# Patient Record
Sex: MALE | Race: WHITE | NOT HISPANIC OR LATINO | Employment: OTHER | URBAN - METROPOLITAN AREA
[De-identification: names, ages, dates, MRNs, and addresses within clinical notes are randomized per-mention and may not be internally consistent; named-entity substitution may affect disease eponyms.]

---

## 2017-01-04 ENCOUNTER — TRANSCRIBE ORDERS (OUTPATIENT)
Dept: URGENT CARE | Age: 72
End: 2017-01-04

## 2017-01-04 ENCOUNTER — APPOINTMENT (OUTPATIENT)
Dept: LAB | Age: 72
End: 2017-01-04
Payer: MEDICARE

## 2017-01-04 DIAGNOSIS — N40.0 BENIGN PROSTATIC HYPERPLASIA, PRESENCE OF LOWER URINARY TRACT SYMPTOMS UNSPECIFIED, UNSPECIFIED MORPHOLOGY: Primary | ICD-10-CM

## 2017-01-04 DIAGNOSIS — N40.0 BENIGN PROSTATIC HYPERPLASIA, PRESENCE OF LOWER URINARY TRACT SYMPTOMS UNSPECIFIED, UNSPECIFIED MORPHOLOGY: ICD-10-CM

## 2017-01-04 LAB — PSA SERPL-MCNC: 0.4 NG/ML (ref 0–4)

## 2017-01-04 PROCEDURE — 84153 ASSAY OF PSA TOTAL: CPT

## 2017-01-05 ENCOUNTER — LAB CONVERSION - ENCOUNTER (OUTPATIENT)
Dept: OTHER | Facility: OTHER | Age: 72
End: 2017-01-05

## 2017-01-05 ENCOUNTER — LAB REQUISITION (OUTPATIENT)
Dept: LAB | Facility: HOSPITAL | Age: 72
End: 2017-01-05
Payer: MEDICARE

## 2017-01-05 DIAGNOSIS — Z12.11 ENCOUNTER FOR SCREENING FOR MALIGNANT NEOPLASM OF COLON: ICD-10-CM

## 2017-01-05 PROCEDURE — 88305 TISSUE EXAM BY PATHOLOGIST: CPT | Performed by: INTERNAL MEDICINE

## 2017-01-23 ENCOUNTER — GENERIC CONVERSION - ENCOUNTER (OUTPATIENT)
Dept: OTHER | Facility: OTHER | Age: 72
End: 2017-01-23

## 2017-02-20 ENCOUNTER — ALLSCRIPTS OFFICE VISIT (OUTPATIENT)
Dept: OTHER | Facility: OTHER | Age: 72
End: 2017-02-20

## 2017-02-20 DIAGNOSIS — I10 ESSENTIAL (PRIMARY) HYPERTENSION: ICD-10-CM

## 2017-02-20 DIAGNOSIS — K21.9 GASTRO-ESOPHAGEAL REFLUX DISEASE WITHOUT ESOPHAGITIS: ICD-10-CM

## 2017-02-20 DIAGNOSIS — R00.2 PALPITATIONS: ICD-10-CM

## 2017-02-20 DIAGNOSIS — G47.30 SLEEP APNEA: ICD-10-CM

## 2017-02-20 DIAGNOSIS — Z00.00 ENCOUNTER FOR GENERAL ADULT MEDICAL EXAMINATION WITHOUT ABNORMAL FINDINGS: ICD-10-CM

## 2017-02-20 DIAGNOSIS — E66.9 OBESITY: ICD-10-CM

## 2017-02-20 DIAGNOSIS — I49.8 OTHER SPECIFIED CARDIAC ARRHYTHMIAS: ICD-10-CM

## 2017-02-20 DIAGNOSIS — E78.5 HYPERLIPIDEMIA: ICD-10-CM

## 2017-02-20 DIAGNOSIS — E78.00 PURE HYPERCHOLESTEROLEMIA: ICD-10-CM

## 2017-02-20 DIAGNOSIS — E11.9 TYPE 2 DIABETES MELLITUS WITHOUT COMPLICATIONS (HCC): ICD-10-CM

## 2017-04-06 ENCOUNTER — APPOINTMENT (OUTPATIENT)
Dept: LAB | Age: 72
End: 2017-04-06
Payer: MEDICARE

## 2017-04-06 ENCOUNTER — TRANSCRIBE ORDERS (OUTPATIENT)
Dept: ADMINISTRATIVE | Age: 72
End: 2017-04-06

## 2017-04-06 DIAGNOSIS — E66.9 OBESITY: ICD-10-CM

## 2017-04-06 DIAGNOSIS — R00.2 PALPITATIONS: ICD-10-CM

## 2017-04-06 DIAGNOSIS — Z00.00 ENCOUNTER FOR GENERAL ADULT MEDICAL EXAMINATION WITHOUT ABNORMAL FINDINGS: ICD-10-CM

## 2017-04-06 DIAGNOSIS — I49.8 OTHER SPECIFIED CARDIAC DYSRHYTHMIAS(427.89): ICD-10-CM

## 2017-04-06 DIAGNOSIS — E78.00 PURE HYPERCHOLESTEROLEMIA: ICD-10-CM

## 2017-04-06 DIAGNOSIS — E11.9 TYPE 2 DIABETES MELLITUS WITHOUT COMPLICATIONS (HCC): ICD-10-CM

## 2017-04-06 DIAGNOSIS — E78.5 HYPERLIPIDEMIA: ICD-10-CM

## 2017-04-06 DIAGNOSIS — G47.30 SLEEP APNEA: ICD-10-CM

## 2017-04-06 DIAGNOSIS — I10 ESSENTIAL (PRIMARY) HYPERTENSION: ICD-10-CM

## 2017-04-06 DIAGNOSIS — K21.9 GASTRO-ESOPHAGEAL REFLUX DISEASE WITHOUT ESOPHAGITIS: ICD-10-CM

## 2017-04-06 LAB
ALBUMIN SERPL BCP-MCNC: 3.2 G/DL (ref 3.5–5)
ALP SERPL-CCNC: 113 U/L (ref 46–116)
ALT SERPL W P-5'-P-CCNC: 42 U/L (ref 12–78)
ANION GAP SERPL CALCULATED.3IONS-SCNC: 7 MMOL/L (ref 4–13)
AST SERPL W P-5'-P-CCNC: 18 U/L (ref 5–45)
BACTERIA UR QL AUTO: ABNORMAL /HPF
BASOPHILS # BLD AUTO: 0.02 THOUSANDS/ΜL (ref 0–0.1)
BASOPHILS NFR BLD AUTO: 0 % (ref 0–1)
BILIRUB SERPL-MCNC: 0.59 MG/DL (ref 0.2–1)
BILIRUB UR QL STRIP: NEGATIVE
BUN SERPL-MCNC: 15 MG/DL (ref 5–25)
CALCIUM SERPL-MCNC: 8.2 MG/DL (ref 8.3–10.1)
CHLORIDE SERPL-SCNC: 98 MMOL/L (ref 100–108)
CHOLEST SERPL-MCNC: 168 MG/DL (ref 50–200)
CLARITY UR: CLEAR
CO2 SERPL-SCNC: 31 MMOL/L (ref 21–32)
COLOR UR: YELLOW
CREAT SERPL-MCNC: 1.13 MG/DL (ref 0.6–1.3)
EOSINOPHIL # BLD AUTO: 0.12 THOUSAND/ΜL (ref 0–0.61)
EOSINOPHIL NFR BLD AUTO: 2 % (ref 0–6)
ERYTHROCYTE [DISTWIDTH] IN BLOOD BY AUTOMATED COUNT: 13.6 % (ref 11.6–15.1)
GFR SERPL CREATININE-BSD FRML MDRD: >60 ML/MIN/1.73SQ M
GLUCOSE P FAST SERPL-MCNC: 140 MG/DL (ref 65–99)
GLUCOSE UR STRIP-MCNC: NEGATIVE MG/DL
HCT VFR BLD AUTO: 37.2 % (ref 36.5–49.3)
HDLC SERPL-MCNC: 43 MG/DL (ref 40–60)
HGB BLD-MCNC: 12.9 G/DL (ref 12–17)
HGB UR QL STRIP.AUTO: ABNORMAL
HYALINE CASTS #/AREA URNS LPF: ABNORMAL /LPF
KETONES UR STRIP-MCNC: NEGATIVE MG/DL
LDLC SERPL CALC-MCNC: 98 MG/DL (ref 0–100)
LEUKOCYTE ESTERASE UR QL STRIP: NEGATIVE
LYMPHOCYTES # BLD AUTO: 1.49 THOUSANDS/ΜL (ref 0.6–4.47)
LYMPHOCYTES NFR BLD AUTO: 22 % (ref 14–44)
MCH RBC QN AUTO: 33.8 PG (ref 26.8–34.3)
MCHC RBC AUTO-ENTMCNC: 34.7 G/DL (ref 31.4–37.4)
MCV RBC AUTO: 97 FL (ref 82–98)
MONOCYTES # BLD AUTO: 0.6 THOUSAND/ΜL (ref 0.17–1.22)
MONOCYTES NFR BLD AUTO: 9 % (ref 4–12)
NEUTROPHILS # BLD AUTO: 4.62 THOUSANDS/ΜL (ref 1.85–7.62)
NEUTS SEG NFR BLD AUTO: 67 % (ref 43–75)
NITRITE UR QL STRIP: NEGATIVE
NON-SQ EPI CELLS URNS QL MICRO: ABNORMAL /HPF
NRBC BLD AUTO-RTO: 0 /100 WBCS
PH UR STRIP.AUTO: 6.5 [PH] (ref 4.5–8)
PLATELET # BLD AUTO: 257 THOUSANDS/UL (ref 149–390)
PMV BLD AUTO: 10.2 FL (ref 8.9–12.7)
POTASSIUM SERPL-SCNC: 3.2 MMOL/L (ref 3.5–5.3)
PROT SERPL-MCNC: 7.5 G/DL (ref 6.4–8.2)
PROT UR STRIP-MCNC: ABNORMAL MG/DL
RBC # BLD AUTO: 3.82 MILLION/UL (ref 3.88–5.62)
RBC #/AREA URNS AUTO: ABNORMAL /HPF
SODIUM SERPL-SCNC: 136 MMOL/L (ref 136–145)
SP GR UR STRIP.AUTO: 1.02 (ref 1–1.03)
TRIGL SERPL-MCNC: 133 MG/DL
UROBILINOGEN UR QL STRIP.AUTO: 0.2 E.U./DL
WBC # BLD AUTO: 6.88 THOUSAND/UL (ref 4.31–10.16)
WBC #/AREA URNS AUTO: ABNORMAL /HPF

## 2017-04-06 PROCEDURE — 81001 URINALYSIS AUTO W/SCOPE: CPT

## 2017-04-06 PROCEDURE — 80053 COMPREHEN METABOLIC PANEL: CPT

## 2017-04-06 PROCEDURE — 80061 LIPID PANEL: CPT

## 2017-04-06 PROCEDURE — 36415 COLL VENOUS BLD VENIPUNCTURE: CPT

## 2017-04-06 PROCEDURE — 85025 COMPLETE CBC W/AUTO DIFF WBC: CPT

## 2017-04-13 ENCOUNTER — ALLSCRIPTS OFFICE VISIT (OUTPATIENT)
Dept: OTHER | Facility: OTHER | Age: 72
End: 2017-04-13

## 2017-04-14 ENCOUNTER — GENERIC CONVERSION - ENCOUNTER (OUTPATIENT)
Dept: OTHER | Facility: OTHER | Age: 72
End: 2017-04-14

## 2017-05-19 ENCOUNTER — HOSPITAL ENCOUNTER (OUTPATIENT)
Dept: SLEEP CENTER | Facility: CLINIC | Age: 72
Discharge: HOME | End: 2017-05-19
Payer: MEDICARE

## 2017-05-19 ENCOUNTER — TRANSCRIBE ORDERS (OUTPATIENT)
Dept: SLEEP CENTER | Facility: CLINIC | Age: 72
End: 2017-05-19

## 2017-05-19 DIAGNOSIS — G47.33 OSA (OBSTRUCTIVE SLEEP APNEA): ICD-10-CM

## 2017-05-19 DIAGNOSIS — G47.33 OSA (OBSTRUCTIVE SLEEP APNEA): Primary | ICD-10-CM

## 2017-06-06 ENCOUNTER — GENERIC CONVERSION - ENCOUNTER (OUTPATIENT)
Dept: OTHER | Facility: OTHER | Age: 72
End: 2017-06-06

## 2017-06-07 ENCOUNTER — GENERIC CONVERSION - ENCOUNTER (OUTPATIENT)
Dept: OTHER | Facility: OTHER | Age: 72
End: 2017-06-07

## 2017-06-08 ENCOUNTER — GENERIC CONVERSION - ENCOUNTER (OUTPATIENT)
Dept: OTHER | Facility: OTHER | Age: 72
End: 2017-06-08

## 2017-06-15 DIAGNOSIS — E11.9 TYPE 2 DIABETES MELLITUS WITHOUT COMPLICATIONS (HCC): ICD-10-CM

## 2017-06-21 ENCOUNTER — APPOINTMENT (OUTPATIENT)
Dept: LAB | Age: 72
End: 2017-06-21
Payer: MEDICARE

## 2017-06-21 ENCOUNTER — TRANSCRIBE ORDERS (OUTPATIENT)
Dept: ADMINISTRATIVE | Age: 72
End: 2017-06-21

## 2017-06-21 DIAGNOSIS — E08.8 DIABETES MELLITUS DUE TO UNDERLYING CONDITION WITH COMPLICATION, UNSPECIFIED LONG TERM INSULIN USE STATUS: ICD-10-CM

## 2017-06-21 DIAGNOSIS — E08.8 DIABETES MELLITUS DUE TO UNDERLYING CONDITION WITH COMPLICATION, UNSPECIFIED LONG TERM INSULIN USE STATUS: Primary | ICD-10-CM

## 2017-06-21 LAB
ALBUMIN SERPL BCP-MCNC: 3.4 G/DL (ref 3.5–5)
ALP SERPL-CCNC: 127 U/L (ref 46–116)
ALT SERPL W P-5'-P-CCNC: 33 U/L (ref 12–78)
ANION GAP SERPL CALCULATED.3IONS-SCNC: 11 MMOL/L (ref 4–13)
AST SERPL W P-5'-P-CCNC: 19 U/L (ref 5–45)
BILIRUB SERPL-MCNC: 0.34 MG/DL (ref 0.2–1)
BUN SERPL-MCNC: 22 MG/DL (ref 5–25)
CALCIUM SERPL-MCNC: 9.4 MG/DL (ref 8.3–10.1)
CHLORIDE SERPL-SCNC: 102 MMOL/L (ref 100–108)
CO2 SERPL-SCNC: 29 MMOL/L (ref 21–32)
CREAT SERPL-MCNC: 1.02 MG/DL (ref 0.6–1.3)
EST. AVERAGE GLUCOSE BLD GHB EST-MCNC: 137 MG/DL
GFR SERPL CREATININE-BSD FRML MDRD: >60 ML/MIN/1.73SQ M
GLUCOSE P FAST SERPL-MCNC: 139 MG/DL (ref 65–99)
HBA1C MFR BLD: 6.4 % (ref 4.2–6.3)
POTASSIUM SERPL-SCNC: 3.4 MMOL/L (ref 3.5–5.3)
PROT SERPL-MCNC: 7.9 G/DL (ref 6.4–8.2)
SODIUM SERPL-SCNC: 142 MMOL/L (ref 136–145)

## 2017-06-21 PROCEDURE — 83036 HEMOGLOBIN GLYCOSYLATED A1C: CPT

## 2017-06-21 PROCEDURE — 80053 COMPREHEN METABOLIC PANEL: CPT

## 2017-06-21 PROCEDURE — 36415 COLL VENOUS BLD VENIPUNCTURE: CPT

## 2017-06-28 ENCOUNTER — ALLSCRIPTS OFFICE VISIT (OUTPATIENT)
Dept: OTHER | Facility: OTHER | Age: 72
End: 2017-06-28

## 2017-07-05 ENCOUNTER — ALLSCRIPTS OFFICE VISIT (OUTPATIENT)
Dept: OTHER | Facility: OTHER | Age: 72
End: 2017-07-05

## 2017-07-14 ENCOUNTER — ALLSCRIPTS OFFICE VISIT (OUTPATIENT)
Dept: OTHER | Facility: OTHER | Age: 72
End: 2017-07-14

## 2017-10-26 ENCOUNTER — APPOINTMENT (OUTPATIENT)
Dept: LAB | Age: 72
End: 2017-10-26
Payer: MEDICARE

## 2017-10-26 ENCOUNTER — TRANSCRIBE ORDERS (OUTPATIENT)
Dept: ADMINISTRATIVE | Age: 72
End: 2017-10-26

## 2017-10-26 DIAGNOSIS — E11.9 TYPE 2 DIABETES MELLITUS WITHOUT COMPLICATIONS (HCC): ICD-10-CM

## 2017-10-26 LAB
ALBUMIN SERPL BCP-MCNC: 3.2 G/DL (ref 3.5–5)
ALP SERPL-CCNC: 151 U/L (ref 46–116)
ALT SERPL W P-5'-P-CCNC: 48 U/L (ref 12–78)
ANION GAP SERPL CALCULATED.3IONS-SCNC: 8 MMOL/L (ref 4–13)
AST SERPL W P-5'-P-CCNC: 23 U/L (ref 5–45)
BILIRUB SERPL-MCNC: 0.49 MG/DL (ref 0.2–1)
BUN SERPL-MCNC: 11 MG/DL (ref 5–25)
CALCIUM SERPL-MCNC: 8.9 MG/DL (ref 8.3–10.1)
CHLORIDE SERPL-SCNC: 101 MMOL/L (ref 100–108)
CO2 SERPL-SCNC: 28 MMOL/L (ref 21–32)
CREAT SERPL-MCNC: 1.1 MG/DL (ref 0.6–1.3)
EST. AVERAGE GLUCOSE BLD GHB EST-MCNC: 186 MG/DL
GFR SERPL CREATININE-BSD FRML MDRD: 66 ML/MIN/1.73SQ M
GLUCOSE P FAST SERPL-MCNC: 190 MG/DL (ref 65–99)
HBA1C MFR BLD: 8.1 % (ref 4.2–6.3)
POTASSIUM SERPL-SCNC: 3.4 MMOL/L (ref 3.5–5.3)
PROT SERPL-MCNC: 7.9 G/DL (ref 6.4–8.2)
SODIUM SERPL-SCNC: 137 MMOL/L (ref 136–145)

## 2017-10-26 PROCEDURE — 83036 HEMOGLOBIN GLYCOSYLATED A1C: CPT

## 2017-10-26 PROCEDURE — 36415 COLL VENOUS BLD VENIPUNCTURE: CPT

## 2017-10-26 PROCEDURE — 80053 COMPREHEN METABOLIC PANEL: CPT

## 2017-10-28 DIAGNOSIS — E11.9 TYPE 2 DIABETES MELLITUS WITHOUT COMPLICATIONS (HCC): ICD-10-CM

## 2017-11-02 ENCOUNTER — ALLSCRIPTS OFFICE VISIT (OUTPATIENT)
Dept: OTHER | Facility: OTHER | Age: 72
End: 2017-11-02

## 2017-11-03 NOTE — PROGRESS NOTES
Assessment  1  Acute pain of right knee (959 46) (M25 561)   2  DMII (diabetes mellitus, type 2) (250 00) (E11 9)   3  Morbid obesity with BMI of 40 0-44 9, adult (278 01,V85 41) (E66 01,Z68 41)   4  Hypertension (401 9) (I10)    Plan  DMII (diabetes mellitus, type 2)    · MetFORMIN HCl - 500 MG Oral Tablet; Take 1 tablet daily   · (1) BASIC METABOLIC PROFILE; Status:Active; Requested for:27Nov2017;     1  Type 2 diabetes uncontrolled recommend the patient start metformin at 500 mg daily follow-up fasting glucose in 3-4 weeks with office visit  Patient will not do home glucometer checking at this time  2   Morbid obesity with a 7 lb weight gain from July to now reviewed with the patient the importance of losing weight through walking exercise and calorie consumption reduction  He admits to poor diet control over the past several months  Dietary consultation offered and declined  3   Hypertension adequate control continue present medications for hypertension  4   Persistent pain in the right knee status post total knee replacement patient's car orthopedic surgeon believes that it could be related to his statin medication will discontinue the medication for period of 4 weeks and re-evaluate  Discussion/Summary  Discussion Summary:   In summary this pleasant 76-year-old gentleman presents today for routine follow-up visit  His blood testing indicates a significant jump in his fasting glucose which is now 190  He did admits to increased frequency of urination secondary to his high blood sugars  We also tested his hemoglobin A1c with a reading of 8 1% today  We discussed the pathophysiology of diabetes and the need to start medication at this time  The patient is agreeable start medication but does not want to start testing his glucose at home   I will start him on metformin 500 mg daily with follow-up laboratory fasting glucose in 4 weeks with an office visit shortly after that   said persistent pain status post total knee replacement  His orthopedic surgeon thinks it may be something related to his statin medication will discontinue his Crestor for 4 weeks and re-evaluate the pain  hypertension continues under good control  does have morbid obesity unfortunately has gained 7 lb since July  We had a discussion today about the need to seriously lose weight hopefully he will be able to decrease his calorie consumption as his activity level is somewhat limited  Counseling Documentation With Imm: total time of encounter was 25 minutes-- and-- 20 minutes was spent counseling  Chief Complaint  Chief Complaint Free Text Note Form: patient is here for a follow up  History of Present Illness  HPI: This 65-year-old gentleman returns today for routine follow-up visit  We performed a comprehensive metabolic profile and hemoglobin A1c test on him for this visit  His blood sugar is now 190 with a hemoglobin A1c value of 8 1%  Had a long discussion with the patient indicating that I think it is no longer safe to continue lifestyle management of his diabetes  I will start him on metformin 500 mg daily to be taken at breakfast time  I have encouraged him to work on losing weight and control his calorie intake  Unfortunately he has gained 7 lb since July  Part of this was from inactivity following his orthopedic surgery  He is reluctant to start home glucose glucose monitoring and prefers to go to the lab to have his blood sugar checked  I will see him in 3-4 weeks for follow-up visit at that time will have a fasting glucose to check on the performance of his metformin medication  has persistent pain in the area of his recent knee surgery  I his orthopedic surgeon is recommending that we discontinue his statin medication for a trial of one-month to determine if the medication is a factor in his persistent pain  I agree with this and will have him stop the Crestor for 4 weeks           Review of Systems  Complete-Male:   Constitutional: recent 7 lb weight gain, but-- No fever or chills, feels well, no tiredness, no recent weight gain or weight loss  Eyes: No complaints of eye pain, no red eyes, no discharge from eyes, no itchy eyes  ENT: no complaints of earache, no hearing loss, no nosebleeds, no nasal discharge, no sore throat, no hoarseness  Cardiovascular: No complaints of slow heart rate, no fast heart rate, no chest pain, no palpitations, no leg claudication, no lower extremity  Respiratory: No complaints of shortness of breath, no wheezing, no cough, no SOB on exertion, no orthopnea or PND  Gastrointestinal: No complaints of abdominal pain, no constipation, no nausea or vomiting, no diarrhea or bloody stools  Genitourinary: No complaints of dysuria, no incontinence, no hesitancy, no nocturia, no genital lesion, no testicular pain  Musculoskeletal: arthralgias-- and-- myalgias  Integumentary: No complaints of skin rash or skin lesions, no itching, no skin wound, no dry skin  Neurological: No compliants of headache, no confusion, no convulsions, no numbness or tingling, no dizziness or fainting, no limb weakness, no difficulty walking  Psychiatric: Is not suicidal, no sleep disturbances, no anxiety or depression, no change in personality, no emotional problems  Endocrine: No complaints of proptosis, no hot flashes, no muscle weakness, no erectile dysfunction, no deepening of the voice, no feelings of weakness  Hematologic/Lymphatic: No complaints of swollen glands, no swollen glands in the neck, does not bleed easily, no easy bruising  Active Problems  1  Acute pain of right knee (719 46) (M25 561)   2  Acute pharyngitis, unspecified etiology (462) (J02 9)   3  Antibiotic prophylaxis for dental procedure indicated due to prior joint replacement (V58 62) (Z79 2)   4  Anxiety (300 00) (F41 9)   5  Arthritis (716 90) (M19 90)   6   DMII (diabetes mellitus, type 2) (250 00) (E11 9)   7  Fluttering heart (427 42) (I49 8)   8  GERD (gastroesophageal reflux disease) (530 81) (K21 9)   9  High cholesterol (272 0) (E78 00)   10  History of total knee replacement, right (V43 65) (Z96 651)   11  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)   12  Hyperlipidemia (272 4) (E78 5)   13  Hypertension (401 9) (I10)   14  Liver disease (573 9) (K76 9)   15  Lumbar radiculopathy (724 4) (M54 16)   16  Mild left ventricular hypertrophy (429 3) (I51 7)   17  Morbid obesity with BMI of 40 0-44 9, adult (278 01,V85 41) (E66 01,Z68 41)   18  Need for immunization against influenza (V04 81) (Z23)   19  Obesity (278 00) (E66 9)   20  Palpitations (785 1) (R00 2)   21  Sinusitis (473 9) (J32 9)   22  Sleep apnea (780 57) (G47 30)    Past Medical History  1  History of Chest discomfort (786 59) (R07 89)   2  History of Diaphoresis (780 8) (R61)   3  History of acute sinusitis (V12 69) (Z87 09)   4  History of paroxysmal supraventricular tachycardia (V12 59) (Z86 79)   5  History of shortness of breath (V13 89) (Z87 898)   6  History of tinnitus (V12 49) (Z86 69)   7  History of urinary frequency (V13 09) (Z87 898)   8  History of Numbness and tingling in left arm (782 0) (R20 0,R20 2)   9  History of Numbness on left side (782 0) (R20 0)  Active Problems And Past Medical History Reviewed: The active problems and past medical history were reviewed and updated today  Surgical History  1  History of Anoscopy For Polyp Removal   2  History of Arthroscopy Knee Right   3  History of Back Surgery   4  History of Biopsy Rectal   5  History of Knee Surgery Left   6  History of Oral Surgery Tooth Extraction   7  History of Total Knee Replacement Left  Surgical History Reviewed: The surgical history was reviewed and updated today  Family History  Mother    1  Family history of diabetes mellitus (V18 0) (Z83 3)  Father    2  Family history of hypertension (V17 49) (Z82 49)  Family History Reviewed:    The family history was reviewed and updated today  Social History   · Consumes alcohol (V49 89) (Z78 9)   · Former smoker (F27 63) (U86 854)   ·    · Non-smoker (V49 89) (Z78 9)   · Retired  Social History Reviewed: The social history was reviewed and updated today  The social history was reviewed and is unchanged  Current Meds   1  AmLODIPine Besylate 5 MG Oral Tablet; TAKE 1 AND 1/2 TABLETS     DAILY; Therapy: 39UJI8027 to 450 5770)  Requested for: 36VHN2865; Last Rx:23Jan2017   Ordered   2  Amoxicillin 500 MG Oral Capsule; TAKE 4 CAPSULES 1 HOUR PRIOR TO DENTAL APPOINTMENT; Therapy: 01Aff0343 to (Evaluate:15Oct2017)  Requested for: 99VJI2824; Last Rx:10Oct2017   Ordered   3  Aspirin Low Dose 81 MG TABS; TAKE 1 TABLET DAILY; Therapy: (Recorded:17Mar2015) to Recorded   4  Benicar HCT 40-25 MG Oral Tablet; TAKE 1 TABLET DAILY; Therapy: 75GPZ2604 to (Evaluate:23Nov2017)  Requested for: 15ITQ1749; Last Rx:28Nov2016   Ordered   5  Cephalexin 500 MG Oral Capsule; TAKE 4 CAPSULES 1 HR PRIOR TO DENTAL PROCEDURE; Therapy: 18XQC1033 to (Evaluate:30Mzv4889)  Requested for: 88KPM7486; Last Rx:72Fmz4901 Ordered   6  Citalopram Hydrobromide 10 MG Oral Tablet; Take 1 tablet daily; Therapy: 86PYN0220 to (Ruffus Willie)  Requested for: 88Jzo5633; Last Rx:89Veu9020   Ordered   7  Combigan 0 2-0 5 % Ophthalmic Solution; take as directed; Therapy: (Recorded:17Mar2015) to Recorded   8  Fluticasone Propionate 50 MCG/ACT Nasal Suspension; USE 1 SPRAY IN EACH NOSTRIL TWICE   DAILY for one week then one spray once daily thereafter; Therapy: 14ROH3893 to (Evaluate:01Eue1061)  Requested for: 71CKK1687; Last Rx:77Qfv2992   Ordered   9  Latanoprost 0 005 % Ophthalmic Solution; Therapy: 59GWP3863 to Recorded   10   MethylPREDNISolone 4 MG Oral Tablet Therapy Pack; take 6 tabs the first day, 5 tabs the next day, 4    tabs the next day, 3 tabs the next day, 2 tabs the next day and 1 tab the next day and sto;    Therapy: [id number] to (Evaluate:12Lki0776)  Requested for: 91XHF7930; Last Rx:91Egi6841 Ordered   11  Omeprazole 20 MG Oral Capsule Delayed Release; take 1 capsule daily; Therapy: 19RQC0091 to (Evaluate:18Jan2018)  Requested for: 29ESA6548; Last Rx:45Ohq2038    Ordered   12  Rosuvastatin Calcium 5 MG Oral Tablet; TAKE 1 TABLET DAILY; Therapy: 00UWA0570 to (Charly Knows)  Requested for: 53IMR7903; Last Rx:21Tbe9112    Ordered   13  TraMADol HCl - 50 MG Oral Tablet; TAKE 1 TABLET EVERY 4 TO 6 HOURS AS NEEDED FOR PAIN;    Therapy: 41TEM1817 to (Evaluate:39Kgi3541); Last Rx:28Jun2017 Ordered  Medication List Reviewed: The medication list was reviewed and updated today  Allergies  1  Codeine Derivatives   2  Sulfa Drugs    Vitals  Vital Signs    Recorded: 09QJV7494 12:54PM   Temperature 98 8 F   Heart Rate 67   Respiration 16   Systolic 574   Diastolic 84   Height 5 ft 7 5 in   Weight 287 lb 0 2 oz   BMI Calculated 44 29   BSA Calculated 2 37   O2 Saturation 98     Physical Exam    Constitutional   General appearance: No acute distress, well appearing and well nourished  Eyes   Conjunctiva and lids: No swelling, erythema, or discharge  Ears, Nose, Mouth, and Throat   External inspection of ears and nose: Normal     Pulmonary   Respiratory effort: No increased work of breathing or signs of respiratory distress  Auscultation of lungs: Clear to auscultation, equal breath sounds bilaterally, no wheezes, no rales, no rhonci  Cardiovascular   Auscultation of heart: Normal rate and rhythm, normal S1 and S2, without murmurs  Examination of extremities for edema and/or varicosities: Normal          Future Appointments    Date/Time Provider Specialty Site   02/21/2018 11:15 AM SUZY Garcia   Internal Medicine [address]     Signatures   Electronically signed by : SUZY Florez ; Nov 2 2017  1:27PM EST                       (Author)

## 2017-11-27 ENCOUNTER — APPOINTMENT (OUTPATIENT)
Dept: LAB | Age: 72
End: 2017-11-27
Payer: MEDICARE

## 2017-11-27 ENCOUNTER — TRANSCRIBE ORDERS (OUTPATIENT)
Dept: ADMINISTRATIVE | Age: 72
End: 2017-11-27

## 2017-11-27 DIAGNOSIS — E11.9 TYPE 2 DIABETES MELLITUS WITHOUT COMPLICATIONS (HCC): ICD-10-CM

## 2017-11-27 LAB
ANION GAP SERPL CALCULATED.3IONS-SCNC: 6 MMOL/L (ref 4–13)
BUN SERPL-MCNC: 14 MG/DL (ref 5–25)
CALCIUM SERPL-MCNC: 8.8 MG/DL (ref 8.3–10.1)
CHLORIDE SERPL-SCNC: 102 MMOL/L (ref 100–108)
CO2 SERPL-SCNC: 29 MMOL/L (ref 21–32)
CREAT SERPL-MCNC: 1.13 MG/DL (ref 0.6–1.3)
GFR SERPL CREATININE-BSD FRML MDRD: 64 ML/MIN/1.73SQ M
GLUCOSE P FAST SERPL-MCNC: 149 MG/DL (ref 65–99)
POTASSIUM SERPL-SCNC: 3.3 MMOL/L (ref 3.5–5.3)
SODIUM SERPL-SCNC: 137 MMOL/L (ref 136–145)

## 2017-11-27 PROCEDURE — 80048 BASIC METABOLIC PNL TOTAL CA: CPT

## 2017-11-27 PROCEDURE — 36415 COLL VENOUS BLD VENIPUNCTURE: CPT

## 2017-11-30 ENCOUNTER — GENERIC CONVERSION - ENCOUNTER (OUTPATIENT)
Dept: OTHER | Facility: OTHER | Age: 72
End: 2017-11-30

## 2018-01-01 DIAGNOSIS — E11.9 TYPE 2 DIABETES MELLITUS WITHOUT COMPLICATIONS (HCC): ICD-10-CM

## 2018-01-12 VITALS
BODY MASS INDEX: 43.5 KG/M2 | HEIGHT: 68 IN | TEMPERATURE: 98.8 F | HEART RATE: 67 BPM | SYSTOLIC BLOOD PRESSURE: 138 MMHG | RESPIRATION RATE: 16 BRPM | OXYGEN SATURATION: 98 % | DIASTOLIC BLOOD PRESSURE: 84 MMHG | WEIGHT: 287.01 LBS

## 2018-01-13 VITALS
DIASTOLIC BLOOD PRESSURE: 80 MMHG | OXYGEN SATURATION: 98 % | HEART RATE: 80 BPM | SYSTOLIC BLOOD PRESSURE: 138 MMHG | RESPIRATION RATE: 18 BRPM | BODY MASS INDEX: 42.66 KG/M2 | WEIGHT: 281.5 LBS | TEMPERATURE: 98.2 F | HEIGHT: 68 IN

## 2018-01-14 VITALS
DIASTOLIC BLOOD PRESSURE: 78 MMHG | OXYGEN SATURATION: 98 % | SYSTOLIC BLOOD PRESSURE: 154 MMHG | RESPIRATION RATE: 18 BRPM | BODY MASS INDEX: 43.21 KG/M2 | TEMPERATURE: 98.9 F | HEIGHT: 68 IN | WEIGHT: 285.13 LBS | HEART RATE: 77 BPM

## 2018-01-14 VITALS
DIASTOLIC BLOOD PRESSURE: 74 MMHG | SYSTOLIC BLOOD PRESSURE: 132 MMHG | HEART RATE: 70 BPM | OXYGEN SATURATION: 97 % | WEIGHT: 295.38 LBS | TEMPERATURE: 98 F | HEIGHT: 68 IN | RESPIRATION RATE: 22 BRPM | BODY MASS INDEX: 44.77 KG/M2

## 2018-01-14 VITALS
RESPIRATION RATE: 16 BRPM | TEMPERATURE: 98.5 F | HEIGHT: 68 IN | DIASTOLIC BLOOD PRESSURE: 70 MMHG | SYSTOLIC BLOOD PRESSURE: 133 MMHG | OXYGEN SATURATION: 98 % | WEIGHT: 280 LBS | BODY MASS INDEX: 42.44 KG/M2 | HEART RATE: 77 BPM

## 2018-01-16 ENCOUNTER — TRANSCRIBE ORDERS (OUTPATIENT)
Dept: ADMINISTRATIVE | Age: 73
End: 2018-01-16

## 2018-01-16 ENCOUNTER — APPOINTMENT (OUTPATIENT)
Dept: LAB | Age: 73
End: 2018-01-16
Payer: MEDICARE

## 2018-01-16 DIAGNOSIS — R35.0 BENIGN PROSTATIC HYPERPLASIA WITH URINARY FREQUENCY: ICD-10-CM

## 2018-01-16 DIAGNOSIS — R35.0 BENIGN PROSTATIC HYPERPLASIA WITH URINARY FREQUENCY: Primary | ICD-10-CM

## 2018-01-16 DIAGNOSIS — N40.1 BENIGN PROSTATIC HYPERPLASIA WITH URINARY FREQUENCY: ICD-10-CM

## 2018-01-16 DIAGNOSIS — N40.1 BENIGN PROSTATIC HYPERPLASIA WITH URINARY FREQUENCY: Primary | ICD-10-CM

## 2018-01-16 LAB — PSA SERPL-MCNC: 0.4 NG/ML (ref 0–4)

## 2018-01-16 PROCEDURE — 84153 ASSAY OF PSA TOTAL: CPT

## 2018-01-16 NOTE — PROGRESS NOTES
Assessment   1  DMII (diabetes mellitus, type 2) (250 00) (E11 9)  2  Tinnitus (388 30) (H93 19)  3  Arthritis (716 90) (M19 90)  4  Hyperlipidemia (272 4) (E78 5)  5  Hypertension (401 9) (I10)  6  Numbness on left side (782 0) (R20 0)  7  Sleep apnea (780 57) (G47 30)    Plan   DMII (diabetes mellitus, type 2)    · (1) GLUCOSE,  FASTING; Status:Active; Requested for:17Mar2016;     * MRI BRAIN WO CONTRAST; Status:Hold For - Scheduling; Requested [facility]:95RWF0790;   Perform:[facility] Radiology; Order Comments: With Anesthesia; AMR:80GYZ0569;KUPISUL;  For:Numbness on left side; Ordered By:Shasta Michel;   * MRI CERVICAL SPINE WO CONTRAST; Status:Hold For - Scheduling; Requested for:95Nrv3494;   Perform:[facility] Radiology; Order Comments:Needs Anesthesia; SLZ:32NVC3004;LVTJSKS;  For:Numbness on left side; Ordered By:Shasta Michel;   2 Bettie Clifton MD, Shasta Peacock (Otolaryngology) Physician Referral Consult Status: Active Requested for: 22WEL6944  Recorded; For: Numbness on left side; Ordered By: Angie Dominguez Performed:  Due: 09GKC8552; Last Updated By: Katheryn Wang; 2/4/2016 12:37:56 PM     Discussion/Summary  Prostate cancer screening: prostate cancer screening is current  Testicular cancer screening: testicular cancer screening is current  Colorectal cancer screening: the risks and benefits of colorectal cancer screening were discussed  1- Left side change in sensation and patient perceived weakness  Plan for MRI of brain and Cervical spine  2-Tinnitus in both ears will schedule;e audiology testing  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  4-H/O sleep apnea will continue CPAP  type II diabetes rising FBS patient admits to dietary noncompliance will follow up in 6 weeks likely will need to start PO agent  HTN- controlled  Chief Complaint  Annual complete history and physical      History of Present Illness  HPI: 1- 1 year duration of left side of body weakness and sensation of tingling and burning sensation    2- Muscle spasm of the upper back located in the scapular region  3- Tinnitus in both ears,some decrease in hearing acuity  4- Bilateral thumb tenderness and pain with swelling in the joint region  5- intermittent dizziness seems to have an orthostatic component  Review of Systems    Constitutional: No fever or chills, feels well, no tiredness, no recent weight gain or weight loss  Eyes: No complaints of eye pain, no red eyes, no discharge from eyes, no itchy eyes  ENT: tinnitus in both ears  Cardiovascular: No complaints of slow heart rate, no fast heart rate, no chest pain, no palpitations, no leg claudication, no lower extremity  Respiratory: No complaints of shortness of breath, no wheezing, no cough, no SOB on exertion, no orthopnea or PND  Gastrointestinal: No complaints of abdominal pain, no constipation, no nausea or vomiting, no diarrhea or bloody stools  Genitourinary: No complaints of dysuria, no incontinence, no hesitancy, no nocturia, no genital lesion, no testicular pain  Musculoskeletal: No complaints of arthralgia, no myalgias, no joint swelling or stiffness, no limb pain or swelling  Integumentary: No complaints of skin rash or skin lesions, no itching, no skin wound, no dry skin  Neurological: tingling, dizziness and tingling and burning sensation on the l side of his body  Psychiatric: Is not suicidal, no sleep disturbances, no anxiety or depression, no change in personality, no emotional problems  Endocrine: No complaints of proptosis, no hot flashes, no muscle weakness, no erectile dysfunction, no deepening of the voice, no feelings of weakness  Hematologic/Lymphatic: No complaints of swollen glands, no swollen glands in the neck, does not bleed easily, no easy bruising  Active Problems   1  Anxiety (300 00) (F41 9)  2  Arthritis (716 90) (M19 90)  3  Fluttering heart (427 42) (I49 8)  4  GERD (gastroesophageal reflux disease) (530 81) (K21 9)  5   Hyperlipidemia (272 4) (E78 5)  6  Hypertension (401 9) (I10)  7  Liver disease (573 9) (K76 9)  8  Palpitations (785 1) (R00 2)  9  Sleep apnea (780 57) (G47 30)    Past Medical History    · History of Chest discomfort (786 59) (R07 89)   · History of Diaphoresis (780 8) (R61)   · History of paroxysmal supraventricular tachycardia (V12 59) (Z86 79)   · History of shortness of breath (V13 89) (M46 914)   · History of urinary frequency (V13 09) (W04 557)    Surgical History    · History of Anoscopy For Polyp Removal   · History of Arthroscopy Knee Right   · History of Back Surgery   · History of Biopsy Rectal   · History of Knee Surgery Left   · History of Oral Surgery Tooth Extraction   · History of Total Knee Replacement Left    Family History    · Family history of diabetes mellitus (V18 0) (Z83 3)    · Family history of hypertension (V17 49) (Z82 49)    Social History    · Consumes alcohol (V49 89) (F10 99)   ·    · Non-smoker (V49 89) (Z78 9)   · Retired    Current Meds  1  AmLODIPine Besylate 5 MG Oral Tablet; take 1 tablet and 1/2  every day; Therapy: 25DLT7826 to (Evaluate:15Jun2015) Recorded  2  Aspirin Low Dose 81 MG Oral Tablet; TAKE 1 TABLET DAILY; Therapy: (Recorded:17Mar2015) to Recorded  3  Benicar HCT 40-25 MG Oral Tablet; TAKE 1 TABLET DAILY; Therapy: 69FFL7979 to (Evaluate:16Apr2015) Recorded  4  Besivance 0 6 % Ophthalmic Suspension; Therapy: 41KOX9846 to Recorded  5  Citalopram Hydrobromide 10 MG Oral Tablet; TAKE 1 TABLET DAILY; Therapy: 17DTO0410 to (Evaluate:16Apr2015) Recorded  6  Combigan 0 2-0 5 % Ophthalmic Solution; take as directed; Therapy: (Recorded:17Mar2015) to Recorded  7  Crestor 5 MG Oral Tablet; TAKE 1 TABLET DAILY; Therapy: 93HFS4823 to (Evaluate:18Xjc0291) Recorded  8  Durezol 0 05 % Ophthalmic Emulsion; Therapy: 23QXN0349 to Recorded  9  Ilevro 0 3 % Ophthalmic Suspension; Therapy: 08WGA3080 to Recorded  10  Latanoprost 0 005 % Ophthalmic Solution;     Therapy: 61YAO2975 to Recorded  11  Omeprazole 20 MG Oral Capsule Delayed Release; take one tablet daily; Therapy: 95QWA0135 to (Evaluate:15Jun2015) Recorded  12  VESIcare 5 MG Oral Tablet; Therapy: 39KYP2494 to Recorded    Allergies   1  Codeine Derivatives  2  Sulfa Drugs    Vitals   Recorded: 15TIS2015 06:17PM   Heart Rate 68   Respiration 18   Systolic 773   Diastolic 78   BP CUFF SIZE Large   Height 5 ft 7 in   Weight 284 lb    BMI Calculated 44 48   BSA Calculated 2 35     Physical Exam    Constitutional   General appearance: No acute distress, well appearing and well nourished  Head and Face   Head and face: Normal     Palpation of the face and sinuses: No sinus tenderness  Eyes   Conjunctiva and lids: No erythema, swelling or discharge  Pupils and irises: Equal, round, reactive to light  Ophthalmoscopic examination: Normal fundi and optic discs  Ears, Nose, Mouth, and Throat   External inspection of ears and nose: Normal     Otoscopic examination: Tympanic membranes translucent with normal light reflex  Canals patent without erythema  Hearing: Normal     Nasal mucosa, septum, and turbinates: Normal without edema or erythema  Lips, teeth, and gums: Normal, good dentition  Oropharynx: Normal with no erythema, edema, exudate or lesions  Neck   Neck: Supple, symmetric, trachea midline, no masses  Thyroid: Normal, no thyromegaly  Pulmonary   Respiratory effort: No increased work of breathing or signs of respiratory distress  Percussion of chest: Normal     Auscultation of lungs: Clear to auscultation  Cardiovascular   Auscultation of heart: Normal rate and rhythm, normal S1 and S2, no murmurs  Carotid pulses: 2+ bilaterally  Chest   Breasts: Normal, no dimpling or skin changes appreciated  Palpation of breasts and axillae: Normal, no masses palpated  Chest: Normal     Abdomen   Abdomen: Non-tender, no masses  Liver and spleen: No hepatomegaly or splenomegaly      Examination for hernias: No hernias appreciated  Anus, perineum, and rectum: Normal sphincter tone, no masses, no prolapse  Genitourinary   Scrotal contents: Normal testes, no masses  Penis: Normal, no lesions  Digital rectal exam of prostate: Abnormal   enlarged non nodular  Lymphatic   Palpation of lymph nodes in neck: No lymphadenopathy  Palpation of lymph nodes in axillae: No lymphadenopathy  Palpation of lymph nodes in groin: No lymphadenopathy  Musculoskeletal   Gait and station: Normal     Inspection/palpation of joints, bones, and muscles: Abnormal   swelling and tenderness the thumbs bilaterally  Muscle strength/tone: Abnormal   tender at the right scapular edge  Skin   Skin and subcutaneous tissue: Abnormal   recent skin lesion removed from right cheek  Neurologic   Cranial nerves: Cranial nerves 2-12 intact  Cortical function: Normal mental status  Reflexes: Abnormal   decreased reflexes in the patellar and Achillis region bilaterally  Sensation: No sensory loss  Coordination: Normal finger to nose and heel to shin  Diabetic Foot Exam: Right Foot Findings: normal foot  The toes were normal  Left Foot Findings: normal foot  The toes were normal    Monofilament Testing: normal tactile sensation with monofilament testing throughout both feet  Vascular:   Psychiatric   Judgment and insight: Normal     Orientation to person, place and time: Normal     Recent and remote memory: Intact  Mood and affect: Normal        Future Appointments    Date/Time Provider Specialty Site   03/23/2016 10:30 AM SUZY Le  Internal Medicine [facility]   02/15/2017 10:45 AM SUZY Le   Internal Medicine [facility] INTERNAL MED     Signatures   Electronically signed by : SUZY Spicer ; Feb 5 2016  2:31PM EST                       (Author)

## 2018-01-22 VITALS
WEIGHT: 290.38 LBS | HEIGHT: 68 IN | RESPIRATION RATE: 20 BRPM | BODY MASS INDEX: 44.01 KG/M2 | TEMPERATURE: 97.8 F | SYSTOLIC BLOOD PRESSURE: 148 MMHG | HEART RATE: 74 BPM | OXYGEN SATURATION: 95 % | DIASTOLIC BLOOD PRESSURE: 78 MMHG

## 2018-01-22 VITALS
DIASTOLIC BLOOD PRESSURE: 80 MMHG | WEIGHT: 287.02 LBS | RESPIRATION RATE: 14 BRPM | SYSTOLIC BLOOD PRESSURE: 140 MMHG | OXYGEN SATURATION: 97 % | HEART RATE: 74 BPM | HEIGHT: 68 IN | TEMPERATURE: 99.3 F | BODY MASS INDEX: 43.5 KG/M2

## 2018-01-23 NOTE — PROGRESS NOTES
Assessment    1  DMII (diabetes mellitus, type 2) (250 00) (E11 9)   2  High cholesterol (272 0) (E78 00)   3  Hyperlipidemia (272 4) (E78 5)   4  Hypertension (401 9) (I10)   5  Lumbar radiculopathy (724 4) (M54 16)   6  Obesity (278 00) (E66 9)   7  Sleep apnea (780 57) (G47 30)   8  Arthritis (716 90) (M19 90)    Plan  DMII (diabetes mellitus, type 2), Health Maintenance, Fluttering heart, GERD  (gastroesophageal reflux disease), High cholesterol, Hyperlipidemia, Hypertension,  Obesity, Palpitations, Sleep apnea    · (1) CBC/PLT/DIFF; Status:Active; Requested for:07Snr4148;    · (1) COMPREHENSIVE METABOLIC PANEL; Status:Active; Requested for:97Jjb9816;    · (1) LIPID PANEL FASTING W DIRECT LDL REFLEX; Status:Active; Requested  for:28Pwc3563;    · (1) URINALYSIS (will reflex a microscopy if leukocytes, occult blood, protein or nitrites are  not within normal limits); Status:Active; Requested for:16Mhc7691;   Hypertension    · EKG/ECG- POC; Status:Active - Perform Order; Requested for:28Rlv4561;     #1 type 2 diabetes recommend the patient continue to work on weight loss and regular exercise  His bili exercise limited by his advancing arthritis and is weightbearing joints secondary to his obesity  #2 history of hyperlipidemia right updated blood testing to evaluate his present status  #3 hypertension adequately controlled continue present medications  #4 history of lumbar radiculopathy presently was some low back pain that does not seem to radiate below the buttocks region  #5 easily significant risk for future medical complications  Rest of the patient need to lose weight through caloric consumption reduction  #6 history of sleep apnea continue on CPAP  Discussion/Summary  In summary this gentleman continues to have significant obesity problems without a loss of late significant over the past year  The excessive weight increases his risk for multiple medical  comorbidities   I discussed the need for regular walking exercise as reduction of calorie intake  The patient had a colonoscopy within the past year and is up-to-date on his colon cancer screening  A single polyp was removed on that procedure  Recommendation is for a five-year follow-up examination R the patient prefers to stay at 2-3 year intervals given his history of polyps  He continues with regular prostate examinations with his urologist  Yandy Cruz see the patient in 4 months for his next regular visit  Chief Complaint  patient is here for an AWV  History of Present Illness  This 24-year-old gentleman presents today for an annual wellness visit  He has a history of hypertension and obesity left ventricular hypertrophy sleep apnea, arthritis, type 2 diabetes, lumbar radiculopathy, hyperlipidemia  He is up-to-date on his eye examinations having had his most recent diabetic eye examination performed on September 28, 2016  A 1C from October 5, 2016 of 6 3  The patient is followed by Dr Thor Aase the Department of urology who monitors his prostate status he had a examination performed with Dr Thor Aase in January 2017 and his PSA reading was 0 4  The patient is being seen for the initial annual wellness visit  Medicare Screening and Risk Factors   Hospitalizations: no previous hospitalizations  Once per lifetime medicare screening tests: ECG  Medicare Screening Tests Risk Questions   Drug and Alcohol Use: The patient has never smoked cigarettes  The patient reports frequent alcohol use and drinking 2 drinks per day  Alcohol concern:   The patient has no concerns about alcohol abuse  He has never used illicit drugs  Diet and Physical Activity: Current diet includes well balanced meals  The patient does not exercise  Mood Disorder and Cognitive Impairment Screening:   Depression screening was done using  Functional Ability/Level of Safety: Hearing is slightly decreased and a hearing aid is not used  Fall risk factors:   The patient fell 0 times in the past 12 months  Advance Directives: Advance directives: unknown  Concerns with the patient's end of life decisions: unknown  Co-Managers and Medical Equipment/Suppliers: See Patient Care Team      Patient Care Team    Care Team Member Role Specialty Office Number   Tevin Spears [place]  Cardiology [phone]   Cecil Dean MD Attending Internal Medicine [phone]     Review of Systems    Constitutional: negative  Head and Face: negative  Eyes: negative  ENT: nasal congestion and nasal discharge  Cardiovascular: negative  Respiratory: negative  Gastrointestinal: negative  Genitourinary: negative  Musculoskeletal: negative  Integumentary and Breasts: negative  Neurological: negative  Psychiatric: negative  Endocrine: negative  Hematologic and Lymphatic: negative  Active Problems    1  Anxiety (300 00) (F41 9)   2  Arthritis (716 90) (M19 90)   3  DMII (diabetes mellitus, type 2) (250 00) (E11 9)   4  Fluttering heart (427 42) (I49 8)   5  GERD (gastroesophageal reflux disease) (530 81) (K21 9)   6  High cholesterol (272 0) (E78 00)   7  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)   8  Hyperlipidemia (272 4) (E78 5)   9  Hypertension (401 9) (I10)   10  Liver disease (573 9) (K76 9)   11  Lumbar radiculopathy (724 4) (M54 16)   12  Mild left ventricular hypertrophy (429 3) (I51 7)   13  Need for immunization against influenza (V04 81) (Z23)   14  Obesity (278 00) (E66 9)   15  Palpitations (785 1) (R00 2)   16  Sleep apnea (780 57) (G47 30)    Past Medical History    1  History of Chest discomfort (786 59) (R07 89)   2  History of Diaphoresis (780 8) (R61)   3  History of acute sinusitis (V12 69) (Z87 09)   4  History of paroxysmal supraventricular tachycardia (V12 59) (Z86 79)   5  History of shortness of breath (V13 89) (Z87 898)   6  History of tinnitus (V12 49) (Z86 69)   7  History of urinary frequency (V13 09) (Z87 898)   8   History of Numbness and tingling in left arm (782  0) (R20 0,R20 2)   9  History of Numbness on left side (782 0) (R20 0)    The active problems and past medical history were reviewed and updated today  Surgical History    1  History of Anoscopy For Polyp Removal   2  History of Arthroscopy Knee Right   3  History of Back Surgery   4  History of Biopsy Rectal   5  History of Knee Surgery Left   6  History of Oral Surgery Tooth Extraction   7  History of Total Knee Replacement Left    The surgical history was reviewed and updated today  Family History  Mother    1  Family history of diabetes mellitus (V18 0) (Z83 3)  Father    2  Family history of hypertension (V17 49) (Z82 49)    The family history was reviewed and updated today  Social History    · Consumes alcohol (V49 89) (Z78 9)   · Former smoker (I20 79) (X02 541)   ·    · Non-smoker (V49 89) (Z78 9)   · Retired  The social history was reviewed and updated today  The social history was reviewed and is unchanged  Current Meds   1  AmLODIPine Besylate 5 MG Oral Tablet; TAKE 1 AND 1/2 TABLETS     DAILY; Therapy: 64GIX9004 to [phone])  Requested for: 61NJQ6132; Last   Rx:23Jan2017 Ordered   2  Aspirin Low Dose 81 MG TABS; TAKE 1 TABLET DAILY; Therapy: (Recorded:17Mar2015) to Recorded   3  Benicar HCT 40-25 MG Oral Tablet; TAKE 1 TABLET DAILY; Therapy: 00GKI7520 to (Jhonatan Thompson)  Requested for: 17SJN9103; Last   Rx:28Nov2016 Ordered   4  Benzonatate 100 MG Oral Capsule; TAKE 1 CAPSULE 3 TIMES DAILY AS NEEDED; Therapy: 82MPE1930 to (Evaluate:53Fkg8238)  Requested for: 92OZP1860; Last   Rx:29Mar2016 Ordered   5  Citalopram Hydrobromide 10 MG Oral Tablet; TAKE 1 TABLET DAILY; Therapy: 46AAM7704 to (Evaluate:64Dwk9723)  Requested for: 25Jan2017; Last   Rx:25Jan2017 Ordered   6  Clarithromycin 500 MG Oral Tablet; take 1 tablet twice daily until finished; Therapy: 99SCZ2314 to (Michele Dean)  Requested for: 99OGE6904; Last   Rx:29Mar2016 Ordered   7  Combigan 0 2-0 5 % Ophthalmic Solution; take as directed; Therapy: (Recorded:17Mar2015) to Recorded   8  Latanoprost 0 005 % Ophthalmic Solution; Therapy: 72SDW4952 to Recorded   9  Omeprazole 20 MG Oral Capsule Delayed Release; take 1 capsule daily; Therapy: 10XIK0331 to (Evaluate:18Jan2018)  Requested for: 33TEX7882; Last   Rx:23Jan2017 Ordered   10  Rosuvastatin Calcium 5 MG Oral Tablet; TAKE 1 TABLET DAILY; Therapy: 97NPT7256 to ([employer])  Requested for: 70NPG3364; Last    Rx:28Nov2016 Ordered    The medication list was reviewed and updated today  Allergies    1  Codeine Derivatives   2  Sulfa Drugs    Immunizations  Influenza --- Verl Nones: 28-Oct-2016     Vitals  Signs   Recorded: 86VFP7944 13:19TO   Systolic: 727  Diastolic: 78  Recorded: 53XYL0695 02:48PM   Temperature: 97 8 F  Heart Rate: 74  Respiration: 20  Systolic: 610  Diastolic: 84  Height: 5 ft 7 5 in  Weight: 290 lb 6 08 oz  BMI Calculated: 44 81  BSA Calculated: 2 39  O2 Saturation: 95    Procedure    Procedure: Visual Acuity Test    Indication: routine screening  Inforrmation supplied by a Snellen chart  Results: 20/20 in both eyes without corrective device, 20/50 in the right eye without corrective device, 20/40 in the left eye without corrective device   Color vision was screened with the Ishihara Test and the results were normal    The patient tolerated the procedure well and was cooperative  There were no complications  Future Appointments    Date/Time Provider Specialty Site   04/13/2017 01:00 PM SUZY Leblanc  Internal Medicine [facility] INTERNAL MED   07/12/2017 11:30 AM SUZY Leblacn  Internal Medicine [facility] INTERNAL MED   02/21/2018 11:15 AM SUZY Leblanc   Internal Medicine [facility] INTERNAL MED     Signatures   Electronically signed by : SUZY [place] ; Feb 20 2017  6:31PM EST                       (Author)

## 2018-01-24 ENCOUNTER — OFFICE VISIT (OUTPATIENT)
Dept: UROLOGY | Facility: CLINIC | Age: 73
End: 2018-01-24
Payer: MEDICARE

## 2018-01-24 ENCOUNTER — TRANSCRIBE ORDERS (OUTPATIENT)
Dept: ADMINISTRATIVE | Facility: HOSPITAL | Age: 73
End: 2018-01-24

## 2018-01-24 ENCOUNTER — APPOINTMENT (OUTPATIENT)
Dept: LAB | Age: 73
End: 2018-01-24
Payer: MEDICARE

## 2018-01-24 VITALS
BODY MASS INDEX: 45.36 KG/M2 | WEIGHT: 289 LBS | HEIGHT: 67 IN | SYSTOLIC BLOOD PRESSURE: 159 MMHG | HEART RATE: 75 BPM | DIASTOLIC BLOOD PRESSURE: 84 MMHG

## 2018-01-24 DIAGNOSIS — E11.9 TYPE 2 DIABETES MELLITUS WITHOUT COMPLICATIONS (HCC): ICD-10-CM

## 2018-01-24 DIAGNOSIS — R35.1 BPH ASSOCIATED WITH NOCTURIA: ICD-10-CM

## 2018-01-24 DIAGNOSIS — Z00.00 HEALTHCARE MAINTENANCE: Primary | ICD-10-CM

## 2018-01-24 DIAGNOSIS — N40.1 BPH ASSOCIATED WITH NOCTURIA: ICD-10-CM

## 2018-01-24 LAB
ANION GAP SERPL CALCULATED.3IONS-SCNC: 8 MMOL/L (ref 4–13)
BUN SERPL-MCNC: 13 MG/DL (ref 5–25)
CALCIUM SERPL-MCNC: 8.6 MG/DL (ref 8.3–10.1)
CHLORIDE SERPL-SCNC: 102 MMOL/L (ref 100–108)
CO2 SERPL-SCNC: 30 MMOL/L (ref 21–32)
CREAT SERPL-MCNC: 1.13 MG/DL (ref 0.6–1.3)
GFR SERPL CREATININE-BSD FRML MDRD: 64 ML/MIN/1.73SQ M
GLUCOSE P FAST SERPL-MCNC: 140 MG/DL (ref 65–99)
POTASSIUM SERPL-SCNC: 3.7 MMOL/L (ref 3.5–5.3)
SL AMB  POCT GLUCOSE, UA: NORMAL
SL AMB LEUKOCYTE ESTERASE,UA: NORMAL
SL AMB POCT BLOOD,UA: NORMAL
SL AMB POCT CLARITY,UA: CLEAR
SL AMB POCT COLOR,UA: YELLOW
SL AMB POCT KETONES,UA: NORMAL
SL AMB POCT NITRITE,UA: NORMAL
SL AMB POCT PH,UA: 5.5
SODIUM SERPL-SCNC: 140 MMOL/L (ref 136–145)

## 2018-01-24 PROCEDURE — 81002 URINALYSIS NONAUTO W/O SCOPE: CPT | Performed by: PHYSICIAN ASSISTANT

## 2018-01-24 PROCEDURE — 36415 COLL VENOUS BLD VENIPUNCTURE: CPT

## 2018-01-24 PROCEDURE — 80048 BASIC METABOLIC PNL TOTAL CA: CPT

## 2018-01-24 PROCEDURE — 99213 OFFICE O/P EST LOW 20 MIN: CPT | Performed by: PHYSICIAN ASSISTANT

## 2018-01-24 RX ORDER — GABAPENTIN 300 MG/1
100 CAPSULE ORAL DAILY
COMMUNITY
End: 2018-06-13 | Stop reason: ALTCHOICE

## 2018-01-24 NOTE — PROGRESS NOTES
UROLOGY PROGRESS NOTE   Patient Identifiers: Xander Wallace (MRN [id number])  Date of Service: 1/24/2018    Subjective:        Patient has  no complaints  Objective:     VITALS:    There were no vitals filed for this visit  67 year old male hx of BPH and voiding dysfunction  He is doing well on oxybutinin  He is on a diuretic and has morning frequency ad pccasional urgency  PSA 0 4  AUA index score 10  Urine clear  No other complaints  LABS:  Lab Results   Component Value Date    HGB 12 9 04/06/2017    HCT 37 2 04/06/2017    WBC 6 88 04/06/2017     04/06/2017   ]    Lab Results   Component Value Date     01/24/2018    K 3 7 01/24/2018     01/24/2018    CO2 30 01/24/2018    BUN 13 01/24/2018    CREATININE 1 13 01/24/2018    CALCIUM 8 6 01/24/2018    GLUCOSE 121 10/05/2016   ]    INPATIENT MEDS:    Current Outpatient Prescriptions:     AMLODIPINE BESYLATE PO, Take 7 5 mg by mouth daily  , Disp: , Rfl:     aspirin 81 MG tablet, Take 81 mg by mouth daily  , Disp: , Rfl:     brimonidine-timolol (COMBIGAN) 0 2-0 5 %, Administer 1 drop into the left eye daily  , Disp: , Rfl:     citalopram (CeleXA) 10 mg tablet, Take 10 mg by mouth daily  , Disp: , Rfl:     latanoprost (XALATAN) 0 005 % ophthalmic solution, 1 drop daily at bedtime  , Disp: , Rfl:     olmesartan-hydrochlorothiazide (BENICAR HCT) 40-25 MG per tablet, Take 1 tablet by mouth daily  , Disp: , Rfl:     omeprazole (PriLOSEC) 20 mg delayed release capsule, Take 20 mg by mouth daily  , Disp: , Rfl:     oxybutynin (DITROPAN-XL) 10 MG 24 hr tablet, Take 10 mg by mouth daily  , Disp: , Rfl:     rosuvastatin (CRESTOR) 5 mg tablet, Take 5 mg by mouth daily  , Disp: , Rfl:     UNABLE TO FIND, 500 mg 2 (two) times a day  c carthromycin/  Sinus infection, Disp: , Rfl:       Physical Exam:   There were no vitals taken for this visit    GEN: no acute distress    RESP: breathing comfortably with no accessory muscle use    ABD: soft, non-tender, non-distended   INCISION:    EXT: no significant peripheral edema   (Male): Penis circumcised, phallus normal, meatus patent  Testicles descended into scrotum bilaterally without masses nor tenderness  No inguinal hernias bilaterally  TU: Prostate is enlarged at 35 grams  The prostate is not boggy  The prostate is not tender  No nodules noted    RADIOLOGY:   none     Assessment:   1  BPH  2   Voiding dysfunction    Plan:   - continue oxybutynin  - follow up one year psa prior  -  -  -

## 2018-01-29 ENCOUNTER — OFFICE VISIT (OUTPATIENT)
Dept: INTERNAL MEDICINE CLINIC | Facility: CLINIC | Age: 73
End: 2018-01-29
Payer: MEDICARE

## 2018-01-29 VITALS
BODY MASS INDEX: 45.2 KG/M2 | HEART RATE: 77 BPM | WEIGHT: 288 LBS | HEIGHT: 67 IN | TEMPERATURE: 98.6 F | SYSTOLIC BLOOD PRESSURE: 126 MMHG | RESPIRATION RATE: 18 BRPM | DIASTOLIC BLOOD PRESSURE: 70 MMHG | OXYGEN SATURATION: 98 %

## 2018-01-29 DIAGNOSIS — I10 ESSENTIAL HYPERTENSION: Primary | ICD-10-CM

## 2018-01-29 DIAGNOSIS — E66.01 MORBID OBESITY (HCC): ICD-10-CM

## 2018-01-29 DIAGNOSIS — Z12.11 COLON CANCER SCREENING: ICD-10-CM

## 2018-01-29 DIAGNOSIS — E78.5 HYPERLIPIDEMIA, UNSPECIFIED HYPERLIPIDEMIA TYPE: ICD-10-CM

## 2018-01-29 DIAGNOSIS — E11.40 TYPE 2 DIABETES MELLITUS WITH DIABETIC NEUROPATHY, WITHOUT LONG-TERM CURRENT USE OF INSULIN (HCC): ICD-10-CM

## 2018-01-29 DIAGNOSIS — Z12.5 PROSTATE CANCER SCREENING: ICD-10-CM

## 2018-01-29 PROBLEM — G62.9 PERIPHERAL NEUROPATHY: Status: ACTIVE | Noted: 2018-01-29

## 2018-01-29 PROCEDURE — 99214 OFFICE O/P EST MOD 30 MIN: CPT | Performed by: INTERNAL MEDICINE

## 2018-01-29 RX ORDER — AMLODIPINE BESYLATE 10 MG/1
10 TABLET ORAL
COMMUNITY
Start: 2017-11-30 | End: 2019-01-07 | Stop reason: SDUPTHER

## 2018-01-29 RX ORDER — OXYBUTYNIN CHLORIDE 5 MG/1
5 TABLET, EXTENDED RELEASE ORAL
COMMUNITY
Start: 2017-11-20 | End: 2018-06-11 | Stop reason: SDUPTHER

## 2018-01-29 RX ORDER — CEPHALEXIN 500 MG/1
500 CAPSULE ORAL
COMMUNITY
Start: 2017-07-24 | End: 2018-09-17 | Stop reason: ALTCHOICE

## 2018-01-29 RX ORDER — ROSUVASTATIN CALCIUM 5 MG/1
5 TABLET, COATED ORAL DAILY
COMMUNITY
Start: 2015-02-10 | End: 2018-01-29 | Stop reason: SDUPTHER

## 2018-01-29 RX ORDER — LATANOPROST 50 UG/ML
SOLUTION/ DROPS OPHTHALMIC
COMMUNITY
Start: 2015-01-15 | End: 2018-01-29 | Stop reason: SDUPTHER

## 2018-01-29 RX ORDER — CITALOPRAM 10 MG/1
10 TABLET ORAL DAILY
COMMUNITY
Start: 2015-02-10 | End: 2018-05-21 | Stop reason: SDUPTHER

## 2018-01-29 RX ORDER — BRIMONIDINE TARTRATE, TIMOLOL MALEATE 2; 5 MG/ML; MG/ML
SOLUTION/ DROPS OPHTHALMIC
COMMUNITY
End: 2018-01-29 | Stop reason: SDUPTHER

## 2018-01-29 NOTE — ASSESSMENT & PLAN NOTE
Patient continues to be morbidly obese  Connection to hypertension as well as type 2 diabetes was explained  Recommend the patient continue to work on reducing calorie intake which would be beneficial to help him lose weight as well as help control his type 2 diabetes    Follow-up in 2 months

## 2018-01-29 NOTE — ASSESSMENT & PLAN NOTE
Patient has a history of hypertension on his last visit his blood pressure was elevated and we increased his amlodipine from 7 and 0 5 mg a day to 10 mg daily  He reports no side effects of the medication as blood pressure today is much better controlled  Will continue on 10 mg daily follow-up in late February for reassessment of blood pressure control

## 2018-01-29 NOTE — PROGRESS NOTES
Assessment/Plan:    Type 2 diabetes mellitus with diabetic neuropathy, without long-term current use of insulin ([address] )  The patient returns today for assessment of his type 2 diabetes  He is resistant to the idea of testing his glucose at home prefers to go to the laboratory instead  Fortunately he made it through the holiday season with out any increase in his fasting glucose  His fasting glucose is now down to 140  On his next visit will obtain a hemoglobin A1c to see how it compares to his pretreatment hemoglobin A1c value  He denies any symptoms of hypoglycemia at this time  He is working to try to reduce his weight through eating a careful diet  Due to his multiple arthritic issues he is not able to do any significant exercising to assist with the control of his type 2 diabetes  He does have a new onset of peripheral neuropathy symptoms in his lower legs and feet consistent with a possible diabetic neuropathy  He also has a history of lower spine issue so the neuropathy could be multifactorial   His podiatrist recently saw him and started him on gabapentin 300 mg daily he has no apparent side effects of the drug but has not been on an adequate trial to confirm its effectiveness  He is coming back to see me the end of February for complete physical repeat reassess the effectiveness of the gabapentin at that time if he still has significant symptoms I would favor increasing the dose of gabapentin  The meantime will continue on metformin 500 mg twice a day  He does occasionally has some loose stools I do not believe is related to the medication as it is an intermittent episode    Essential hypertension  Patient has a history of hypertension on his last visit his blood pressure was elevated and we increased his amlodipine from 7 and 0 5 mg a day to 10 mg daily  He reports no side effects of the medication as blood pressure today is much better controlled    Will continue on 10 mg daily follow-up in late February for reassessment of blood pressure control  Morbid obesity ([facility] )  Patient continues to be morbidly obese  Connection to hypertension as well as type 2 diabetes was explained  Recommend the patient continue to work on reducing calorie intake which would be beneficial to help him lose weight as well as help control his type 2 diabetes  Follow-up in 2 months       Diagnoses and all orders for this visit:    Essential hypertension  -     CBC and differential; Future  -     Comprehensive metabolic panel; Future  -     Urinalysis with microscopic    Colon cancer screening  -     Fecal Globin By [employer]  (Medicare); Future    Hyperlipidemia, unspecified hyperlipidemia type  -     Lipid panel; Future    Prostate cancer screening  -     PSA; Future    Type 2 diabetes mellitus with diabetic neuropathy, without long-term current use of insulin ([facility])  -     Hemoglobin A1c; Future    Morbid obesity ([facility] )    Other orders  -     amLODIPine (NORVASC) 10 mg tablet; 10 mg  -     aspirin (ASPIRIN LOW DOSE) 81 MG tablet; Take 1 tablet by mouth daily  -     cephalexin (KEFLEX) 500 mg capsule; Take 500 mg by mouth  -     oxybutynin (DITROPAN-XL) 5 mg 24 hr tablet; 5 mg  -     citalopram (CeleXA) 10 mg tablet; Take 10 tablets by mouth daily  -     Discontinue: brimonidine-timolol (COMBIGAN) 0 2-0 5 %; Apply to eye  -     Discontinue: latanoprost (XALATAN) 0 005 % ophthalmic solution; Apply to eye  -     Discontinue: metFORMIN (GLUCOPHAGE) 500 mg tablet; Take 500 mg by mouth Twice daily  -     Discontinue: rosuvastatin (CRESTOR) 5 mg tablet; Take 5 tablets by mouth daily        Subjective:      Patient ID: Gia West is a 67 y o  male  This 59-year-old gentleman presents today for follow-up visit pertaining to his hypertension and type 2 diabetes  He has no new complaints at this time    His blood pressure shows significant improvement and he has had no significant side effects from the increased dose of amlodipine  His blood sugars continue to gradually improve he is now down to 140 and is on metformin 500 mg twice a day with no apparent side effects of the medication  Correlation to weight and exercise and control of the diabetes was reviewed today with the patient  The patient presents with symptoms of peripheral neuropathy with numbness and paresthesia in his feet bilaterally  His podiatrist has started him on gabapentin recently 300 mg daily  Will see how he does with this medication over the next 2 months when he returns to the office will reassess the effectiveness of the medication  I connected the patient has history of back problems as well as diabetes as a cause for this peripheral neuropathy  The following portions of the patient's history were reviewed and updated as appropriate:   He  has a past medical history of Anxiety; Claustrophobia; Depression; Dyslipidemia; GERD (gastroesophageal reflux disease); Glaucoma; Headache; Hypertension; Low back pain; Neck pain; Numbness and tingling in left arm; OAB (overactive bladder); Paroxysmal supraventricular tachycardia ([address] ); and Sleep apnea  He  does not have any pertinent problems on file  He  has a past surgical history that includes Cataract extraction (Bilateral); Knee arthroscopy (Bilateral); Lumbar spine surgery; Excision basal cell carcinoma; Total knee arthroplasty (Left); Replacement total knee (Right, 04/2017); Replacement total knee (Left, 03/2013); Anoscopy; Rectal biopsy; and Mouth surgery  His family history includes Alzheimer's disease in his mother; Diabetes in his mother and paternal grandmother; Esophageal cancer in his paternal grandfather; Burak Madrigal Hypertension in his father  He  reports that he quit smoking about 35 years ago  He has never used smokeless tobacco  He reports that he drinks about 8 4 oz of alcohol per week   He reports that he does not use drugs    Current Outpatient Prescriptions   Medication Sig Dispense Refill    amLODIPine (NORVASC) 10 mg tablet 10 mg      aspirin (ASPIRIN LOW DOSE) 81 MG tablet Take 1 tablet by mouth daily      brimonidine-timolol (COMBIGAN) 0 2-0 5 % Administer 1 drop into the left eye daily   cephalexin (KEFLEX) 500 mg capsule Take 500 mg by mouth      citalopram (CeleXA) 10 mg tablet Take 10 tablets by mouth daily      gabapentin (NEURONTIN) 300 mg capsule Take 100 mg by mouth daily      latanoprost (XALATAN) 0 005 % ophthalmic solution 1 drop daily at bedtime   metFORMIN (GLUCOPHAGE) 500 mg tablet Take 500 mg by mouth 2 (two) times a day      olmesartan-hydrochlorothiazide (BENICAR HCT) 40-25 MG per tablet Take 1 tablet by mouth daily   omeprazole (PriLOSEC) 20 mg delayed release capsule Take 20 mg by mouth daily   oxybutynin (DITROPAN-XL) 5 mg 24 hr tablet 5 mg      rosuvastatin (CRESTOR) 5 mg tablet Take 5 mg by mouth daily   oxybutynin (DITROPAN-XL) 10 MG 24 hr tablet Take 10 mg by mouth daily   UNABLE TO FIND 500 mg 2 (two) times a day  c carthromycin/  Sinus infection       No current facility-administered medications for this visit  Current Outpatient Prescriptions on File Prior to Visit   Medication Sig    brimonidine-timolol (COMBIGAN) 0 2-0 5 % Administer 1 drop into the left eye daily   gabapentin (NEURONTIN) 300 mg capsule Take 100 mg by mouth daily    latanoprost (XALATAN) 0 005 % ophthalmic solution 1 drop daily at bedtime   metFORMIN (GLUCOPHAGE) 500 mg tablet Take 500 mg by mouth 2 (two) times a day    olmesartan-hydrochlorothiazide (BENICAR HCT) 40-25 MG per tablet Take 1 tablet by mouth daily   omeprazole (PriLOSEC) 20 mg delayed release capsule Take 20 mg by mouth daily   rosuvastatin (CRESTOR) 5 mg tablet Take 5 mg by mouth daily   oxybutynin (DITROPAN-XL) 10 MG 24 hr tablet Take 10 mg by mouth daily   UNABLE TO FIND 500 mg 2 (two) times a day   c carthromycin/  Sinus infection    [DISCONTINUED] AMLODIPINE BESYLATE PO Take 10 mg by mouth daily      [DISCONTINUED] aspirin 81 MG tablet Take 81 mg by mouth daily   [DISCONTINUED] citalopram (CeleXA) 10 mg tablet Take 10 mg by mouth daily  No current facility-administered medications on file prior to visit       Review of Systems   Constitutional: Negative  HENT: Negative  Eyes: Negative  Respiratory: Negative  Cardiovascular: Negative  Gastrointestinal: Positive for diarrhea  Occasional diarrhea not on a regular basis   Endocrine: Negative  Genitourinary: Negative  Musculoskeletal: Negative  Skin: Negative  Allergic/Immunologic: Negative  Neurological: Negative  Hematological: Negative  Psychiatric/Behavioral: Negative  Objective:     Physical Exam   Constitutional: He appears well-developed and well-nourished  No distress  HENT:   Head: Normocephalic and atraumatic  Eyes: Conjunctivae and EOM are normal  Pupils are equal, round, and reactive to light  Neck: Normal range of motion  Neck supple  Cardiovascular: Normal rate, regular rhythm and normal heart sounds  Exam reveals no gallop and no friction rub  No murmur heard  Pulmonary/Chest: Effort normal and breath sounds normal    Musculoskeletal: Normal range of motion  Neurological:   Decreased sensation in the feet bilaterally   Skin: Skin is warm and dry  No rash noted  He is not diaphoretic  No erythema  No pallor  Psychiatric: He has a normal mood and affect   His behavior is normal  Judgment and thought content normal

## 2018-01-29 NOTE — ASSESSMENT & PLAN NOTE
The patient returns today for assessment of his type 2 diabetes  He is resistant to the idea of testing his glucose at home prefers to go to the laboratory instead  Fortunately he made it through the holiday season with out any increase in his fasting glucose  His fasting glucose is now down to 140  On his next visit will obtain a hemoglobin A1c to see how it compares to his pretreatment hemoglobin A1c value  He denies any symptoms of hypoglycemia at this time  He is working to try to reduce his weight through eating a careful diet  Due to his multiple arthritic issues he is not able to do any significant exercising to assist with the control of his type 2 diabetes  He does have a new onset of peripheral neuropathy symptoms in his lower legs and feet consistent with a possible diabetic neuropathy  He also has a history of lower spine issue so the neuropathy could be multifactorial   His podiatrist recently saw him and started him on gabapentin 300 mg daily he has no apparent side effects of the drug but has not been on an adequate trial to confirm its effectiveness  He is coming back to see me the end of February for complete physical repeat reassess the effectiveness of the gabapentin at that time if he still has significant symptoms I would favor increasing the dose of gabapentin  The meantime will continue on metformin 500 mg twice a day    He does occasionally has some loose stools I do not believe is related to the medication as it is an intermittent episode

## 2018-01-30 ENCOUNTER — TELEPHONE (OUTPATIENT)
Dept: INTERNAL MEDICINE CLINIC | Facility: CLINIC | Age: 73
End: 2018-01-30

## 2018-01-30 NOTE — TELEPHONE ENCOUNTER
Patient reports he left a VM yesterday to find out whether he still needs to get his PSA level checked before his physical  You ordered it yesterday along with his other bloodwork orders  However, he reports he just got his PSA checked in the past 10 days  Should he get it again, or disregard?

## 2018-02-16 ENCOUNTER — TRANSCRIBE ORDERS (OUTPATIENT)
Dept: ADMINISTRATIVE | Age: 73
End: 2018-02-16

## 2018-02-16 ENCOUNTER — APPOINTMENT (OUTPATIENT)
Dept: LAB | Age: 73
End: 2018-02-16
Payer: MEDICARE

## 2018-02-16 DIAGNOSIS — Z12.11 SPECIAL SCREENING FOR MALIGNANT NEOPLASMS, COLON: Primary | ICD-10-CM

## 2018-02-16 DIAGNOSIS — Z12.11 COLON CANCER SCREENING: ICD-10-CM

## 2018-02-16 LAB
ALBUMIN SERPL BCP-MCNC: 3.3 G/DL (ref 3.5–5)
ALP SERPL-CCNC: 118 U/L (ref 46–116)
ALT SERPL W P-5'-P-CCNC: 42 U/L (ref 12–78)
ANION GAP SERPL CALCULATED.3IONS-SCNC: 9 MMOL/L (ref 4–13)
AST SERPL W P-5'-P-CCNC: 20 U/L (ref 5–45)
BACTERIA UR QL AUTO: ABNORMAL /HPF
BASOPHILS # BLD AUTO: 0.03 THOUSANDS/ΜL (ref 0–0.1)
BASOPHILS NFR BLD AUTO: 0 % (ref 0–1)
BILIRUB SERPL-MCNC: 0.29 MG/DL (ref 0.2–1)
BILIRUB UR QL STRIP: NEGATIVE
BUN SERPL-MCNC: 14 MG/DL (ref 5–25)
CALCIUM SERPL-MCNC: 8.3 MG/DL (ref 8.3–10.1)
CHLORIDE SERPL-SCNC: 104 MMOL/L (ref 100–108)
CHOLEST SERPL-MCNC: 141 MG/DL (ref 50–200)
CLARITY UR: CLEAR
CO2 SERPL-SCNC: 27 MMOL/L (ref 21–32)
COLOR UR: YELLOW
CREAT SERPL-MCNC: 1.12 MG/DL (ref 0.6–1.3)
EOSINOPHIL # BLD AUTO: 0.11 THOUSAND/ΜL (ref 0–0.61)
EOSINOPHIL NFR BLD AUTO: 2 % (ref 0–6)
ERYTHROCYTE [DISTWIDTH] IN BLOOD BY AUTOMATED COUNT: 13.2 % (ref 11.6–15.1)
GFR SERPL CREATININE-BSD FRML MDRD: 65 ML/MIN/1.73SQ M
GLUCOSE P FAST SERPL-MCNC: 136 MG/DL (ref 65–99)
GLUCOSE UR STRIP-MCNC: NEGATIVE MG/DL
HCT VFR BLD AUTO: 35.8 % (ref 36.5–49.3)
HDLC SERPL-MCNC: 37 MG/DL (ref 40–60)
HGB BLD-MCNC: 12.3 G/DL (ref 12–17)
HGB UR QL STRIP.AUTO: NEGATIVE
HYALINE CASTS #/AREA URNS LPF: ABNORMAL /LPF
KETONES UR STRIP-MCNC: NEGATIVE MG/DL
LDLC SERPL CALC-MCNC: 75 MG/DL (ref 0–100)
LEUKOCYTE ESTERASE UR QL STRIP: NEGATIVE
LYMPHOCYTES # BLD AUTO: 1.88 THOUSANDS/ΜL (ref 0.6–4.47)
LYMPHOCYTES NFR BLD AUTO: 25 % (ref 14–44)
MCH RBC QN AUTO: 33.3 PG (ref 26.8–34.3)
MCHC RBC AUTO-ENTMCNC: 34.4 G/DL (ref 31.4–37.4)
MCV RBC AUTO: 97 FL (ref 82–98)
MONOCYTES # BLD AUTO: 0.71 THOUSAND/ΜL (ref 0.17–1.22)
MONOCYTES NFR BLD AUTO: 10 % (ref 4–12)
NEUTROPHILS # BLD AUTO: 4.7 THOUSANDS/ΜL (ref 1.85–7.62)
NEUTS SEG NFR BLD AUTO: 63 % (ref 43–75)
NITRITE UR QL STRIP: NEGATIVE
NON-SQ EPI CELLS URNS QL MICRO: ABNORMAL /HPF
NRBC BLD AUTO-RTO: 0 /100 WBCS
PH UR STRIP.AUTO: 6 [PH] (ref 4.5–8)
PLATELET # BLD AUTO: 302 THOUSANDS/UL (ref 149–390)
PMV BLD AUTO: 10.4 FL (ref 8.9–12.7)
POTASSIUM SERPL-SCNC: 3.6 MMOL/L (ref 3.5–5.3)
PROT SERPL-MCNC: 7.9 G/DL (ref 6.4–8.2)
PROT UR STRIP-MCNC: ABNORMAL MG/DL
RBC # BLD AUTO: 3.69 MILLION/UL (ref 3.88–5.62)
RBC #/AREA URNS AUTO: ABNORMAL /HPF
SODIUM SERPL-SCNC: 140 MMOL/L (ref 136–145)
SP GR UR STRIP.AUTO: 1.02 (ref 1–1.03)
TRIGL SERPL-MCNC: 145 MG/DL
UROBILINOGEN UR QL STRIP.AUTO: 1 E.U./DL
WBC # BLD AUTO: 7.47 THOUSAND/UL (ref 4.31–10.16)
WBC #/AREA URNS AUTO: ABNORMAL /HPF

## 2018-02-16 PROCEDURE — 81001 URINALYSIS AUTO W/SCOPE: CPT | Performed by: INTERNAL MEDICINE

## 2018-02-16 PROCEDURE — 85025 COMPLETE CBC W/AUTO DIFF WBC: CPT

## 2018-02-16 PROCEDURE — 36415 COLL VENOUS BLD VENIPUNCTURE: CPT

## 2018-02-16 PROCEDURE — 80053 COMPREHEN METABOLIC PANEL: CPT

## 2018-02-16 PROCEDURE — 80061 LIPID PANEL: CPT

## 2018-02-21 ENCOUNTER — TELEPHONE (OUTPATIENT)
Dept: INTERNAL MEDICINE CLINIC | Facility: CLINIC | Age: 73
End: 2018-02-21

## 2018-02-22 ENCOUNTER — OFFICE VISIT (OUTPATIENT)
Dept: INTERNAL MEDICINE CLINIC | Facility: CLINIC | Age: 73
End: 2018-02-22
Payer: MEDICARE

## 2018-02-22 ENCOUNTER — APPOINTMENT (OUTPATIENT)
Dept: LAB | Age: 73
End: 2018-02-22
Payer: MEDICARE

## 2018-02-22 VITALS
HEIGHT: 67 IN | OXYGEN SATURATION: 97 % | BODY MASS INDEX: 45.61 KG/M2 | WEIGHT: 290.6 LBS | DIASTOLIC BLOOD PRESSURE: 64 MMHG | HEART RATE: 67 BPM | TEMPERATURE: 98.3 F | SYSTOLIC BLOOD PRESSURE: 128 MMHG | RESPIRATION RATE: 14 BRPM

## 2018-02-22 DIAGNOSIS — Z12.5 SCREENING FOR PROSTATE CANCER: ICD-10-CM

## 2018-02-22 DIAGNOSIS — Z12.11 SPECIAL SCREENING FOR MALIGNANT NEOPLASMS, COLON: ICD-10-CM

## 2018-02-22 DIAGNOSIS — I10 ESSENTIAL HYPERTENSION: ICD-10-CM

## 2018-02-22 DIAGNOSIS — E11.40 TYPE 2 DIABETES MELLITUS WITH DIABETIC NEUROPATHY, WITHOUT LONG-TERM CURRENT USE OF INSULIN (HCC): Primary | ICD-10-CM

## 2018-02-22 DIAGNOSIS — N06.9 ISOLATED PROTEINURIA WITH MORPHOLOGIC LESION: ICD-10-CM

## 2018-02-22 DIAGNOSIS — Z12.11 SCREENING FOR COLON CANCER: ICD-10-CM

## 2018-02-22 DIAGNOSIS — E66.01 MORBID OBESITY (HCC): ICD-10-CM

## 2018-02-22 DIAGNOSIS — Z13.6 SCREENING FOR CARDIOVASCULAR CONDITION: ICD-10-CM

## 2018-02-22 DIAGNOSIS — Z00.00 MEDICARE ANNUAL WELLNESS VISIT, SUBSEQUENT: ICD-10-CM

## 2018-02-22 DIAGNOSIS — G62.9 PERIPHERAL POLYNEUROPATHY: ICD-10-CM

## 2018-02-22 DIAGNOSIS — Z13.6 SCREENING FOR AAA (AORTIC ABDOMINAL ANEURYSM): ICD-10-CM

## 2018-02-22 DIAGNOSIS — Z13.5 SCREENING FOR GLAUCOMA: ICD-10-CM

## 2018-02-22 DIAGNOSIS — Z13.1 SCREENING FOR DIABETES MELLITUS: ICD-10-CM

## 2018-02-22 LAB
HEMOCCULT STL QL IA: NEGATIVE
SL AMB POCT HEMOGLOBIN AIC: 6.6

## 2018-02-22 PROCEDURE — 99214 OFFICE O/P EST MOD 30 MIN: CPT | Performed by: INTERNAL MEDICINE

## 2018-02-22 PROCEDURE — G0328 FECAL BLOOD SCRN IMMUNOASSAY: HCPCS

## 2018-02-22 PROCEDURE — 83036 HEMOGLOBIN GLYCOSYLATED A1C: CPT | Performed by: INTERNAL MEDICINE

## 2018-02-22 NOTE — PROGRESS NOTES
Assessment/Plan:    No problem-specific Assessment & Plan notes found for this encounter  Diagnoses and all orders for this visit:    Type 2 diabetes mellitus with diabetic neuropathy, without long-term current use of insulin ([facility])  -     POCT hemoglobin A1c    Essential hypertension    Morbid obesity ([facility] [address] )    Isolated proteinuria with morphologic lesion        Subjective:      Patient ID: Anthony Brandon is a 67 y o  male  This is a routine annual complete physical for this 60-year-old gentleman  He presents today with no new complaints  He has a past history of hypertension type 2 diabetes treated with oral medication and lifestyle management peripheral neuropathy secondary to his diabetes and morbid obesity  He denies any chest pains palpitations or shortness of breath  He has had some occasional mild hypoglycemic episodes that occur in the late morning hours  I reviewed this with the patient and reviewed his breakfast contents  Appears that he is eating mostly carbohydrates for breakfast and would benefit from the addition of some proteins at breakfast time to minimize his tendency for hypoglycemic episodes  We discussed the things to do in response to his hypoglycemic tendencies  The following portions of the patient's history were reviewed and updated as appropriate:   He  has a past medical history of Anxiety; Claustrophobia; Depression; Dyslipidemia; GERD (gastroesophageal reflux disease); Glaucoma; Headache; Hypertension; Low back pain; Neck pain; Numbness and tingling in left arm; OAB (overactive bladder); Paroxysmal supraventricular tachycardia ([address] ); and Sleep apnea    He   Patient Active Problem List    Diagnosis Date Noted    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    Type 2 diabetes mellitus with diabetic neuropathy, without long-term current use of insulin ([address] ) 01/29/2018    Essential hypertension 01/29/2018    Peripheral neuropathy 01/29/2018    Morbid obesity ([facility] [address] ) 01/29/2018    Claustrophobia     Headache     Neck pain     Low back pain      He  has a past surgical history that includes Cataract extraction (Bilateral); Knee arthroscopy (Bilateral); Lumbar spine surgery; Excision basal cell carcinoma; Total knee arthroplasty (Left); Replacement total knee (Right, 04/2017); Replacement total knee (Left, 03/2013); Anoscopy; Rectal biopsy; and Mouth surgery  His family history includes Alzheimer's disease in his mother; Diabetes in his mother and paternal grandmother; Esophageal cancer in his paternal grandfather; Raynell Pheasant Hypertension in his father  He  reports that he quit smoking about 35 years ago  He has never used smokeless tobacco  He reports that he drinks about 8 4 oz of alcohol per week   He reports that he does not use drugs  Current Outpatient Prescriptions   Medication Sig Dispense Refill    amLODIPine (NORVASC) 10 mg tablet 10 mg      aspirin (ASPIRIN LOW DOSE) 81 MG tablet Take 1 tablet by mouth daily      brimonidine-timolol (COMBIGAN) 0 2-0 5 % Administer 1 drop into the left eye daily   cephalexin (KEFLEX) 500 mg capsule Take 500 mg by mouth      citalopram (CeleXA) 10 mg tablet Take 10 tablets by mouth daily      gabapentin (NEURONTIN) 300 mg capsule Take 100 mg by mouth daily      latanoprost (XALATAN) 0 005 % ophthalmic solution 1 drop daily at bedtime   metFORMIN (GLUCOPHAGE) 500 mg tablet Take 500 mg by mouth 2 (two) times a day      olmesartan-hydrochlorothiazide (BENICAR HCT) 40-25 MG per tablet Take 1 tablet by mouth daily   omeprazole (PriLOSEC) 20 mg delayed release capsule Take 20 mg by mouth daily   oxybutynin (DITROPAN-XL) 10 MG 24 hr tablet Take 10 mg by mouth daily   oxybutynin (DITROPAN-XL) 5 mg 24 hr tablet 5 mg      rosuvastatin (CRESTOR) 5 mg tablet Take 5 mg by mouth daily   UNABLE TO FIND 500 mg 2 (two) times a day   c carthromycin/  Sinus infection       No current facility-administered medications for this visit       Review of Systems   Constitutional: Negative  HENT: Negative  Eyes: Negative  Respiratory: Negative  Cardiovascular: Negative  Gastrointestinal: Negative  Endocrine: Negative  Genitourinary: Negative  Musculoskeletal: Negative  Skin: Negative  Allergic/Immunologic: Negative  Neurological:        Pain and paresthesia of the feet bilaterally   Hematological: Negative  Psychiatric/Behavioral: Negative  All other systems reviewed and are negative  Objective:      /64   Pulse 67   Temp 98 3 °F (36 8 °C)   Resp 14   Ht 5' 7" (1 702 m)   Wt 132 kg (290 lb 9 6 oz)   SpO2 97%   BMI 45 51 kg/m²          Physical Exam   Constitutional: He is oriented to person, place, and time  He appears well-developed and well-nourished  No distress  HENT:   Head: Normocephalic and atraumatic  Right Ear: Tympanic membrane, external ear and ear canal normal    Left Ear: Tympanic membrane, external ear and ear canal normal    Nose: Nose normal    Mouth/Throat: Uvula is midline, oropharynx is clear and moist and mucous membranes are normal    Eyes: Conjunctivae and EOM are normal  Pupils are equal, round, and reactive to light  Right eye exhibits no discharge  Left eye exhibits no discharge  No scleral icterus  Neck: Normal range of motion  Neck supple  No tracheal deviation present  No thyromegaly present  Cardiovascular: Normal rate, regular rhythm and normal heart sounds  Exam reveals no gallop and no friction rub  Pulses are weak pulses  No murmur heard  Pulses:       Dorsalis pedis pulses are 1+ on the right side, and 1+ on the left side  Posterior tibial pulses are 1+ on the right side, and 1+ on the left side  Pulmonary/Chest: Effort normal and breath sounds normal  No respiratory distress  He has no wheezes  He has no rales  Abdominal: Soft  Bowel sounds are normal  He exhibits no distension and no mass   There is no tenderness  There is no rebound and no guarding  Musculoskeletal: Normal range of motion  He exhibits no edema  Feet:   Right Foot:   Skin Integrity: Negative for ulcer, skin breakdown, erythema, warmth, callus or dry skin  Left Foot:   Skin Integrity: Negative for ulcer, skin breakdown, erythema, warmth, callus or dry skin  Lymphadenopathy:     He has no cervical adenopathy  Neurological: He is alert and oriented to person, place, and time  He displays normal reflexes  No cranial nerve deficit  He exhibits normal muscle tone  Coordination normal    Skin: Skin is dry  No rash noted  He is not diaphoretic  No erythema  No pallor  Psychiatric: He has a normal mood and affect  His behavior is normal  Judgment and thought content normal      Right Foot/Ankle   Right Foot Inspection  Skin Exam: skin normal and skin intact no dry skin, no warmth, no callus, no erythema, no maceration, no abnormal color, no pre-ulcer, no ulcer and no callus                          Toe Exam: ROM and strength within normal limits  Sensory   Vibration: intact  Proprioception: intact   Monofilament testing: diminished  Vascular  Capillary refills: < 3 seconds  The right DP pulse is 1+  The right PT pulse is 1+  Left Foot/Ankle  Left Foot Inspection  Skin Exam: skin normal and skin intactno dry skin, no warmth, no erythema, no maceration, normal color, no pre-ulcer, no ulcer and no callus                         Toe Exam: ROM and strength within normal limits                   Sensory   Vibration: intact  Proprioception: intact  Monofilament: diminished  Vascular  Capillary refills: < 3 seconds  The left DP pulse is 1+  The left PT pulse is 1+  Assign Risk Category:  No deformity present;  Loss of protective sensation; Weak pulses       Risk: 1

## 2018-02-22 NOTE — ASSESSMENT & PLAN NOTE
Patient does have decreased sensation in his toes and feet consistent with a peripheral diabetic neuropathy  I explained this to the patient in detail that better control of his diabetes will prevent this from progressing

## 2018-02-22 NOTE — ASSESSMENT & PLAN NOTE
Patient has a trace of protein in his urine and a slightly decreased serum albumin value we correlated this to the patient's diabetic condition explained this to the patient that improvement in his kidney function would be correlated to improve diabetic control

## 2018-02-22 NOTE — ASSESSMENT & PLAN NOTE
Patient has adequate control of his blood sugars have with the hemoglobin A1c less than 7 and a fasting glucose of 136  He declines any home fasting glucose testing  He continues to work on losing weight and trying to exercise through walking as much as he can  We will see him in 4 months with a follow-up fasting glucose and hemoglobin A1c

## 2018-02-22 NOTE — ASSESSMENT & PLAN NOTE
Morbid obesity continue to work with the patient for a weight loss of encouraged him to work on modifying his diet to decrease his total calorie intake this was discussed in detail with the patient

## 2018-02-23 DIAGNOSIS — E11.9 TYPE 2 DIABETES MELLITUS WITHOUT COMPLICATION, WITHOUT LONG-TERM CURRENT USE OF INSULIN (HCC): Primary | ICD-10-CM

## 2018-05-21 DIAGNOSIS — F32.A DEPRESSION, UNSPECIFIED DEPRESSION TYPE: Primary | ICD-10-CM

## 2018-05-21 RX ORDER — CITALOPRAM 10 MG/1
TABLET ORAL
Qty: 90 TABLET | Refills: 2 | Status: SHIPPED | OUTPATIENT
Start: 2018-05-21 | End: 2019-02-19 | Stop reason: SDUPTHER

## 2018-06-04 ENCOUNTER — OFFICE VISIT (OUTPATIENT)
Dept: SLEEP CENTER | Facility: CLINIC | Age: 73
End: 2018-06-04
Payer: MEDICARE

## 2018-06-04 VITALS
BODY MASS INDEX: 44.89 KG/M2 | HEIGHT: 67 IN | WEIGHT: 286 LBS | DIASTOLIC BLOOD PRESSURE: 62 MMHG | HEART RATE: 76 BPM | SYSTOLIC BLOOD PRESSURE: 140 MMHG

## 2018-06-04 DIAGNOSIS — E66.01 MORBID OBESITY WITH BMI OF 40.0-44.9, ADULT (HCC): ICD-10-CM

## 2018-06-04 DIAGNOSIS — R68.2 DRY MOUTH: ICD-10-CM

## 2018-06-04 DIAGNOSIS — K21.9 GASTROESOPHAGEAL REFLUX DISEASE, ESOPHAGITIS PRESENCE NOT SPECIFIED: ICD-10-CM

## 2018-06-04 DIAGNOSIS — R45.86 MOOD DISTURBANCE: ICD-10-CM

## 2018-06-04 DIAGNOSIS — G47.33 OSA (OBSTRUCTIVE SLEEP APNEA): Primary | ICD-10-CM

## 2018-06-04 DIAGNOSIS — G47.53 RECURRENT ISOLATED SLEEP PARALYSIS: ICD-10-CM

## 2018-06-04 DIAGNOSIS — I10 ESSENTIAL HYPERTENSION: ICD-10-CM

## 2018-06-04 PROCEDURE — 99214 OFFICE O/P EST MOD 30 MIN: CPT | Performed by: INTERNAL MEDICINE

## 2018-06-04 NOTE — PROGRESS NOTES
Follow-Up Note - Sleep Center   Hector VIKAS Fishman  75 y o  male  :1945  DAPHNEY:[id number]    CC: I saw this patient for follow-up in clinic today for his Sleep Disordered Breathing, Coexisting Sleep and Medical Problems  PFSH, Problem List, Medications & Allergies were reviewed in EMR  Interval changes: [none reported ]   He  has a past medical history of Anxiety; Claustrophobia; Depression; Dyslipidemia; GERD (gastroesophageal reflux disease); Glaucoma; Headache; Hypertension; Low back pain; Neck pain; Numbness and tingling in left arm; OAB (overactive bladder); Paroxysmal supraventricular tachycardia ([facility] [address] ); and Sleep apnea  He has a current medication list which includes the following prescription(s): amlodipine, aspirin, brimonidine-timolol, cephalexin, citalopram, gabapentin, latanoprost, metformin, olmesartan-hydrochlorothiazide, omeprazole, oxybutynin, oxybutynin, and rosuvastatin  ROS: Reviewed (see attached)  He reports awakening with episodic fluttering for which he had Holter monitoring that did not demonstrate any significant arrhythmia  He he has some dyspnea on effort  He rarely awakens with headache  He feels acid reflux is controlled  HPI:  With respect to sleep disordered breathing, compliance data download shows: [ No data was available, but he reports use for > 4hours regularly ]   Rosy Yun reports  · no difficulty tolerating PAP;   · some adverse effects; dry mouth  · Experiencing significant benefits from use; he is unable to sleep without CPAP  · He has episodic sleep paralysis around 2 times a month  · He states he has medical conditions are stable  · Anxiety is controlled on Celexa  Sleep Routine:  He reports spending up to 12 hr in bed and getting 8-10 hours sleep  He watches TV in bed for several hours ; he  has no difficulty initiating or maintaining sleep   He awakens spontaneously feeling refreshed  He denies excessive drowsiness    [facility] rated himself at Total score: 2 /24 on the [place] sleepiness scale ]  [  ]    EXAM: Vitals are stable: /62   Pulse 76   Ht 5' 7" (1 702 m)   Wt 130 kg (286 lb)   BMI 44 79 kg/m²   Neck Circumference: 51 cm  Patient is alert, orientated, cooperative [and in no distress]  Mental state [appears normal]  There are [no] facial pressure marks or rashes  Physical findings are essentially unchanged as documented in the initial encounter  IMPRESSION:     1  ANALY (obstructive sleep apnea)  Sleep F/U  - established patient    Cpap DME   2  Dry mouth     3  Morbid obesity with BMI of 40 0-44 9, adult ([facility] )     4  Essential hypertension     5  Mood disturbance ([facility] )     6  Gastroesophageal reflux disease, esophagitis presence not specified         PLAN:  1  I reviewed results of the Sleep studies with the patient  2  The patient's current unit has now reached its five-year reasonable useful life and needs to be replaced  3  I advised a retitration study but he declined  4  Treatment with  PAP is medically necessary and Hector is agreable to continue use  5  Pressure setting:  Continue at 14 - 18 cm H2O     6  Instruction on care of equipment and strategies to improve comfort with use of PAP were discussed [:controlling mucosal dryness, nasal symptoms and Mask leaks]  7  Care coordinated with DME provider and prescription to replace supplies as needed was provided  8  Need for compliance with therapy and weight reduction were emphasized  He states his efforts at weight reduction have been unsuccessful  I suggested working with weight management program but he declined  9  I will see him for follow-up in 2 months] [to monitor progress, compliance and to adjust therapy]  Thank you for allowing me to participate in the care of this patient      Sincerely,    Authenticated electronically by Zeinab Parkinson MD on 77/87/23   Board Certified Specialist

## 2018-06-04 NOTE — PROGRESS NOTES
Review of Systems      Genitourinary difficulty with erection   Cardiology palpitations/fluttering feeling in the chest and ankle/leg swelling   Gastrointestinal none   Neurology awaken with headache, forgetfulness, poor concentration or confusion,  and difficulty with memory   Constitutional none   Integumentary itching   Psychiatry anxiety   Musculoskeletal joint pain and back pain   Pulmonary shortness of breath with activity   ENT throat clearing and ringing in ears   Endocrine excessive thirst and frequent urination   Hematological none

## 2018-06-06 ENCOUNTER — APPOINTMENT (OUTPATIENT)
Dept: LAB | Age: 73
End: 2018-06-06
Payer: MEDICARE

## 2018-06-06 DIAGNOSIS — E11.9 TYPE 2 DIABETES MELLITUS WITHOUT COMPLICATION, WITHOUT LONG-TERM CURRENT USE OF INSULIN (HCC): ICD-10-CM

## 2018-06-06 LAB
ALBUMIN SERPL BCP-MCNC: 3.2 G/DL (ref 3.5–5)
ALP SERPL-CCNC: 110 U/L (ref 46–116)
ALT SERPL W P-5'-P-CCNC: 39 U/L (ref 12–78)
ANION GAP SERPL CALCULATED.3IONS-SCNC: 5 MMOL/L (ref 4–13)
AST SERPL W P-5'-P-CCNC: 21 U/L (ref 5–45)
BILIRUB SERPL-MCNC: 0.29 MG/DL (ref 0.2–1)
BUN SERPL-MCNC: 13 MG/DL (ref 5–25)
CALCIUM SERPL-MCNC: 9 MG/DL (ref 8.3–10.1)
CHLORIDE SERPL-SCNC: 99 MMOL/L (ref 100–108)
CO2 SERPL-SCNC: 33 MMOL/L (ref 21–32)
CREAT SERPL-MCNC: 1.17 MG/DL (ref 0.6–1.3)
EST. AVERAGE GLUCOSE BLD GHB EST-MCNC: 157 MG/DL
GFR SERPL CREATININE-BSD FRML MDRD: 61 ML/MIN/1.73SQ M
GLUCOSE P FAST SERPL-MCNC: 146 MG/DL (ref 65–99)
HBA1C MFR BLD: 7.1 % (ref 4.2–6.3)
POTASSIUM SERPL-SCNC: 3.5 MMOL/L (ref 3.5–5.3)
PROT SERPL-MCNC: 7.5 G/DL (ref 6.4–8.2)
SODIUM SERPL-SCNC: 137 MMOL/L (ref 136–145)

## 2018-06-06 PROCEDURE — 36415 COLL VENOUS BLD VENIPUNCTURE: CPT

## 2018-06-06 PROCEDURE — 80053 COMPREHEN METABOLIC PANEL: CPT

## 2018-06-06 PROCEDURE — 83036 HEMOGLOBIN GLYCOSYLATED A1C: CPT

## 2018-06-11 DIAGNOSIS — N39.8 VOIDING DYSFUNCTION: Primary | ICD-10-CM

## 2018-06-12 DIAGNOSIS — N39.8 VOIDING DYSFUNCTION: ICD-10-CM

## 2018-06-12 RX ORDER — OXYBUTYNIN CHLORIDE 5 MG/1
5 TABLET, EXTENDED RELEASE ORAL
Qty: 30 TABLET | Refills: 0 | Status: SHIPPED | OUTPATIENT
Start: 2018-06-12 | End: 2020-01-23 | Stop reason: SDUPTHER

## 2018-06-12 RX ORDER — OXYBUTYNIN CHLORIDE 5 MG/1
5 TABLET, EXTENDED RELEASE ORAL
Qty: 90 TABLET | Refills: 3 | Status: SHIPPED | OUTPATIENT
Start: 2018-06-12 | End: 2018-06-12 | Stop reason: SDUPTHER

## 2018-06-13 ENCOUNTER — OFFICE VISIT (OUTPATIENT)
Dept: INTERNAL MEDICINE CLINIC | Facility: CLINIC | Age: 73
End: 2018-06-13
Payer: MEDICARE

## 2018-06-13 VITALS
HEIGHT: 67 IN | SYSTOLIC BLOOD PRESSURE: 138 MMHG | TEMPERATURE: 98.6 F | WEIGHT: 287 LBS | OXYGEN SATURATION: 98 % | DIASTOLIC BLOOD PRESSURE: 70 MMHG | HEART RATE: 68 BPM | BODY MASS INDEX: 45.04 KG/M2

## 2018-06-13 DIAGNOSIS — K52.1 DIARRHEA DUE TO DRUG: ICD-10-CM

## 2018-06-13 DIAGNOSIS — I10 ESSENTIAL HYPERTENSION: ICD-10-CM

## 2018-06-13 DIAGNOSIS — M62.838 MUSCLE SPASM: Primary | ICD-10-CM

## 2018-06-13 DIAGNOSIS — M54.2 NECK PAIN: ICD-10-CM

## 2018-06-13 DIAGNOSIS — E11.40 TYPE 2 DIABETES MELLITUS WITH DIABETIC NEUROPATHY, WITHOUT LONG-TERM CURRENT USE OF INSULIN (HCC): ICD-10-CM

## 2018-06-13 PROCEDURE — 99214 OFFICE O/P EST MOD 30 MIN: CPT | Performed by: INTERNAL MEDICINE

## 2018-06-13 RX ORDER — CYCLOBENZAPRINE HCL 5 MG
5 TABLET ORAL
Qty: 14 TABLET | Refills: 1 | Status: SHIPPED | OUTPATIENT
Start: 2018-06-13 | End: 2018-09-17 | Stop reason: ALTCHOICE

## 2018-06-13 NOTE — ASSESSMENT & PLAN NOTE
Lab Results   Component Value Date    HGBA1C 7 1 (H) 06/06/2018     Type 2 diabetes with a hemoglobin A1c improving from 8 1 down to 7 1 most recently  His blood sugar was 146 fasting  Will be discontinuing his metformin in view of his diarrhea symptoms and converting him to Januvia at 100 mg once a day prior to breakfast   He will check his blood sugars in the morning and return in about 2 weeks for follow-up assessment  He has a history of diabetic peripheral neuropathy and was tried on gabapentin 300 mg daily at bedtime with no improvement after 3 months  The patient would prefer to discontinue the medication as he sees no particular benefit  I am in agreement with this decision  No results for input(s): POCGLU in the last 72 hours      Blood Sugar Average: Last 72 hrs:

## 2018-06-13 NOTE — ASSESSMENT & PLAN NOTE
Neck pain appears to be muscular in nature he has some muscular tension on the right side of his neck will start him on warm heat to the neck and upper shoulder area and also Flexeril 5 mg at bedtime for 2 weeks  Follow-up assessment in 2 weeks when he returns to review his blood sugars

## 2018-06-13 NOTE — PROGRESS NOTES
Assessment/Plan:    Diarrhea due to drug  New stools most likely due to metformin medication will discontinue the drug and convert the patient over to Januvia 100 mg daily prior to breakfast   He will check his blood sugars daily on the morning we instructed him today in the proper use of a home glucometer  I will see him in 2 weeks to see how his blood sugar control isn't also review the diarrhea symptoms  Type 2 diabetes mellitus with diabetic neuropathy, without long-term current use of insulin ([facility])  Lab Results   Component Value Date    HGBA1C 7 1 (H) 06/06/2018     Type 2 diabetes with a hemoglobin A1c improving from 8 1 down to 7 1 most recently  His blood sugar was 146 fasting  Will be discontinuing his metformin in view of his diarrhea symptoms and converting him to Januvia at 100 mg once a day prior to breakfast   He will check his blood sugars in the morning and return in about 2 weeks for follow-up assessment  He has a history of diabetic peripheral neuropathy and was tried on gabapentin 300 mg daily at bedtime with no improvement after 3 months  The patient would prefer to discontinue the medication as he sees no particular benefit  I am in agreement with this decision  No results for input(s): POCGLU in the last 72 hours  Blood Sugar Average: Last 72 hrs:      Essential hypertension  History of hypertension blood pressure 138/70 continue on present blood pressure medication    Neck pain  Neck pain appears to be muscular in nature he has some muscular tension on the right side of his neck will start him on warm heat to the neck and upper shoulder area and also Flexeril 5 mg at bedtime for 2 weeks  Follow-up assessment in 2 weeks when he returns to review his blood sugars  Diagnoses and all orders for this visit:    Muscle spasm  -     cyclobenzaprine (FLEXERIL) 5 mg tablet;  Take 1 tablet (5 mg total) by mouth daily at bedtime    Type 2 diabetes mellitus with diabetic neuropathy, without long-term current use of insulin ([facility] [address] )    Diarrhea due to drug    Essential hypertension    Neck pain        Subjective:      Patient ID: Chelel Romero is a 75 y o  male  This 76-year-old gentleman presents today to review several symptoms  The 1st symptom is that of intermittent diarrhea  He reports several months of intermittent watery diarrhea  He has some normal bowel movements and then occasionally a loose watery bowel movement  He does have some occasional abdominal discomfort associated with the loose stools  He reports no nausea or vomiting  He has seen no evidence of any undigested food or blood mixed in with the stool  His last colonoscopy was performed in January of 2017 a single polyp was removed there is no mention of any inflammatory bowel changes  We did review with the patient the possibility that his metformin medication which is being used to treat his type 2 diabetes can cause diarrhea  Second concern is regarding a previous trial of gabapentin prescribed by his podiatrist for peripheral neuropathy  He has documented peripheral neuropathy secondary to his diabetes and he was placed on 300 mg of gabapentin at bedtime  He has now been on medication for 3 months and sees no particular benefit to the medication he would like to discontinue the medication and has already run out of pills  As we see no particular benefit to the drug it is okay to discontinue it  He also has symptoms of right-sided neck stiffness which has been present for several days  He has had no history of trauma to the neck he awoke 1 morning with the symptoms  He has discomfort when he rotates his neck side to side forward and backward  The following portions of the patient's history were reviewed and updated as appropriate:   He  has a past medical history of Anxiety; Claustrophobia; Depression; Dyslipidemia; GERD (gastroesophageal reflux disease); Glaucoma; Headache; Hypertension;  Low back pain; Neck pain; Numbness and tingling in left arm; OAB (overactive bladder); Paroxysmal supraventricular tachycardia ([facility] ); and Sleep apnea  He   Patient Active Problem List    Diagnosis Date Noted    Diarrhea due to drug 06/13/2018    ANLAY (obstructive sleep apnea) 06/04/2018    Dry mouth 06/04/2018    Morbid obesity with BMI of 40 0-44 9, adult ([facility] ) 06/04/2018    Mood disturbance ([facility] ) 06/04/2018    Gastroesophageal reflux disease 06/04/2018    Recurrent isolated sleep paralysis 06/04/2018    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    Type 2 diabetes mellitus with diabetic neuropathy, without long-term current use of insulin ([place] [address] ) 01/29/2018    Essential hypertension 01/29/2018    Peripheral neuropathy 01/29/2018    Morbid obesity ([place] [address] ) 01/29/2018    Claustrophobia     Headache     Neck pain     Low back pain      He  has a past surgical history that includes Cataract extraction (Bilateral); Knee arthroscopy (Bilateral); Lumbar spine surgery; Excision basal cell carcinoma; Total knee arthroplasty (Left); Replacement total knee (Right, 04/2017); Replacement total knee (Left, 03/2013); Anoscopy; Rectal biopsy; and Mouth surgery  His family history includes Alzheimer's disease in his mother; Diabetes in his mother and paternal grandmother; Esophageal cancer in his paternal grandfather; Sara Dikes Hypertension in his father  He  reports that he quit smoking about 35 years ago  He has never used smokeless tobacco  He reports that he drinks about 8 4 oz of alcohol per week   He reports that he does not use drugs       Review of Systems   Constitutional: Negative  HENT: Negative  Eyes: Negative  Respiratory: Negative  Cardiovascular: Negative  Gastrointestinal: Positive for abdominal pain and diarrhea  Endocrine: Negative  Genitourinary: Negative  Musculoskeletal: Positive for myalgias and neck pain  Skin: Negative  Allergic/Immunologic: Negative  Neurological: Negative  Hematological: Negative  Psychiatric/Behavioral: Negative  Objective:      /70 (BP Location: Right arm, Patient Position: Sitting, Cuff Size: Adult)   Pulse 68   Temp 98 6 °F (37 °C) (Tympanic)   Ht 5' 7" (1 702 m)   Wt 130 kg (287 lb)   SpO2 98%   BMI 44 95 kg/m²          Physical Exam   Constitutional: He is oriented to person, place, and time  He appears well-developed and well-nourished  No distress  HENT:   Head: Normocephalic and atraumatic  Right Ear: Hearing, tympanic membrane, external ear and ear canal normal    Left Ear: Hearing, tympanic membrane, external ear and ear canal normal    Nose: Nose normal    Mouth/Throat: Oropharynx is clear and moist and mucous membranes are normal    Eyes: Conjunctivae are normal  Pupils are equal, round, and reactive to light  Right eye exhibits no discharge  Left eye exhibits no discharge  Neck: No JVD present  No tracheal deviation present  No thyromegaly present  Cardiovascular: Normal rate, regular rhythm, S1 normal, S2 normal, normal heart sounds and intact distal pulses  No murmur heard  Pulmonary/Chest: Effort normal and breath sounds normal  No respiratory distress  He has no wheezes  He has no rales  He exhibits no tenderness  Abdominal: Soft  Bowel sounds are normal  He exhibits no distension and no mass  There is no tenderness  There is no rebound and no guarding  Musculoskeletal: Normal range of motion  He exhibits no edema  Muscular tension in tightness over the right neck and upper shoulder region   Lymphadenopathy:     He has no cervical adenopathy  Neurological: He is alert and oriented to person, place, and time  He has normal reflexes  Skin: Skin is warm and dry  No rash noted  He is not diaphoretic  No erythema  No pallor  Psychiatric: He has a normal mood and affect   His behavior is normal  Judgment and thought content normal

## 2018-06-13 NOTE — ASSESSMENT & PLAN NOTE
New stools most likely due to metformin medication will discontinue the drug and convert the patient over to Januvia 100 mg daily prior to breakfast   He will check his blood sugars daily on the morning we instructed him today in the proper use of a home glucometer  I will see him in 2 weeks to see how his blood sugar control isn't also review the diarrhea symptoms

## 2018-06-25 ENCOUNTER — APPOINTMENT (OUTPATIENT)
Dept: LAB | Age: 73
End: 2018-06-25
Payer: MEDICARE

## 2018-06-25 ENCOUNTER — TELEPHONE (OUTPATIENT)
Dept: INTERNAL MEDICINE CLINIC | Facility: CLINIC | Age: 73
End: 2018-06-25

## 2018-06-25 DIAGNOSIS — E11.9 TYPE 2 DIABETES MELLITUS WITHOUT COMPLICATION, WITHOUT LONG-TERM CURRENT USE OF INSULIN (HCC): ICD-10-CM

## 2018-06-25 DIAGNOSIS — E11.9 TYPE 2 DIABETES MELLITUS WITHOUT COMPLICATION, WITHOUT LONG-TERM CURRENT USE OF INSULIN (HCC): Primary | ICD-10-CM

## 2018-06-25 LAB — GLUCOSE P FAST SERPL-MCNC: 168 MG/DL (ref 65–99)

## 2018-06-25 PROCEDURE — 36415 COLL VENOUS BLD VENIPUNCTURE: CPT

## 2018-06-25 PROCEDURE — 82947 ASSAY GLUCOSE BLOOD QUANT: CPT

## 2018-06-25 NOTE — TELEPHONE ENCOUNTER
Pt was unable to use the kit given to him last time for tomorrow's appt  He asked that a lab for the glucose test be added in his chart  He is currently fasting, so once added, I will call him at [phone] to let him know he can go to [facility]

## 2018-06-26 ENCOUNTER — OFFICE VISIT (OUTPATIENT)
Dept: INTERNAL MEDICINE CLINIC | Facility: CLINIC | Age: 73
End: 2018-06-26
Payer: MEDICARE

## 2018-06-26 VITALS
RESPIRATION RATE: 14 BRPM | BODY MASS INDEX: 45.74 KG/M2 | WEIGHT: 291.4 LBS | HEIGHT: 67 IN | DIASTOLIC BLOOD PRESSURE: 80 MMHG | TEMPERATURE: 99.1 F | SYSTOLIC BLOOD PRESSURE: 136 MMHG | HEART RATE: 85 BPM | OXYGEN SATURATION: 96 %

## 2018-06-26 DIAGNOSIS — I10 ESSENTIAL HYPERTENSION: ICD-10-CM

## 2018-06-26 DIAGNOSIS — E11.9 TYPE 2 DIABETES MELLITUS WITHOUT COMPLICATION, WITHOUT LONG-TERM CURRENT USE OF INSULIN (HCC): Primary | ICD-10-CM

## 2018-06-26 DIAGNOSIS — M54.2 NECK PAIN: ICD-10-CM

## 2018-06-26 PROBLEM — K52.1 DIARRHEA DUE TO DRUG: Status: RESOLVED | Noted: 2018-06-13 | Resolved: 2018-06-26

## 2018-06-26 PROCEDURE — 99214 OFFICE O/P EST MOD 30 MIN: CPT | Performed by: INTERNAL MEDICINE

## 2018-06-26 NOTE — ASSESSMENT & PLAN NOTE
Lab Results   Component Value Date    HGBA1C 7 1 (H) 06/06/2018    Type 2 diabetes her with hemoglobin A1c of 7 1%  Metformin was previously discontinued due to loose bowel movement condition  As place the patient was started on Januvia 100 mg daily which has not controlled his blood per sugars quite as well with a reading now 168 compared to his previous reading of 146 on metformin  He reports constipation instead of diarrhea since discontinuing the metformin so I suggested that we use a combination of the Januvia and metformin to control his blood sugars and hopefully give him and neutral affect on his bowel movements  He will take 100 mg of Januvia daily and 500 mg of metformin daily both of them to be taken at breakfast time  I requested a follow-up blood sugar to be performed in 1 month along with a office follow-up visit  No results for input(s): POCGLU in the last 72 hours      Blood Sugar Average: Last 72 hrs:

## 2018-06-26 NOTE — PROGRESS NOTES
Assessment/Plan:    Type 2 diabetes mellitus with diabetic neuropathy, without long-term current use of insulin (HCC)  Lab Results   Component Value Date    HGBA1C 7 1 (H) 06/06/2018    Type 2 diabetes her with hemoglobin A1c of 7 1%  Metformin was previously discontinued due to loose bowel movement condition  As place the patient was started on Januvia 100 mg daily which has not controlled his blood per sugars quite as well with a reading now 168 compared to his previous reading of 146 on metformin  He reports constipation instead of diarrhea since discontinuing the metformin so I suggested that we use a combination of the Januvia and metformin to control his blood sugars and hopefully give him and neutral affect on his bowel movements  He will take 100 mg of Januvia daily and 500 mg of metformin daily both of them to be taken at breakfast time  I requested a follow-up blood sugar to be performed in 1 month along with a office follow-up visit  No results for input(s): POCGLU in the last 72 hours  Blood Sugar Average: Last 72 hrs:      Essential hypertension  Hypertension control is adequate blood pressure today 136/80 continue present medications  Neck pain  Muscular neck pain has improved the patient completed his course of Flexeril he still has some mild tenderness in the muscles with significant improvement in range of motion compared to his last visit  Will continue with heat and expect his condition should continue to improve  Diagnoses and all orders for this visit:    Type 2 diabetes mellitus without complication, without long-term current use of insulin (HCC)  -     metFORMIN (GLUCOPHAGE) 500 mg tablet; Take 1 tablet (500 mg total) by mouth daily with breakfast  -     Glucose, fasting; Future        Subjective:      Patient ID: Yanique Steve is a 75 y o  male  This is a follow-up visit for this 69-year-old gentleman    On his last visit he indicated that he been experiencing persistent diarrhea and we suspected that it could be related to his metformin medication  At the time of his last visit he was taking 1000 mg of metformin daily in a 500 mg dose in the morning and 500 mg at suppertime  At our request he discontinued that medication and started on Januvia 100 mg daily  He returns today for follow-up assessment of the effectiveness of this treatment for his type 2 diabetes  The patient indicates that 1st of all his bowel movements have now become rather constipated instead of loose since discontinuing the metformin  His blood sugar has increased from a previous reading of 146 to a present reading of 168 on just the Januvia by itself  Unfortunate the patient was not able to master the use of a home glucometer  His previous stiffness in the musculature of the neck on the right side has improved he still has some tightness in decreased range of motion but overall it is much better than his last visit  He went through the full course of Flexeril 5 mg at bedtime  The following portions of the patient's history were reviewed and updated as appropriate: He  has a past medical history of Anxiety; Claustrophobia; Depression; Dyslipidemia; GERD (gastroesophageal reflux disease); Glaucoma; Headache; Hypertension; Low back pain; Neck pain; Numbness and tingling in left arm; OAB (overactive bladder); Paroxysmal supraventricular tachycardia ([address] ); and Sleep apnea  He  has a past surgical history that includes Cataract extraction (Bilateral); Knee arthroscopy (Bilateral); Lumbar spine surgery; Excision basal cell carcinoma; Total knee arthroplasty (Left); Replacement total knee (Right, 04/2017); Replacement total knee (Left, 03/2013); Anoscopy; Rectal biopsy; and Mouth surgery  His family history includes Alzheimer's disease in his mother; Diabetes in his mother and paternal grandmother; Esophageal cancer in his paternal grandfather; Jg Limb Hypertension in his father    He reports that he quit smoking about 35 years ago  He has never used smokeless tobacco  He reports that he drinks about 8 4 oz of alcohol per week   He reports that he does not use drugs       Review of Systems   Gastrointestinal: Positive for constipation  Musculoskeletal: Positive for neck pain  All other systems reviewed and are negative  Objective:      /80   Pulse 85   Temp 99 1 °F (37 3 °C)   Resp 14   Ht 5' 7" (1 702 m)   Wt 132 kg (291 lb 6 4 oz)   SpO2 96%   BMI 45 64 kg/m²          Physical Exam   Constitutional: He is oriented to person, place, and time  He appears well-developed and well-nourished  HENT:   Right Ear: Hearing, tympanic membrane, external ear and ear canal normal    Left Ear: Hearing, tympanic membrane, external ear and ear canal normal    Nose: Nose normal    Mouth/Throat: Oropharynx is clear and moist and mucous membranes are normal    Eyes: Conjunctivae are normal  Pupils are equal, round, and reactive to light  Neck: No thyromegaly present  Cardiovascular: Normal rate, regular rhythm, S1 normal, S2 normal, normal heart sounds and intact distal pulses  No murmur heard  Pulmonary/Chest: Effort normal and breath sounds normal    Musculoskeletal: Normal range of motion  He exhibits no edema  Slight muscular tension in the right side of the neck radiating down into the scapular region   Lymphadenopathy:     He has no cervical adenopathy  Neurological: He is alert and oriented to person, place, and time  He has normal reflexes  Skin: Skin is warm and dry  Psychiatric: He has a normal mood and affect   His behavior is normal  Judgment and thought content normal

## 2018-06-26 NOTE — ASSESSMENT & PLAN NOTE
Muscular neck pain has improved the patient completed his course of Flexeril he still has some mild tenderness in the muscles with significant improvement in range of motion compared to his last visit  Will continue with heat and expect his condition should continue to improve

## 2018-07-31 ENCOUNTER — APPOINTMENT (OUTPATIENT)
Dept: LAB | Age: 73
End: 2018-07-31
Payer: MEDICARE

## 2018-07-31 DIAGNOSIS — E11.9 TYPE 2 DIABETES MELLITUS WITHOUT COMPLICATION, WITHOUT LONG-TERM CURRENT USE OF INSULIN (HCC): ICD-10-CM

## 2018-07-31 LAB — GLUCOSE P FAST SERPL-MCNC: 144 MG/DL (ref 65–99)

## 2018-07-31 PROCEDURE — 82947 ASSAY GLUCOSE BLOOD QUANT: CPT

## 2018-07-31 PROCEDURE — 36415 COLL VENOUS BLD VENIPUNCTURE: CPT

## 2018-08-01 ENCOUNTER — OFFICE VISIT (OUTPATIENT)
Dept: INTERNAL MEDICINE CLINIC | Facility: CLINIC | Age: 73
End: 2018-08-01
Payer: MEDICARE

## 2018-08-01 VITALS
TEMPERATURE: 98.9 F | HEART RATE: 75 BPM | DIASTOLIC BLOOD PRESSURE: 70 MMHG | WEIGHT: 287.2 LBS | SYSTOLIC BLOOD PRESSURE: 134 MMHG | BODY MASS INDEX: 45.08 KG/M2 | RESPIRATION RATE: 16 BRPM | HEIGHT: 67 IN | OXYGEN SATURATION: 96 %

## 2018-08-01 DIAGNOSIS — I10 ESSENTIAL HYPERTENSION: Primary | ICD-10-CM

## 2018-08-01 DIAGNOSIS — E66.01 MORBID OBESITY WITH BMI OF 40.0-44.9, ADULT (HCC): ICD-10-CM

## 2018-08-01 DIAGNOSIS — E11.40 TYPE 2 DIABETES MELLITUS WITH DIABETIC NEUROPATHY, WITHOUT LONG-TERM CURRENT USE OF INSULIN (HCC): ICD-10-CM

## 2018-08-01 PROBLEM — R68.2 DRY MOUTH: Status: RESOLVED | Noted: 2018-06-04 | Resolved: 2018-08-01

## 2018-08-01 PROCEDURE — 99213 OFFICE O/P EST LOW 20 MIN: CPT | Performed by: INTERNAL MEDICINE

## 2018-08-01 NOTE — ASSESSMENT & PLAN NOTE
Lab Results   Component Value Date    HGBA1C 7 1 (H) 06/06/2018    Type 2 diabetes with a hemoglobin A1c of 7 1%  Follow-up blood sugar for this visit is 144  He will continue on his combination of Januvia 100 mg and metformin 500 mg taken at breakfast daily and continue to try to lose weight  Follow-up is requested for 3 months with glucose and hemoglobin A1c at that time  The benefits of tight blood sugar control reviewed with the patient  We offer him a visit with the dietitian but he declines that consultation  Type 2 diabetesNo results for input(s): POCGLU in the last 72 hours      Blood Sugar Average: Last 72 hrs:

## 2018-08-01 NOTE — PROGRESS NOTES
Assessment/Plan:    Type 2 diabetes mellitus with diabetic neuropathy, without long-term current use of insulin ([facility])  Lab Results   Component Value Date    HGBA1C 7 1 (H) 06/06/2018    Type 2 diabetes with a hemoglobin A1c of 7 1%  Follow-up blood sugar for this visit is 144  He will continue on his combination of Januvia 100 mg and metformin 500 mg taken at breakfast daily and continue to try to lose weight  Follow-up is requested for 3 months with glucose and hemoglobin A1c at that time  The benefits of tight blood sugar control reviewed with the patient  We offer him a visit with the dietitian but he declines that consultation  Type 2 diabetesNo results for input(s): POCGLU in the last 72 hours  Blood Sugar Average: Last 72 hrs:      Essential hypertension  Hypertension remains under good control continue present medications follow-up assessment in 3 months requested  Morbid obesity with BMI of 40 0-44 9, adult ([facility] )  Morbid obesity with a body mass index of 44 98  This certainly compromises the control of his type 2 diabetes  We have encouraged him to continue to work on losing weight  He has made some progress since his last visit but still needs to continue with regular walking exercise as well as a reduction in his calorie consumption  Diagnoses and all orders for this visit:    Essential hypertension    Type 2 diabetes mellitus with diabetic neuropathy, without long-term current use of insulin ([facility])  -     sitaGLIPtin (JANUVIA) 100 mg tablet; Take 1 tablet (100 mg total) by mouth daily  -     Basic metabolic panel; Future  -     Hemoglobin A1C; Future    Morbid obesity with BMI of 40 0-44 9, adult ([facility])        Subjective:      Patient ID: Helder Abdullahi is a 75 y o  male  This 23-year-old gentleman returns today for follow-up assessment of his type 2 diabetes  He is presently on Januvia 100 mg daily and metformin 500 mg daily both her taken in the morning    He has been trying to abide by a good diabetic diet  He declines a consultation with the dietitian's for further education about the proper diabetic diet  He has been able to lose some weight and is 4 lb lighter than he was on his last visit with us  He has had no symptoms of hypoglycemia  Previously he was having some bowel irregularity symptoms on the metformin he was loose on the Januvia he was constipated with the 2 together he seems to be pretty well regulated with his bowel movements  He has a history of hypertension blood pressure today is 134/70  He has no other complaints at this time  The following portions of the patient's history were reviewed and updated as appropriate:   He  has a past medical history of Anxiety; Claustrophobia; Depression; Dyslipidemia; GERD (gastroesophageal reflux disease); Glaucoma; Headache; Hypertension; Low back pain; Neck pain; Numbness and tingling in left arm; OAB (overactive bladder); Paroxysmal supraventricular tachycardia ([facility] ); and Sleep apnea  He  has a past surgical history that includes Cataract extraction (Bilateral); Knee arthroscopy (Bilateral); Lumbar spine surgery; Excision basal cell carcinoma; Total knee arthroplasty (Left); Replacement total knee (Right, 04/2017); Replacement total knee (Left, 03/2013); Anoscopy; Rectal biopsy; and Mouth surgery  His family history includes Alzheimer's disease in his mother; Diabetes in his mother and paternal grandmother; Esophageal cancer in his paternal grandfather; Genella Brothers Hypertension in his father  He  reports that he quit smoking about 35 years ago  He has never used smokeless tobacco  He reports that he drinks about 8 4 oz of alcohol per week   He reports that he does not use drugs    Current Outpatient Prescriptions   Medication Sig Dispense Refill    amLODIPine (NORVASC) 10 mg tablet 10 mg      aspirin (ASPIRIN LOW DOSE) 81 MG tablet Take 1 tablet by mouth daily      brimonidine-timolol (COMBIGAN) 0 2-0 5 % Administer 1 drop into the left eye daily   cephalexin (KEFLEX) 500 mg capsule Take 500 mg by mouth      citalopram (CeleXA) 10 mg tablet TAKE 1 TABLET DAILY 90 tablet 2    cyclobenzaprine (FLEXERIL) 5 mg tablet Take 1 tablet (5 mg total) by mouth daily at bedtime 14 tablet 1    latanoprost (XALATAN) 0 005 % ophthalmic solution 1 drop daily at bedtime   metFORMIN (GLUCOPHAGE) 500 mg tablet Take 1 tablet (500 mg total) by mouth daily with breakfast 90 tablet 3    olmesartan-hydrochlorothiazide (BENICAR HCT) 40-25 MG per tablet Take 1 tablet by mouth daily   omeprazole (PriLOSEC) 20 mg delayed release capsule Take 20 mg by mouth daily   oxybutynin (DITROPAN-XL) 10 MG 24 hr tablet Take 10 mg by mouth daily   oxybutynin (DITROPAN-XL) 5 mg 24 hr tablet Take 1 tablet (5 mg total) by mouth daily at bedtime 30 tablet 0    rosuvastatin (CRESTOR) 5 mg tablet Take 5 mg by mouth daily   sitaGLIPtin (JANUVIA) 100 mg tablet Take 1 tablet (100 mg total) by mouth daily 90 tablet 3     No current facility-administered medications for this visit       Review of Systems   All other systems reviewed and are negative  Objective:      /70   Pulse 75   Temp 98 9 °F (37 2 °C)   Resp 16   Ht 5' 7" (1 702 m)   Wt 130 kg (287 lb 3 2 oz)   SpO2 96%   BMI 44 98 kg/m²          Physical Exam   Constitutional: He is oriented to person, place, and time  He appears well-developed and well-nourished  No distress  HENT:   Head: Normocephalic and atraumatic  Right Ear: Hearing, tympanic membrane, external ear and ear canal normal    Left Ear: Hearing, tympanic membrane, external ear and ear canal normal    Nose: Nose normal    Mouth/Throat: Oropharynx is clear and moist and mucous membranes are normal  No oropharyngeal exudate  Eyes: Conjunctivae are normal  Pupils are equal, round, and reactive to light  Right eye exhibits no discharge  Left eye exhibits no discharge  No scleral icterus     Neck: No JVD present  No tracheal deviation present  No thyromegaly present  Cardiovascular: Normal rate, regular rhythm, S1 normal, S2 normal, normal heart sounds and intact distal pulses  No murmur heard  Pulmonary/Chest: Effort normal and breath sounds normal  No respiratory distress  He has no wheezes  He has no rales  He exhibits no tenderness  Musculoskeletal: Normal range of motion  He exhibits no edema  Lymphadenopathy:     He has no cervical adenopathy  Neurological: He is alert and oriented to person, place, and time  He has normal reflexes  Skin: Skin is warm and dry  No rash noted  He is not diaphoretic  No erythema  No pallor  Psychiatric: He has a normal mood and affect   His behavior is normal  Judgment and thought content normal

## 2018-08-01 NOTE — ASSESSMENT & PLAN NOTE
Hypertension remains under good control continue present medications follow-up assessment in 3 months requested

## 2018-08-01 NOTE — ASSESSMENT & PLAN NOTE
Morbid obesity with a body mass index of 44 98  This certainly compromises the control of his type 2 diabetes  We have encouraged him to continue to work on losing weight  He has made some progress since his last visit but still needs to continue with regular walking exercise as well as a reduction in his calorie consumption

## 2018-09-17 ENCOUNTER — OFFICE VISIT (OUTPATIENT)
Dept: SLEEP CENTER | Facility: CLINIC | Age: 73
End: 2018-09-17
Payer: MEDICARE

## 2018-09-17 VITALS
WEIGHT: 289 LBS | DIASTOLIC BLOOD PRESSURE: 62 MMHG | HEIGHT: 67 IN | BODY MASS INDEX: 45.36 KG/M2 | SYSTOLIC BLOOD PRESSURE: 130 MMHG | RESPIRATION RATE: 14 BRPM | HEART RATE: 60 BPM

## 2018-09-17 DIAGNOSIS — R00.2 PALPITATIONS: ICD-10-CM

## 2018-09-17 DIAGNOSIS — G47.33 OSA (OBSTRUCTIVE SLEEP APNEA): Primary | ICD-10-CM

## 2018-09-17 DIAGNOSIS — R45.86 MOOD DISTURBANCE: ICD-10-CM

## 2018-09-17 DIAGNOSIS — G47.10 HYPERSOMNIA: ICD-10-CM

## 2018-09-17 DIAGNOSIS — I10 ESSENTIAL HYPERTENSION: ICD-10-CM

## 2018-09-17 DIAGNOSIS — R68.2 DRY MOUTH: ICD-10-CM

## 2018-09-17 DIAGNOSIS — F40.240 CLAUSTROPHOBIA: ICD-10-CM

## 2018-09-17 DIAGNOSIS — E66.01 MORBID OBESITY WITH BMI OF 40.0-44.9, ADULT (HCC): ICD-10-CM

## 2018-09-17 DIAGNOSIS — J34.89 DRY NOSE: ICD-10-CM

## 2018-09-17 DIAGNOSIS — E11.40 TYPE 2 DIABETES MELLITUS WITH DIABETIC NEUROPATHY, WITHOUT LONG-TERM CURRENT USE OF INSULIN (HCC): ICD-10-CM

## 2018-09-17 PROCEDURE — 99214 OFFICE O/P EST MOD 30 MIN: CPT | Performed by: INTERNAL MEDICINE

## 2018-09-17 NOTE — PROGRESS NOTES
Review of Systems      Genitourinary need to urinate more than twice a night and difficulty with erection   Cardiology palpitations/fluttering feeling in the chest   Gastrointestinal none   Neurology none   Constitutional excessive sweating at night   Integumentary none   Psychiatry none   Musculoskeletal back pain   Pulmonary shortness of breath with activity   ENT none   Endocrine frequent urination   Hematological none

## 2018-09-17 NOTE — PROGRESS NOTES
Follow-Up Note - Sleep [facility]   Hector Fishman  75 y o  male  :1945  [id number]    CC: I saw this patient for follow-up in clinic today for his Sleep Disordered Breathing, Coexisting Sleep and Medical Problems  He got a replacement CPAP machine a few months ago  PFSH, Problem List, Medications & Allergies were reviewed in EMR  Interval changes: none reported  He  has a past medical history of Anxiety; Claustrophobia; Depression; Dyslipidemia; GERD (gastroesophageal reflux disease); Glaucoma; Headache; Hypertension; Low back pain; Neck pain; Numbness and tingling in left arm; OAB (overactive bladder); Paroxysmal supraventricular tachycardia ([address] ); and Sleep apnea  He has a current medication list which includes the following prescription(s): amlodipine, aspirin, brimonidine-timolol, citalopram, latanoprost, metformin, olmesartan-hydrochlorothiazide, omeprazole, oxybutynin, rosuvastatin, and sitagliptin  ROS: Reviewed (see attached)  Significant for at times he awakens with sensation of his heart fluttering  He had Holter monitoring a few years ago that was unrevealing but does have a history of SVT  HPI:  Reed Morgan reports  · no  difficulty tolerating PAP; he has not felt claustrophobic since using a nasal interface  · no  adverse effects: dry mouth and dry nose  · Benefitting from use: sleeping better   With respect to compliance, data download shows:  using PAP > 4 hours/night 73% of the time  LILO (estimated) 1 1/hour at 90th percentile pressure of 15 2cm H2O  Sleep Routine: He reports getting 9-10 hours sleep; he has no difficulty initiating, but reports diffculty maintaining sleep  episodically  He awakens spontaneously feeling refreshed  He denied excessive drowsiness   He rated himself at Total score: 2 /24 on the [place] sleepiness scale  Habits: reports that he quit smoking about 35 years ago   He has never used smokeless tobacco ,  reports that he drinks about 8 4 oz of alcohol per week   ,  reports that he does not use drugs  , Caffeine use: moderate , Exercise routine: none   EXAM: /62 (BP Location: Left arm, Cuff Size: Large)   Pulse 60   Resp 14   Ht 5' 7" (1 702 m)   Wt 131 kg (289 lb)   BMI 45 26 kg/m²      Patient is alert, orientated, cooperative and in no distress  Mental state appears normal Craniofacial anatomy normal  There are no facial pressure marks or rashes  There are no abnormal neck masses  Nasal airway is patent  Mucous membranes appeared normal  The oral airway is crowded  Apart from truncal obesity, the rest of exam (Heart, Lungs, Abdomen, CNS and Musculoskeletal systems) was unremarkable   IMPRESSION:   1  ANALY (obstructive sleep apnea)  PAP DME Resupply/Reorder   2  Hypersomnia     3  Claustrophobia     4  Dry mouth     5  Dry nose     6  Mood disturbance ([facility] )     7  Morbid obesity with BMI of 40 0-44 9, adult ([facility] )     8  Palpitations     9  Essential hypertension     10  Type 2 diabetes mellitus with diabetic neuropathy, without long-term current use of insulin ([facility] [address] )         PLAN:  1  Treatment with  PAP is medically necessary and Hector is agreable to continue use  2  We discussed methods to improve comfort using PAP, care of equipment, and importance of compliance with therapy  3  Pressure setting:  Continue at 14-18 cm H2O     4  Rx provided to replace supplies and Care coordinated with DME provider  5  Strategies for weight reduction were discussed  6  Cognitive behavioral therapy, Sleep Hygiene and behavioral techniques to manage Insomnia were discussed  7  I advised follow-up with his primary care physician to consider repeat Holter monitoring but he felt he did not want to do so  8  Follow-up is advised in 1 year or sooner if needed to monitor progress, compliance and to adjust therapy  Thank you for allowing me to participate in the care of this patient      Sincerely,    Authenticated electronically by Jaquan Polanco Sean Mayers MD on 91/96/15   Board Certified Specialist

## 2018-11-06 ENCOUNTER — APPOINTMENT (OUTPATIENT)
Dept: LAB | Age: 73
End: 2018-11-06
Payer: MEDICARE

## 2018-11-06 ENCOUNTER — TRANSCRIBE ORDERS (OUTPATIENT)
Dept: ADMINISTRATIVE | Age: 73
End: 2018-11-06

## 2018-11-06 DIAGNOSIS — E11.40 TYPE 2 DIABETES MELLITUS WITH DIABETIC NEUROPATHY, WITHOUT LONG-TERM CURRENT USE OF INSULIN (HCC): ICD-10-CM

## 2018-11-06 LAB
ANION GAP SERPL CALCULATED.3IONS-SCNC: 5 MMOL/L (ref 4–13)
BUN SERPL-MCNC: 16 MG/DL (ref 5–25)
CALCIUM SERPL-MCNC: 9.1 MG/DL (ref 8.3–10.1)
CHLORIDE SERPL-SCNC: 100 MMOL/L (ref 100–108)
CO2 SERPL-SCNC: 29 MMOL/L (ref 21–32)
CREAT SERPL-MCNC: 1.24 MG/DL (ref 0.6–1.3)
EST. AVERAGE GLUCOSE BLD GHB EST-MCNC: 183 MG/DL
GFR SERPL CREATININE-BSD FRML MDRD: 57 ML/MIN/1.73SQ M
GLUCOSE P FAST SERPL-MCNC: 140 MG/DL (ref 65–99)
HBA1C MFR BLD: 8 % (ref 4.2–6.3)
POTASSIUM SERPL-SCNC: 3.8 MMOL/L (ref 3.5–5.3)
SODIUM SERPL-SCNC: 134 MMOL/L (ref 136–145)

## 2018-11-06 PROCEDURE — 36415 COLL VENOUS BLD VENIPUNCTURE: CPT

## 2018-11-06 PROCEDURE — 83036 HEMOGLOBIN GLYCOSYLATED A1C: CPT

## 2018-11-06 PROCEDURE — 80048 BASIC METABOLIC PNL TOTAL CA: CPT

## 2018-11-08 ENCOUNTER — OFFICE VISIT (OUTPATIENT)
Dept: INTERNAL MEDICINE CLINIC | Facility: CLINIC | Age: 73
End: 2018-11-08
Payer: MEDICARE

## 2018-11-08 VITALS
BODY MASS INDEX: 46.62 KG/M2 | WEIGHT: 297 LBS | HEART RATE: 74 BPM | SYSTOLIC BLOOD PRESSURE: 138 MMHG | TEMPERATURE: 98.7 F | DIASTOLIC BLOOD PRESSURE: 70 MMHG | OXYGEN SATURATION: 95 % | HEIGHT: 67 IN | RESPIRATION RATE: 16 BRPM

## 2018-11-08 DIAGNOSIS — I10 ESSENTIAL HYPERTENSION: ICD-10-CM

## 2018-11-08 DIAGNOSIS — E11.40 TYPE 2 DIABETES MELLITUS WITH DIABETIC NEUROPATHY, WITHOUT LONG-TERM CURRENT USE OF INSULIN (HCC): ICD-10-CM

## 2018-11-08 DIAGNOSIS — E66.01 MORBID OBESITY WITH BMI OF 40.0-44.9, ADULT (HCC): ICD-10-CM

## 2018-11-08 DIAGNOSIS — R00.2 PALPITATIONS: ICD-10-CM

## 2018-11-08 DIAGNOSIS — K21.9 GASTROESOPHAGEAL REFLUX DISEASE, ESOPHAGITIS PRESENCE NOT SPECIFIED: ICD-10-CM

## 2018-11-08 DIAGNOSIS — Z13.29 SCREENING FOR THYROID DISORDER: Primary | ICD-10-CM

## 2018-11-08 PROCEDURE — 99214 OFFICE O/P EST MOD 30 MIN: CPT | Performed by: NURSE PRACTITIONER

## 2018-11-08 RX ORDER — INFLUENZA A VIRUSA/MICHIGAN/45/2015 X-275 (H1N1) ANTIGEN (FORMALDEHYDE INACTIVATED), INFLUENZA A VIRUS A/HONG KONG/4801/2014 X-263B (H3N2) ANTIGEN (FORMALDEHYDE INACTIVATED), AND INFLUENZA B VIRUS B/BRISBANE/60/2008 ANTIGEN (FORMALDEHYDE INACTIVATED) 60; 60; 60 UG/.5ML; UG/.5ML; UG/.5ML
INJECTION, SUSPENSION INTRAMUSCULAR
Refills: 0 | COMMUNITY
Start: 2018-10-22 | End: 2020-02-17

## 2018-11-08 NOTE — ASSESSMENT & PLAN NOTE
Stable on omeprazole  Pt does have hx of precancerous cells on endoscopy and he is due for f/u endoscopy with Dr Yue Rai  He will also schedule colonoscopy at the same time

## 2018-11-08 NOTE — ASSESSMENT & PLAN NOTE
Lab Results   Component Value Date    HGBA1C 8 0 (H) 11/06/2018       No results for input(s): POCGLU in the last 72 hours  Blood Sugar Average: Last 72 hrs:  Diabetes is not well controlled on current medications  He openly states that he is not going to become more compliant with diet and exercise  He is resistant to adding on more medication  I did discuss various options and ultimately, we elected to try to increase his metformin back up to twice daily in hopes that his tolerance to medication will be improved now that he has been tolerating once daily dosing for 3 months  In addition, the constipating effects of januvia may help balance out the GI effects of metformin  Pt to begin this change today and he should call the office at the first sign of problem so that we can work on a new treatment plan  Otherwise, f/u in 3 months

## 2018-11-08 NOTE — ASSESSMENT & PLAN NOTE
As noted, pt is not compliant with lifestyle modifications for weight loss and risk reduction  He feels "life is too short" to suffer  He is aware of the risk he is incurring because of this choice  I have ordered a TSH to assess for possible thyroid dysfunction that may be a potentially contributing factor

## 2018-11-08 NOTE — ASSESSMENT & PLAN NOTE
Symptoms have been evaluated in the past and were non-cardiac  No associated chest pain, headache, shortness of breath, dizziness  It appears to be due to stress/anxiety  Pt decline intervention to treat anxiety at this time  Will continue to monitor

## 2018-11-08 NOTE — PROGRESS NOTES
Assessment/Plan:    Gastroesophageal reflux disease  Stable on omeprazole  Pt does have hx of precancerous cells on endoscopy and he is due for f/u endoscopy with Dr Colby Thornton  He will also schedule colonoscopy at the same time  Type 2 diabetes mellitus with diabetic neuropathy, without long-term current use of insulin (HCC)  Lab Results   Component Value Date    HGBA1C 8 0 (H) 11/06/2018       No results for input(s): POCGLU in the last 72 hours  Blood Sugar Average: Last 72 hrs:  Diabetes is not well controlled on current medications  He openly states that he is not going to become more compliant with diet and exercise  He is resistant to adding on more medication  I did discuss various options and ultimately, we elected to try to increase his metformin back up to twice daily in hopes that his tolerance to medication will be improved now that he has been tolerating once daily dosing for 3 months  In addition, the constipating effects of januvia may help balance out the GI effects of metformin  Pt to begin this change today and he should call the office at the first sign of problem so that we can work on a new treatment plan  Otherwise, f/u in 3 months  Essential hypertension  Blood pressure is adequately controlled on amlodipine and benicar  No change indicated  Morbid obesity with BMI of 40 0-44 9, adult ([facility] [address] )  As noted, pt is not compliant with lifestyle modifications for weight loss and risk reduction  He feels "life is too short" to suffer  He is aware of the risk he is incurring because of this choice  I have ordered a TSH to assess for possible thyroid dysfunction that may be a potentially contributing factor  Palpitations  Symptoms have been evaluated in the past and were non-cardiac  No associated chest pain, headache, shortness of breath, dizziness  It appears to be due to stress/anxiety  Pt decline intervention to treat anxiety at this time    Will continue to monitor  Diagnoses and all orders for this visit:    Screening for thyroid disorder  -     TSH, 3rd generation with Free T4 reflex; Future    Gastroesophageal reflux disease, esophagitis presence not specified    Type 2 diabetes mellitus with diabetic neuropathy, without long-term current use of insulin ([facility] [address] )    Essential hypertension    Morbid obesity with BMI of 40 0-44 9, adult (HCC)    Palpitations    Other orders  -     FLUZONE HIGH-DOSE 0 5 ML SHEILA; TO BE GIVEN BY PHARMACIST PER STANDING ORDER          Subjective:      Patient ID: Jose Maria King is a 75 y o  male  Pt is a 75y o  year old male who is seen today for 3 month follow up to management of DM, HTN, HLD  Management of diabetes has been a challenge as patient has not been compliant with diet and he has had difficult tolerating medications  He was initially treated with metformin 500 mg BID which had to be d/c'd due to loose stools  He was switched to [place] and his glycemic control was not as good  He eventually ended up with constipation from the [place] so most recently, he was put back on a 500 mg dose of metformin in addition to [place]  His bowels have since been regular  His A1C is 8 0 which is up from 7 1 at his last check  He feels very discouraged by this and he states that he feels like his quality of life goes down when he needs to adhere to to dietary restrictions and he does not want to be on more medication  He does not exercise due to knee pain and back pain  He denies chest pain, headaches, dizziness  He does occasionally get SOB and palpitations  This has been evaluated previously and was not found to be cardiac  He does say he puts a lot of stress on himself and feels this might be related  No change in bowel/bladder               The following portions of the patient's history were reviewed and updated as appropriate: allergies, current medications, past family history, past medical history, past social history, past surgical history and problem list     Review of Systems   Constitutional: Negative for activity change, appetite change, chills, fatigue, fever and unexpected weight change  HENT: Negative for hearing loss  Eyes: Negative for visual disturbance  Respiratory: Positive for shortness of breath  Negative for cough and chest tightness  Cardiovascular: Positive for palpitations  Negative for chest pain and leg swelling  Gastrointestinal: Negative for abdominal pain, constipation, diarrhea, nausea and vomiting  Genitourinary: Negative for dysuria and frequency  Musculoskeletal: Positive for arthralgias and back pain  Negative for myalgias  Neurological: Negative for dizziness, weakness, numbness and headaches  Psychiatric/Behavioral: Negative for sleep disturbance  The patient is nervous/anxious  Objective:      /70   Pulse 74   Temp 98 7 °F (37 1 °C)   Resp 16   Ht 5' 7" (1 702 m)   Wt 135 kg (297 lb)   SpO2 95%   BMI 46 52 kg/m²          Physical Exam   Constitutional: He is oriented to person, place, and time  Vital signs are normal  He appears well-developed and well-nourished  He is cooperative  HENT:   Right Ear: Hearing and external ear normal    Left Ear: Hearing and external ear normal    Mouth/Throat: Mucous membranes are normal    Eyes: Conjunctivae and lids are normal    Neck: No JVD present  Carotid bruit is not present  Cardiovascular: Normal rate, regular rhythm, S1 normal, S2 normal, normal heart sounds and intact distal pulses  No murmur heard  Pulmonary/Chest: Effort normal and breath sounds normal    Abdominal: Soft  Normal appearance and bowel sounds are normal  There is no tenderness  Musculoskeletal: Normal range of motion  He exhibits no edema  Lymphadenopathy:     He has no cervical adenopathy  Neurological: He is alert and oriented to person, place, and time  Skin: Skin is warm, dry and intact     Psychiatric: He has a normal mood and affect  His speech is normal and behavior is normal  Judgment and thought content normal  Cognition and memory are normal    Vitals reviewed

## 2018-11-09 ENCOUNTER — LAB (OUTPATIENT)
Dept: LAB | Age: 73
End: 2018-11-09
Payer: MEDICARE

## 2018-11-09 DIAGNOSIS — Z13.29 SCREENING FOR THYROID DISORDER: ICD-10-CM

## 2018-11-09 LAB
T4 FREE SERPL-MCNC: 0.84 NG/DL (ref 0.76–1.46)
TSH SERPL DL<=0.05 MIU/L-ACNC: 3.85 UIU/ML (ref 0.36–3.74)

## 2018-11-09 PROCEDURE — 84443 ASSAY THYROID STIM HORMONE: CPT

## 2018-11-09 PROCEDURE — 84439 ASSAY OF FREE THYROXINE: CPT

## 2018-11-09 PROCEDURE — 36415 COLL VENOUS BLD VENIPUNCTURE: CPT

## 2018-11-13 DIAGNOSIS — E03.8 SUBCLINICAL HYPOTHYROIDISM: Primary | ICD-10-CM

## 2018-11-14 DIAGNOSIS — I10 ESSENTIAL HYPERTENSION: Primary | ICD-10-CM

## 2018-11-14 RX ORDER — OLMESARTAN MEDOXOMIL AND HYDROCHLOROTHIAZIDE 40/25 40; 25 MG/1; MG/1
TABLET ORAL
Qty: 90 TABLET | Refills: 3 | Status: SHIPPED | OUTPATIENT
Start: 2018-11-14 | End: 2019-02-14 | Stop reason: SDUPTHER

## 2018-12-10 DIAGNOSIS — E11.9 TYPE 2 DIABETES MELLITUS WITHOUT COMPLICATION, WITHOUT LONG-TERM CURRENT USE OF INSULIN (HCC): ICD-10-CM

## 2018-12-19 DIAGNOSIS — E78.00 PURE HYPERCHOLESTEROLEMIA: Primary | ICD-10-CM

## 2018-12-19 RX ORDER — ROSUVASTATIN CALCIUM 5 MG/1
TABLET, COATED ORAL
Qty: 90 TABLET | Refills: 3 | Status: SHIPPED | OUTPATIENT
Start: 2018-12-19 | End: 2019-12-11 | Stop reason: SDUPTHER

## 2018-12-27 ENCOUNTER — APPOINTMENT (OUTPATIENT)
Dept: LAB | Age: 73
End: 2018-12-27
Payer: MEDICARE

## 2018-12-27 DIAGNOSIS — E03.8 SUBCLINICAL HYPOTHYROIDISM: ICD-10-CM

## 2018-12-27 LAB — TSH SERPL DL<=0.05 MIU/L-ACNC: 3 UIU/ML (ref 0.36–3.74)

## 2018-12-27 PROCEDURE — 86376 MICROSOMAL ANTIBODY EACH: CPT

## 2018-12-27 PROCEDURE — 84443 ASSAY THYROID STIM HORMONE: CPT

## 2018-12-27 PROCEDURE — 86800 THYROGLOBULIN ANTIBODY: CPT

## 2018-12-27 PROCEDURE — 36415 COLL VENOUS BLD VENIPUNCTURE: CPT

## 2018-12-28 LAB
THYROGLOB AB SERPL-ACNC: <1 IU/ML (ref 0–0.9)
THYROPEROXIDASE AB SERPL-ACNC: 12 IU/ML (ref 0–34)

## 2019-01-02 DIAGNOSIS — K21.9 GASTROESOPHAGEAL REFLUX DISEASE WITHOUT ESOPHAGITIS: Primary | ICD-10-CM

## 2019-01-02 RX ORDER — OMEPRAZOLE 20 MG/1
CAPSULE, DELAYED RELEASE ORAL
Qty: 90 CAPSULE | Refills: 3 | Status: SHIPPED | OUTPATIENT
Start: 2019-01-02 | End: 2019-11-20 | Stop reason: SDUPTHER

## 2019-01-07 DIAGNOSIS — I10 ESSENTIAL HYPERTENSION: Primary | ICD-10-CM

## 2019-01-07 RX ORDER — AMLODIPINE BESYLATE 10 MG/1
TABLET ORAL
Qty: 90 TABLET | Refills: 3 | Status: SHIPPED | OUTPATIENT
Start: 2019-01-07 | End: 2019-12-30 | Stop reason: SDUPTHER

## 2019-01-11 ENCOUNTER — TELEPHONE (OUTPATIENT)
Dept: INTERNAL MEDICINE CLINIC | Facility: CLINIC | Age: 74
End: 2019-01-11

## 2019-01-11 NOTE — TELEPHONE ENCOUNTER
The patient called back he would like the results of his TSH  Please result and I will call the patient back

## 2019-01-24 ENCOUNTER — OFFICE VISIT (OUTPATIENT)
Dept: UROLOGY | Facility: CLINIC | Age: 74
End: 2019-01-24
Payer: MEDICARE

## 2019-01-24 VITALS
BODY MASS INDEX: 45.36 KG/M2 | SYSTOLIC BLOOD PRESSURE: 142 MMHG | DIASTOLIC BLOOD PRESSURE: 80 MMHG | HEIGHT: 67 IN | WEIGHT: 289 LBS | HEART RATE: 74 BPM

## 2019-01-24 DIAGNOSIS — N40.1 BENIGN PROSTATIC HYPERPLASIA WITH LOWER URINARY TRACT SYMPTOMS, SYMPTOM DETAILS UNSPECIFIED: Primary | ICD-10-CM

## 2019-01-24 DIAGNOSIS — N39.8 VOIDING DYSFUNCTION: ICD-10-CM

## 2019-01-24 PROCEDURE — 99213 OFFICE O/P EST LOW 20 MIN: CPT | Performed by: PHYSICIAN ASSISTANT

## 2019-01-24 RX ORDER — METRONIDAZOLE 7.5 MG/G
GEL TOPICAL
Refills: 3 | COMMUNITY
Start: 2018-12-22 | End: 2020-05-06

## 2019-01-24 NOTE — PROGRESS NOTES
UROLOGY PROGRESS NOTE   Patient Identifiers: Anna Osullivan (MRN [id number])  Date of Service: 1/24/2019    Subjective:       40-year-old man history of BPH and overactive bladder  He is comfortable on oxybutynin 5 mg  AUA index score is 13  His last PSA 0 4  Current PSA is pending  No other complaints  Patient has  no complaints  Objective:     VITALS:    Vitals:    01/24/19 1319   BP: 142/80   Pulse: 74     AUA SYMPTOM SCORE      Most Recent Value   AUA SYMPTOM SCORE   How often have you had a sensation of not emptying your bladder completely after you finished urinating? 0   How often have you had to urinate again less than two hours after you finished urinating? 4   How often have you found you stopped and started again several times when you urinate? 1   How often have you found it difficult to postpone urination? 4   How often have you had a weak urinary stream?  2   How often have you had to push or strain to begin urination? 0   How many times did you most typically get up to urinate from the time you went to bed at night until the time you got up in the morning?   2   Quality of Life: If you were to spend the rest of your life with your urinary condition just the way it is now, how would you feel about that?  3   AUA SYMPTOM SCORE  13            LABS:  Lab Results   Component Value Date    HGB 12 3 02/16/2018    HCT 35 8 (L) 02/16/2018    WBC 7 47 02/16/2018     02/16/2018   ]    Lab Results   Component Value Date     01/19/2015    K 3 8 11/06/2018     11/06/2018    CO2 29 11/06/2018    BUN 16 11/06/2018    CREATININE 1 24 11/06/2018    CALCIUM 9 1 11/06/2018    GLUCOSE 125 01/19/2015   ]        INPATIENT MEDS:    Current Outpatient Prescriptions:     amLODIPine (NORVASC) 10 mg tablet, TAKE 1 TABLET DAILY, Disp: 90 tablet, Rfl: 3    aspirin (ASPIRIN LOW DOSE) 81 MG tablet, Take 1 tablet by mouth daily, Disp: , Rfl:     brimonidine-timolol (COMBIGAN) 0 2-0 5 %, Administer 1 drop into the left eye daily  , Disp: , Rfl:     citalopram (CeleXA) 10 mg tablet, TAKE 1 TABLET DAILY, Disp: 90 tablet, Rfl: 2    latanoprost (XALATAN) 0 005 % ophthalmic solution, 1 drop daily at bedtime  , Disp: , Rfl:     metFORMIN (GLUCOPHAGE) 500 mg tablet, TAKE 1 TABLET TWICE A DAY  BEFORE BREAKFAST AND SUPPER, Disp: 180 tablet, Rfl: 2    metroNIDAZOLE (METROGEL) 0 75 % gel, APPLY TO AFFECTED AREA(S) EVERY DAY, Disp: , Rfl: 3    olmesartan-hydrochlorothiazide (BENICAR HCT) 40-25 MG per tablet, TAKE 1 TABLET DAILY, Disp: 90 tablet, Rfl: 3    omeprazole (PriLOSEC) 20 mg delayed release capsule, Take one daily, Disp: 90 capsule, Rfl: 3    oxybutynin (DITROPAN-XL) 5 mg 24 hr tablet, Take 1 tablet (5 mg total) by mouth daily at bedtime, Disp: 30 tablet, Rfl: 0    rosuvastatin (CRESTOR) 5 mg tablet, TAKE 1 TABLET DAILY, Disp: 90 tablet, Rfl: 3    sitaGLIPtin (JANUVIA) 100 mg tablet, Take 1 tablet (100 mg total) by mouth daily, Disp: 90 tablet, Rfl: 3    FLUZONE HIGH-DOSE 0 5 ML SHEILA, TO BE GIVEN BY PHARMACIST PER STANDING ORDER, Disp: , Rfl: 0      Physical Exam:   /80 (BP Location: Left arm, Patient Position: Sitting, Cuff Size: Adult)   Pulse 74   Ht 5' 7" (1 702 m)   Wt 131 kg (289 lb)   BMI 45 26 kg/m²   GEN: no acute distress    RESP: breathing comfortably with no accessory muscle use    ABD: soft, non-tender, non-distended   INCISION:    EXT: no significant peripheral edema   (Male): Penis circumcised, phallus normal, meatus patent  Testicles descended into scrotum bilaterally without masses nor tenderness  No inguinal hernias bilaterally  TU: Prostate is enlarged at 35 grams  The prostate is not boggy  The prostate is not tender  No nodules noted      RADIOLOGY:     None     Assessment:    1  BPH    2   Voiding dysfunction  Plan:   - continue same medication  - follow-up in 1 year with PSA prior to visit  -  -

## 2019-02-07 ENCOUNTER — OFFICE VISIT (OUTPATIENT)
Dept: INTERNAL MEDICINE CLINIC | Facility: CLINIC | Age: 74
End: 2019-02-07
Payer: MEDICARE

## 2019-02-07 VITALS
WEIGHT: 292 LBS | RESPIRATION RATE: 14 BRPM | HEART RATE: 78 BPM | HEIGHT: 67 IN | DIASTOLIC BLOOD PRESSURE: 70 MMHG | OXYGEN SATURATION: 96 % | TEMPERATURE: 98.4 F | BODY MASS INDEX: 45.83 KG/M2 | SYSTOLIC BLOOD PRESSURE: 142 MMHG

## 2019-02-07 DIAGNOSIS — N30.00 ACUTE CYSTITIS WITHOUT HEMATURIA: ICD-10-CM

## 2019-02-07 DIAGNOSIS — Z12.5 PROSTATE CANCER SCREENING: ICD-10-CM

## 2019-02-07 DIAGNOSIS — E66.01 MORBID OBESITY WITH BMI OF 40.0-44.9, ADULT (HCC): ICD-10-CM

## 2019-02-07 DIAGNOSIS — R00.2 PALPITATION: ICD-10-CM

## 2019-02-07 DIAGNOSIS — G47.33 OBSTRUCTIVE SLEEP APNEA SYNDROME: ICD-10-CM

## 2019-02-07 DIAGNOSIS — Z23 ENCOUNTER FOR IMMUNIZATION: ICD-10-CM

## 2019-02-07 DIAGNOSIS — R53.83 OTHER FATIGUE: ICD-10-CM

## 2019-02-07 DIAGNOSIS — Z12.11 COLON CANCER SCREENING: ICD-10-CM

## 2019-02-07 DIAGNOSIS — I10 ESSENTIAL HYPERTENSION: ICD-10-CM

## 2019-02-07 DIAGNOSIS — E78.5 HYPERLIPIDEMIA, UNSPECIFIED HYPERLIPIDEMIA TYPE: ICD-10-CM

## 2019-02-07 DIAGNOSIS — E11.8 TYPE 2 DIABETES MELLITUS WITH COMPLICATION, UNSPECIFIED WHETHER LONG TERM INSULIN USE: Primary | ICD-10-CM

## 2019-02-07 PROBLEM — G47.53 RECURRENT ISOLATED SLEEP PARALYSIS: Status: RESOLVED | Noted: 2018-06-04 | Resolved: 2019-02-07

## 2019-02-07 PROBLEM — J34.89 DRY NOSE: Status: RESOLVED | Noted: 2018-09-17 | Resolved: 2019-02-07

## 2019-02-07 PROBLEM — Z13.29 SCREENING FOR THYROID DISORDER: Status: RESOLVED | Noted: 2018-11-08 | Resolved: 2019-02-07

## 2019-02-07 PROCEDURE — 93000 ELECTROCARDIOGRAM COMPLETE: CPT | Performed by: INTERNAL MEDICINE

## 2019-02-07 PROCEDURE — G0009 ADMIN PNEUMOCOCCAL VACCINE: HCPCS | Performed by: INTERNAL MEDICINE

## 2019-02-07 PROCEDURE — 99214 OFFICE O/P EST MOD 30 MIN: CPT | Performed by: INTERNAL MEDICINE

## 2019-02-07 PROCEDURE — 90732 PPSV23 VACC 2 YRS+ SUBQ/IM: CPT | Performed by: INTERNAL MEDICINE

## 2019-02-07 NOTE — PROGRESS NOTES
Assessment/Plan:    Type 2 diabetes mellitus with complication (HCC)  Lab Results   Component Value Date    HGBA1C 8 0 (H) 11/06/2018    This patient is presently on a combination of metformin 500 mg twice a day and Januvia 100 mg daily  He struggles to maintain good blood sugar control  His weight continues to be a significant factor in limitations on control of his diabetes  Physically is not overly active which also compromises control of his diabetes  We requested updated studies of fasting blood sugar and hemoglobin A1c  The patient does not desire to test his blood sugars at home  No results for input(s): POCGLU in the last 72 hours  Blood Sugar Average: Last 72 hrs:      Obstructive sleep apnea syndrome  History of sleep apnea symptoms the patient it is comply and on a daily basis with his CPAP  In view of his increasing body weight I feel it is necessary to do a follow-up sleep study to ensure that his CPAP is providing him with adequate treatment of his sleep apnea  He does have morning palpitations which may be a sign of hypoxia through the night  Essential hypertension  History of hypertension presently under medication  Blood pressure is mildly elevated today at 142/70  I have recommended that he continue his Benicar HCTZ  tablet daily along with his amlodipine 10 mg daily  Will re-evaluate blood pressure control on next visit    Acute cystitis without hematuria  Right flank discomfort suggesting the possibility of urinary tract infection we have requested a urine culture  If the urine cultures normal I suspect that the right flank pain is most likely muscular in nature  Morbid obesity with BMI of 40 0-44 9, adult ([facility] )  Morbid obesity with a body mass index of 45 73  He continues to gain weight which is a serious concern for his overall health  He understands the adverse effects of his obesity on his hypertension as well as diabetes      Palpitation  Morning palpitations feeling in his chest which is intermittent and does not occur every morning  He does not seem to have any palpitations or chest pains on physical exertion  At the timing of the symptoms suggest the possibility of hypoxia at night  While he is compliant with his CPAP is been several years since he has had a sleep study to confirm that the CPAP is still adequate to control his condition  He has gained a fair amount of weight since his last study  We requested a home sleep study to evaluate this further       Diagnoses and all orders for this visit:    Type 2 diabetes mellitus with complication, unspecified whether long term insulin use ([facility] [address] )  -     Cancel: Microalbumin / creatinine urine ratio  -     Cancel: Ambulatory referral to Ophthalmology; Future  -     Comprehensive metabolic panel; Future  -     Hemoglobin A1C; Future  -     Microalbumin / creatinine urine ratio; Future    Encounter for immunization  -     PNEUMOCOCCAL POLYSACCHARIDE VACCINE 23-VALENT =>1YO SQ IM    Palpitation  -     POCT ECG  -     Comprehensive metabolic panel; Future    Other fatigue  -     CBC and differential; Future    Colon cancer screening  -     Occult Blood, Fecal Immunochemical; Future    Prostate cancer screening  -     PSA, Total Screen; Future    Obstructive sleep apnea syndrome  -     Home Study; Future    Hyperlipidemia, unspecified hyperlipidemia type  -     Lipid panel; Future    Acute cystitis without hematuria  -     UA w Reflex to Microscopic w Reflex to Culture -Lab Collect    Morbid obesity with BMI of 40 0-44 9, adult ([facility])        Subjective:      Patient ID: Candy Tejeda is a 75 y o  male  This is a routine follow-up visit for this 76-year-old gentleman with a history of type 2 diabetes obesity, hypertension, morbid obesity  He has several issues to discuss today  His 1st concern is regarding of intermittent symptoms of morning palpitations    He indicates that frequently when he awakens in the morning he has a limited episode of palpitations of his heart  These never current during the day when he is up and around inactive  He does not have any heaviness on his chest nor any discomfort in his neck or arm associated with these palpitations  Second concern is that of a right flank area pain which he has had for approximately 1 month  He denies any changes to his urination he has no dysuria hematuria increased frequency  His 3rd concern is that of bilateral tinnitus and decreased hearing acuity  The following portions of the patient's history were reviewed and updated as appropriate:   He  has a past medical history of Anxiety; Claustrophobia; Depression; Dyslipidemia; GERD (gastroesophageal reflux disease); Glaucoma; Headache; Hypertension; Low back pain; Neck pain; Numbness and tingling in left arm; OAB (overactive bladder); Paroxysmal supraventricular tachycardia ([facility] [address] ); and Sleep apnea  He   Patient Active Problem List    Diagnosis Date Noted    Palpitation 02/07/2019    Acute cystitis without hematuria 02/07/2019    Hypersomnia 09/17/2018    Obstructive sleep apnea syndrome 06/04/2018    Dry mouth 06/04/2018    Morbid obesity with BMI of 40 0-44 9, adult (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 [address] ) 33/11/1201    Essential hypertension 01/29/2018    Peripheral neuropathy 01/29/2018    Headache     Neck pain     Low back pain      He  has a past surgical history that includes Cataract extraction (Bilateral); Knee arthroscopy (Bilateral); Lumbar spine surgery; Excision basal cell carcinoma; Total knee arthroplasty (Left); Replacement total knee (Right, 04/2017); Replacement total knee (Left, 03/2013); Anoscopy; Rectal biopsy; and Mouth surgery    His family history includes Alzheimer's disease in his mother; Diabetes in his mother and paternal grandmother; Esophageal cancer in his paternal grandfather; Dem[employer] Hypertension in his father  He  reports that he quit smoking about 36 years ago  He has never used smokeless tobacco  He reports that he drinks about 8 4 oz of alcohol per week   He reports that he does not use drugs  Current Outpatient Prescriptions   Medication Sig Dispense Refill    amLODIPine (NORVASC) 10 mg tablet TAKE 1 TABLET DAILY 90 tablet 3    aspirin (ASPIRIN LOW DOSE) 81 MG tablet Take 1 tablet by mouth daily      brimonidine-timolol (COMBIGAN) 0 2-0 5 % Administer 1 drop into the left eye daily   citalopram (CeleXA) 10 mg tablet TAKE 1 TABLET DAILY 90 tablet 2    FLUZONE HIGH-DOSE 0 5 ML SHEILA TO BE GIVEN BY PHARMACIST PER STANDING ORDER  0    latanoprost (XALATAN) 0 005 % ophthalmic solution 1 drop daily at bedtime   metFORMIN (GLUCOPHAGE) 500 mg tablet TAKE 1 TABLET TWICE A DAY  BEFORE BREAKFAST AND SUPPER 180 tablet 2    metroNIDAZOLE (METROGEL) 0 75 % gel APPLY TO AFFECTED AREA(S) EVERY DAY  3    olmesartan-hydrochlorothiazide (BENICAR HCT) 40-25 MG per tablet TAKE 1 TABLET DAILY 90 tablet 3    omeprazole (PriLOSEC) 20 mg delayed release capsule Take one daily 90 capsule 3    oxybutynin (DITROPAN-XL) 5 mg 24 hr tablet Take 1 tablet (5 mg total) by mouth daily at bedtime 30 tablet 0    rosuvastatin (CRESTOR) 5 mg tablet TAKE 1 TABLET DAILY 90 tablet 3    sitaGLIPtin (JANUVIA) 100 mg tablet Take 1 tablet (100 mg total) by mouth daily 90 tablet 3     No current facility-administered medications for this visit       Review of Systems   HENT: Positive for hearing loss and tinnitus  Cardiovascular: Positive for palpitations  All other systems reviewed and are negative          Objective:      /70   Pulse 78   Temp 98 4 °F (36 9 °C)   Resp 14   Ht 5' 7" (1 702 m)   Wt 132 kg (292 lb)   SpO2 96%   BMI 45 73 kg/m²          Physical Exam   Constitutional: He is oriented to person, place, and time  He appears well-developed and well-nourished  HENT:   Right Ear: Hearing, tympanic membrane, external ear and ear canal normal    Left Ear: Hearing, tympanic membrane, external ear and ear canal normal    Nose: Nose normal    Mouth/Throat: Oropharynx is clear and moist and mucous membranes are normal    Eyes: Pupils are equal, round, and reactive to light  Conjunctivae are normal    Neck: No thyromegaly present  Cardiovascular: Normal rate, regular rhythm, S1 normal, S2 normal, normal heart sounds and intact distal pulses  No murmur heard  Pulmonary/Chest: Effort normal and breath sounds normal  No respiratory distress  He has no wheezes  He has no rales  He exhibits no tenderness  Abdominal: Soft  Bowel sounds are normal  There is no tenderness  Musculoskeletal: Normal range of motion  He exhibits no edema  Lymphadenopathy:     He has no cervical adenopathy  Neurological: He is alert and oriented to person, place, and time  He has normal reflexes  Skin: Skin is warm and dry  Psychiatric: He has a normal mood and affect   His behavior is normal  Judgment and thought content normal

## 2019-02-08 NOTE — ASSESSMENT & PLAN NOTE
Right flank discomfort suggesting the possibility of urinary tract infection we have requested a urine culture  If the urine cultures normal I suspect that the right flank pain is most likely muscular in nature

## 2019-02-08 NOTE — ASSESSMENT & PLAN NOTE
History of sleep apnea symptoms the patient it is comply and on a daily basis with his CPAP  In view of his increasing body weight I feel it is necessary to do a follow-up sleep study to ensure that his CPAP is providing him with adequate treatment of his sleep apnea  He does have morning palpitations which may be a sign of hypoxia through the night

## 2019-02-08 NOTE — ASSESSMENT & PLAN NOTE
Morning palpitations feeling in his chest which is intermittent and does not occur every morning  He does not seem to have any palpitations or chest pains on physical exertion  At the timing of the symptoms suggest the possibility of hypoxia at night  While he is compliant with his CPAP is been several years since he has had a sleep study to confirm that the CPAP is still adequate to control his condition  He has gained a fair amount of weight since his last study    We requested a home sleep study to evaluate this further

## 2019-02-08 NOTE — ASSESSMENT & PLAN NOTE
Lab Results   Component Value Date    HGBA1C 8 0 (H) 11/06/2018    This patient is presently on a combination of metformin 500 mg twice a day and Januvia 100 mg daily  He struggles to maintain good blood sugar control  His weight continues to be a significant factor in limitations on control of his diabetes  Physically is not overly active which also compromises control of his diabetes  We requested updated studies of fasting blood sugar and hemoglobin A1c  The patient does not desire to test his blood sugars at home  No results for input(s): POCGLU in the last 72 hours      Blood Sugar Average: Last 72 hrs:

## 2019-02-08 NOTE — ASSESSMENT & PLAN NOTE
History of hypertension presently under medication  Blood pressure is mildly elevated today at 142/70  I have recommended that he continue his Benicar HCTZ  tablet daily along with his amlodipine 10 mg daily    Will re-evaluate blood pressure control on next visit

## 2019-02-08 NOTE — ASSESSMENT & PLAN NOTE
Morbid obesity with a body mass index of 45 73  He continues to gain weight which is a serious concern for his overall health  He understands the adverse effects of his obesity on his hypertension as well as diabetes

## 2019-02-13 ENCOUNTER — HOSPITAL ENCOUNTER (OUTPATIENT)
Facility: HOSPITAL | Age: 74
Setting detail: OUTPATIENT SURGERY
Discharge: HOME | End: 2019-02-13
Attending: INTERNAL MEDICINE | Admitting: INTERNAL MEDICINE
Payer: MEDICARE

## 2019-02-13 ENCOUNTER — ANESTHESIA (OUTPATIENT)
Dept: GASTROENTEROLOGY | Facility: HOSPITAL | Age: 74
End: 2019-02-13
Payer: MEDICARE

## 2019-02-13 ENCOUNTER — ANESTHESIA EVENT (OUTPATIENT)
Dept: GASTROENTEROLOGY | Facility: HOSPITAL | Age: 74
End: 2019-02-13
Payer: MEDICARE

## 2019-02-13 VITALS
RESPIRATION RATE: 16 BRPM | OXYGEN SATURATION: 98 % | BODY MASS INDEX: 45.52 KG/M2 | HEART RATE: 75 BPM | SYSTOLIC BLOOD PRESSURE: 115 MMHG | WEIGHT: 290 LBS | TEMPERATURE: 97.8 F | DIASTOLIC BLOOD PRESSURE: 61 MMHG | HEIGHT: 67 IN

## 2019-02-13 DIAGNOSIS — R19.4 CHANGE IN BOWEL HABITS: ICD-10-CM

## 2019-02-13 DIAGNOSIS — K21.9 GASTRO-ESOPHAGEAL REFLUX DISEASE WITHOUT ESOPHAGITIS: ICD-10-CM

## 2019-02-13 LAB — GLUCOSE SERPL-MCNC: 110 MG/DL (ref 65–140)

## 2019-02-13 PROCEDURE — 88305 TISSUE EXAM BY PATHOLOGIST: CPT | Performed by: PATHOLOGY

## 2019-02-13 PROCEDURE — 82948 REAGENT STRIP/BLOOD GLUCOSE: CPT

## 2019-02-13 RX ORDER — KETAMINE HYDROCHLORIDE 50 MG/ML
INJECTION, SOLUTION, CONCENTRATE INTRAMUSCULAR; INTRAVENOUS AS NEEDED
Status: DISCONTINUED | OUTPATIENT
Start: 2019-02-13 | End: 2019-02-13 | Stop reason: SURG

## 2019-02-13 RX ORDER — MIDAZOLAM HYDROCHLORIDE 1 MG/ML
INJECTION INTRAMUSCULAR; INTRAVENOUS AS NEEDED
Status: DISCONTINUED | OUTPATIENT
Start: 2019-02-13 | End: 2019-02-13 | Stop reason: SURG

## 2019-02-13 RX ORDER — PROPOFOL 10 MG/ML
INJECTION, EMULSION INTRAVENOUS AS NEEDED
Status: DISCONTINUED | OUTPATIENT
Start: 2019-02-13 | End: 2019-02-13 | Stop reason: SURG

## 2019-02-13 RX ORDER — PROPOFOL 10 MG/ML
INJECTION, EMULSION INTRAVENOUS CONTINUOUS PRN
Status: DISCONTINUED | OUTPATIENT
Start: 2019-02-13 | End: 2019-02-13 | Stop reason: SURG

## 2019-02-13 RX ORDER — SODIUM CHLORIDE 9 MG/ML
125 INJECTION, SOLUTION INTRAVENOUS CONTINUOUS
Status: DISCONTINUED | OUTPATIENT
Start: 2019-02-13 | End: 2019-02-13 | Stop reason: HOSPADM

## 2019-02-13 RX ORDER — LIDOCAINE HYDROCHLORIDE 10 MG/ML
INJECTION, SOLUTION INFILTRATION; PERINEURAL AS NEEDED
Status: DISCONTINUED | OUTPATIENT
Start: 2019-02-13 | End: 2019-02-13 | Stop reason: SURG

## 2019-02-13 RX ADMIN — MIDAZOLAM 2 MG: 1 INJECTION INTRAMUSCULAR; INTRAVENOUS at 09:38

## 2019-02-13 RX ADMIN — LIDOCAINE HYDROCHLORIDE 50 MG: 10 INJECTION, SOLUTION INFILTRATION; PERINEURAL at 09:45

## 2019-02-13 RX ADMIN — KETAMINE HYDROCHLORIDE 20 MG: 50 INJECTION, SOLUTION INTRAMUSCULAR; INTRAVENOUS at 09:50

## 2019-02-13 RX ADMIN — KETAMINE HYDROCHLORIDE 30 MG: 50 INJECTION, SOLUTION INTRAMUSCULAR; INTRAVENOUS at 09:45

## 2019-02-13 RX ADMIN — PROPOFOL 80 MCG/KG/MIN: 10 INJECTION, EMULSION INTRAVENOUS at 09:45

## 2019-02-13 RX ADMIN — PROPOFOL 40 MG: 10 INJECTION, EMULSION INTRAVENOUS at 09:45

## 2019-02-13 RX ADMIN — SODIUM CHLORIDE 125 ML/HR: 0.9 INJECTION, SOLUTION INTRAVENOUS at 09:12

## 2019-02-13 NOTE — ANESTHESIA POSTPROCEDURE EVALUATION
Post-Op Assessment Note    CV Status:  Stable  Pain Score: 0    Pain management: adequate     Mental Status:  Sleepy   Hydration Status:  Stable   PONV Controlled:  None   Airway Patency:  Patent   Post Op Vitals Reviewed: Yes      Staff: CRNA           /61 (02/13/19 1024)    Temp      Pulse 75 (02/13/19 1024)   Resp 16 (02/13/19 1024)    SpO2 98 % (02/13/19 1024)

## 2019-02-13 NOTE — ADDENDUM NOTE
Addendum  created 02/13/19 1128 by Ny Velazquez MD    Attestation recorded in [facility], [address] deleted, [address] filed

## 2019-02-13 NOTE — H&P
H&P EXAM - Outpatient Endoscopy   Yadiel Navarrete 75 y o  male MRN: [id number]    [facility] GI LAB PRE/POST   Encounter: [id number]        Impression:  Change in bowel habit GERD    Plan:  EGD and colonoscopy    Chief Complaint:  Change in bowel habit heartburn reflux    Physical Exam:  Alert   Chest:  Clear   Heart:  Regular    Consent signed

## 2019-02-13 NOTE — ANESTHESIA PREPROCEDURE EVALUATION
Review of Systems/Medical History  Patient summary reviewed  Chart reviewed  No history of anesthetic complications     Cardiovascular  EKG reviewed, Hypertension ,    Pulmonary  Smoker ex-smoker  , Sleep apnea ,        GI/Hepatic    GERD ,             Endo/Other  Diabetes ,   Obesity  morbid obesity   GYN       Hematology   Musculoskeletal  Back pain ,        Neurology    Headaches,    Psychology   Anxiety, Depression ,              Physical Exam    Airway    Mallampati score: II  TM Distance: >3 FB  Neck ROM: full     Dental   No notable dental hx     Cardiovascular  Cardiovascular exam normal    Pulmonary  Pulmonary exam normal Breath sounds clear to auscultation,     Other Findings        Anesthesia Plan  ASA Score- 3     Anesthesia Type- IV sedation with anesthesia with ASA Monitors  Additional Monitors:   Airway Plan:         Plan Factors- Patient instructed to abstain from smoking on day of procedure       Induction- intravenous  Postoperative Plan-     Informed Consent- Anesthetic plan and risks discussed with patient  I personally reviewed this patient with the CRNA  Discussed and agreed on the Anesthesia Plan with the CRNA             Lab Results   Component Value Date    WBC 7 47 02/16/2018    HGB 12 3 02/16/2018    HCT 35 8 (L) 02/16/2018    MCV 97 02/16/2018     02/16/2018     Lab Results   Component Value Date    GLUCOSE 125 01/19/2015    CALCIUM 9 1 11/06/2018     01/19/2015    K 3 8 11/06/2018    CO2 29 11/06/2018     11/06/2018    BUN 16 11/06/2018    CREATININE 1 24 11/06/2018           I, Dr Mateo Chadwick, the attending physician, have personally seen and evaluated the patient prior to anesthetic care  I have reviewed the pre-anesthetic record, and other medical records if appropriate to the anesthetic care  If a CRNA is involved in the case, I have reviewed the CRNA assessment, if present, and agree   The patient is in a suitable condition to proceed with my formulated anesthetic plan

## 2019-02-13 NOTE — OP NOTE
ESOPHAGOGASTRODUODENOSCOPY(EGD) and COLONOSCOPY    SEDATION: Monitored anesthesia care, check anesthesia records    ASA Class: 3    INDICATIONS:  GERD, change in bowel movements chronic diarrhea    CONSENT:  Informed consent was obtained for the procedure, including sedation after explaining the risks and benefits of the procedure  Risks including but not limited to bleeding, perforation, infection, and missed lesion  PREPARATION:   Telemetry, pulse oximetry, blood pressure were monitored throughout the procedure  Patient was identified by myself both verbally and by visual inspection of ID band  PROCEDURE: EGD    DESCRIPTION:   Patient was placed in the left lateral decubitus position and was sedated with the above medication  The gastroscope was introduced in to the oropharynx and the esophagus was intubated under direct visualization  Scope was passed down the esophagus up to 2nd part of the duodenum  A careful inspection was made as the gastroscope was withdrawn, including a retroflexed view of the stomach; findings and interventions are described below  FINDINGS:    #1  Esophagus- normal    #2  Stomach- normal 2 biopsies done for histology    #3  Duodenum- normal            PROCEDURE: COLONOSCOPY    DESCRIPTION:     Prior colonoscopy: Less than 3 years ago  It is being repeated at an interval of less than 3 years because: This colonoscopy is being performed for a diagnostic indication    Informed consent was obtained for the procedure, including sedation  Risks of perforation, hemorrhage, adverse drug reaction and aspiration were discussed  The patient was placed in the left lateral decubitus position  Based on the pre-procedure assessment, including review of the patient's medical history, medications, allergies, and review of systems, he had been deemed to be an appropriate candidate for conscious sedation; he was therefore sedated with the medications listed below     The patient was monitored continuously with telemetry, pulse oximetry, blood pressure monitoring, and direct observations  A rectal examination was performed  The colonoscope was inserted into the rectum and advanced under direct vision to the cecum, which was identified by the ileocecal valve and appendiceal orifice  The quality of the colonic preparation was excellent  A careful inspection was made as the colonoscope was withdrawn, including a retroflexed view of the rectum; findings and interventions are described below  FINDINGS:  Normal mucosa all areas well visualized multiple biopsies were done to rule out microscopic colitis           COMPLICATIONS:   None; patient tolerated the procedure well  IMPRESSION:    Normal colon    RECOMMENDATIONS:  Repeat colonoscopy in 10 years or sooner if clinically indicated    :  1 week call

## 2019-02-14 DIAGNOSIS — I10 ESSENTIAL HYPERTENSION: ICD-10-CM

## 2019-02-14 RX ORDER — OLMESARTAN MEDOXOMIL AND HYDROCHLOROTHIAZIDE 40/25 40; 25 MG/1; MG/1
1 TABLET ORAL DAILY
Qty: 90 TABLET | Refills: 3 | Status: SHIPPED | OUTPATIENT
Start: 2019-02-14 | End: 2019-02-25 | Stop reason: CLARIF

## 2019-02-18 ENCOUNTER — APPOINTMENT (OUTPATIENT)
Dept: LAB | Age: 74
End: 2019-02-18
Payer: MEDICARE

## 2019-02-18 LAB
BACTERIA UR QL AUTO: NORMAL /HPF
BILIRUB UR QL STRIP: NEGATIVE
CLARITY UR: CLEAR
COLOR UR: YELLOW
GLUCOSE UR STRIP-MCNC: NEGATIVE MG/DL
HGB UR QL STRIP.AUTO: NEGATIVE
HYALINE CASTS #/AREA URNS LPF: NORMAL /LPF
KETONES UR STRIP-MCNC: NEGATIVE MG/DL
LEUKOCYTE ESTERASE UR QL STRIP: NEGATIVE
NITRITE UR QL STRIP: NEGATIVE
NON-SQ EPI CELLS URNS QL MICRO: NORMAL /HPF
PH UR STRIP.AUTO: 6 [PH] (ref 4.5–8)
PROT UR STRIP-MCNC: ABNORMAL MG/DL
RBC #/AREA URNS AUTO: NORMAL /HPF
SP GR UR STRIP.AUTO: 1.02 (ref 1–1.03)
UROBILINOGEN UR QL STRIP.AUTO: 0.2 E.U./DL
WBC #/AREA URNS AUTO: NORMAL /HPF

## 2019-02-18 PROCEDURE — 81001 URINALYSIS AUTO W/SCOPE: CPT | Performed by: INTERNAL MEDICINE

## 2019-02-19 DIAGNOSIS — F32.A DEPRESSION, UNSPECIFIED DEPRESSION TYPE: ICD-10-CM

## 2019-02-19 RX ORDER — CITALOPRAM 10 MG/1
TABLET ORAL
Qty: 90 TABLET | Refills: 2 | Status: SHIPPED | OUTPATIENT
Start: 2019-02-19 | End: 2019-11-16 | Stop reason: SDUPTHER

## 2019-02-25 ENCOUNTER — TELEPHONE (OUTPATIENT)
Dept: INTERNAL MEDICINE CLINIC | Facility: CLINIC | Age: 74
End: 2019-02-25

## 2019-02-25 DIAGNOSIS — I10 ESSENTIAL HYPERTENSION: Primary | ICD-10-CM

## 2019-02-25 RX ORDER — IRBESARTAN AND HYDROCHLOROTHIAZIDE 300; 12.5 MG/1; MG/1
1 TABLET, FILM COATED ORAL DAILY
Qty: 10 TABLET | Refills: 1 | Status: SHIPPED | OUTPATIENT
Start: 2019-02-25 | End: 2019-02-25 | Stop reason: SDUPTHER

## 2019-02-25 RX ORDER — IRBESARTAN AND HYDROCHLOROTHIAZIDE 300; 12.5 MG/1; MG/1
1 TABLET, FILM COATED ORAL DAILY
Qty: 10 TABLET | Refills: 1 | Status: SHIPPED | OUTPATIENT
Start: 2019-02-25 | End: 2019-08-20 | Stop reason: SDUPTHER

## 2019-02-25 NOTE — TELEPHONE ENCOUNTER
Received a letter from the pharmacy that omesartan hctz or benicar is on maufacturer back order  Can you order a new medication? He only has 2 pills left and he would like a 7-10 day supply sent to local pharmacy  AND a 90 day supply sent to [facility] for [employer] for local supply

## 2019-02-25 NOTE — TELEPHONE ENCOUNTER
Prescriptions were sent to the local pharmacy as well as mail order for irbesartan hydrochlorothiazide 300-12 5 mg

## 2019-03-01 LAB
LEFT EYE DIABETIC RETINOPATHY: NORMAL
RIGHT EYE DIABETIC RETINOPATHY: NORMAL

## 2019-03-12 ENCOUNTER — APPOINTMENT (OUTPATIENT)
Dept: LAB | Age: 74
End: 2019-03-12
Payer: MEDICARE

## 2019-03-12 DIAGNOSIS — Z12.5 PROSTATE CANCER SCREENING: ICD-10-CM

## 2019-03-12 DIAGNOSIS — R00.2 PALPITATION: ICD-10-CM

## 2019-03-12 DIAGNOSIS — R53.83 OTHER FATIGUE: ICD-10-CM

## 2019-03-12 DIAGNOSIS — Z12.11 COLON CANCER SCREENING: ICD-10-CM

## 2019-03-12 DIAGNOSIS — E11.8 TYPE 2 DIABETES MELLITUS WITH COMPLICATION, UNSPECIFIED WHETHER LONG TERM INSULIN USE: ICD-10-CM

## 2019-03-12 DIAGNOSIS — E78.5 HYPERLIPIDEMIA, UNSPECIFIED HYPERLIPIDEMIA TYPE: ICD-10-CM

## 2019-03-12 LAB
ALBUMIN SERPL BCP-MCNC: 3.2 G/DL (ref 3.5–5)
ALP SERPL-CCNC: 112 U/L (ref 46–116)
ALT SERPL W P-5'-P-CCNC: 27 U/L (ref 12–78)
ANION GAP SERPL CALCULATED.3IONS-SCNC: 7 MMOL/L (ref 4–13)
AST SERPL W P-5'-P-CCNC: 15 U/L (ref 5–45)
BASOPHILS # BLD AUTO: 0.08 THOUSANDS/ΜL (ref 0–0.1)
BASOPHILS NFR BLD AUTO: 1 % (ref 0–1)
BILIRUB SERPL-MCNC: 0.37 MG/DL (ref 0.2–1)
BUN SERPL-MCNC: 16 MG/DL (ref 5–25)
CALCIUM SERPL-MCNC: 8.3 MG/DL (ref 8.3–10.1)
CHLORIDE SERPL-SCNC: 104 MMOL/L (ref 100–108)
CHOLEST SERPL-MCNC: 135 MG/DL (ref 50–200)
CO2 SERPL-SCNC: 30 MMOL/L (ref 21–32)
CREAT SERPL-MCNC: 1.3 MG/DL (ref 0.6–1.3)
EOSINOPHIL # BLD AUTO: 0.16 THOUSAND/ΜL (ref 0–0.61)
EOSINOPHIL NFR BLD AUTO: 2 % (ref 0–6)
ERYTHROCYTE [DISTWIDTH] IN BLOOD BY AUTOMATED COUNT: 13.6 % (ref 11.6–15.1)
EST. AVERAGE GLUCOSE BLD GHB EST-MCNC: 143 MG/DL
GFR SERPL CREATININE-BSD FRML MDRD: 54 ML/MIN/1.73SQ M
GLUCOSE P FAST SERPL-MCNC: 130 MG/DL (ref 65–99)
HBA1C MFR BLD: 6.6 % (ref 4.2–6.3)
HCT VFR BLD AUTO: 36.2 % (ref 36.5–49.3)
HDLC SERPL-MCNC: 37 MG/DL (ref 40–60)
HEMOCCULT STL QL IA: NEGATIVE
HGB BLD-MCNC: 12.1 G/DL (ref 12–17)
IMM GRANULOCYTES # BLD AUTO: 0.05 THOUSAND/UL (ref 0–0.2)
IMM GRANULOCYTES NFR BLD AUTO: 1 % (ref 0–2)
LDLC SERPL CALC-MCNC: 74 MG/DL (ref 0–100)
LYMPHOCYTES # BLD AUTO: 1.64 THOUSANDS/ΜL (ref 0.6–4.47)
LYMPHOCYTES NFR BLD AUTO: 23 % (ref 14–44)
MCH RBC QN AUTO: 32.8 PG (ref 26.8–34.3)
MCHC RBC AUTO-ENTMCNC: 33.4 G/DL (ref 31.4–37.4)
MCV RBC AUTO: 98 FL (ref 82–98)
MONOCYTES # BLD AUTO: 0.74 THOUSAND/ΜL (ref 0.17–1.22)
MONOCYTES NFR BLD AUTO: 10 % (ref 4–12)
NEUTROPHILS # BLD AUTO: 4.49 THOUSANDS/ΜL (ref 1.85–7.62)
NEUTS SEG NFR BLD AUTO: 63 % (ref 43–75)
NONHDLC SERPL-MCNC: 98 MG/DL
NRBC BLD AUTO-RTO: 0 /100 WBCS
PLATELET # BLD AUTO: 261 THOUSANDS/UL (ref 149–390)
PMV BLD AUTO: 10.1 FL (ref 8.9–12.7)
POTASSIUM SERPL-SCNC: 3.4 MMOL/L (ref 3.5–5.3)
PROT SERPL-MCNC: 7.4 G/DL (ref 6.4–8.2)
PSA SERPL-MCNC: 0.3 NG/ML (ref 0–4)
RBC # BLD AUTO: 3.69 MILLION/UL (ref 3.88–5.62)
SODIUM SERPL-SCNC: 141 MMOL/L (ref 136–145)
TRIGL SERPL-MCNC: 118 MG/DL
WBC # BLD AUTO: 7.16 THOUSAND/UL (ref 4.31–10.16)

## 2019-03-12 PROCEDURE — G0103 PSA SCREENING: HCPCS

## 2019-03-12 PROCEDURE — 85025 COMPLETE CBC W/AUTO DIFF WBC: CPT

## 2019-03-12 PROCEDURE — 83036 HEMOGLOBIN GLYCOSYLATED A1C: CPT

## 2019-03-12 PROCEDURE — G0328 FECAL BLOOD SCRN IMMUNOASSAY: HCPCS

## 2019-03-12 PROCEDURE — 80061 LIPID PANEL: CPT

## 2019-03-12 PROCEDURE — 36415 COLL VENOUS BLD VENIPUNCTURE: CPT

## 2019-03-12 PROCEDURE — 80053 COMPREHEN METABOLIC PANEL: CPT

## 2019-03-13 ENCOUNTER — TRANSCRIBE ORDERS (OUTPATIENT)
Dept: SLEEP CENTER | Facility: CLINIC | Age: 74
End: 2019-03-13

## 2019-03-25 ENCOUNTER — OFFICE VISIT (OUTPATIENT)
Dept: INTERNAL MEDICINE CLINIC | Facility: CLINIC | Age: 74
End: 2019-03-25
Payer: MEDICARE

## 2019-03-25 VITALS
RESPIRATION RATE: 16 BRPM | WEIGHT: 289.8 LBS | SYSTOLIC BLOOD PRESSURE: 148 MMHG | HEART RATE: 77 BPM | TEMPERATURE: 98.3 F | BODY MASS INDEX: 45.48 KG/M2 | DIASTOLIC BLOOD PRESSURE: 84 MMHG | HEIGHT: 67 IN | OXYGEN SATURATION: 98 %

## 2019-03-25 DIAGNOSIS — G89.29 CHRONIC RIGHT-SIDED LOW BACK PAIN WITHOUT SCIATICA: ICD-10-CM

## 2019-03-25 DIAGNOSIS — Z00.00 HEALTH CARE MAINTENANCE: ICD-10-CM

## 2019-03-25 DIAGNOSIS — E11.8 TYPE 2 DIABETES MELLITUS WITH COMPLICATION, WITHOUT LONG-TERM CURRENT USE OF INSULIN (HCC): Primary | ICD-10-CM

## 2019-03-25 DIAGNOSIS — M54.50 CHRONIC RIGHT-SIDED LOW BACK PAIN WITHOUT SCIATICA: ICD-10-CM

## 2019-03-25 DIAGNOSIS — G47.33 OBSTRUCTIVE SLEEP APNEA SYNDROME: ICD-10-CM

## 2019-03-25 DIAGNOSIS — E66.01 MORBID OBESITY WITH BMI OF 40.0-44.9, ADULT (HCC): ICD-10-CM

## 2019-03-25 DIAGNOSIS — K21.9 GASTROESOPHAGEAL REFLUX DISEASE, ESOPHAGITIS PRESENCE NOT SPECIFIED: ICD-10-CM

## 2019-03-25 DIAGNOSIS — I10 ESSENTIAL HYPERTENSION: ICD-10-CM

## 2019-03-25 DIAGNOSIS — R10.9 FLANK PAIN: ICD-10-CM

## 2019-03-25 DIAGNOSIS — R00.2 PALPITATION: ICD-10-CM

## 2019-03-25 PROBLEM — G47.10 HYPERSOMNIA: Status: RESOLVED | Noted: 2018-09-17 | Resolved: 2019-03-25

## 2019-03-25 PROBLEM — N30.00 ACUTE CYSTITIS WITHOUT HEMATURIA: Status: RESOLVED | Noted: 2019-02-07 | Resolved: 2019-03-25

## 2019-03-25 PROBLEM — R68.2 DRY MOUTH: Status: RESOLVED | Noted: 2018-06-04 | Resolved: 2019-03-25

## 2019-03-25 PROCEDURE — G0439 PPPS, SUBSEQ VISIT: HCPCS | Performed by: INTERNAL MEDICINE

## 2019-03-25 PROCEDURE — 99215 OFFICE O/P EST HI 40 MIN: CPT | Performed by: INTERNAL MEDICINE

## 2019-03-25 RX ORDER — METOPROLOL SUCCINATE 25 MG/1
25 TABLET, EXTENDED RELEASE ORAL DAILY
Qty: 90 TABLET | Refills: 2 | Status: SHIPPED | OUTPATIENT
Start: 2019-03-25 | End: 2019-12-11 | Stop reason: SDUPTHER

## 2019-03-25 NOTE — ASSESSMENT & PLAN NOTE
Morning symptoms of palpitation noted may be some form of hypoxia from his sleep apnea over night  He does not have any symptoms of exertional angina or palpitations during the day  Because his blood pressure is high will adamant beta-blocker metoprolol succinate and see if it does double duty with controlling his blood pressure as well as decreasing his palpitations symptoms

## 2019-03-25 NOTE — ASSESSMENT & PLAN NOTE
History of obstructive sleep apnea possibly his morning palpitations could represent some form of hypoxia  Uses CPAP on a daily basis  If palpitations do not respond to beta-blocker therapy would consider overnight oximetry testing

## 2019-03-25 NOTE — ASSESSMENT & PLAN NOTE
Lab Results   Component Value Date    HGBA1C 6 6 (H) 03/12/2019       No results for input(s): POCGLU in the last 72 hours  Type 2 diabetes with a hemoglobin A1c 6 of 6 6 percent  He has had no hypoglycemic episodes  His fasting blood sugar was 130  He prefers not to check his blood sugars at home  He continues on a combination of Januvia 100 mg daily and 500 mg of metformin twice a day  He would certainly benefit from weight reduction which would assist in the control of his diabetes    Blood Sugar Average: Last 72 hrs:

## 2019-03-25 NOTE — ASSESSMENT & PLAN NOTE
History of chronic gastroesophageal reflux disease worsen by the patient's morbid obesity  He continues on omeprazole 20 mg daily for control of his acid reflux symptoms he has no apparent side effects of the drug nor any breakthrough symptoms    Recommendation is for continued efforts to reduce body weight and continue omeprazole for control of acid reflux symptoms

## 2019-03-25 NOTE — PROGRESS NOTES
Assessment and Plan:    Problem List Items Addressed This Visit        Endocrine    Type 2 diabetes mellitus with complication [facility]) - Primary        Health Maintenance Due   Topic Date Due    URINE MICROALBUMIN  05/13/1955    BMI: Followup Plan  05/13/1962    HEPATITIS B VACCINES (1 of 3 - Risk 3-dose series) 05/13/1963    DTaP,Tdap,and Td Vaccines (1 - Tdap) 05/13/1965    DM Eye Exam  06/07/2018    Fall Risk  02/22/2019    Medicare Annual Wellness Visit (AWV)  02/22/2019    Diabetic Foot Exam  02/22/2019         HPI:  Joe Duran is a 75 y o  male here for his Subsequent Wellness Visit      Patient Active Problem List   Diagnosis    Headache    Neck pain    Low back pain    Type 2 diabetes mellitus with complication (HCC)    Essential hypertension    Peripheral neuropathy    Isolated proteinuria with morphologic lesion    Obstructive sleep apnea syndrome    Dry mouth    Morbid obesity with BMI of 40 0-44 9, adult (HCC)    Mood disturbance    Gastroesophageal reflux disease    Hypersomnia    Palpitation    Acute cystitis without hematuria     Past Medical History:   Diagnosis Date    Anxiety     Claustrophobia     Colon polyps     Depression     Diabetes mellitus (HCC)     Dyslipidemia     Esophageal polyp     GERD (gastroesophageal reflux disease)     Glaucoma     Headache     Hypertension     Low back pain     Neck pain     Numbness and tingling in left arm     resolved 2/20/17 / numbness and tingling left side last assessed 3/23/16    OAB (overactive bladder)     Paroxysmal supraventricular tachycardia (HCC)     Sleep apnea     wears cpap      Past Surgical History:   Procedure Laterality Date    ANOSCOPY      for polyp removal    BASAL CELL CARCINOMA EXCISION      right cheek    CATARACT EXTRACTION Bilateral     KNEE ARTHROSCOPY Bilateral     LUMBAR SPINE SURGERY      30years ago    MOUTH SURGERY      tooth extraction    LA COLONOSCOPY FLX DX W/COLLJ SPEC WHEN PFRMD N/A 2019    Procedure: COLONOSCOPY;  Surgeon: Rhonda Calderón MD;  Location: [facility]; Service: Gastroenterology    SD ESOPHAGOGASTRODUODENOSCOPY TRANSORAL DIAGNOSTIC N/A 2019    Procedure: ESOPHAGOGASTRODUODENOSCOPY (EGD); Surgeon: Rhonda Calderón MD;  Location: [facility]; Service: Gastroenterology    RECTAL BIOPSY      REPLACEMENT TOTAL KNEE Right 2017    REPLACEMENT TOTAL KNEE Left 2013    TOTAL KNEE ARTHROPLASTY Left      Family History   Problem Relation Age of Onset    Alzheimer's disease Mother     Diabetes Mother    Senora Halter Hypertension Father     Diabetes Paternal Grandmother     Esophageal cancer Paternal Grandfather      Social History     Tobacco Use   Smoking Status Former Smoker    Last attempt to quit: 1983    Years since quittin 1   Smokeless Tobacco Never Used     Social History     Substance and Sexual Activity   Alcohol Use Yes    Alcohol/week: 8 4 oz    Types: 14 Shots of liquor per week    Comment: "couple of drinks each night"      Social History     Substance and Sexual Activity   Drug Use No       Current Outpatient Medications   Medication Sig Dispense Refill    amLODIPine (NORVASC) 10 mg tablet TAKE 1 TABLET DAILY 90 tablet 3    aspirin (ASPIRIN LOW DOSE) 81 MG tablet Take 1 tablet by mouth daily      brimonidine-timolol (COMBIGAN) 0 2-0 5 % Administer 1 drop into the left eye daily   citalopram (CeleXA) 10 mg tablet TAKE 1 TABLET DAILY 90 tablet 2    FLUZONE HIGH-DOSE 0 5 ML SHEILA TO BE GIVEN BY PHARMACIST PER STANDING ORDER  0    irbesartan-hydrochlorothiazide (AVALIDE) 300-12 5 MG per tablet Take 1 tablet by mouth daily 10 tablet 1    latanoprost (XALATAN) 0 005 % ophthalmic solution 1 drop daily at bedtime        metFORMIN (GLUCOPHAGE) 500 mg tablet TAKE 1 TABLET TWICE A DAY  BEFORE BREAKFAST AND SUPPER 180 tablet 2    metroNIDAZOLE (METROGEL) 0 75 % gel APPLY TO AFFECTED AREA(S) EVERY DAY  3    omeprazole (PriLOSEC) 20 mg delayed release capsule Take one daily 90 capsule 3    oxybutynin (DITROPAN-XL) 5 mg 24 hr tablet Take 1 tablet (5 mg total) by mouth daily at bedtime 30 tablet 0    rosuvastatin (CRESTOR) 5 mg tablet TAKE 1 TABLET DAILY 90 tablet 3    sitaGLIPtin (JANUVIA) 100 mg tablet Take 1 tablet (100 mg total) by mouth daily 90 tablet 3     No current facility-administered medications for this visit  Allergies   Allergen Reactions    Acetazolamide     Codeine Hives    Sulfa Antibiotics Other (See Comments)     Nausea       Immunization History   Administered Date(s) Administered    INFLUENZA 10/28/2016, 10/17/2017, 10/22/2018    Influenza Split High Dose Preservative Free IM 10/28/2016, 10/17/2017    Pneumococcal Conjugate 13-Valent 11/02/2017    Pneumococcal Polysaccharide PPV23 02/07/2019       Patient Care Team:  Naresh Sterling MD as PCP - General  Navarro Tracey DO Stevie Myrtle, MD    Medicare Screening Tests and Risk Assessments:      Health Risk Assessment:  Patient rates overall health as good  Patient feels that their physical health rating is Same  Hearing was rated as Slightly worse  Pain experienced by patient in the last 7 days has been Some  Patient's pain rating has been 5/10  Patient states that he has experienced no weight loss or gain in last 6 months  Emotional/Mental Health:  Patient has been feeling nervous/anxious  PHQ-9 Depression Screening:    Frequency of the following problems over the past two weeks:      1  Little interest or pleasure in doing things: 0 - not at all      2  Feeling down, depressed, or hopeless: 0 - not at all  PHQ-2 Score: 0          Broken Bones/Falls: Fall Risk Assessment:    In the past year, patient has experienced: No history of falling in past year          Bladder/Bowel:  Patient has leaked urine accidently in the last six months  Patient reports no loss of bowel control      Immunizations:  Patient has had a flu vaccination within the last year  Patient has received a pneumonia shot  Home Safety:  Patient has trouble with stairs inside or outside of their home  Patient currently reports that there are no safety hazards present in home, working smoke alarms, no working carbon monoxide detectors  Preventative Screenings:   prostate cancer screen performed, colon cancer screen completed, cholesterol screen completed, glaucoma eye exam completed,     Nutrition:  Current diet: Regular, Diabetic and Unhealthy with servings of the following:    Medications:  Patient is not currently taking any over-the-counter supplements  Patient is able to manage medications  Lifestyle Choices:  Patient reports no tobacco use  Patient has smoked or used tobacco in the past   Patient has stopped his tobacco use  Patient reports alcohol use  Alcohol use per week: 14  Patient drives a vehicle  Patient wears seat belt  Current level of exercise of physical activity described by patient as: limited  Activities of Daily Living:  Can get out of bed by his or her self, able to dress self, able to make own meals, able to do own shopping, able to bathe self, can do own laundry/housekeeping, can manage own money, pay bills and track expenses    Previous Hospitalizations:  No hospitalization or ED visit in past 12 months        Advanced Directives:  Patient has decided on a power of   Patient has spoken to designated power of   Patient has completed advanced directive          Preventative Screening/Counseling:      Cardiovascular:      General: Screening Current          Diabetes:      General: Screening Current          Colorectal Cancer:      General: Screening Current          Prostate Cancer:      General: Screening Current          Osteoporosis:      General: Screening Not Indicated          AAA:      General: Screening Not Indicated          Glaucoma:      General: Risks and Benefits Discussed HIV:      General: Screening Not Indicated          Hepatitis C:      General: Screening Not Indicated        Advanced Directives:   Patient has no living will for healthcare, does not have durable POA for healthcare, patient does not have an advanced directive       Immunizations:      Influenza: Influenza UTD This Year      Pneumococcal: Lifetime Vaccine Completed      Shingrix: Risks & Benefits Discussed      Hepatitis B (Low risk patients): Series Not Indicated      Zostavax: Patient Declines      TD: Risks & Benefits Discussed      TDAP: Risks & Benefits Discussed

## 2019-03-25 NOTE — PROGRESS NOTES
Assessment/Plan:    Gastroesophageal reflux disease  History of chronic gastroesophageal reflux disease worsen by the patient's morbid obesity  He continues on omeprazole 20 mg daily for control of his acid reflux symptoms he has no apparent side effects of the drug nor any breakthrough symptoms  Recommendation is for continued efforts to reduce body weight and continue omeprazole for control of acid reflux symptoms    Type 2 diabetes mellitus with complication (HCC)  Lab Results   Component Value Date    HGBA1C 6 6 (H) 03/12/2019       No results for input(s): POCGLU in the last 72 hours  Type 2 diabetes with a hemoglobin A1c 6 of 6 6 percent  He has had no hypoglycemic episodes  His fasting blood sugar was 130  He prefers not to check his blood sugars at home  He continues on a combination of Januvia 100 mg daily and 500 mg of metformin twice a day  He would certainly benefit from weight reduction which would assist in the control of his diabetes  Blood Sugar Average: Last 72 hrs:      Obstructive sleep apnea syndrome  History of obstructive sleep apnea possibly his morning palpitations could represent some form of hypoxia  Uses CPAP on a daily basis  If palpitations do not respond to beta-blocker therapy would consider overnight oximetry testing  Essential hypertension  Blood pressure is elevated on today's visit  He is presently on amlodipine 10 mg daily and irbesartan hydrochlorothiazide 300-12 5 mg daily  In view of his elevated blood pressure and palpitations symptoms will place him on metoprolol succinate 25 mg daily  A follow-up assessment in 1 month as requested  Flank pain  Flank pain in the right kidney region  The patient has no evidence of hematuria in his urinalysis  He has no fevers or chills hers no bacteria in the urine  As he is claustrophobic I have recommended a ultrasound of his kidneys    This request was provided to the patient today will review the results when they have been completed  Low back pain  Chronic low back pain reviewed with the patient his last MRI of the lumbar spine which does show 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  He has a pain management physician recommend follow-up with them for consideration of epidural injections  Morbid obesity with BMI of 40 0-44 9, adult ([facility] )  Morbid obesity with a body mass index of 45 39  I have recommended that he continue to work on reducing his calorie consumption in an effort to reduce his body weight  The adverse effects of his hypertension on his multiple disease processes include hypertension diabetes and sleep apnea were reviewed today  Palpitation  Morning symptoms of palpitation noted may be some form of hypoxia from his sleep apnea over night  He does not have any symptoms of exertional angina or palpitations during the day  Because his blood pressure is high will adamant beta-blocker metoprolol succinate and see if it does double duty with controlling his blood pressure as well as decreasing his palpitations symptoms  Diagnoses and all orders for this visit:    Type 2 diabetes mellitus with complication, without long-term current use of insulin ([facility])  -     Comprehensive metabolic panel; Future  -     Hemoglobin A1C; Future    Essential hypertension  -     metoprolol succinate (TOPROL-XL) 25 mg 24 hr tablet; Take 1 tablet (25 mg total) by mouth daily    Flank pain  -     US kidney and bladder; Future    Gastroesophageal reflux disease, esophagitis presence not specified    Obstructive sleep apnea syndrome    Morbid obesity with BMI of 40 0-44 9, adult ([facility])    Chronic right-sided low back pain without sciatica    Palpitation    Other orders  -     Cancel: Ambulatory referral to Ophthalmology; Future  -     Discontinue: Omeprazole 20 MG TBDD        Subjective:      Patient ID: Linus Linn is a 75 y o  male      This is an annual physical examination review of blood work for this 53-year-old gentleman  He has a history of type 2 diabetes hypertension obesity and sleep apnea  He is concerned about warning symptoms of palpitations that occur when he awakens in the morning he does not have the symptoms any other time during the day  They are self-limiting and only last for several seconds  He indicates that feels like a shin ring feeling in his chest   He does not have chest pain nor any shortness of breath associated with this feeling  The patient has a history of hypertension is blood pressure is noted to be elevated on today's visit  He has no secondary symptoms of the hypertension  He also has a history of type 2 day diabetes and is presently on Januvia 100 mg daily and metformin 500 mg twice a day  He has occasional loose bowel movements which are most likely the result of his metformin but he is okay remaining on the medication  His hemoglobin A1c is a commendable 6 6%  The patient experiences chronic low back pain and has been seeing pain management  He is considering injections at the direction of his pain management physician  The patient also has intermittent right flank pain located over the area where his right kidney is located  He does not have any tenderness on palpation of this area he denies any blood in his urine fevers or chills  He has had no trauma to this area  The following portions of the patient's history were reviewed and updated as appropriate:   He  has a past medical history of Anxiety, Claustrophobia, Colon polyps, Depression, Diabetes mellitus ([address] ), Dyslipidemia, Esophageal polyp, GERD (gastroesophageal reflux disease), Glaucoma, Headache, Hypertension, Low back pain, Neck pain, Numbness and tingling in left arm, OAB (overactive bladder), Paroxysmal supraventricular tachycardia ([address] ), and Sleep apnea    He   Patient Active Problem List    Diagnosis Date Noted    Flank pain 03/25/2019    Palpitation 02/07/2019    Obstructive sleep apnea syndrome 06/04/2018    Morbid obesity with BMI of 40 0-44 9, adult (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    Type 2 diabetes mellitus with complication ([place] [address] ) 93/62/4779    Essential hypertension 01/29/2018    Peripheral neuropathy 01/29/2018    Low back pain      He  has a past surgical history that includes Cataract extraction (Bilateral); Knee arthroscopy (Bilateral); Lumbar spine surgery; Excision basal cell carcinoma; Total knee arthroplasty (Left); Replacement total knee (Right, 04/2017); Replacement total knee (Left, 03/2013); Anoscopy; Rectal biopsy; Mouth surgery; pr esophagogastroduodenoscopy transoral diagnostic (N/A, 2/13/2019); and pr colonoscopy flx dx w/collj spec when pfrmd (N/A, 2/13/2019)  His family history includes Alzheimer's disease in his mother; Diabetes in his mother and paternal grandmother; Esophageal cancer in his paternal grandfather; [place] Mel[place] Hypertension in his father  He  reports that he quit smoking about 36 years ago  He has never used smokeless tobacco  He reports that he drinks about 8 4 oz of alcohol per week  He reports that he does not use drugs  Current Outpatient Medications   Medication Sig Dispense Refill    amLODIPine (NORVASC) 10 mg tablet TAKE 1 TABLET DAILY 90 tablet 3    brimonidine-timolol (COMBIGAN) 0 2-0 5 % Administer 1 drop into the left eye daily   citalopram (CeleXA) 10 mg tablet TAKE 1 TABLET DAILY 90 tablet 2    FLUZONE HIGH-DOSE 0 5 ML SHEILA TO BE GIVEN BY PHARMACIST PER STANDING ORDER  0    irbesartan-hydrochlorothiazide (AVALIDE) 300-12 5 MG per tablet Take 1 tablet by mouth daily 10 tablet 1    latanoprost (XALATAN) 0 005 % ophthalmic solution 1 drop daily at bedtime        metFORMIN (GLUCOPHAGE) 500 mg tablet TAKE 1 TABLET TWICE A DAY  BEFORE BREAKFAST AND SUPPER 180 tablet 2    metroNIDAZOLE (METROGEL) 0 75 % gel APPLY TO AFFECTED AREA(S) EVERY DAY  3    omeprazole (PriLOSEC) 20 mg delayed release capsule Take one daily 90 capsule 3    oxybutynin (DITROPAN-XL) 5 mg 24 hr tablet Take 1 tablet (5 mg total) by mouth daily at bedtime 30 tablet 0    rosuvastatin (CRESTOR) 5 mg tablet TAKE 1 TABLET DAILY 90 tablet 3    sitaGLIPtin (JANUVIA) 100 mg tablet Take 1 tablet (100 mg total) by mouth daily 90 tablet 3    metoprolol succinate (TOPROL-XL) 25 mg 24 hr tablet Take 1 tablet (25 mg total) by mouth daily 90 tablet 2     No current facility-administered medications for this visit       Review of Systems   Cardiovascular: Positive for palpitations  Genitourinary:        Right flank pain   Musculoskeletal: Positive for back pain  All other systems reviewed and are negative  Objective:      /84   Pulse 77   Temp 98 3 °F (36 8 °C)   Resp 16   Ht 5' 7" (1 702 m)   Wt 131 kg (289 lb 12 8 oz)   SpO2 98%   BMI 45 39 kg/m²          Physical Exam   Constitutional: He is oriented to person, place, and time  He appears well-developed and well-nourished  No distress  HENT:   Right Ear: Hearing, tympanic membrane, external ear and ear canal normal    Left Ear: Hearing, tympanic membrane, external ear and ear canal normal    Nose: Nose normal    Mouth/Throat: Oropharynx is clear and moist and mucous membranes are normal    Eyes: Pupils are equal, round, and reactive to light  Conjunctivae are normal  Right eye exhibits no discharge  Left eye exhibits no discharge  Neck: No tracheal deviation present  No thyromegaly present  Cardiovascular: Normal rate, regular rhythm, S1 normal, S2 normal, normal heart sounds and intact distal pulses  No murmur heard  Pulmonary/Chest: Effort normal and breath sounds normal  No stridor  No respiratory distress  He has no wheezes  He has no rales  He exhibits no tenderness  Abdominal: Soft  Bowel sounds are normal  There is no tenderness   Hernia confirmed negative in the right inguinal area and confirmed negative in the left inguinal area  Genitourinary: Testes normal and penis normal  Circumcised  Musculoskeletal: Normal range of motion  He exhibits no edema  Lymphadenopathy:     He has no cervical adenopathy  No inguinal adenopathy noted on the right or left side  Neurological: He is alert and oriented to person, place, and time  He has normal reflexes  He displays normal reflexes  Skin: Skin is warm and dry  He is not diaphoretic  Psychiatric: He has a normal mood and affect   His behavior is normal  Judgment and thought content normal

## 2019-03-25 NOTE — ASSESSMENT & PLAN NOTE
Flank pain in the right kidney region  The patient has no evidence of hematuria in his urinalysis  He has no fevers or chills hers no bacteria in the urine  As he is claustrophobic I have recommended a ultrasound of his kidneys  This request was provided to the patient today will review the results when they have been completed

## 2019-03-25 NOTE — ASSESSMENT & PLAN NOTE
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

## 2019-03-25 NOTE — ASSESSMENT & PLAN NOTE
Morbid obesity with a body mass index of 45 39  I have recommended that he continue to work on reducing his calorie consumption in an effort to reduce his body weight  The adverse effects of his hypertension on his multiple disease processes include hypertension diabetes and sleep apnea were reviewed today  unknown

## 2019-03-25 NOTE — ASSESSMENT & PLAN NOTE
Blood pressure is elevated on today's visit  He is presently on amlodipine 10 mg daily and irbesartan hydrochlorothiazide 300-12 5 mg daily  In view of his elevated blood pressure and palpitations symptoms will place him on metoprolol succinate 25 mg daily  A follow-up assessment in 1 month as requested

## 2019-03-28 ENCOUNTER — HOSPITAL ENCOUNTER (OUTPATIENT)
Dept: RADIOLOGY | Age: 74
Discharge: HOME | End: 2019-03-28
Payer: MEDICARE

## 2019-03-28 DIAGNOSIS — R10.9 FLANK PAIN: ICD-10-CM

## 2019-03-28 PROCEDURE — 76770 US EXAM ABDO BACK WALL COMP: CPT

## 2019-04-01 ENCOUNTER — TELEPHONE (OUTPATIENT)
Dept: INTERNAL MEDICINE CLINIC | Facility: CLINIC | Age: 74
End: 2019-04-01

## 2019-04-24 ENCOUNTER — OFFICE VISIT (OUTPATIENT)
Dept: INTERNAL MEDICINE CLINIC | Facility: CLINIC | Age: 74
End: 2019-04-24
Payer: MEDICARE

## 2019-04-24 VITALS
HEIGHT: 67 IN | SYSTOLIC BLOOD PRESSURE: 132 MMHG | BODY MASS INDEX: 45.04 KG/M2 | TEMPERATURE: 98.1 F | HEART RATE: 69 BPM | WEIGHT: 287 LBS | OXYGEN SATURATION: 97 % | DIASTOLIC BLOOD PRESSURE: 68 MMHG

## 2019-04-24 DIAGNOSIS — M54.50 CHRONIC RIGHT-SIDED LOW BACK PAIN WITHOUT SCIATICA: ICD-10-CM

## 2019-04-24 DIAGNOSIS — I10 ESSENTIAL HYPERTENSION: ICD-10-CM

## 2019-04-24 DIAGNOSIS — Z79.4 TYPE 2 DIABETES MELLITUS WITH COMPLICATION, WITH LONG-TERM CURRENT USE OF INSULIN (HCC): Primary | ICD-10-CM

## 2019-04-24 DIAGNOSIS — G89.29 CHRONIC RIGHT-SIDED LOW BACK PAIN WITHOUT SCIATICA: ICD-10-CM

## 2019-04-24 DIAGNOSIS — R10.9 FLANK PAIN: ICD-10-CM

## 2019-04-24 DIAGNOSIS — E11.8 TYPE 2 DIABETES MELLITUS WITH COMPLICATION, WITH LONG-TERM CURRENT USE OF INSULIN (HCC): Primary | ICD-10-CM

## 2019-04-24 DIAGNOSIS — R00.2 PALPITATION: ICD-10-CM

## 2019-04-24 DIAGNOSIS — E66.01 MORBID OBESITY WITH BMI OF 40.0-44.9, ADULT (HCC): ICD-10-CM

## 2019-04-24 PROCEDURE — 99214 OFFICE O/P EST MOD 30 MIN: CPT | Performed by: INTERNAL MEDICINE

## 2019-05-20 ENCOUNTER — OFFICE VISIT (OUTPATIENT)
Dept: SLEEP CENTER | Facility: CLINIC | Age: 74
End: 2019-05-20
Payer: MEDICARE

## 2019-05-20 VITALS
WEIGHT: 289 LBS | SYSTOLIC BLOOD PRESSURE: 126 MMHG | HEIGHT: 67 IN | DIASTOLIC BLOOD PRESSURE: 68 MMHG | BODY MASS INDEX: 45.36 KG/M2

## 2019-05-20 DIAGNOSIS — E66.01 MORBID OBESITY WITH BMI OF 40.0-44.9, ADULT (HCC): ICD-10-CM

## 2019-05-20 DIAGNOSIS — K21.9 GASTROESOPHAGEAL REFLUX DISEASE, ESOPHAGITIS PRESENCE NOT SPECIFIED: ICD-10-CM

## 2019-05-20 DIAGNOSIS — J34.89 DRY NOSE: ICD-10-CM

## 2019-05-20 DIAGNOSIS — R68.2 DRY MOUTH: ICD-10-CM

## 2019-05-20 DIAGNOSIS — R45.86 MOOD DISTURBANCE: ICD-10-CM

## 2019-05-20 DIAGNOSIS — G47.33 OSA (OBSTRUCTIVE SLEEP APNEA): Primary | ICD-10-CM

## 2019-05-20 DIAGNOSIS — R00.2 PALPITATION: ICD-10-CM

## 2019-05-20 PROCEDURE — 99214 OFFICE O/P EST MOD 30 MIN: CPT | Performed by: INTERNAL MEDICINE

## 2019-06-11 DIAGNOSIS — E11.8 TYPE 2 DIABETES MELLITUS WITH COMPLICATION, WITHOUT LONG-TERM CURRENT USE OF INSULIN (HCC): Primary | ICD-10-CM

## 2019-06-11 DIAGNOSIS — E11.9 TYPE 2 DIABETES MELLITUS WITHOUT COMPLICATION, WITHOUT LONG-TERM CURRENT USE OF INSULIN (HCC): ICD-10-CM

## 2019-06-11 RX ORDER — GLIMEPIRIDE 1 MG/1
1 TABLET ORAL
Qty: 90 TABLET | Refills: 2 | Status: SHIPPED | OUTPATIENT
Start: 2019-06-11 | End: 2019-08-14

## 2019-06-17 ENCOUNTER — TELEPHONE (OUTPATIENT)
Dept: INTERNAL MEDICINE CLINIC | Facility: CLINIC | Age: 74
End: 2019-06-17

## 2019-06-17 DIAGNOSIS — E11.9 TYPE 2 DIABETES MELLITUS WITHOUT COMPLICATION, WITHOUT LONG-TERM CURRENT USE OF INSULIN (HCC): ICD-10-CM

## 2019-06-19 ENCOUNTER — OFFICE VISIT (OUTPATIENT)
Dept: PODIATRY | Facility: CLINIC | Age: 74
End: 2019-06-19
Payer: MEDICARE

## 2019-06-19 VITALS
HEIGHT: 67 IN | BODY MASS INDEX: 45.52 KG/M2 | HEART RATE: 76 BPM | SYSTOLIC BLOOD PRESSURE: 156 MMHG | DIASTOLIC BLOOD PRESSURE: 95 MMHG | WEIGHT: 290 LBS

## 2019-06-19 DIAGNOSIS — E66.01 MORBID OBESITY WITH BMI OF 40.0-44.9, ADULT (HCC): ICD-10-CM

## 2019-06-19 DIAGNOSIS — E11.8 TYPE 2 DIABETES MELLITUS WITH COMPLICATION, WITHOUT LONG-TERM CURRENT USE OF INSULIN (HCC): Primary | ICD-10-CM

## 2019-06-19 DIAGNOSIS — G62.9 PERIPHERAL POLYNEUROPATHY: ICD-10-CM

## 2019-06-19 PROCEDURE — 99203 OFFICE O/P NEW LOW 30 MIN: CPT | Performed by: PODIATRIST

## 2019-07-03 ENCOUNTER — OFFICE VISIT (OUTPATIENT)
Dept: INTERNAL MEDICINE CLINIC | Facility: CLINIC | Age: 74
End: 2019-07-03
Payer: MEDICARE

## 2019-07-03 VITALS
TEMPERATURE: 98.3 F | HEART RATE: 87 BPM | HEIGHT: 67 IN | BODY MASS INDEX: 44.73 KG/M2 | DIASTOLIC BLOOD PRESSURE: 80 MMHG | WEIGHT: 285 LBS | OXYGEN SATURATION: 95 % | SYSTOLIC BLOOD PRESSURE: 140 MMHG

## 2019-07-03 DIAGNOSIS — M54.50 CHRONIC RIGHT-SIDED LOW BACK PAIN WITHOUT SCIATICA: Primary | ICD-10-CM

## 2019-07-03 DIAGNOSIS — K52.1 DIARRHEA DUE TO DRUG: ICD-10-CM

## 2019-07-03 DIAGNOSIS — G89.29 CHRONIC RIGHT-SIDED LOW BACK PAIN WITHOUT SCIATICA: Primary | ICD-10-CM

## 2019-07-03 PROCEDURE — 99214 OFFICE O/P EST MOD 30 MIN: CPT | Performed by: NURSE PRACTITIONER

## 2019-07-03 NOTE — ASSESSMENT & PLAN NOTE
Adjustment of metformin dose with addition of januvia has not improved the diarrhea  He has a few days left of januvia and then he is switching to [employer]  He is scheduled for f/u in a couple of weeks  Advised to make that medication change as ordered and we will see if his symptoms improve  I explained that metformin likely contributing to the diarrhea  I explained that control of his diabetes may not be sufficient on other oral medications alone and may need to at some point discuss option of insulin

## 2019-07-03 NOTE — PROGRESS NOTES
Assessment/Plan:    Low back pain  Pt is requesting MRI for evaluation because he is trying to get in for evaluation with a doctor in [facility] that he was referred to by a friend  He does not know what the doctor specializes in  I explained to him that an MRI may not be covered based on his exam because he has no neurological abnormality and he has not done PT or treated with antiinflammatory meds yet  He would agree to a referral to comprehensive spine program which was ordered today  Mehreen  He declines prescriptions for medication at this time  Diarrhea due to drug  Adjustment of metformin dose with addition of januvia has not improved the diarrhea  He has a few days left of januvia and then he is switching to [employer]  He is scheduled for f/u in a couple of weeks  Advised to make that medication change as ordered and we will see if his symptoms improve  I explained that metformin likely contributing to the diarrhea  I explained that control of his diabetes may not be sufficient on other oral medications alone and may need to at some point discuss option of insulin  Diagnoses and all orders for this visit:    Chronic right-sided low back pain without sciatica  -     Ambulatory Referral to Comprehensive Spine Program; Future    Diarrhea due to drug          Subjective:      Patient ID: Sam Lanza is a 75 y o  male  Pt is a 75 y o  y/o male who is seen today for evaluation of low back pain  He started with symptoms earlier this year and says he gets intermittent episodes of a sharp stabbing pain in the right back lasting seconds to minutes, severe at times rated at 9-10/10  He does not note a pattern with what precipitates the episodes  Episode frequency varies, at times several times a day  Between episodes he is pain free  He has numbness and tingling in his legs bilaterally  Denies weakness  He denies urinary incontinence    He at times get stool incontinence which is due to medication associated diarrhea  He was evaluated in march this year for the R flank pain with KUB US which was normal   Pt was given the name of a doctor in [facility] by a friend who he thinks can help him but the doctor requires an MRI for him to be evaluated so he would like an MRI  He also wishes to discuss his diabetes medications which cause him diarrhea  He was having diarrhea when on metformin 1000 mg QAM, 500 mg QPM and was switched to 500 mg BID with [employer] QAM   He continues with diarrhea  [address] has recently been changed to amaryl due to cost of Melissa Castleton but he has not yet changed and has a few more days of januvia left  Diabetes has been well controlled with last a1c 6 6  The following portions of the patient's history were reviewed and updated as appropriate: allergies, current medications, past family history, past medical history, past social history, past surgical history and problem list     Review of Systems   Constitutional: Negative for activity change, appetite change, chills, fatigue, fever and unexpected weight change  Eyes: Negative for visual disturbance  Respiratory: Negative for cough, chest tightness and shortness of breath  Cardiovascular: Negative for chest pain, palpitations and leg swelling  Gastrointestinal: Positive for diarrhea  Negative for constipation, nausea and vomiting  Genitourinary: Negative for dysuria  Musculoskeletal: Positive for back pain  Negative for arthralgias, joint swelling, myalgias and neck pain  Skin: Negative for color change, rash and wound  Neurological: Negative for dizziness, weakness, numbness and headaches  Psychiatric/Behavioral: Negative for sleep disturbance  The patient is not nervous/anxious            Objective:      /80 (BP Location: Left arm, Patient Position: Sitting, Cuff Size: Adult)   Pulse 87   Temp 98 3 °F (36 8 °C) (Tympanic)   Ht 5' 7" (1 702 m)   Wt 129 kg (285 lb)   SpO2 95%   BMI 44 64 kg/m² Physical Exam   Constitutional: He is oriented to person, place, and time  Vital signs are normal  He appears well-developed and well-nourished  He is cooperative  Neck: Normal range of motion  Cardiovascular: Normal rate, regular rhythm, normal heart sounds and intact distal pulses  No murmur heard  Pulmonary/Chest: Effort normal and breath sounds normal    Abdominal: Soft  Normal appearance and bowel sounds are normal  There is no tenderness  Musculoskeletal: He exhibits no edema  Lumbar back: He exhibits decreased range of motion and tenderness  He exhibits no bony tenderness, no swelling, no edema, no pain and no spasm  Back:    Pain to palpation over the anterior sacrum and tender over the R flank muscles  Neurological: He is alert and oriented to person, place, and time  He has normal strength  No sensory deficit  He exhibits normal muscle tone  Reflex Scores:       Patellar reflexes are 1+ on the right side and 1+ on the left side  Skin: Skin is warm, dry and intact  No rash noted  No erythema  Psychiatric: He has a normal mood and affect  His speech is normal and behavior is normal  Judgment and thought content normal  Cognition and memory are normal    Vitals reviewed

## 2019-07-03 NOTE — ASSESSMENT & PLAN NOTE
Pt is requesting MRI for evaluation because he is trying to get in for evaluation with a doctor in [facility] that he was referred to by a friend  He does not know what the doctor specializes in  I explained to him that an MRI may not be covered based on his exam because he has no neurological abnormality and he has not done PT or treated with antiinflammatory meds yet  He would agree to a referral to comprehensive spine program which was ordered today  Janey Whittaker He declines prescriptions for medication at this time

## 2019-07-08 ENCOUNTER — TELEPHONE (OUTPATIENT)
Dept: PHYSICAL THERAPY | Facility: OTHER | Age: 74
End: 2019-07-08

## 2019-07-08 NOTE — TELEPHONE ENCOUNTER
This nurse did return pt's phone call and Pt stated that he did not understand why I was calling him back since he does have an appointment with PM&R  Pt  Stated that he was upset that he is seeing a PA and not a doctor  He stated " no one told me that I will be seeing a Physician Assistant, why can I not see a doctor"  This nurse did review in detail the comp spine program and what we can provide for the pt for their back pain  Pt is refusing to see physical therapy at this time and would like to wait till after he has seen PM&R  Pt did state understanding of what was reviewed with him, and was grateful for the clarification

## 2019-07-08 NOTE — TELEPHONE ENCOUNTER
Message left for Pt  to call back Comp  Spine Program at there earliest conveniece, our number and our hours given    Waiting for Pt  to call back  Referral closed

## 2019-07-10 NOTE — PROGRESS NOTES
Assessment/Plan:      Diagnoses and all orders for this visit:    Chronic right-sided thoracic back pain  -     Ambulatory referral to Physical Therapy; Future    Myalgia  -     Ambulatory referral to Physical Therapy; Future    Cervical paraspinal muscle spasm  -     Ambulatory referral to Physical Therapy; Future    Sacroiliac joint pain  -     Ambulatory referral to Physical Therapy; Future    Chronic midline low back pain without sciatica  -     Ambulatory referral to Physical Therapy; Future      Discussion: 76 yo right hand dominant presenting for chronic right sided thoracic pain  Will refer to physical therapy and advised to follow up in office if pain does not improve or worsens  At that time to consider further imaging such as MRI  He was advised to utilize heat/ice and OTC topical pain relievers as needed  Patient agrees to above plan  Subjective:     Patient ID: Patricia Santos is a 75 y o  male  HPI Referral from PCP for chronic low back pain without previous treatment  Low back pain x 5-6 months which started as right sided flank pain  No trauma or EDILBERTO  Ultrasound completed without any abnormalities and denies any urinary sx  Pain in back is intermittent but brought on with movements  Describes as intermittent sharp stabbing, peircing pain when present can be rated as a 9-10/10 on pain scale  States pain typically lasts a few seconds and subsides  States not present today and finds has been improving over the past few days  Denies any N/T, weakness, B/B incontinence or retention  Some issues with metformin induced diarrhea  Kennedy any saddle anesthesia  Some additional bilateral shoulder blade and cervical spasm with activity  Subsides with rest  Previous lumbar surgery 36 years ago with intermittent back pain since that time specifically left SIJ region today  Previous DC and lumbar epidural steroid injection years ago through [facility]  PMHx + for diabetes   Hx of bilateral knee replacements  Finds ADLs limited due to overall deconditioning  Not taking any medications at present for this issue  PAST MEDICAL HISTORY  Past Medical History:   Diagnosis Date    Anxiety     Claustrophobia     Colon polyps     Depression     Diabetes mellitus ([address] )     Dyslipidemia     Esophageal polyp     GERD (gastroesophageal reflux disease)     Glaucoma     Headache     Hypertension     Low back pain     Neck pain     Numbness and tingling in left arm     resolved 2/20/17 / numbness and tingling left side last assessed 3/23/16    OAB (overactive bladder)     Paroxysmal supraventricular tachycardia (HCC)     Sleep apnea     wears cpap      PAST SURGICAL HISTORY  Past Surgical History:   Procedure Laterality Date    ANOSCOPY      for polyp removal    BASAL CELL CARCINOMA EXCISION      right cheek    CATARACT EXTRACTION Bilateral     KNEE ARTHROSCOPY Bilateral     LUMBAR SPINE SURGERY      30years ago    MOUTH SURGERY      tooth extraction    RI COLONOSCOPY FLX DX W/COLLJ SPEC WHEN PFRMD N/A 2/13/2019    Procedure: COLONOSCOPY;  Surgeon: Jeremie Lee MD;  Location: [facility]; Service: Gastroenterology    RI ESOPHAGOGASTRODUODENOSCOPY TRANSORAL DIAGNOSTIC N/A 2/13/2019    Procedure: ESOPHAGOGASTRODUODENOSCOPY (EGD); Surgeon: Jeremie Lee MD;  Location: [facility];   Service: Gastroenterology    RECTAL BIOPSY      REPLACEMENT TOTAL KNEE Right 04/2017    REPLACEMENT TOTAL KNEE Left 03/2013    TOTAL KNEE ARTHROPLASTY Left        FAMILY HISTORY  Family History   Problem Relation Age of Onset    Alzheimer's disease Mother     Diabetes Mother    Roxanne Risen Hypertension Father     Diabetes Paternal Grandmother     Esophageal cancer Paternal Grandfather      HOME MEDICATIONS  Current Outpatient Medications   Medication Sig Dispense Refill    amLODIPine (NORVASC) 10 mg tablet TAKE 1 TABLET DAILY 90 tablet 3    brimonidine-timolol (COMBIGAN) 0 2-0 5 % Administer 1 drop into the left eye daily   citalopram (CeleXA) 10 mg tablet TAKE 1 TABLET DAILY 90 tablet 2    FLUZONE HIGH-DOSE 0 5 ML SHEILA TO BE GIVEN BY PHARMACIST PER STANDING ORDER  0    glimepiride (AMARYL) 1 mg tablet Take 1 tablet (1 mg total) by mouth daily with breakfast 90 tablet 2    irbesartan-hydrochlorothiazide (AVALIDE) 300-12 5 MG per tablet Take 1 tablet by mouth daily 10 tablet 1    latanoprost (XALATAN) 0 005 % ophthalmic solution 1 drop daily at bedtime   metFORMIN (GLUCOPHAGE) 500 mg tablet Take 1 tablet (500 mg total) by mouth 2 (two) times a day with meals TAKE 2 TABLETS IN AM AND 1 TABLET IN  tablet 2    metoprolol succinate (TOPROL-XL) 25 mg 24 hr tablet Take 1 tablet (25 mg total) by mouth daily 90 tablet 2    metroNIDAZOLE (METROGEL) 0 75 % gel APPLY TO AFFECTED AREA(S) EVERY DAY  3    omeprazole (PriLOSEC) 20 mg delayed release capsule Take one daily 90 capsule 3    oxybutynin (DITROPAN-XL) 5 mg 24 hr tablet Take 1 tablet (5 mg total) by mouth daily at bedtime 30 tablet 0    rosuvastatin (CRESTOR) 5 mg tablet TAKE 1 TABLET DAILY 90 tablet 3    sitaGLIPtin (JANUVIA) 100 mg tablet Take 100 mg by mouth daily       No current facility-administered medications for this visit  ALLERGIES  Acetazolamide; Codeine; and Sulfa antibiotics      SOCIAL HISTORY  Social History     Substance and Sexual Activity   Alcohol Use Yes    Alcohol/week: 14 0 standard drinks    Types: 14 Shots of liquor per week    Comment: "couple of drinks each night"     Social History     Substance and Sexual Activity   Drug Use No     Social History     Tobacco Use   Smoking Status Former Smoker    Last attempt to quit: 1983    Years since quittin 4   Smokeless Tobacco Former User       Review of Systems   Constitutional: Negative for chills, diaphoresis, fatigue, fever and unexpected weight change  HENT: Negative for trouble swallowing  Eyes: Negative for visual disturbance  Respiratory: Negative for shortness of breath and wheezing  Cardiovascular: Negative for chest pain, palpitations and leg swelling  Gastrointestinal: Positive for diarrhea  Endocrine: Negative for polydipsia, polyphagia and polyuria  Genitourinary: Positive for difficulty urinating (BPH)  Negative for decreased urine volume, frequency, hematuria and urgency  Musculoskeletal: Positive for back pain, myalgias and neck stiffness  Negative for arthralgias, gait problem, joint swelling and neck pain  Skin: Negative for pallor and rash  Neurological: Negative for weakness and numbness  Psychiatric/Behavioral: Negative for sleep disturbance  Objective:  Vitals:    07/15/19 1302   BP: 130/78   Pulse: 80       Imaging:  MRI LUMBAR SPINE WITHOUT CONTRAST 3/30/16      INDICATION:  Chronic low back pain with left leg radiculopathy      COMPARISON:  None      TECHNIQUE:  Sagittal T1, sagittal T2, sagittal inversion recovery, axial T1 and axial T2, coronal T2  Study was performed under sedation      IMAGE QUALITY:  Diagnostic     FINDINGS:     ALIGNMENT:  Slight dextroscoliosis apex at L1      MARROW SIGNAL:  Normal marrow signal is identified within the visualized bony structures  No discrete marrow lesion      DISTAL CORD AND CONUS:  Normal size and signal within the distal cord and conus  The conus ends at the L1 level      PARASPINAL SOFT TISSUES:  Paraspinal soft tissues are unremarkable      SACRUM:  Normal signal within the sacrum  No evidence of insufficiency or stress fracture      LOWER THORACIC DISC SPACES:  Normal disc height and signal   No disc herniation, canal stenosis or foraminal narrowing      LUMBAR DISC SPACES:          L1-L2:  Normal      L2-L3:  Small marginal osteophytes  No central or foraminal narrowing      L3-L4:  Left foraminal protrusion type disc herniation with marginal osteophytes bilaterally  Disc material contacts the left L3 nerve root    Correlate for left L3 radiculopathy  Minimal central and mild bilateral foraminal narrowing noted      L4-L5:  Mild annular bulging identified with small marginal osteophytes eccentric to the right  There is moderate right foraminal narrowing  Mild left foraminal narrowing and minimal central stenosis      L5-S1:  Moderate facet hypertrophy  Small marginal osteophytes are noted  Minimal left foraminal narrowing      IMPRESSION:     Left foraminal disc herniation L3-4 contacts the left foraminal L3 nerve root  Correlate for left L3 radiculopathy      Spondylotic degenerative disease elsewhere including L4-5 there is moderate right foraminal narrowing         Workstation performed: [id number]     Physical Exam   Constitutional: He is oriented to person, place, and time  He appears well-developed and well-nourished  No distress  HENT:   Head: Normocephalic and atraumatic  Eyes: Conjunctivae and EOM are normal    Neck: Neck supple  Muscular tenderness present  No spinous process tenderness present  No neck rigidity  No edema and no erythema present  Musculoskeletal:        Thoracic back: He exhibits decreased range of motion and tenderness  He exhibits no bony tenderness, no swelling, no deformity, no pain and no spasm  Lumbar back: He exhibits decreased range of motion and tenderness  He exhibits no bony tenderness  Back:    TTP right latissimus, left SIJ    - KISHOR/FADIR/Log roll    Neurological: He is alert and oriented to person, place, and time  He has normal strength  He displays no atrophy  No sensory deficit  He exhibits normal muscle tone  Coordination and gait normal    Reflex Scores:       Tricep reflexes are 1+ on the right side and 1+ on the left side  Bicep reflexes are 1+ on the right side and 1+ on the left side  Brachioradialis reflexes are 1+ on the right side and 1+ on the left side  Patellar reflexes are 1+ on the right side and 1+ on the left side         Achilles reflexes are 1+ on the right side and 1+ on the left side   - SLR   Able to heel stand, toe stand, squat and rise    Skin: Skin is warm and dry  No rash noted  He is not diaphoretic  No cyanosis  Nails show no clubbing  Psychiatric: He has a normal mood and affect  His speech is normal and behavior is normal  Judgment and thought content normal  Cognition and memory are normal  He is attentive  Vitals reviewed

## 2019-07-15 ENCOUNTER — OFFICE VISIT (OUTPATIENT)
Dept: PAIN MEDICINE | Facility: CLINIC | Age: 74
End: 2019-07-15
Payer: MEDICARE

## 2019-07-15 VITALS
HEART RATE: 80 BPM | WEIGHT: 288 LBS | BODY MASS INDEX: 45.2 KG/M2 | HEIGHT: 67 IN | DIASTOLIC BLOOD PRESSURE: 78 MMHG | SYSTOLIC BLOOD PRESSURE: 130 MMHG

## 2019-07-15 DIAGNOSIS — G89.29 CHRONIC MIDLINE LOW BACK PAIN WITHOUT SCIATICA: ICD-10-CM

## 2019-07-15 DIAGNOSIS — M79.10 MYALGIA: ICD-10-CM

## 2019-07-15 DIAGNOSIS — M54.6 CHRONIC RIGHT-SIDED THORACIC BACK PAIN: Primary | ICD-10-CM

## 2019-07-15 DIAGNOSIS — M54.50 CHRONIC MIDLINE LOW BACK PAIN WITHOUT SCIATICA: ICD-10-CM

## 2019-07-15 DIAGNOSIS — G89.29 CHRONIC RIGHT-SIDED THORACIC BACK PAIN: Primary | ICD-10-CM

## 2019-07-15 DIAGNOSIS — M62.838 CERVICAL PARASPINAL MUSCLE SPASM: ICD-10-CM

## 2019-07-15 DIAGNOSIS — M53.3 SACROILIAC JOINT PAIN: ICD-10-CM

## 2019-07-15 PROCEDURE — 99203 OFFICE O/P NEW LOW 30 MIN: CPT | Performed by: PHYSICIAN ASSISTANT

## 2019-07-17 ENCOUNTER — TRANSCRIBE ORDERS (OUTPATIENT)
Dept: ADMINISTRATIVE | Age: 74
End: 2019-07-17

## 2019-07-17 ENCOUNTER — APPOINTMENT (OUTPATIENT)
Dept: LAB | Age: 74
End: 2019-07-17
Payer: MEDICARE

## 2019-07-17 ENCOUNTER — EVALUATION (OUTPATIENT)
Dept: PHYSICAL THERAPY | Age: 74
End: 2019-07-17
Payer: MEDICARE

## 2019-07-17 DIAGNOSIS — E11.8 TYPE 2 DIABETES MELLITUS WITH COMPLICATION, WITH LONG-TERM CURRENT USE OF INSULIN (HCC): ICD-10-CM

## 2019-07-17 DIAGNOSIS — G89.29 CHRONIC MIDLINE LOW BACK PAIN WITHOUT SCIATICA: Primary | ICD-10-CM

## 2019-07-17 DIAGNOSIS — M79.10 MYALGIA: ICD-10-CM

## 2019-07-17 DIAGNOSIS — M54.6 CHRONIC RIGHT-SIDED THORACIC BACK PAIN: ICD-10-CM

## 2019-07-17 DIAGNOSIS — G89.29 CHRONIC RIGHT-SIDED THORACIC BACK PAIN: ICD-10-CM

## 2019-07-17 DIAGNOSIS — Z79.4 TYPE 2 DIABETES MELLITUS WITH COMPLICATION, WITH LONG-TERM CURRENT USE OF INSULIN (HCC): ICD-10-CM

## 2019-07-17 DIAGNOSIS — M62.838 CERVICAL PARASPINAL MUSCLE SPASM: ICD-10-CM

## 2019-07-17 DIAGNOSIS — M54.50 CHRONIC MIDLINE LOW BACK PAIN WITHOUT SCIATICA: Primary | ICD-10-CM

## 2019-07-17 DIAGNOSIS — E11.8 TYPE 2 DIABETES MELLITUS WITH COMPLICATION, WITHOUT LONG-TERM CURRENT USE OF INSULIN (HCC): ICD-10-CM

## 2019-07-17 LAB
ALBUMIN SERPL BCP-MCNC: 3.1 G/DL (ref 3.5–5)
ALP SERPL-CCNC: 119 U/L (ref 46–116)
ALT SERPL W P-5'-P-CCNC: 27 U/L (ref 12–78)
ANION GAP SERPL CALCULATED.3IONS-SCNC: 9 MMOL/L (ref 4–13)
AST SERPL W P-5'-P-CCNC: 14 U/L (ref 5–45)
BILIRUB SERPL-MCNC: 0.5 MG/DL (ref 0.2–1)
BUN SERPL-MCNC: 15 MG/DL (ref 5–25)
CALCIUM SERPL-MCNC: 8 MG/DL (ref 8.3–10.1)
CHLORIDE SERPL-SCNC: 105 MMOL/L (ref 100–108)
CO2 SERPL-SCNC: 29 MMOL/L (ref 21–32)
CREAT SERPL-MCNC: 1.21 MG/DL (ref 0.6–1.3)
EST. AVERAGE GLUCOSE BLD GHB EST-MCNC: 143 MG/DL
GFR SERPL CREATININE-BSD FRML MDRD: 58 ML/MIN/1.73SQ M
GLUCOSE P FAST SERPL-MCNC: 125 MG/DL (ref 65–99)
HBA1C MFR BLD: 6.6 % (ref 4.2–6.3)
POTASSIUM SERPL-SCNC: 2.8 MMOL/L (ref 3.5–5.3)
PROT SERPL-MCNC: 7.4 G/DL (ref 6.4–8.2)
SODIUM SERPL-SCNC: 143 MMOL/L (ref 136–145)

## 2019-07-17 PROCEDURE — 83036 HEMOGLOBIN GLYCOSYLATED A1C: CPT

## 2019-07-17 PROCEDURE — 36415 COLL VENOUS BLD VENIPUNCTURE: CPT

## 2019-07-17 PROCEDURE — 97162 PT EVAL MOD COMPLEX 30 MIN: CPT

## 2019-07-17 PROCEDURE — 80053 COMPREHEN METABOLIC PANEL: CPT

## 2019-07-17 NOTE — PROGRESS NOTES
PT Evaluation     Today's date: 2019  Patient name: Rossana Shultz  : 1945  MRN: [id number]  Referring provider: Isaac Gupta  Dx:   Encounter Diagnosis     ICD-10-CM    1  Chronic right-sided thoracic back pain M54 6 Ambulatory referral to Physical Therapy    G89 29    2  Myalgia M79 10 Ambulatory referral to Physical Therapy   3  Cervical paraspinal muscle spasm M62 838 Ambulatory referral to Physical Therapy   4  Chronic midline low back pain without sciatica M54 5 Ambulatory referral to Physical Therapy    G89 29                   Assessment  Assessment details: Patient is a 75 y o  Male reporting to PT with complaints of chronic LBP  He presents with decreased lumbar AROM in all planes, limited LE strength, and poor tolerance of physical activity  Patient denies B/B dysfunction or saddle paresthesias  No red flags present  Despite positive Peña's sign, kidney pathology is unlikely considering recent US in 2019  Testing for lumbar radiculopathy was also negative  Overall, patient's presentation is consistent with chronic LBP resulting from abnormal mechanics and degenerative changes  At this time, patient will benefit from skilled PT intervention to address the impairments listed  Impairments: abnormal or restricted ROM, abnormal movement, activity intolerance, impaired physical strength, lacks appropriate home exercise program, pain with function, poor posture  and poor body mechanics    Symptom irritability: moderateBarriers to therapy: None  Understanding of Dx/Px/POC: good   Prognosis: good    Goals  STG's: 4 Weeks  1 ) Patient will be independent with HEP   2 ) Patient will tolerate rolling in bed without discomfort or difficulty  3 ) Patient will be able to walk >100-200 feet with <3/10 pain  4 ) Patient will be able to transfer from sit-to-stand without discomfort  LTG's: 8 Weeks  1 ) Patient will exhibit 5/5 LE strength in all planes    2 ) Patient will exhibit full lumbar AROM in all planes  3 ) Patient will be able to walk up to 1/2 miles without discomfort  4 ) Patient will exceed predicted FOTO score  Plan  Plan details: Patient was provided with an HEP  They were educated regarding repetitions, resistance, and proper technique  Patient demonstrated understanding verbally  Patient would benefit from: skilled physical therapy  Planned therapy interventions: manual therapy, neuromuscular re-education, postural training, patient education, strengthening, stretching, therapeutic activities, therapeutic exercise, home exercise program, flexibility, functional ROM exercises, body mechanics training and balance  Frequency: 2x week  Duration in weeks: 8  Treatment plan discussed with: patient        Subjective Evaluation    History of Present Illness  Mechanism of injury: Patient is a 75 y o  Male reporting to PT with complaints of mid to low back pain  Insidious onset several years ago  Sx's have been pretty severe on and off during that time  Most recently, patient was seen by comprehensive [facility] program and referred to PT  Most recent MRI performed in 2016 showed: Left foraminal disc herniation L3-4 contacts the left foraminal L3 nerve root  Correlate for left L3 radiculopathy  Spondylotic degenerative disease elsewhere including L4-5 there is moderate right foraminal narrowing  Sx's include sharp occurrence of pain around the mid back that extends down into the lumbar spine  Patient denies radicular sx's down either leg  He denies B/B dysfunction or saddle paresthesias  Patient had back surgery 30+ years ago to address a herniated disc  He is unsure regarding the nature of that procedure  Sx's are not constant, they are brought on by certain positions and movements  Pain is also present around the R costovertebral angle  Patient denies blood in urine or abnormal changes in micturition frequency or duration   US of kidneys performed in March 2019 was performed to R/U any kidney pathology  Functional Limitations  Walking: limited by his ability to walk due to discomfort  Standing: mild discomfort with standing for a long period of time  Sleeping: no difficulty; able to sleep through the night without discomfort  Stairs: able to ascend/descend without significant difficulty   Lifting: able to lift 15-20 lbs of groceries  Employment: retired; continues to manage apartments    Patient consented to manual therapy and physical examination  Demonstrated understanding verbally                  Recurrent probem    Quality of life: good    Pain  Current pain ratin  At best pain ratin  At worst pain ratin  Location: Mid Back, Low Back  Quality: burning, cramping, dull ache, throbbing, tight, sharp and discomfort  Relieving factors: change in position, relaxation, rest and support  Aggravating factors: stair climbing, sitting, standing, walking and overhead activity      Diagnostic Tests  MRI studies: abnormal  Patient Goals  Patient goals for therapy: decreased pain, increased motion, increased strength, independence with ADLs/IADLs and return to sport/leisure activities          Objective     Concurrent Complaints  Negative for night pain, disturbed sleep, bladder dysfunction, bowel dysfunction, saddle (S4) numbness, cardiac problem, kidney problem, gallbladder problem, stomach problem, ulcer, appendix problem, spleen problem, pancreas problem, history of cancer, history of trauma and infection    Palpation     Additional Palpation Details  Point tenderness with palpation of PSIS B/L    Point tenderness with palpation of R costovertebral angle  (+) Peña's Punch Sign         Neurological Testing     Sensation     Lumbar   Left   Intact: light touch    Right   Intact: light touch    Reflexes   Left   Patellar (L4): normal (2+)  Achilles (S1): normal (2+)    Right   Patellar (L4): normal (2+)  Achilles (S1): normal (2+)    Active Range of Motion     Lumbar Flexion: 90 degrees   Extension: 15 degrees  with pain  Left lateral flexion:  WFL and with pain  Right lateral flexion:  WFL and with pain  Left rotation:  WFL  Right rotation:  [facility] Active Range of Motion Details  SLR: 45 / 60 degrees     Strength/Myotome Testing     Left Hip   Planes of Motion   Flexion: 4+  Extension: 4-  Abduction: 4    Right Hip   Planes of Motion   Flexion: 4+  Extension: 4-  Abduction: 4    Left Knee   Flexion: 5  Extension: 5    Right Knee   Flexion: 5  Extension: 5    Left Ankle/Foot   Dorsiflexion: 5    Right Ankle/Foot   Dorsiflexion: 5    Tests     Lumbar     Left   Negative crossed SLR, passive SLR, slump test and Sherwin's signs  Right   Negative crossed SLR, passive SLR, slump test and Sherwin's signs  Left Pelvic Girdle/Sacrum   Negative: thigh thrust      Right Pelvic Girdle/Sacrum   Negative: thigh thrust      Left Hip   Negative KISHOR and FADIR  Right Hip   Negative KISHOR and FADIR       General Comments:    Lower quarter screen   Hips: unremarkable  Knees: unremarkable  Foot/ankle: unremarkable             Precautions: B/L TKA, pmhx of lumbar surgery, HTN, DM      Manual  7/17  IE                                                                                 Exercise Diary  7/17  IE            Nustep             LTR             SKTC             H/S Stretch             DKTC             Bridges             SLR x 3             TB Clamshells             TA Holds             Standing H' Abd/Ext             Standing Marches             HR             Leg Press             Step-Ups             TB Rows/Low Rows                                                                                  Modalities

## 2019-07-23 ENCOUNTER — OFFICE VISIT (OUTPATIENT)
Dept: PHYSICAL THERAPY | Age: 74
End: 2019-07-23
Payer: MEDICARE

## 2019-07-23 DIAGNOSIS — G89.29 CHRONIC RIGHT-SIDED THORACIC BACK PAIN: Primary | ICD-10-CM

## 2019-07-23 DIAGNOSIS — M54.6 CHRONIC RIGHT-SIDED THORACIC BACK PAIN: Primary | ICD-10-CM

## 2019-07-23 DIAGNOSIS — M79.10 MYALGIA: ICD-10-CM

## 2019-07-23 DIAGNOSIS — G89.29 CHRONIC MIDLINE LOW BACK PAIN WITHOUT SCIATICA: ICD-10-CM

## 2019-07-23 DIAGNOSIS — M54.50 CHRONIC MIDLINE LOW BACK PAIN WITHOUT SCIATICA: ICD-10-CM

## 2019-07-23 DIAGNOSIS — M62.838 CERVICAL PARASPINAL MUSCLE SPASM: ICD-10-CM

## 2019-07-23 PROCEDURE — 97110 THERAPEUTIC EXERCISES: CPT

## 2019-07-23 NOTE — PROGRESS NOTES
Daily Note     Today's date: 2019  Patient name: Chelle Romero  : 1945  MRN: [id number]  Referring provider: Bar Sun  Dx:   Encounter Diagnosis     ICD-10-CM    1  Chronic right-sided thoracic back pain M54 6     G89 29    2  Myalgia M79 10    3  Cervical paraspinal muscle spasm M62 838    4  Chronic midline low back pain without sciatica M54 5     G89 29                     **Evelyn Puentes PTA supervised 50% of tx**    Subjective: Patient presents feeling okay overall  Mild LBP pain present  Tried HEP and was able to perform majority of exercises without discomfort  Offers no other complaints  Objective: See treatment diary below      Assessment: Patient tolerated today's tx session poorly  He experienced discomfort with several exercises that involved lumbar extension or end-range lumbar rotation  Tolerated standing PRE's well without complaint  Patient demonstrated fatigue post treatment, exhibited good technique with therapeutic exercises and would benefit from continued PT      Plan: Continue per plan of care  Precautions: B/L TKA, pmhx of lumbar surgery, HTN, DM       Manual    IE                                                                                 Exercise Diary    IE            Nustep  10'           LTR  20x5"           SKTC  10x10"           H/S Stretch             DKTC  P! Bridges  P! SLR x 3  20x           Supine  TB Clamshells  Green  20x B           TA Holds  20x5"           Standing H' Abd/Ext  20x EA  B           Standing Marches  20x B           HR  30x            Leg Press             Step-Ups             TB Rows/Low Rows  Green  20x EA                                                                                  Modalities

## 2019-07-24 ENCOUNTER — OFFICE VISIT (OUTPATIENT)
Dept: INTERNAL MEDICINE CLINIC | Facility: CLINIC | Age: 74
End: 2019-07-24
Payer: MEDICARE

## 2019-07-24 ENCOUNTER — OFFICE VISIT (OUTPATIENT)
Dept: PHYSICAL THERAPY | Age: 74
End: 2019-07-24
Payer: MEDICARE

## 2019-07-24 VITALS
TEMPERATURE: 99.1 F | DIASTOLIC BLOOD PRESSURE: 70 MMHG | SYSTOLIC BLOOD PRESSURE: 138 MMHG | HEART RATE: 89 BPM | WEIGHT: 286.8 LBS | RESPIRATION RATE: 14 BRPM | OXYGEN SATURATION: 97 % | HEIGHT: 67 IN | BODY MASS INDEX: 45.01 KG/M2

## 2019-07-24 DIAGNOSIS — E87.6 HYPOKALEMIA: ICD-10-CM

## 2019-07-24 DIAGNOSIS — G89.29 CHRONIC MIDLINE LOW BACK PAIN WITHOUT SCIATICA: ICD-10-CM

## 2019-07-24 DIAGNOSIS — E11.8 TYPE 2 DIABETES MELLITUS WITH COMPLICATION, WITHOUT LONG-TERM CURRENT USE OF INSULIN (HCC): Primary | ICD-10-CM

## 2019-07-24 DIAGNOSIS — E11.9 TYPE 2 DIABETES MELLITUS WITHOUT COMPLICATION, WITHOUT LONG-TERM CURRENT USE OF INSULIN (HCC): ICD-10-CM

## 2019-07-24 DIAGNOSIS — I10 ESSENTIAL HYPERTENSION: ICD-10-CM

## 2019-07-24 DIAGNOSIS — M54.6 CHRONIC RIGHT-SIDED THORACIC BACK PAIN: Primary | ICD-10-CM

## 2019-07-24 DIAGNOSIS — R10.9 FLANK PAIN: ICD-10-CM

## 2019-07-24 DIAGNOSIS — G89.29 CHRONIC RIGHT-SIDED THORACIC BACK PAIN: Primary | ICD-10-CM

## 2019-07-24 DIAGNOSIS — M54.50 CHRONIC MIDLINE LOW BACK PAIN WITHOUT SCIATICA: ICD-10-CM

## 2019-07-24 DIAGNOSIS — M62.838 CERVICAL PARASPINAL MUSCLE SPASM: ICD-10-CM

## 2019-07-24 DIAGNOSIS — K52.1 DIARRHEA DUE TO DRUG: ICD-10-CM

## 2019-07-24 DIAGNOSIS — M79.10 MYALGIA: ICD-10-CM

## 2019-07-24 PROCEDURE — 97110 THERAPEUTIC EXERCISES: CPT

## 2019-07-24 PROCEDURE — 99214 OFFICE O/P EST MOD 30 MIN: CPT | Performed by: INTERNAL MEDICINE

## 2019-07-24 NOTE — ASSESSMENT & PLAN NOTE
Chronic flank pain sharp at times of previous ultrasound Ng of the abdomen revealed no organ abnormality in this region  He has been seeing the spine center/Pain Management people for further evaluation  There is a good chance his symptoms may be neurologic  He is going for physical therapy for now and once he finishes that if there is no improvement then he may need an MRI scan of his thoracic and lumbar spine

## 2019-07-24 NOTE — ASSESSMENT & PLAN NOTE
We evaluated the patient's hypertension today on his blood pressure to be 138/70 which for his age and conditions I think is a safe reading recommend the continuation of his irbesartan hydrochlorothiazide 300-12 5 mg daily  Follow-up assessment within 4 months is recommended he has no apparent side effects of the medication other than a slightly low potassium value he has no symptoms of hypertension or hypotension at this time

## 2019-07-24 NOTE — ASSESSMENT & PLAN NOTE
I strongly suspect that the patient's diarrhea is triggered by his metformin  We discussed 2 possible options since his blood sugars are well controlled presently the 1st would be to try to discontinue the morning dose of metformin but continue the evening dose and also continue his morning dose of Amaryl  An alternative would be to convert the metformin from short-acting to long-acting medication  We elected to discontinue the morning dose of metformin as it seems to be the trouble some dose that causes diarrhea  Will see how he is on his follow-up visit

## 2019-07-24 NOTE — ASSESSMENT & PLAN NOTE
He has a mildly depressed potassium level recommend increased supplementation of foods rich in potassium I suspect the potassium level is low due to his almost daily diarrhea from the metformin  For decreasing the metformin in an effort to decrease his diarrhea symptoms will check a follow-up blood test on his next visit in 2-3 weeks

## 2019-07-24 NOTE — PROGRESS NOTES
Assessment/Plan:    Diarrhea due to drug  I strongly suspect that the patient's diarrhea is triggered by his metformin  We discussed 2 possible options since his blood sugars are well controlled presently the 1st would be to try to discontinue the morning dose of metformin but continue the evening dose and also continue his morning dose of Amaryl  An alternative would be to convert the metformin from short-acting to long-acting medication  We elected to discontinue the morning dose of metformin as it seems to be the trouble some dose that causes diarrhea  Will see how he is on his follow-up visit  Type 2 diabetes mellitus with complication [facility])  Lab Results   Component Value Date    HGBA1C 6 6 (H) 07/17/2019    Most recent evaluation of diabetes indicates excellent control of his diabetes he has a blood sugar at the lab of 125  His hemoglobin A1c is 6 6%  He denies any symptoms of hypoglycemia is only concern is that of daily diarrhea that after he takes his morning dose of metformin  He is transitions her without any issues from Januvia to Amaryl to save money  He is presently taking Amaryl 1 mg daily in the morning we decided to decrease his daily metformin dose from 500 mg twice a day to 500 mg once a day in the evening  He does not seem to have any diarrhea after the evening dose of metformin  I also indicated option would be to switch the metformin to extended release form which seems to have less incidence of diarrhea  Will see him in 2-3 weeks to re-evaluate his blood sugars as well as see how his bowels are doing  No results for input(s): POCGLU in the last 72 hours  Blood Sugar Average: Last 72 hrs:      Essential hypertension  We evaluated the patient's hypertension today on his blood pressure to be 138/70 which for his age and conditions I think is a safe reading recommend the continuation of his irbesartan hydrochlorothiazide 300-12 5 mg daily    Follow-up assessment within 4 months is recommended he has no apparent side effects of the medication other than a slightly low potassium value he has no symptoms of hypertension or hypotension at this time  Hypokalemia  He has a mildly depressed potassium level recommend increased supplementation of foods rich in potassium I suspect the potassium level is low due to his almost daily diarrhea from the metformin  For decreasing the metformin in an effort to decrease his diarrhea symptoms will check a follow-up blood test on his next visit in 2-3 weeks  Flank pain  Chronic flank pain sharp at times of previous ultrasound Ng of the abdomen revealed no organ abnormality in this region  He has been seeing the spine center/Pain Management people for further evaluation  There is a good chance his symptoms may be neurologic  He is going for physical therapy for now and once he finishes that if there is no improvement then he may need an MRI scan of his thoracic and lumbar spine  Diagnoses and all orders for this visit:    Type 2 diabetes mellitus with complication, without long-term current use of insulin (HCC)  -     Microalbumin / creatinine urine ratio  -     Basic metabolic panel; Future    Type 2 diabetes mellitus without complication, without long-term current use of insulin ([facility])  -     metFORMIN (GLUCOPHAGE) 500 mg tablet; Take 1 tablet (500 mg total) by mouth daily with breakfast TAKE 2 TABLETS IN AM AND 1 TABLET IN PM    Diarrhea due to drug    Essential hypertension    Flank pain        Subjective:      Patient ID: Patricia Santos is a 75 y o  male  This is a follow-up visit for this 70-year-old gentleman  He has a history of type 2 diabetes on oral medication and lifestyle management  He indicates that he has continued to have loose bowel movements almost daily in the morning after he takes his morning dose of metformin  Interestingly he does not seem to have any diarrhea after the evening dose of metformin    He was previously on Januvia along with metformin but was unable to afford the Januvia and we recently converted him to Amaryl 1 mg daily  We reviewed with the patient today is most recent fasting blood sugar and hemoglobin A1c both of which indicate good control of his diabetes  The patient has continued to experience problems with his right flank and has been seen and evaluated by the pain management people at [address] [place] [address]  He is presently going to physical therapy to see if it will be of benefit to his pain  He has had 2 episodes of therapy with no significant reduction in pain to this point  Plans are to continue with physical therapy for several weeks and if there is no evidence of further improvement in his pain consideration for MRI scanning of his spine  He most likely need to have scanning of both his thoracic and lumbar spine to evaluate this completely  The following portions of the patient's history were reviewed and updated as appropriate:   He  has a past medical history of Anxiety, Claustrophobia, Colon polyps, Depression, Diabetes mellitus ([facility] ), Dyslipidemia, Esophageal polyp, GERD (gastroesophageal reflux disease), Glaucoma, Headache, Hypertension, Low back pain, Neck pain, Numbness and tingling in left arm, OAB (overactive bladder), Paroxysmal supraventricular tachycardia ([facility] ), and Sleep apnea    He   Patient Active Problem List    Diagnosis Date Noted    Hypokalemia 07/24/2019    Flank pain 03/25/2019    Palpitation 02/07/2019    Diarrhea due to drug 06/13/2018    Obstructive sleep apnea syndrome 06/04/2018    Morbid obesity with BMI of 40 0-44 9, adult ([facility] ) 06/04/2018    Mood disturbance 06/04/2018    Gastroesophageal reflux disease 06/04/2018    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    Type 2 diabetes mellitus with complication ([facility] ) 90/20/5645    Essential hypertension 01/29/2018    Peripheral neuropathy 01/29/2018    Low back pain      He  has a past surgical history that includes Cataract extraction (Bilateral); Knee arthroscopy (Bilateral); Lumbar spine surgery; Excision basal cell carcinoma; Total knee arthroplasty (Left); Replacement total knee (Right, 04/2017); Replacement total knee (Left, 03/2013); Anoscopy; Rectal biopsy; Mouth surgery; pr esophagogastroduodenoscopy transoral diagnostic (N/A, 2/13/2019); and pr colonoscopy flx dx w/collj spec when pfrmd (N/A, 2/13/2019)  His family history includes Alzheimer's disease in his mother; Diabetes in his mother and paternal grandmother; Esophageal cancer in his paternal grandfather; [place] Hypertension in his father  He  reports that he quit smoking about 36 years ago  He has quit using smokeless tobacco  He reports that he drinks about 14 0 standard drinks of alcohol per week  He reports that he does not use drugs  Current Outpatient Medications   Medication Sig Dispense Refill    amLODIPine (NORVASC) 10 mg tablet TAKE 1 TABLET DAILY 90 tablet 3    brimonidine-timolol (COMBIGAN) 0 2-0 5 % Administer 1 drop into the left eye daily   citalopram (CeleXA) 10 mg tablet TAKE 1 TABLET DAILY 90 tablet 2    FLUZONE HIGH-DOSE 0 5 ML SHEILA TO BE GIVEN BY PHARMACIST PER STANDING ORDER  0    glimepiride (AMARYL) 1 mg tablet Take 1 tablet (1 mg total) by mouth daily with breakfast 90 tablet 2    irbesartan-hydrochlorothiazide (AVALIDE) 300-12 5 MG per tablet Take 1 tablet by mouth daily 10 tablet 1    latanoprost (XALATAN) 0 005 % ophthalmic solution 1 drop daily at bedtime        metFORMIN (GLUCOPHAGE) 500 mg tablet Take 1 tablet (500 mg total) by mouth daily with breakfast TAKE 2 TABLETS IN AM AND 1 TABLET IN  tablet 2    metoprolol succinate (TOPROL-XL) 25 mg 24 hr tablet Take 1 tablet (25 mg total) by mouth daily 90 tablet 2    metroNIDAZOLE (METROGEL) 0 75 % gel APPLY TO AFFECTED AREA(S) EVERY DAY  3    omeprazole (PriLOSEC) 20 mg delayed release capsule Take one daily 90 capsule 3    oxybutynin (DITROPAN-XL) 5 mg 24 hr tablet Take 1 tablet (5 mg total) by mouth daily at bedtime 30 tablet 0    rosuvastatin (CRESTOR) 5 mg tablet TAKE 1 TABLET DAILY 90 tablet 3     No current facility-administered medications for this visit       Review of Systems   Constitutional: Positive for fatigue  Gastrointestinal: Positive for diarrhea  Musculoskeletal: Positive for back pain  All other systems reviewed and are negative  Objective:      /70   Pulse 89   Temp 99 1 °F (37 3 °C)   Resp 14   Ht 5' 7" (1 702 m)   Wt 130 kg (286 lb 12 8 oz)   SpO2 97%   BMI 44 92 kg/m²          Physical Exam   Constitutional: He is oriented to person, place, and time  He appears well-developed and well-nourished  HENT:   Right Ear: Hearing, tympanic membrane, external ear and ear canal normal    Left Ear: Hearing, tympanic membrane, external ear and ear canal normal    Nose: Nose normal    Mouth/Throat: Oropharynx is clear and moist and mucous membranes are normal    Eyes: Pupils are equal, round, and reactive to light  Conjunctivae are normal    Neck: No thyromegaly present  Cardiovascular: Normal rate, regular rhythm, S1 normal, S2 normal, normal heart sounds and intact distal pulses  No murmur heard  Pulmonary/Chest: Effort normal and breath sounds normal    Abdominal: Soft  Bowel sounds are normal  There is no tenderness  Musculoskeletal: Normal range of motion  He exhibits no edema  Lymphadenopathy:     He has no cervical adenopathy  Neurological: He is alert and oriented to person, place, and time  He has normal reflexes  He displays normal reflexes  Skin: Skin is warm and dry  Psychiatric: He has a normal mood and affect   His behavior is normal  Judgment and thought content normal

## 2019-07-24 NOTE — PROGRESS NOTES
Daily Note     Today's date: 2019  Patient name: Jasper Palumbo  : 1945  MRN: [id number]  Referring provider: Leif Reyes  Dx:   Encounter Diagnosis     ICD-10-CM    1  Chronic right-sided thoracic back pain M54 6     G89 29    2  Myalgia M79 10    3  Cervical paraspinal muscle spasm M62 838    4  Chronic midline low back pain without sciatica M54 5     G89 29                   Subjective: Patient presents feeling okay overall  Denies muscle soreness following previous tx session  Continues to experience significant discomfort with transferring from supine to sitting  Overall, patient does not believe PT will help and desires to have an MRI  Objective: See treatment diary below      Assessment: Patient continues to tolerate supine exercises poorly  Required mod assist x 1 with transferring from supine to sitting and from sit-to-stand out of the leg press  Patient demonstrated fatigue post treatment, exhibited good technique with therapeutic exercises and would benefit from continued PT      Plan: Continue per plan of care  Precautions: B/L TKA, pmhx of lumbar surgery, HTN, DM       Manual    IE                                                                                 Exercise Diary    IE           Nustep  10' 10'          LTR  20x5" 20x5"          SKTC  10x10" 10x10"          H/S Stretch             DKTC  P! Bridges  P! SLR x 3  20x           Supine  TB Clamshells  Green  20x B           TA Holds  20x5"           Standing H' Abd/Ext  20x EA  B 30x EA  B          Standing Marches  20x B 20x B          HR  30x  30x          Leg Press   110#  30x          Step-Ups   20x B          TB Rows/Low Rows  Green  20x EA  Green  20x EA            PB Roll-Out   10x10"          PB Press-Down   20x5"                                                     Modalities

## 2019-07-30 ENCOUNTER — OFFICE VISIT (OUTPATIENT)
Dept: PHYSICAL THERAPY | Age: 74
End: 2019-07-30
Payer: MEDICARE

## 2019-07-30 DIAGNOSIS — M54.50 CHRONIC MIDLINE LOW BACK PAIN WITHOUT SCIATICA: ICD-10-CM

## 2019-07-30 DIAGNOSIS — M79.10 MYALGIA: ICD-10-CM

## 2019-07-30 DIAGNOSIS — G89.29 CHRONIC RIGHT-SIDED THORACIC BACK PAIN: Primary | ICD-10-CM

## 2019-07-30 DIAGNOSIS — G89.29 CHRONIC MIDLINE LOW BACK PAIN WITHOUT SCIATICA: ICD-10-CM

## 2019-07-30 DIAGNOSIS — M54.6 CHRONIC RIGHT-SIDED THORACIC BACK PAIN: Primary | ICD-10-CM

## 2019-07-30 DIAGNOSIS — M62.838 CERVICAL PARASPINAL MUSCLE SPASM: ICD-10-CM

## 2019-07-30 PROCEDURE — 97110 THERAPEUTIC EXERCISES: CPT

## 2019-07-30 NOTE — PROGRESS NOTES
Daily Note     Today's date: 2019  Patient name: Socrates Amador  : 1945  MRN: [id number]  Referring provider: Jatin Song  Dx:   Encounter Diagnosis     ICD-10-CM    1  Chronic right-sided thoracic back pain M54 6     G89 29    2  Myalgia M79 10    3  Cervical paraspinal muscle spasm M62 838    4  Chronic midline low back pain without sciatica M54 5     G89 29                   Subjective: Patient presents feeling fairly well overall  Minimal discomforted noted at the start of today's session  Offers no additional complaints  Objective: See treatment diary below      Assessment: Patient tolerated tx session much better compared to previous session  He was able to transition from supine to sitting without difficulty or discomfort  Patient demonstrated fatigue post treatment, exhibited good technique with therapeutic exercises and would benefit from continued PT      Plan: Continue per plan of care  Precautions: B/L TKA, pmhx of lumbar surgery, HTN, DM       Manual    IE                                                                                 Exercise Diary    IE          Nustep  10' 10' 10'         LTR  20x5" 20x5" 20x5"         SKTC  10x10" 10x10" 10x10"         H/S Stretch             DKTC  P! Bridges  P! SLR x 3  20x  20x         Supine  TB Clamshells  Green  20x B  Green  20x B         TA Holds  20x5"           Standing H' Abd/Ext  20x EA  B 30x EA  B 30x EA  B         Standing Marches  20x B 20x B 20x B         HR  30x  30x          Leg Press   110#  30x 130#         Step-Ups   20x B 20x B         TB Rows/Low Rows  Green  20x EA  Green  20x EA  Blue  20x EA           PB Roll-Out   10x10" 10x10"         PB Press-Down   20x5" 20x5"                                                    Modalities

## 2019-07-31 ENCOUNTER — OFFICE VISIT (OUTPATIENT)
Dept: PHYSICAL THERAPY | Age: 74
End: 2019-07-31
Payer: MEDICARE

## 2019-07-31 DIAGNOSIS — G89.29 CHRONIC MIDLINE LOW BACK PAIN WITHOUT SCIATICA: ICD-10-CM

## 2019-07-31 DIAGNOSIS — M54.6 CHRONIC RIGHT-SIDED THORACIC BACK PAIN: Primary | ICD-10-CM

## 2019-07-31 DIAGNOSIS — M54.50 CHRONIC MIDLINE LOW BACK PAIN WITHOUT SCIATICA: ICD-10-CM

## 2019-07-31 DIAGNOSIS — G89.29 CHRONIC RIGHT-SIDED THORACIC BACK PAIN: Primary | ICD-10-CM

## 2019-07-31 DIAGNOSIS — M62.838 CERVICAL PARASPINAL MUSCLE SPASM: ICD-10-CM

## 2019-07-31 DIAGNOSIS — M79.10 MYALGIA: ICD-10-CM

## 2019-07-31 PROCEDURE — 97110 THERAPEUTIC EXERCISES: CPT

## 2019-07-31 PROCEDURE — 97112 NEUROMUSCULAR REEDUCATION: CPT

## 2019-07-31 NOTE — PROGRESS NOTES
Daily Note     Today's date: 2019  Patient name: Nita Shepherd  : 1945  MRN: [id number]  Referring provider: Kayden Miranda  Dx:   Encounter Diagnosis     ICD-10-CM    1  Chronic right-sided thoracic back pain M54 6     G89 29    2  Myalgia M79 10    3  Cervical paraspinal muscle spasm M62 838    4  Chronic midline low back pain without sciatica M54 5     G89 29                   Subjective: Patient noted no new complaints  Objective: See treatment diary below      Assessment: Patient was able to perform exercises with no increase of pain  Patient was also able to perform with correct form  Plan: Continue per plan of care  Precautions: B/L TKA, pmhx of lumbar surgery, HTN, DM       Manual    IE                                                                                 Exercise Diary    IE         Nustep  10' 10' 10' 10'        LTR  20x5" 20x5" 20x5" 20x5"        SKTC  10x10" 10x10" 10x10" 10x10"        H/S Stretch             DKTC  P! Bridges  P! SLR x 3  20x  20x 20x        Supine  TB Clamshells  Green  20x B  Green  20x B Green  20x B        TA Holds  20x5"           Standing H' Abd/Ext  20x EA  B 30x EA  B 30x EA  B 30x EA  B        Standing Marches  20x B 20x B 20x B 20x b        HR  30x  30x          Leg Press   110#  30x 130# 130#        Step-Ups   20x B 20x B         TB Rows/Low Rows  Green  20x EA  Green  20x EA  Blue  20x EA  PB Roll-Out   10x10" 10x10" 10x10''        PB Press-Down   20x5" 20x5" 20x5"                                                   Modalities                                                     11:00-11:10 self directed exercises not billed    11:10-11:40 treated 1:1 PTA AF  11:40-11:53 self directed exercises not billed

## 2019-08-06 ENCOUNTER — OFFICE VISIT (OUTPATIENT)
Dept: PHYSICAL THERAPY | Age: 74
End: 2019-08-06
Payer: MEDICARE

## 2019-08-06 DIAGNOSIS — M54.50 CHRONIC MIDLINE LOW BACK PAIN WITHOUT SCIATICA: ICD-10-CM

## 2019-08-06 DIAGNOSIS — M62.838 CERVICAL PARASPINAL MUSCLE SPASM: ICD-10-CM

## 2019-08-06 DIAGNOSIS — G89.29 CHRONIC RIGHT-SIDED THORACIC BACK PAIN: Primary | ICD-10-CM

## 2019-08-06 DIAGNOSIS — M79.10 MYALGIA: ICD-10-CM

## 2019-08-06 DIAGNOSIS — G89.29 CHRONIC MIDLINE LOW BACK PAIN WITHOUT SCIATICA: ICD-10-CM

## 2019-08-06 DIAGNOSIS — M54.6 CHRONIC RIGHT-SIDED THORACIC BACK PAIN: Primary | ICD-10-CM

## 2019-08-06 PROCEDURE — 97110 THERAPEUTIC EXERCISES: CPT | Performed by: SPECIALIST/TECHNOLOGIST

## 2019-08-06 PROCEDURE — 97112 NEUROMUSCULAR REEDUCATION: CPT | Performed by: SPECIALIST/TECHNOLOGIST

## 2019-08-06 NOTE — PROGRESS NOTES
Daily Note     Today's date: 2019  Patient name: Nita Shepherd  : 1945  MRN: [id number]  Referring provider: Kayden Miranda  Dx:   Encounter Diagnosis     ICD-10-CM    1  Chronic right-sided thoracic back pain M54 6     G89 29    2  Myalgia M79 10    3  Cervical paraspinal muscle spasm M62 838    4  Chronic midline low back pain without sciatica M54 5     G89 29                   Subjective: Pt reports "no zingers for about a week in my back"  Objective: See treatment diary below    Assessment: Pt able to increase repetitions with therex this visit while maintaining appropriate core stabilization and muscle activation  Tolerated treatment well  Patient demonstrated fatigue post treatment, exhibited good technique with therapeutic exercises and would benefit from continued PT    Plan: Continue per plan of care  Progress treatment as tolerated  Potential discharge in near future pending continued progress  Precautions: B/L TKA, pmhx of lumbar surgery, HTN, DM       Manual    IE                                                                                 Exercise Diary    IE        Nustep  10' 10' 10' 10' 10'       LTR  20x5" 20x5" 20x5" 20x5" 20x5"       SKTC  10x10" 10x10" 10x10" 10x10" 10x10"       H/S Stretch             DKTC  P! Bridges  P! SLR x 3  20x  20x 20x 30x       Supine  TB Clamshells  Green  20x B  Green  20x B Green  20x B Blue 20x B       TA Holds  20x5"           Standing H' Abd/Ext  20x EA  B 30x EA  B 30x EA  B 30x EA  B 30x EA  B       Standing Marches  20x B 20x B 20x B 20x b 30x       HR  30x  30x          Leg Press   110#  30x 130# 130# 130# 30x       Step-Ups   20x B 20x B  20x B       TB Rows/Low Rows  Green  20x EA  Green  20x EA  Blue  20x EA    Bue 30x EA       PB Roll-Out   10x10" 10x10" 10x10'' 10x10"       PB Press-Down   20x5" 20x5" 20x5" 20x5" Modalities

## 2019-08-07 ENCOUNTER — OFFICE VISIT (OUTPATIENT)
Dept: PHYSICAL THERAPY | Age: 74
End: 2019-08-07
Payer: MEDICARE

## 2019-08-07 DIAGNOSIS — G89.29 CHRONIC RIGHT-SIDED THORACIC BACK PAIN: Primary | ICD-10-CM

## 2019-08-07 DIAGNOSIS — M54.50 CHRONIC MIDLINE LOW BACK PAIN WITHOUT SCIATICA: ICD-10-CM

## 2019-08-07 DIAGNOSIS — G89.29 CHRONIC MIDLINE LOW BACK PAIN WITHOUT SCIATICA: ICD-10-CM

## 2019-08-07 DIAGNOSIS — M62.838 CERVICAL PARASPINAL MUSCLE SPASM: ICD-10-CM

## 2019-08-07 DIAGNOSIS — M54.6 CHRONIC RIGHT-SIDED THORACIC BACK PAIN: Primary | ICD-10-CM

## 2019-08-07 DIAGNOSIS — M79.10 MYALGIA: ICD-10-CM

## 2019-08-07 PROCEDURE — 97110 THERAPEUTIC EXERCISES: CPT | Performed by: SPECIALIST/TECHNOLOGIST

## 2019-08-07 NOTE — PROGRESS NOTES
Daily Note     Today's date: 2019  Patient name: Afia Piña  : 1945  MRN: [id number]  Referring provider: Elizabeth Yo  Dx:   Encounter Diagnosis     ICD-10-CM    1  Chronic right-sided thoracic back pain M54 6     G89 29    2  Myalgia M79 10    3  Cervical paraspinal muscle spasm M62 838    4  Chronic midline low back pain without sciatica M54 5     G89 29                   Subjective: Decreased thoracic pain overall- pt reports he feels comfortable discharging this visit as he has achieved his goals  Objective: See treatment diary below    Assessment: Pt's FOTO scores demonstrate improvement beyond predicted values this visit  Pt has accomplished therapeutic goals and is no longer limited by his thoracic pain  Tolerated treatment well  Plan: Discharge  Recommendation to continue with HEP to maintain progress  Precautions: B/L TKA, pmhx of lumbar surgery, HTN, DM       Manual    IE                                                                                 Exercise Diary    IE       Nustep  10' 10' 10' 10' 10' 12' L3      LTR  20x5" 20x5" 20x5" 20x5" 20x5" -      SKTC  10x10" 10x10" 10x10" 10x10" 10x10" -      H/S Stretch             DKTC  P! Bridges  P! SLR x 3  20x  20x 20x 30x -      Supine  TB Clamshells  Green  20x B  Green  20x B Green  20x B Blue 20x B -      TA Holds  20x5"           Standing H' Abd/Ext  20x EA  B 30x EA  B 30x EA  B 30x EA  B 30x EA  B 30x EA B      Standing Marches  20x B 20x B 20x B 20x b 30x 30x      HR  30x  30x          Leg Press   110#  30x 130# 130# 130# 30x 130# 30x      Step-Ups   20x B 20x B  20x B 30x B      TB Rows/Low Rows  Green  20x EA  Green  20x EA  Blue  20x EA    Bue 30x EA Blue 30x      PB Roll-Out   10x10" 10x10" 10x10'' 10x10" 10x10"      PB Press-Down   20x5" 20x5" 20x5" 20x5" 30x5"                                                 Modalities

## 2019-08-12 ENCOUNTER — APPOINTMENT (OUTPATIENT)
Dept: LAB | Age: 74
End: 2019-08-12
Payer: MEDICARE

## 2019-08-12 DIAGNOSIS — E11.8 TYPE 2 DIABETES MELLITUS WITH COMPLICATION, WITHOUT LONG-TERM CURRENT USE OF INSULIN (HCC): ICD-10-CM

## 2019-08-12 LAB
ANION GAP SERPL CALCULATED.3IONS-SCNC: 6 MMOL/L (ref 4–13)
BUN SERPL-MCNC: 14 MG/DL (ref 5–25)
CALCIUM SERPL-MCNC: 8.4 MG/DL (ref 8.3–10.1)
CHLORIDE SERPL-SCNC: 102 MMOL/L (ref 100–108)
CO2 SERPL-SCNC: 30 MMOL/L (ref 21–32)
CREAT SERPL-MCNC: 1.17 MG/DL (ref 0.6–1.3)
GFR SERPL CREATININE-BSD FRML MDRD: 61 ML/MIN/1.73SQ M
GLUCOSE P FAST SERPL-MCNC: 137 MG/DL (ref 65–99)
POTASSIUM SERPL-SCNC: 3.4 MMOL/L (ref 3.5–5.3)
SODIUM SERPL-SCNC: 138 MMOL/L (ref 136–145)

## 2019-08-12 PROCEDURE — 80048 BASIC METABOLIC PNL TOTAL CA: CPT

## 2019-08-12 PROCEDURE — 36415 COLL VENOUS BLD VENIPUNCTURE: CPT

## 2019-08-13 ENCOUNTER — APPOINTMENT (OUTPATIENT)
Dept: PHYSICAL THERAPY | Age: 74
End: 2019-08-13
Payer: MEDICARE

## 2019-08-14 ENCOUNTER — APPOINTMENT (OUTPATIENT)
Dept: PHYSICAL THERAPY | Age: 74
End: 2019-08-14
Payer: MEDICARE

## 2019-08-14 ENCOUNTER — OFFICE VISIT (OUTPATIENT)
Dept: INTERNAL MEDICINE CLINIC | Facility: CLINIC | Age: 74
End: 2019-08-14
Payer: MEDICARE

## 2019-08-14 VITALS
DIASTOLIC BLOOD PRESSURE: 62 MMHG | TEMPERATURE: 97.8 F | WEIGHT: 290 LBS | HEART RATE: 72 BPM | SYSTOLIC BLOOD PRESSURE: 132 MMHG | BODY MASS INDEX: 45.52 KG/M2 | OXYGEN SATURATION: 97 % | HEIGHT: 67 IN

## 2019-08-14 DIAGNOSIS — E11.8 TYPE 2 DIABETES MELLITUS WITH COMPLICATION, WITHOUT LONG-TERM CURRENT USE OF INSULIN (HCC): ICD-10-CM

## 2019-08-14 DIAGNOSIS — K52.1 DIARRHEA DUE TO DRUG: Primary | ICD-10-CM

## 2019-08-14 DIAGNOSIS — F39 MOOD DISORDER (HCC): ICD-10-CM

## 2019-08-14 DIAGNOSIS — I10 ESSENTIAL HYPERTENSION: ICD-10-CM

## 2019-08-14 DIAGNOSIS — E87.6 HYPOKALEMIA: ICD-10-CM

## 2019-08-14 PROBLEM — R10.9 FLANK PAIN: Status: RESOLVED | Noted: 2019-03-25 | Resolved: 2019-08-14

## 2019-08-14 PROCEDURE — 99214 OFFICE O/P EST MOD 30 MIN: CPT | Performed by: INTERNAL MEDICINE

## 2019-08-14 RX ORDER — GLIMEPIRIDE 1 MG/1
2 TABLET ORAL
Qty: 90 TABLET | Refills: 2
Start: 2019-08-14 | End: 2019-08-28 | Stop reason: SDUPTHER

## 2019-08-14 NOTE — ASSESSMENT & PLAN NOTE
History of mood disorder recommend continuation of citalopram 10 mg daily no apparent side effects of this medication

## 2019-08-14 NOTE — ASSESSMENT & PLAN NOTE
Lab Results   Component Value Date    HGBA1C 6 6 (H) 07/17/2019     Blood sugar control remains adequate at this time  Blood sugar running at 1:37 a m     Were discontinuing his metformin to see if it helps relieve his diarrhea  In place of the metformin will increases glimepiride to 2 mg daily in the morning  A follow-up fasting blood sugars requested in 1-2 weeks along with an office visit  No results for input(s): POCGLU in the last 72 hours      Blood Sugar Average: Last 72 hrs:

## 2019-08-14 NOTE — ASSESSMENT & PLAN NOTE
Initial blood pressure today was somewhat elevated but subsequent reading improved  I recommended continuation of his combination of irbesartan hydrochlorothiazide and metoprolol succinate  Follow-up assessment in 2 weeks

## 2019-08-14 NOTE — ASSESSMENT & PLAN NOTE
Previous hypokalemia is improving  I suspect the etiology of the hypokalemia is his persistent loose stools  He had he is encouraged to increase potassium supplementation in his diet  A follow-up blood test has been requested in 1-2 weeks

## 2019-08-14 NOTE — ASSESSMENT & PLAN NOTE
Persistent diarrhea with urgency  We have elected to discontinue his metformin completely over the next 2 weeks  If his symptoms persist with loose stools I would pursue further evaluation including stool cultures and evaluation possibly by GI services at [facility]

## 2019-08-14 NOTE — PROGRESS NOTES
Assessment/Plan:    Diarrhea due to drug  Persistent diarrhea with urgency  We have elected to discontinue his metformin completely over the next 2 weeks  If his symptoms persist with loose stools I would pursue further evaluation including stool cultures and evaluation possibly by GI services at [facility]  Type 2 diabetes mellitus with complication ([facility])  Lab Results   Component Value Date    HGBA1C 6 6 (H) 07/17/2019     Blood sugar control remains adequate at this time  Blood sugar running at 1:37 a m     Were discontinuing his metformin to see if it helps relieve his diarrhea  In place of the metformin will increases glimepiride to 2 mg daily in the morning  A follow-up fasting blood sugars requested in 1-2 weeks along with an office visit  No results for input(s): POCGLU in the last 72 hours  Blood Sugar Average: Last 72 hrs:      Essential hypertension  Initial blood pressure today was somewhat elevated but subsequent reading improved  I recommended continuation of his combination of irbesartan hydrochlorothiazide and metoprolol succinate  Follow-up assessment in 2 weeks  Mood disorder ([facility] )  History of mood disorder recommend continuation of citalopram 10 mg daily no apparent side effects of this medication  Hypokalemia  Previous hypokalemia is improving  I suspect the etiology of the hypokalemia is his persistent loose stools  He had he is encouraged to increase potassium supplementation in his diet  A follow-up blood test has been requested in 1-2 weeks  Diagnoses and all orders for this visit:    Diarrhea due to drug  -     Basic metabolic panel; Future    Hypokalemia    Essential hypertension    Type 2 diabetes mellitus with complication, without long-term current use of insulin ([facility])  -     glimepiride (AMARYL) 1 mg tablet; Take 2 tablets (2 mg total) by mouth daily with breakfast    Mood disorder ([facility] )        Subjective:      Patient ID: Jasper Palumbo is a 75 y o  male     This is a follow-up visit for this 65-year-old gentleman with a history of type 2 diabetes and persistent symptoms of loose bowel movements  He indicates that he continues to have urgent loose bowel movements in the morning almost on a daily basis  He has at times soft formed stools but these also can be very urgent  As the day progresses the diarrhea symptoms seem to improve  On his last visit with us we discontinued his morning dose of metformin in the hope that it would decreases diarrhea but this has not had any significant impact on his symptomatology  He indicates that he sees no blood in his stool and has no significant abdominal cramping  The following portions of the patient's history were reviewed and updated as appropriate:   He  has a past medical history of Anxiety, Claustrophobia, Colon polyps, Depression, Diabetes mellitus ([address] ), Dyslipidemia, Esophageal polyp, GERD (gastroesophageal reflux disease), Glaucoma, Headache, Hypertension, Low back pain, Neck pain, Numbness and tingling in left arm, OAB (overactive bladder), Paroxysmal supraventricular tachycardia ([address] ), and Sleep apnea  He   Patient Active Problem List    Diagnosis Date Noted    Mood disorder ([facility] ) 08/14/2019    Hypokalemia 07/24/2019    Palpitation 02/07/2019    Diarrhea due to drug 06/13/2018    Obstructive sleep apnea syndrome 06/04/2018    Morbid obesity with BMI of 40 0-44 9, adult ([address] ) 06/04/2018    Mood disturbance 06/04/2018    Gastroesophageal reflux disease 06/04/2018    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    Type 2 diabetes mellitus with complication ([facility] ) 62/77/5669    Essential hypertension 01/29/2018    Peripheral neuropathy 01/29/2018    Low back pain      He  has a past surgical history that includes Cataract extraction (Bilateral); Knee arthroscopy (Bilateral); Lumbar spine surgery; Excision basal cell carcinoma; Total knee arthroplasty (Left);  Replacement total knee (Right, 04/2017); Replacement total knee (Left, 03/2013); Anoscopy; Rectal biopsy; Mouth surgery; pr esophagogastroduodenoscopy transoral diagnostic (N/A, 2/13/2019); and pr colonoscopy flx dx w/collj spec when pfrmd (N/A, 2/13/2019)  His family history includes Alzheimer's disease in his mother; Diabetes in his mother and paternal grandmother; Esophageal cancer in his paternal grandfather; Link Ax Hypertension in his father  He  reports that he quit smoking about 36 years ago  He has quit using smokeless tobacco  He reports that he drinks about 14 0 standard drinks of alcohol per week  He reports that he does not use drugs  Current Outpatient Medications   Medication Sig Dispense Refill    amLODIPine (NORVASC) 10 mg tablet TAKE 1 TABLET DAILY 90 tablet 3    brimonidine-timolol (COMBIGAN) 0 2-0 5 % Administer 1 drop into the left eye daily   citalopram (CeleXA) 10 mg tablet TAKE 1 TABLET DAILY 90 tablet 2    FLUZONE HIGH-DOSE 0 5 ML SHEILA TO BE GIVEN BY PHARMACIST PER STANDING ORDER  0    glimepiride (AMARYL) 1 mg tablet Take 2 tablets (2 mg total) by mouth daily with breakfast 90 tablet 2    irbesartan-hydrochlorothiazide (AVALIDE) 300-12 5 MG per tablet Take 1 tablet by mouth daily 10 tablet 1    latanoprost (XALATAN) 0 005 % ophthalmic solution 1 drop daily at bedtime   metoprolol succinate (TOPROL-XL) 25 mg 24 hr tablet Take 1 tablet (25 mg total) by mouth daily 90 tablet 2    metroNIDAZOLE (METROGEL) 0 75 % gel APPLY TO AFFECTED AREA(S) EVERY DAY  3    omeprazole (PriLOSEC) 20 mg delayed release capsule Take one daily 90 capsule 3    oxybutynin (DITROPAN-XL) 5 mg 24 hr tablet Take 1 tablet (5 mg total) by mouth daily at bedtime 30 tablet 0    rosuvastatin (CRESTOR) 5 mg tablet TAKE 1 TABLET DAILY 90 tablet 3     No current facility-administered medications for this visit       Review of Systems   Gastrointestinal: Positive for diarrhea  All other systems reviewed and are negative  Objective:      /62   Pulse 72   Temp 97 8 °F (36 6 °C) (Tympanic)   Ht 5' 7" (1 702 m)   Wt 132 kg (290 lb)   SpO2 97%   BMI 45 42 kg/m²          Physical Exam   Constitutional: He is oriented to person, place, and time  He appears well-developed and well-nourished  HENT:   Right Ear: Hearing, tympanic membrane, external ear and ear canal normal    Left Ear: Hearing, tympanic membrane, external ear and ear canal normal    Nose: Nose normal    Mouth/Throat: Oropharynx is clear and moist and mucous membranes are normal    Eyes: Pupils are equal, round, and reactive to light  Conjunctivae are normal    Neck: No thyromegaly present  Cardiovascular: Normal rate, regular rhythm, S1 normal, S2 normal, normal heart sounds and intact distal pulses  No murmur heard  Pulmonary/Chest: Effort normal and breath sounds normal  No stridor  No respiratory distress  He has no wheezes  He has no rales  Musculoskeletal: Normal range of motion  He exhibits no edema  Lymphadenopathy:     He has no cervical adenopathy  Neurological: He is alert and oriented to person, place, and time  He has normal reflexes  He displays normal reflexes  Skin: Skin is warm and dry  Psychiatric: He has a normal mood and affect   His behavior is normal  Judgment and thought content normal

## 2019-08-20 DIAGNOSIS — I10 ESSENTIAL HYPERTENSION: ICD-10-CM

## 2019-08-20 RX ORDER — IRBESARTAN AND HYDROCHLOROTHIAZIDE 300; 12.5 MG/1; MG/1
TABLET, FILM COATED ORAL
Qty: 90 TABLET | Refills: 2 | Status: SHIPPED | OUTPATIENT
Start: 2019-08-20 | End: 2019-08-23 | Stop reason: RX

## 2019-08-21 ENCOUNTER — APPOINTMENT (OUTPATIENT)
Dept: LAB | Age: 74
End: 2019-08-21
Payer: MEDICARE

## 2019-08-21 DIAGNOSIS — K52.1 DIARRHEA DUE TO DRUG: ICD-10-CM

## 2019-08-21 LAB
ANION GAP SERPL CALCULATED.3IONS-SCNC: 8 MMOL/L (ref 4–13)
BUN SERPL-MCNC: 19 MG/DL (ref 5–25)
CALCIUM SERPL-MCNC: 9 MG/DL (ref 8.3–10.1)
CHLORIDE SERPL-SCNC: 106 MMOL/L (ref 100–108)
CO2 SERPL-SCNC: 29 MMOL/L (ref 21–32)
CREAT SERPL-MCNC: 1.09 MG/DL (ref 0.6–1.3)
GFR SERPL CREATININE-BSD FRML MDRD: 66 ML/MIN/1.73SQ M
GLUCOSE P FAST SERPL-MCNC: 135 MG/DL (ref 65–99)
POTASSIUM SERPL-SCNC: 3.1 MMOL/L (ref 3.5–5.3)
SODIUM SERPL-SCNC: 143 MMOL/L (ref 136–145)

## 2019-08-21 PROCEDURE — 80048 BASIC METABOLIC PNL TOTAL CA: CPT

## 2019-08-21 PROCEDURE — 36415 COLL VENOUS BLD VENIPUNCTURE: CPT

## 2019-08-23 ENCOUNTER — TELEPHONE (OUTPATIENT)
Dept: INTERNAL MEDICINE CLINIC | Facility: CLINIC | Age: 74
End: 2019-08-23

## 2019-08-23 DIAGNOSIS — N39.8 VOIDING DYSFUNCTION: ICD-10-CM

## 2019-08-23 DIAGNOSIS — I10 ESSENTIAL HYPERTENSION: Primary | ICD-10-CM

## 2019-08-23 RX ORDER — LOSARTAN POTASSIUM AND HYDROCHLOROTHIAZIDE 12.5; 1 MG/1; MG/1
1 TABLET ORAL DAILY
Qty: 90 TABLET | Refills: 3 | Status: SHIPPED | OUTPATIENT
Start: 2019-08-23 | End: 2019-08-26 | Stop reason: RX

## 2019-08-23 RX ORDER — OXYBUTYNIN CHLORIDE 5 MG/1
TABLET, EXTENDED RELEASE ORAL
Qty: 90 TABLET | Refills: 3 | Status: SHIPPED | OUTPATIENT
Start: 2019-08-23 | End: 2020-01-31 | Stop reason: SDUPTHER

## 2019-08-23 NOTE — PROGRESS NOTES
Patient's mail order service and called indicating that they cannot get her irbesartan hydrochlorothiazide 300-12 5 mg    We have asked him to substitute losartan hydrochlorothiazide 100-12 5 mg

## 2019-08-23 NOTE — TELEPHONE ENCOUNTER
Can you please send an alternative for patients irbesartan-hydrochlorothiazide,it is on back order  They can recommend losartan-hydrochlorothiazide 100-12 5 mg or if there is something else you want to prescribe  The script needs to be sent to [facility]

## 2019-08-23 NOTE — TELEPHONE ENCOUNTER
Please call the patient let him know that we are changing his irbesartan to losartan hydrochlorothiazide due to availability problems with the irbesartan

## 2019-08-26 DIAGNOSIS — I10 ESSENTIAL HYPERTENSION: Primary | ICD-10-CM

## 2019-08-26 RX ORDER — CANDESARTAN CILEXETIL AND HYDROCHLOROTHIAZIDE 16; 12.5 MG/1; MG/1
1 TABLET ORAL DAILY
Qty: 90 TABLET | Refills: 2 | Status: SHIPPED | OUTPATIENT
Start: 2019-08-26 | End: 2019-08-28 | Stop reason: CLARIF

## 2019-08-26 NOTE — PROGRESS NOTES
Losartan is not available at the pharmacy will change him over to candesartan hydrochlorothiazide 16-12 5 milligrams

## 2019-08-28 ENCOUNTER — OFFICE VISIT (OUTPATIENT)
Dept: INTERNAL MEDICINE CLINIC | Facility: CLINIC | Age: 74
End: 2019-08-28
Payer: MEDICARE

## 2019-08-28 VITALS
OXYGEN SATURATION: 95 % | WEIGHT: 293 LBS | BODY MASS INDEX: 45.99 KG/M2 | TEMPERATURE: 98.5 F | SYSTOLIC BLOOD PRESSURE: 136 MMHG | HEIGHT: 67 IN | HEART RATE: 82 BPM | DIASTOLIC BLOOD PRESSURE: 78 MMHG

## 2019-08-28 DIAGNOSIS — E11.8 TYPE 2 DIABETES MELLITUS WITH COMPLICATION, WITHOUT LONG-TERM CURRENT USE OF INSULIN (HCC): ICD-10-CM

## 2019-08-28 DIAGNOSIS — I10 ESSENTIAL HYPERTENSION: Primary | ICD-10-CM

## 2019-08-28 DIAGNOSIS — A09 DIARRHEA OF INFECTIOUS ORIGIN: ICD-10-CM

## 2019-08-28 PROBLEM — R19.7 DIARRHEA: Status: ACTIVE | Noted: 2019-08-28

## 2019-08-28 PROBLEM — K52.1 DIARRHEA DUE TO DRUG: Status: RESOLVED | Noted: 2018-06-13 | Resolved: 2019-08-28

## 2019-08-28 PROCEDURE — 99213 OFFICE O/P EST LOW 20 MIN: CPT | Performed by: INTERNAL MEDICINE

## 2019-08-28 RX ORDER — GLIMEPIRIDE 1 MG/1
2 TABLET ORAL
Qty: 180 TABLET | Refills: 2 | Status: SHIPPED | OUTPATIENT
Start: 2019-08-28 | End: 2019-08-29 | Stop reason: SDUPTHER

## 2019-08-28 RX ORDER — OLMESARTAN MEDOXOMIL AND HYDROCHLOROTHIAZIDE 40/12.5 40; 12.5 MG/1; MG/1
1 TABLET ORAL DAILY
Qty: 90 TABLET | Refills: 1 | Status: SHIPPED | OUTPATIENT
Start: 2019-08-28 | End: 2019-08-29 | Stop reason: SDUPTHER

## 2019-08-28 NOTE — PROGRESS NOTES
Assessment/Plan:    Type 2 diabetes mellitus with complication (HCC)  Lab Results   Component Value Date    HGBA1C 6 6 (H) 07/17/2019     Adequate control of type 2 diabetes on Amaryl 2 milligrams daily at breakfast   The patient is no longer taking metformin due to suspicion of connection to his diarrhea symptoms  Recommend continued daily dosing of Amaryl in the morning and continued balance diet with avoidance of concentrated carbohydrates and sugars  No results for input(s): POCGLU in the last 72 hours  Blood Sugar Average: Last 72 hrs:      Essential hypertension  Blood pressure medication has been changed several times recently due to the unavailability of several Arb inhibitors  He will be changing to almost starting hydrochlorothiazide due to his insurance coverage and in availability of previous medication he was using which was irbesartan  Will reassess the blood pressure in 2 months    Diarrhea  Persistent symptoms of diarrhea that did not seem to improved significantly with the discontinuation of metformin  I have asked him to have stool cultures to check for Clostridium difficile infections and white blood cells  Following the results of the studies if they are negative I would recommend follow-up and consultation with GI services at [facility]  Diagnoses and all orders for this visit:    Essential hypertension  -     olmesartan-hydrochlorothiazide (BENICAR HCT) 40-12 5 MG per tablet; Take 1 tablet by mouth daily    Type 2 diabetes mellitus with complication, without long-term current use of insulin (HCC)  -     glimepiride (AMARYL) 1 mg tablet; Take 2 tablets (2 mg total) by mouth daily with breakfast    Diarrhea of infectious origin  -     Clostridium difficile toxin by PCR; Future  -     Stool Enteric Bacterial Panel by PCR; Future  -     White Blood Cells, Stool by Gram Stain; Future        Subjective:      Patient ID: Nita Shepherd is a 75 y o  male      This patient returns today for follow-up assessment  On his last visit we asked him to discontinue his metformin medication due to persistent diarrhea symptoms  He was to continue his Amaryl at 2 milligrams daily in the morning with breakfast   He reports that his diarrhea has persisted despite the discontinuation of his metformin  There are now days where he has diarrhea and other days where he has formed bowel movements  The does not seem to be any connection between the different types of foods that he eats and his diarrhea symptoms  He reports no evidence of undigested food in his stool nor any evidence of blood in the stools  He has no fevers or chills  He has a potassium level 3 1 and is encouraged to increase potassium supplementation in his diet  His fasting blood sugars are 135 on his last test on 2 milligrams of Amaryl daily  The following portions of the patient's history were reviewed and updated as appropriate:   He  has a past medical history of Anxiety, Claustrophobia, Colon polyps, Depression, Diabetes mellitus ([facility] ), Dyslipidemia, Esophageal polyp, GERD (gastroesophageal reflux disease), Glaucoma, Headache, Hypertension, Low back pain, Neck pain, Numbness and tingling in left arm, OAB (overactive bladder), Paroxysmal supraventricular tachycardia ([facility] ), and Sleep apnea    He   Patient Active Problem List    Diagnosis Date Noted    Diarrhea 08/28/2019    Mood disorder ([facility] ) 08/14/2019    Hypokalemia 07/24/2019    Palpitation 02/07/2019    Obstructive sleep apnea syndrome 06/04/2018    Morbid obesity with BMI of 40 0-44 9, adult ([facility] ) 06/04/2018    Mood disturbance 06/04/2018    Gastroesophageal reflux disease 06/04/2018    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    Type 2 diabetes mellitus with complication ([facility] ) 44/96/8497    Essential hypertension 01/29/2018    Peripheral neuropathy 01/29/2018    Low back pain      He  has a past surgical history that includes Cataract extraction (Bilateral); Knee arthroscopy (Bilateral); Lumbar spine surgery; Excision basal cell carcinoma; Total knee arthroplasty (Left); Replacement total knee (Right, 04/2017); Replacement total knee (Left, 03/2013); Anoscopy; Rectal biopsy; Mouth surgery; pr esophagogastroduodenoscopy transoral diagnostic (N/A, 2/13/2019); and pr colonoscopy flx dx w/collj spec when pfrmd (N/A, 2/13/2019)  His family history includes Alzheimer's disease in his mother; Diabetes in his mother and paternal grandmother; Esophageal cancer in his paternal grandfather; Tip Pouch Hypertension in his father  He  reports that he quit smoking about 36 years ago  He has quit using smokeless tobacco  He reports that he drinks about 14 0 standard drinks of alcohol per week  He reports that he does not use drugs  Current Outpatient Medications   Medication Sig Dispense Refill    amLODIPine (NORVASC) 10 mg tablet TAKE 1 TABLET DAILY 90 tablet 3    brimonidine-timolol (COMBIGAN) 0 2-0 5 % Administer 1 drop into the left eye daily   citalopram (CeleXA) 10 mg tablet TAKE 1 TABLET DAILY 90 tablet 2    FLUZONE HIGH-DOSE 0 5 ML SHEILA TO BE GIVEN BY PHARMACIST PER STANDING ORDER  0    glimepiride (AMARYL) 1 mg tablet Take 2 tablets (2 mg total) by mouth daily with breakfast 180 tablet 2    latanoprost (XALATAN) 0 005 % ophthalmic solution 1 drop daily at bedtime        metoprolol succinate (TOPROL-XL) 25 mg 24 hr tablet Take 1 tablet (25 mg total) by mouth daily 90 tablet 2    metroNIDAZOLE (METROGEL) 0 75 % gel APPLY TO AFFECTED AREA(S) EVERY DAY  3    omeprazole (PriLOSEC) 20 mg delayed release capsule Take one daily 90 capsule 3    oxybutynin (DITROPAN-XL) 5 mg 24 hr tablet Take 1 tablet (5 mg total) by mouth daily at bedtime 30 tablet 0    oxybutynin (DITROPAN-XL) 5 mg 24 hr tablet TAKE 1 TABLET AT BEDTIME 90 tablet 3    rosuvastatin (CRESTOR) 5 mg tablet TAKE 1 TABLET DAILY 90 tablet 3    olmesartan-hydrochlorothiazide (BENICAR HCT) 40-12 5 MG per tablet Take 1 tablet by mouth daily 90 tablet 1     No current facility-administered medications for this visit       Review of Systems   Gastrointestinal: Positive for diarrhea  All other systems reviewed and are negative  Objective:      /78   Pulse 82   Temp 98 5 °F (36 9 °C) (Tympanic)   Ht 5' 7" (1 702 m)   Wt 133 kg (293 lb)   SpO2 95%   BMI 45 89 kg/m²          Physical Exam   Constitutional: He is oriented to person, place, and time  He appears well-developed and well-nourished  HENT:   Right Ear: Hearing, tympanic membrane, external ear and ear canal normal    Left Ear: Hearing, tympanic membrane, external ear and ear canal normal    Nose: Nose normal    Mouth/Throat: Oropharynx is clear and moist and mucous membranes are normal    Eyes: Pupils are equal, round, and reactive to light  Conjunctivae are normal    Neck: No thyromegaly present  Cardiovascular: Normal rate, regular rhythm, S1 normal, S2 normal, normal heart sounds and intact distal pulses  No murmur heard  Pulmonary/Chest: Effort normal and breath sounds normal    Abdominal: Soft  Bowel sounds are normal  There is no tenderness  Musculoskeletal: Normal range of motion  He exhibits no edema  Lymphadenopathy:     He has no cervical adenopathy  Neurological: He is alert and oriented to person, place, and time  He has normal reflexes  He displays normal reflexes  Skin: Skin is warm and dry  Psychiatric: He has a normal mood and affect   His behavior is normal  Judgment and thought content normal

## 2019-08-28 NOTE — ASSESSMENT & PLAN NOTE
Persistent symptoms of diarrhea that did not seem to improved significantly with the discontinuation of metformin  I have asked him to have stool cultures to check for Clostridium difficile infections and white blood cells  Following the results of the studies if they are negative I would recommend follow-up and consultation with GI services at [facility]

## 2019-08-28 NOTE — ASSESSMENT & PLAN NOTE
Lab Results   Component Value Date    HGBA1C 6 6 (H) 07/17/2019     Adequate control of type 2 diabetes on Amaryl 2 milligrams daily at breakfast   The patient is no longer taking metformin due to suspicion of connection to his diarrhea symptoms  Recommend continued daily dosing of Amaryl in the morning and continued balance diet with avoidance of concentrated carbohydrates and sugars  No results for input(s): POCGLU in the last 72 hours      Blood Sugar Average: Last 72 hrs:

## 2019-08-28 NOTE — ASSESSMENT & PLAN NOTE
Blood pressure medication has been changed several times recently due to the unavailability of several Arb inhibitors  He will be changing to almost starting hydrochlorothiazide due to his insurance coverage and in availability of previous medication he was using which was irbesartan    Will reassess the blood pressure in 2 months

## 2019-08-29 ENCOUNTER — TELEPHONE (OUTPATIENT)
Dept: INTERNAL MEDICINE CLINIC | Facility: CLINIC | Age: 74
End: 2019-08-29

## 2019-08-29 ENCOUNTER — TRANSCRIBE ORDERS (OUTPATIENT)
Dept: LAB | Age: 74
End: 2019-08-29

## 2019-08-29 DIAGNOSIS — E11.8 TYPE 2 DIABETES MELLITUS WITH COMPLICATION, WITHOUT LONG-TERM CURRENT USE OF INSULIN (HCC): ICD-10-CM

## 2019-08-29 DIAGNOSIS — I10 ESSENTIAL HYPERTENSION: ICD-10-CM

## 2019-08-29 RX ORDER — OLMESARTAN MEDOXOMIL AND HYDROCHLOROTHIAZIDE 40/12.5 40; 12.5 MG/1; MG/1
1 TABLET ORAL DAILY
Qty: 90 TABLET | Refills: 1 | Status: SHIPPED | OUTPATIENT
Start: 2019-08-29 | End: 2019-09-04 | Stop reason: RX

## 2019-08-29 RX ORDER — GLIMEPIRIDE 1 MG/1
2 TABLET ORAL
Qty: 180 TABLET | Refills: 2 | Status: SHIPPED | OUTPATIENT
Start: 2019-08-29 | End: 2020-04-16 | Stop reason: SDUPTHER

## 2019-08-29 NOTE — TELEPHONE ENCOUNTER
Patient called and said prescriptions were sent to the wrong pharmacy  Needed it to go to [facility] mail order  New orders placed, asked if we could get this done ASAP because he's running low   Thank you

## 2019-08-29 NOTE — TELEPHONE ENCOUNTER
Patient called stating that he forgot to ask about his glucose levels during his visit   He would like a call back

## 2019-08-29 NOTE — TELEPHONE ENCOUNTER
Please call the patient back his last fasting blood sugar was 135 which is stable on the present diabetic medication    Thank you

## 2019-09-04 DIAGNOSIS — I10 ESSENTIAL HYPERTENSION: Primary | ICD-10-CM

## 2019-09-04 DIAGNOSIS — I10 ESSENTIAL HYPERTENSION: ICD-10-CM

## 2019-09-04 RX ORDER — VALSARTAN AND HYDROCHLOROTHIAZIDE 160; 12.5 MG/1; MG/1
1 TABLET, FILM COATED ORAL DAILY
Qty: 90 TABLET | Refills: 2 | Status: SHIPPED | OUTPATIENT
Start: 2019-09-04 | End: 2020-02-24 | Stop reason: SDUPTHER

## 2019-09-04 NOTE — PROGRESS NOTES
I received notification from the patient's mail order prescription plan that olmesartan hydrochlorothiazide is not available will change him till valsartan hydrochlorothiazide 160-12 5 mg 1 tablet daily

## 2019-09-04 NOTE — TELEPHONE ENCOUNTER
Received fax from [address] [place] [address] requesting a substitute for the Olmesartan Hct tab 40-12 5 mg as it it backordered  [facility] requesting substitute medication to be faxed to [phone]  Faxed request is in your inbox  Thank you!

## 2019-09-06 ENCOUNTER — TELEPHONE (OUTPATIENT)
Dept: INTERNAL MEDICINE CLINIC | Facility: CLINIC | Age: 74
End: 2019-09-06

## 2019-09-06 NOTE — TELEPHONE ENCOUNTER
Patient called very confused about his BP medication  After reviewing medication with patient it was discovered that the pharmacy was sending Dr Javed Sun notifications that these meds were not available  Patient still had one days worth of Irbesartan-hydrochlorothiaze  He actually did receive the Losartan-hydrochlorothiazide and we just recently ordered the Valsartan-hydrochlorothiazide  After clarifying the issues with Dr Javed Sun the patient is to start taking the Losartan after the irbesartan and once the losartan is done he is to start the Valsartan  Patient understood and agreed to follow directions

## 2019-09-18 ENCOUNTER — OFFICE VISIT (OUTPATIENT)
Dept: PODIATRY | Facility: CLINIC | Age: 74
End: 2019-09-18
Payer: MEDICARE

## 2019-09-18 VITALS
BODY MASS INDEX: 46.05 KG/M2 | DIASTOLIC BLOOD PRESSURE: 82 MMHG | HEART RATE: 76 BPM | HEIGHT: 67 IN | WEIGHT: 293.4 LBS | SYSTOLIC BLOOD PRESSURE: 150 MMHG

## 2019-09-18 DIAGNOSIS — B35.1 ONYCHOMYCOSIS: ICD-10-CM

## 2019-09-18 DIAGNOSIS — G62.9 PERIPHERAL POLYNEUROPATHY: ICD-10-CM

## 2019-09-18 DIAGNOSIS — E11.8 TYPE 2 DIABETES MELLITUS WITH COMPLICATION, WITHOUT LONG-TERM CURRENT USE OF INSULIN (HCC): Primary | ICD-10-CM

## 2019-09-18 PROCEDURE — 11721 DEBRIDE NAIL 6 OR MORE: CPT | Performed by: PODIATRIST

## 2019-09-18 NOTE — PROGRESS NOTES
Hector Fishman  1945  AT RISK FOOT CARE    1  Type 2 diabetes mellitus with complication, without long-term current use of insulin ([facility] )     2  Peripheral polyneuropathy     3  Onychomycosis         Patient presents for at-risk foot care  Patient has no acute concerns today  Patient is seen routinely due to neuropathy, parasthesia, edema, and trophic skin changes to the lower extremity  Today's treatment includes:  Debridement of toenails  Using nail nipper, krystle, and curette, nails were sharply debrided, reduced in thickness and length  Devitalized tissue removed  Patient tolerated well  Discussed proper shoe gear, daily inspections of feet, and general foot health with patient  Patient has Q9  findings and is recommended for at risk foot care every 9-10 weeks      Patients most recent DM foot exam was on: 6/19/19

## 2019-09-25 ENCOUNTER — OFFICE VISIT (OUTPATIENT)
Dept: INTERNAL MEDICINE CLINIC | Facility: CLINIC | Age: 74
End: 2019-09-25
Payer: MEDICARE

## 2019-09-25 VITALS
DIASTOLIC BLOOD PRESSURE: 76 MMHG | HEART RATE: 82 BPM | TEMPERATURE: 98.2 F | OXYGEN SATURATION: 99 % | SYSTOLIC BLOOD PRESSURE: 136 MMHG | BODY MASS INDEX: 46.3 KG/M2 | HEIGHT: 67 IN | WEIGHT: 295 LBS

## 2019-09-25 DIAGNOSIS — I10 ESSENTIAL HYPERTENSION: ICD-10-CM

## 2019-09-25 DIAGNOSIS — R19.7 DIARRHEA, UNSPECIFIED TYPE: ICD-10-CM

## 2019-09-25 DIAGNOSIS — E11.8 TYPE 2 DIABETES MELLITUS WITH COMPLICATION, WITHOUT LONG-TERM CURRENT USE OF INSULIN (HCC): Primary | ICD-10-CM

## 2019-09-25 PROCEDURE — 99213 OFFICE O/P EST LOW 20 MIN: CPT | Performed by: INTERNAL MEDICINE

## 2019-09-25 NOTE — ASSESSMENT & PLAN NOTE
Initial blood pressure evaluation today was mildly elevated subsequent reading has improved  He is now 136/76 and I recommend the continuation of his present hypertensive therapy including valsartan hydrochlorothiazide 160-12 5 mg daily metoprolol succinate 25 mg daily and amlodipine 10 mg daily  Follow-up assessment requested in 2 months

## 2019-09-25 NOTE — ASSESSMENT & PLAN NOTE
Previous diarrhea symptoms have finally abated  I suspect they were ultimately due to his metformin medication  Stools are now formed with no recurrence of diarrhea

## 2019-09-25 NOTE — PROGRESS NOTES
Assessment/Plan:    Type 2 diabetes mellitus with complication (HCC)    Lab Results   Component Value Date    HGBA1C 6 6 (H) 07/17/2019    Type 2 diabetes remains well controlled on Amaryl 2 mg daily at breakfast time  The patient continues to strive to lose weight and maintain a healthy balance diabetic diet  Since the discontinuation of his metformin his diarrhea has finally abated  Recommend follow-up in 2 months  Essential hypertension  Initial blood pressure evaluation today was mildly elevated subsequent reading has improved  He is now 136/76 and I recommend the continuation of his present hypertensive therapy including valsartan hydrochlorothiazide 160-12 5 mg daily metoprolol succinate 25 mg daily and amlodipine 10 mg daily  Follow-up assessment requested in 2 months  Diarrhea  Previous diarrhea symptoms have finally abated  I suspect they were ultimately due to his metformin medication  Stools are now formed with no recurrence of diarrhea  Diagnoses and all orders for this visit:    Type 2 diabetes mellitus with complication, without long-term current use of insulin (HCC)  -     Hemoglobin A1C; Future  -     Basic metabolic panel; Future        Subjective:      Patient ID: Aidan Colon is a 75 y o  male  This 78-year-old gentleman returns today for follow-up assessment of his persistent diarrhea as well as type 2 diabetes and hypertension  He is happy to report that since his last visit with us his diarrhea has resolved and he has had consistently formed bowel movements  And at times in fact his stools have been on the hard side        The following portions of the patient's history were reviewed and updated as appropriate:   He  has a past medical history of Anxiety, Claustrophobia, Colon polyps, Depression, Diabetes mellitus ([address] ), Dyslipidemia, Esophageal polyp, GERD (gastroesophageal reflux disease), Glaucoma, Headache, Hypertension, Low back pain, Neck pain, Numbness and tingling in left arm, OAB (overactive bladder), Paroxysmal supraventricular tachycardia ([place] [address] ), and Sleep apnea  He   Patient Active Problem List    Diagnosis Date Noted    Diarrhea 08/28/2019    Mood disorder ([place] [address] ) 08/14/2019    Hypokalemia 07/24/2019    Palpitation 02/07/2019    Obstructive sleep apnea syndrome 06/04/2018    Morbid obesity with BMI of 40 0-44 9, adult ([place] [address] ) 06/04/2018    Mood disturbance 06/04/2018    Gastroesophageal reflux disease 06/04/2018    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    Type 2 diabetes mellitus with complication ([place] [address] ) 98/99/8542    Essential hypertension 01/29/2018    Peripheral neuropathy 01/29/2018    Low back pain      He  has a past surgical history that includes Cataract extraction (Bilateral); Knee arthroscopy (Bilateral); Lumbar spine surgery; Excision basal cell carcinoma; Total knee arthroplasty (Left); Replacement total knee (Right, 04/2017); Replacement total knee (Left, 03/2013); Anoscopy; Rectal biopsy; Mouth surgery; pr esophagogastroduodenoscopy transoral diagnostic (N/A, 2/13/2019); and pr colonoscopy flx dx w/collj spec when pfrmd (N/A, 2/13/2019)  His family history includes Alzheimer's disease in his mother; Diabetes in his mother and paternal grandmother; Esophageal cancer in his paternal grandfather; Timmothy High Hypertension in his father  He  reports that he quit smoking about 36 years ago  He has quit using smokeless tobacco  He reports that he drinks about 14 0 standard drinks of alcohol per week  He reports that he does not use drugs  Current Outpatient Medications   Medication Sig Dispense Refill    amLODIPine (NORVASC) 10 mg tablet TAKE 1 TABLET DAILY 90 tablet 3    brimonidine-timolol (COMBIGAN) 0 2-0 5 % Administer 1 drop into the left eye daily        citalopram (CeleXA) 10 mg tablet TAKE 1 TABLET DAILY 90 tablet 2    FLUZONE HIGH-DOSE 0 5 ML SHEILA TO BE GIVEN BY PHARMACIST PER STANDING ORDER  0    glimepiride (AMARYL) 1 mg tablet Take 2 tablets (2 mg total) by mouth daily with breakfast 180 tablet 2    latanoprost (XALATAN) 0 005 % ophthalmic solution 1 drop daily at bedtime   metoprolol succinate (TOPROL-XL) 25 mg 24 hr tablet Take 1 tablet (25 mg total) by mouth daily 90 tablet 2    metroNIDAZOLE (METROGEL) 0 75 % gel APPLY TO AFFECTED AREA(S) EVERY DAY  3    omeprazole (PriLOSEC) 20 mg delayed release capsule Take one daily 90 capsule 3    oxybutynin (DITROPAN-XL) 5 mg 24 hr tablet Take 1 tablet (5 mg total) by mouth daily at bedtime 30 tablet 0    oxybutynin (DITROPAN-XL) 5 mg 24 hr tablet TAKE 1 TABLET AT BEDTIME 90 tablet 3    rosuvastatin (CRESTOR) 5 mg tablet TAKE 1 TABLET DAILY 90 tablet 3    valsartan-hydrochlorothiazide (DIOVAN-HCT) 160-12 5 MG per tablet Take 1 tablet by mouth daily 90 tablet 2     No current facility-administered medications for this visit       Review of Systems   All other systems reviewed and are negative  Objective:      /76   Pulse 82   Temp 98 2 °F (36 8 °C) (Tympanic)   Ht 5' 7" (1 702 m)   Wt 134 kg (295 lb)   SpO2 99%   BMI 46 20 kg/m²          Physical Exam   Constitutional: He is oriented to person, place, and time  He appears well-developed and well-nourished  HENT:   Right Ear: Hearing, tympanic membrane, external ear and ear canal normal    Left Ear: Hearing, tympanic membrane, external ear and ear canal normal    Nose: Nose normal    Mouth/Throat: Oropharynx is clear and moist and mucous membranes are normal    Eyes: Pupils are equal, round, and reactive to light  Conjunctivae are normal    Neck: No thyromegaly present  Cardiovascular: Normal rate, regular rhythm, S1 normal, S2 normal, normal heart sounds and intact distal pulses  No murmur heard  Pulmonary/Chest: Effort normal and breath sounds normal    Abdominal: Soft  Bowel sounds are normal  There is no tenderness  Musculoskeletal: Normal range of motion  He exhibits no edema  Lymphadenopathy:     He has no cervical adenopathy  Neurological: He is alert and oriented to person, place, and time  He has normal reflexes  He displays normal reflexes  Skin: Skin is warm and dry  Psychiatric: He has a normal mood and affect   His behavior is normal  Judgment and thought content normal

## 2019-09-25 NOTE — ASSESSMENT & PLAN NOTE
Lab Results   Component Value Date    HGBA1C 6 6 (H) 07/17/2019    Type 2 diabetes remains well controlled on Amaryl 2 mg daily at breakfast time  The patient continues to strive to lose weight and maintain a healthy balance diabetic diet  Since the discontinuation of his metformin his diarrhea has finally abated  Recommend follow-up in 2 months

## 2019-09-30 ENCOUNTER — IMMUNIZATIONS (OUTPATIENT)
Dept: INTERNAL MEDICINE CLINIC | Facility: CLINIC | Age: 74
End: 2019-09-30
Payer: MEDICARE

## 2019-09-30 DIAGNOSIS — Z23 ENCOUNTER FOR IMMUNIZATION: ICD-10-CM

## 2019-09-30 PROCEDURE — G0008 ADMIN INFLUENZA VIRUS VAC: HCPCS

## 2019-09-30 PROCEDURE — 90662 IIV NO PRSV INCREASED AG IM: CPT

## 2019-11-16 DIAGNOSIS — F32.A DEPRESSION, UNSPECIFIED DEPRESSION TYPE: ICD-10-CM

## 2019-11-17 RX ORDER — CITALOPRAM 10 MG/1
TABLET ORAL
Qty: 90 TABLET | Refills: 3 | Status: SHIPPED | OUTPATIENT
Start: 2019-11-17 | End: 2020-07-29 | Stop reason: ALTCHOICE

## 2019-11-18 ENCOUNTER — APPOINTMENT (OUTPATIENT)
Dept: LAB | Age: 74
End: 2019-11-18
Payer: MEDICARE

## 2019-11-18 DIAGNOSIS — E11.8 TYPE 2 DIABETES MELLITUS WITH COMPLICATION, WITHOUT LONG-TERM CURRENT USE OF INSULIN (HCC): ICD-10-CM

## 2019-11-18 LAB
ANION GAP SERPL CALCULATED.3IONS-SCNC: 7 MMOL/L (ref 4–13)
BUN SERPL-MCNC: 18 MG/DL (ref 5–25)
CALCIUM SERPL-MCNC: 8.8 MG/DL (ref 8.3–10.1)
CHLORIDE SERPL-SCNC: 107 MMOL/L (ref 100–108)
CO2 SERPL-SCNC: 27 MMOL/L (ref 21–32)
CREAT SERPL-MCNC: 1.33 MG/DL (ref 0.6–1.3)
CREAT UR-MCNC: 172 MG/DL
EST. AVERAGE GLUCOSE BLD GHB EST-MCNC: 157 MG/DL
GFR SERPL CREATININE-BSD FRML MDRD: 52 ML/MIN/1.73SQ M
GLUCOSE P FAST SERPL-MCNC: 150 MG/DL (ref 65–99)
HBA1C MFR BLD: 7.1 % (ref 4.2–6.3)
MICROALBUMIN UR-MCNC: 243 MG/L (ref 0–20)
MICROALBUMIN/CREAT 24H UR: 141 MG/G CREATININE (ref 0–30)
POTASSIUM SERPL-SCNC: 3.3 MMOL/L (ref 3.5–5.3)
SODIUM SERPL-SCNC: 141 MMOL/L (ref 136–145)

## 2019-11-18 PROCEDURE — 82043 UR ALBUMIN QUANTITATIVE: CPT | Performed by: INTERNAL MEDICINE

## 2019-11-18 PROCEDURE — 82570 ASSAY OF URINE CREATININE: CPT | Performed by: INTERNAL MEDICINE

## 2019-11-18 PROCEDURE — 36415 COLL VENOUS BLD VENIPUNCTURE: CPT

## 2019-11-18 PROCEDURE — 83036 HEMOGLOBIN GLYCOSYLATED A1C: CPT

## 2019-11-18 PROCEDURE — 80048 BASIC METABOLIC PNL TOTAL CA: CPT

## 2019-11-20 ENCOUNTER — OFFICE VISIT (OUTPATIENT)
Dept: INTERNAL MEDICINE CLINIC | Facility: CLINIC | Age: 74
End: 2019-11-20
Payer: MEDICARE

## 2019-11-20 VITALS
HEART RATE: 79 BPM | WEIGHT: 296.4 LBS | HEIGHT: 67 IN | DIASTOLIC BLOOD PRESSURE: 72 MMHG | BODY MASS INDEX: 46.52 KG/M2 | RESPIRATION RATE: 12 BRPM | OXYGEN SATURATION: 96 % | SYSTOLIC BLOOD PRESSURE: 132 MMHG | TEMPERATURE: 98.5 F

## 2019-11-20 DIAGNOSIS — M54.50 CHRONIC MIDLINE LOW BACK PAIN WITHOUT SCIATICA: ICD-10-CM

## 2019-11-20 DIAGNOSIS — E11.8 TYPE 2 DIABETES MELLITUS WITH COMPLICATION (HCC): Primary | ICD-10-CM

## 2019-11-20 DIAGNOSIS — I10 ESSENTIAL HYPERTENSION: ICD-10-CM

## 2019-11-20 DIAGNOSIS — E87.6 HYPOKALEMIA: ICD-10-CM

## 2019-11-20 DIAGNOSIS — G89.29 CHRONIC MIDLINE LOW BACK PAIN WITHOUT SCIATICA: ICD-10-CM

## 2019-11-20 DIAGNOSIS — E66.01 MORBID OBESITY WITH BMI OF 40.0-44.9, ADULT (HCC): ICD-10-CM

## 2019-11-20 DIAGNOSIS — K21.9 GASTROESOPHAGEAL REFLUX DISEASE WITHOUT ESOPHAGITIS: ICD-10-CM

## 2019-11-20 DIAGNOSIS — N39.41 URGENCY INCONTINENCE: ICD-10-CM

## 2019-11-20 PROBLEM — R19.7 DIARRHEA: Status: RESOLVED | Noted: 2019-08-28 | Resolved: 2019-11-20

## 2019-11-20 PROCEDURE — 99215 OFFICE O/P EST HI 40 MIN: CPT | Performed by: INTERNAL MEDICINE

## 2019-11-20 RX ORDER — ATENOLOL 25 MG/1
25 TABLET ORAL DAILY
Qty: 90 TABLET | Refills: 3 | Status: SHIPPED | OUTPATIENT
Start: 2019-11-20 | End: 2019-11-20 | Stop reason: ALTCHOICE

## 2019-11-20 RX ORDER — OMEPRAZOLE 20 MG/1
CAPSULE, DELAYED RELEASE ORAL
Qty: 90 CAPSULE | Refills: 3 | Status: SHIPPED | OUTPATIENT
Start: 2019-11-20 | End: 2020-08-21 | Stop reason: SDUPTHER

## 2019-11-20 NOTE — ASSESSMENT & PLAN NOTE
Chronic back pain actually extending all the way from the cervical spine down to the lumbar spine but focused primarily on the right sacral iliac region  We requested x-rays of his lumbar and thoracic spine  I have also provided him with a referral to Orthopedics at [facility] for further evaluation of his chronic back pain    He has previously been on anti-inflammatories as well as physical therapy

## 2019-11-20 NOTE — ASSESSMENT & PLAN NOTE
Symptoms of increased frequency of urination as well as urge incontinence reported by the patient  Recommend urology consultation for further evaluation    Referral provided to the patient today

## 2019-11-20 NOTE — PROGRESS NOTES
Assessment/Plan:    Type 2 diabetes mellitus with complication (HCC)    Lab Results   Component Value Date    HGBA1C 7 1 (H) 11/18/2019    Most recent hemoglobin A1c 7 1% recommend the continuation of his present dosing of glimepiride 1 mg 2 tablets daily at breakfast   Follow-up blood work as requested in 4 months including fasting blood sugar as well as hemoglobin A1c  Patient continues to be significantly overweight a factor which complicates concur good control of his type 2 diabetes  I have recommended that he continue to work on reducing his weight  Essential hypertension  Assessment of the patient's hypertension today indicates blood pressure 132/72 I have recommended the continuation of his present blood pressure medication which includes metoprolol succinate 25 mg daily of valsartan hydrochlorothiazide 160-12 5 mg daily and amlodipine 10 mg daily  Follow-up assessment is recommended in 4 months  Weight reduction strongly recommended    Urgency incontinence  Symptoms of increased frequency of urination as well as urge incontinence reported by the patient  Recommend urology consultation for further evaluation  Referral provided to the patient today    Morbid obesity with BMI of 40 0-44 9, adult ([address] )  Morbid obesity continues to be a major problem for this patient  Likely does a factor that is complicating his chronic back pain as well as type 2 diabetes and hypertension control  Recommend decreasing calorie consumption by at least 25% for everything he eats as well as trying to start some regular walking exercise  Low back pain  Chronic back pain actually extending all the way from the cervical spine down to the lumbar spine but focused primarily on the right sacral iliac region  We requested x-rays of his lumbar and thoracic spine  I have also provided him with a referral to Orthopedics at [facility] for further evaluation of his chronic back pain    He has previously been on anti-inflammatories as well as physical therapy    Hypokalemia  Persistent hypokalemia secondary to hydrochlorothiazide in his blood pressure medication we provided him with a list of foods that are rich in potassium and I have asked him to increased consumption of these foods in an effort to correct his hypokalemia  He is asymptomatic in the deficiency is mild so these supplementation with potassium pills is not necessary at this time follow-up assessment of potassium is recommended in 4 months  Diagnoses and all orders for this visit:    Type 2 diabetes mellitus with complication ([facility] )  -     Comprehensive metabolic panel; Future  -     Hemoglobin A1C; Future    Urgency incontinence  -     Ambulatory referral to Urology; Future    Essential hypertension  -     atenolol (TENORMIN) 25 mg tablet; Take 1 tablet (25 mg total) by mouth daily    Chronic midline low back pain without sciatica  -     XR spine lumbar minimum 4 views non injury; Future  -     XR spine thoracic 3 vw; Future  -     Ambulatory referral to Orthopedic Surgery; Future        Subjective:      Patient ID: Peace Carlos is a 75 y o  male  This 50-year-old gentleman returns today for a follow-up visit pertaining to his hypertension as well as type 2 diabetes  He has no secondary symptoms from the diabetes indicating no symptoms of hyperglycemia or hypoglycemia  The patient has declined home blood sugar testing  We have been monitoring his blood sugars on a regular basis at the laboratory and he has had recent blood work which we will review with him today  Patient has had no further episodes of diarrhea  It appears that a significant component of that problem was from his metformin medication  He has concerns about increasing frequency of urination and urgency of urination  He indicates that there are sometimes that he cannot get to the bathroom quick enough before his urine starts to flow    He would like to see a new urologist at [facility] for further evaluation of the symptoms  I referral was placed to [facility] urologist Dr Kira Mckeon  Patient also has symptoms of chronic back pain extending from his neck all the way down to the lumbar spine  He has morbid obesity likely a factor compounding his chronic back pain  His activity is limited by this back pain  There are times when he is standing after several minutes he has to sit down which seems to relieve his discomfort  The low back pain seems to be focused in the sacral iliac region  We did request x-rays of his thoracic and lumbar spine today and have referred him on to Orthopedics at [facility] for further evaluation of his symptoms  The following portions of the patient's history were reviewed and updated as appropriate:   He  has a past medical history of Anxiety, Claustrophobia, Colon polyps, Depression, Diabetes mellitus ([address] ), Dyslipidemia, Esophageal polyp, GERD (gastroesophageal reflux disease), Glaucoma, Headache, Hypertension, Low back pain, Neck pain, Numbness and tingling in left arm, OAB (overactive bladder), Paroxysmal supraventricular tachycardia ([address] ), and Sleep apnea  He   Patient Active Problem List    Diagnosis Date Noted    Urgency incontinence 11/20/2019    Mood disorder ([address] ) 08/14/2019    Hypokalemia 07/24/2019    Palpitation 02/07/2019    Obstructive sleep apnea syndrome 06/04/2018    Morbid obesity with BMI of 40 0-44 9, adult ([address] ) 06/04/2018    Mood disturbance 06/04/2018    Gastroesophageal reflux disease 06/04/2018    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    Type 2 diabetes mellitus with complication ([facility] ) 89/67/1928    Essential hypertension 01/29/2018    Peripheral neuropathy 01/29/2018    Low back pain      He  has a past surgical history that includes Cataract extraction (Bilateral); Knee arthroscopy (Bilateral); Lumbar spine surgery; Excision basal cell carcinoma;  Total knee arthroplasty (Left); Replacement total knee (Right, 04/2017); Replacement total knee (Left, 03/2013); Anoscopy; Rectal biopsy; Mouth surgery; pr esophagogastroduodenoscopy transoral diagnostic (N/A, 2/13/2019); and pr colonoscopy flx dx w/collj spec when pfrmd (N/A, 2/13/2019)  His family history includes Alzheimer's disease in his mother; Diabetes in his mother and paternal grandmother; Esophageal cancer in his paternal grandfather; Marylene Kohler Hypertension in his father  He  reports that he quit smoking about 36 years ago  He has quit using smokeless tobacco  He reports that he drinks about 14 0 standard drinks of alcohol per week  He reports that he does not use drugs  Current Outpatient Medications   Medication Sig Dispense Refill    amLODIPine (NORVASC) 10 mg tablet TAKE 1 TABLET DAILY 90 tablet 3    brimonidine-timolol (COMBIGAN) 0 2-0 5 % Administer 1 drop into the left eye daily   citalopram (CeleXA) 10 mg tablet TAKE 1 TABLET DAILY 90 tablet 3    glimepiride (AMARYL) 1 mg tablet Take 2 tablets (2 mg total) by mouth daily with breakfast 180 tablet 2    latanoprost (XALATAN) 0 005 % ophthalmic solution 1 drop daily at bedtime        metoprolol succinate (TOPROL-XL) 25 mg 24 hr tablet Take 1 tablet (25 mg total) by mouth daily 90 tablet 2    omeprazole (PriLOSEC) 20 mg delayed release capsule TAKE 1 CAPSULE DAILY 90 capsule 3    oxybutynin (DITROPAN-XL) 5 mg 24 hr tablet Take 1 tablet (5 mg total) by mouth daily at bedtime 30 tablet 0    rosuvastatin (CRESTOR) 5 mg tablet TAKE 1 TABLET DAILY 90 tablet 3    valsartan-hydrochlorothiazide (DIOVAN-HCT) 160-12 5 MG per tablet Take 1 tablet by mouth daily 90 tablet 2    FLUZONE HIGH-DOSE 0 5 ML SHEILA TO BE GIVEN BY PHARMACIST PER STANDING ORDER  0    metroNIDAZOLE (METROGEL) 0 75 % gel APPLY TO AFFECTED AREA(S) EVERY DAY  3    oxybutynin (DITROPAN-XL) 5 mg 24 hr tablet TAKE 1 TABLET AT BEDTIME (Patient not taking: Reported on 11/20/2019) 90 tablet 3     No current facility-administered medications for this visit       Review of Systems   Genitourinary: Positive for frequency and urgency  Occasional urge incontinence   Musculoskeletal: Positive for arthralgias, back pain, myalgias, neck pain and neck stiffness  All other systems reviewed and are negative  Objective:      /72   Pulse 79   Temp 98 5 °F (36 9 °C) (Tympanic)   Resp 12   Ht 5' 7" (1 702 m)   Wt 134 kg (296 lb 6 4 oz)   SpO2 96%   BMI 46 42 kg/m²          Physical Exam   Constitutional: He is oriented to person, place, and time  He appears well-developed and well-nourished  HENT:   Right Ear: Hearing, tympanic membrane, external ear and ear canal normal    Left Ear: Hearing, tympanic membrane, external ear and ear canal normal    Nose: Nose normal    Mouth/Throat: Oropharynx is clear and moist and mucous membranes are normal    Eyes: Pupils are equal, round, and reactive to light  Conjunctivae are normal    Neck: No thyromegaly present  Cardiovascular: Normal rate, regular rhythm, S1 normal, S2 normal, normal heart sounds and intact distal pulses  No murmur heard  Pulmonary/Chest: Effort normal and breath sounds normal  No stridor  No respiratory distress  He has no wheezes  Abdominal: Soft  Bowel sounds are normal  There is no tenderness  Musculoskeletal: Normal range of motion  He exhibits no edema  Mild low back tenderness on palpation with tenderness over the right sacral iliac region  Lymphadenopathy:     He has no cervical adenopathy  Neurological: He is alert and oriented to person, place, and time  He displays abnormal reflex  Skin: Skin is warm and dry  Psychiatric: He has a normal mood and affect   His behavior is normal  Judgment and thought content normal

## 2019-11-20 NOTE — ASSESSMENT & PLAN NOTE
Morbid obesity continues to be a major problem for this patient  Likely does a factor that is complicating his chronic back pain as well as type 2 diabetes and hypertension control  Recommend decreasing calorie consumption by at least 25% for everything he eats as well as trying to start some regular walking exercise

## 2019-11-20 NOTE — ASSESSMENT & PLAN NOTE
Persistent hypokalemia secondary to hydrochlorothiazide in his blood pressure medication we provided him with a list of foods that are rich in potassium and I have asked him to increased consumption of these foods in an effort to correct his hypokalemia  He is asymptomatic in the deficiency is mild so these supplementation with potassium pills is not necessary at this time follow-up assessment of potassium is recommended in 4 months

## 2019-11-20 NOTE — ASSESSMENT & PLAN NOTE
Lab Results   Component Value Date    HGBA1C 7 1 (H) 11/18/2019    Most recent hemoglobin A1c 7 1% recommend the continuation of his present dosing of glimepiride 1 mg 2 tablets daily at breakfast   Follow-up blood work as requested in 4 months including fasting blood sugar as well as hemoglobin A1c  Patient continues to be significantly overweight a factor which complicates concur good control of his type 2 diabetes  I have recommended that he continue to work on reducing his weight

## 2019-11-20 NOTE — ASSESSMENT & PLAN NOTE
Assessment of the patient's hypertension today indicates blood pressure 132/72 I have recommended the continuation of his present blood pressure medication which includes metoprolol succinate 25 mg daily of valsartan hydrochlorothiazide 160-12 5 mg daily and amlodipine 10 mg daily  Follow-up assessment is recommended in 4 months    Weight reduction strongly recommended

## 2019-11-25 ENCOUNTER — OFFICE VISIT (OUTPATIENT)
Dept: PODIATRY | Facility: CLINIC | Age: 74
End: 2019-11-25
Payer: MEDICARE

## 2019-11-25 ENCOUNTER — TELEPHONE (OUTPATIENT)
Dept: INTERNAL MEDICINE CLINIC | Facility: CLINIC | Age: 74
End: 2019-11-25

## 2019-11-25 VITALS
SYSTOLIC BLOOD PRESSURE: 132 MMHG | WEIGHT: 300.2 LBS | HEIGHT: 67 IN | HEART RATE: 79 BPM | BODY MASS INDEX: 47.12 KG/M2 | DIASTOLIC BLOOD PRESSURE: 72 MMHG

## 2019-11-25 DIAGNOSIS — B35.1 ONYCHOMYCOSIS: ICD-10-CM

## 2019-11-25 DIAGNOSIS — E11.8 TYPE 2 DIABETES MELLITUS WITH COMPLICATION (HCC): Primary | ICD-10-CM

## 2019-11-25 DIAGNOSIS — G62.9 PERIPHERAL POLYNEUROPATHY: ICD-10-CM

## 2019-11-25 PROCEDURE — 11721 DEBRIDE NAIL 6 OR MORE: CPT | Performed by: PODIATRIST

## 2019-11-25 PROCEDURE — 99214 OFFICE O/P EST MOD 30 MIN: CPT | Performed by: PODIATRIST

## 2019-11-25 NOTE — TELEPHONE ENCOUNTER
Pt would like to clarify the instructions for the Omeprazole  Pt is confused by instructions on after care visit summary regarding changing the way he takes the medication and about going back onto the Atenolol? Pt would appreciate a call back at [phone]  Thank you!

## 2019-11-25 NOTE — PATIENT INSTRUCTIONS
Foot Care for People with Diabetes   WHAT YOU NEED TO KNOW:   · Foot care helps protect your feet and prevent foot ulcers or sores  Long-term high blood sugar levels can damage the blood vessels and nerves in your legs and feet  This damage makes it hard to feel pressure, pain, temperature, and touch  You may not be able to feel a cut or sore, or shoes that are too tight  Foot care is needed to prevent serious problems, such as an infection or amputation  · Diabetes may cause your toes to become crooked or curved under  These changes may affect the way you walk and can lead to increased pressure on your foot  The pressure can decrease blood flow to your feet  Lack of blood flow increases your risk for a foot ulcer  Do not ignore small problems, such as dry skin or small wounds  These can become life-threatening over time without proper care  DISCHARGE INSTRUCTIONS:   Contact your healthcare provider if:   · Your feet become numb, weak, or hard to move  · You have pus draining from a sore on your foot  · You have a wound on your foot that gets bigger, deeper, or does not heal      · You see blisters, cuts, scratches, calluses, or sores on your foot  · You have a fever, and your feet become red, warm, and swollen  · Your toenails become thick, curled, or yellow  · You find it hard to check your feet because your vision is poor  · You have questions or concerns about your condition or care  Foot care:   · Check your feet each day  Look at your whole foot, including the bottom, and between and under your toes  Check for wounds, corns, and calluses  Use a mirror to see the bottom of your feet  The skin on your feet may be shiny, tight, or darker than normal  Your feet may also be cold and pale  Feel your feet by running your hands along the tops, bottoms, sides, and between your toes  Redness, swelling, and warmth are signs of blood flow problems that can lead to a foot ulcer   Do not try to remove corns or calluses yourself  · Wash your feet each day with soap and warm water  Do not use hot water, because this can injure your foot  Dry your feet gently with a towel after you wash them  Dry between and under your toes  · Apply lotion or a moisturizer on your dry feet  Ask your healthcare provider what lotions are best to use  Do not put lotion or moisturizer between your toes  · Cut your toenails correctly  File or cut your toenails straight across  Use a soft brush to clean around your toenails  If your toenails are very thick, you may need to have a healthcare provider or specialist cut them  · Protect your feet  Do not walk barefoot or wear your shoes without socks  Check your shoes for rocks or other objects that can hurt your feet  Wear cotton socks to help keep your feet dry  Wear socks without toe seams, or wear them with the seams inside out  Change your socks each day  Do not wear socks that are dirty or damp  · Wear shoes that fit well  Wear shoes that do not rub against any area of your feet  Your shoes should be ½ to ¾ inch (1 to 2 centimeters) longer than your feet  Your shoes should also have extra space around the widest part of your feet  Walking or athletic shoes with laces or straps that adjust are best  Ask your healthcare provider for help to choose shoes that fit you best  Ask him if you need to wear an insert, orthotic, or bandage on your feet  Follow up with your healthcare provider or foot specialist as directed: You will need to have your feet checked at least once a year  You may need a foot exam more often if you have nerve damage, foot deformities, or ulcers  Write down your questions so you remember to ask them during your visits  © 2017 [address] Information is for End User's use only and may not be sold, redistributed or otherwise used for commercial purposes   All illustrations and images included in CareNotes® are the copyrighted property of [employer]  or [employer]  The above information is an  only  It is not intended as medical advice for individual conditions or treatments  Talk to your doctor, nurse or pharmacist before following any medical regimen to see if it is safe and effective for you

## 2019-11-25 NOTE — PROGRESS NOTES
Hector Fishman  1945  AT RISK FOOT CARE    1  Type 2 diabetes mellitus with complication (HCC)     2  Peripheral polyneuropathy     3  Onychomycosis         Patient presents for at-risk foot care  Patient has no acute concerns today  Patient is seen routinely due to neuropathy, parasthesia, edema, and trophic skin changes to the lower extremity  Today's treatment includes:  Debridement of toenails  Using nail nipper, krystle, and curette, nails were sharply debrided, reduced in thickness and length  Devitalized tissue removed  Patient tolerated well  Discussed proper shoe gear, daily inspections of feet, and general foot health with patient  Patient has Q9  findings and is recommended for at risk foot care every 9-10 weeks      Patients most recent DM foot exam was on: 6/19/19

## 2019-12-11 ENCOUNTER — OFFICE VISIT (OUTPATIENT)
Dept: OBGYN CLINIC | Facility: CLINIC | Age: 74
End: 2019-12-11
Payer: MEDICARE

## 2019-12-11 ENCOUNTER — APPOINTMENT (OUTPATIENT)
Dept: RADIOLOGY | Facility: AMBULARY SURGERY CENTER | Age: 74
End: 2019-12-11
Payer: MEDICARE

## 2019-12-11 VITALS
WEIGHT: 300 LBS | HEIGHT: 67 IN | BODY MASS INDEX: 47.09 KG/M2 | SYSTOLIC BLOOD PRESSURE: 133 MMHG | HEART RATE: 74 BPM | DIASTOLIC BLOOD PRESSURE: 73 MMHG

## 2019-12-11 DIAGNOSIS — M54.50 CHRONIC MIDLINE LOW BACK PAIN WITHOUT SCIATICA: ICD-10-CM

## 2019-12-11 DIAGNOSIS — I10 ESSENTIAL HYPERTENSION: ICD-10-CM

## 2019-12-11 DIAGNOSIS — M51.36 DDD (DEGENERATIVE DISC DISEASE), LUMBAR: Primary | ICD-10-CM

## 2019-12-11 DIAGNOSIS — M54.12 CERVICAL RADICULOPATHY: ICD-10-CM

## 2019-12-11 DIAGNOSIS — G89.29 CHRONIC MIDLINE LOW BACK PAIN WITHOUT SCIATICA: ICD-10-CM

## 2019-12-11 DIAGNOSIS — E78.00 PURE HYPERCHOLESTEROLEMIA: ICD-10-CM

## 2019-12-11 PROCEDURE — 99203 OFFICE O/P NEW LOW 30 MIN: CPT | Performed by: ORTHOPAEDIC SURGERY

## 2019-12-11 PROCEDURE — 72110 X-RAY EXAM L-2 SPINE 4/>VWS: CPT

## 2019-12-11 PROCEDURE — 72050 X-RAY EXAM NECK SPINE 4/5VWS: CPT

## 2019-12-11 RX ORDER — ROSUVASTATIN CALCIUM 5 MG/1
TABLET, COATED ORAL
Qty: 90 TABLET | Refills: 3 | Status: SHIPPED | OUTPATIENT
Start: 2019-12-11 | End: 2020-03-18 | Stop reason: SDUPTHER

## 2019-12-11 RX ORDER — METOPROLOL SUCCINATE 25 MG/1
TABLET, EXTENDED RELEASE ORAL
Qty: 90 TABLET | Refills: 3 | Status: SHIPPED | OUTPATIENT
Start: 2019-12-11 | End: 2020-03-18 | Stop reason: SDUPTHER

## 2019-12-11 NOTE — PROGRESS NOTES
Assessment/Plan:    Multilevel lumbar DDD  Worsening lower back pain with associated stiffness despite conservative management including NSAIDs and several weeks of physical therapy  Pain is primarily right-sided but worse with activity  Fortunately patient remains neurologically stable  Weight loss was also discussed  · MRI of lumbar spine ordered with sedation  · Consider medrol dose marty versus injections  · Follow up after MRI       Problem List Items Addressed This Visit        Other    Low back pain - Primary    Relevant Orders    XR spine cervical complete 4 or 5 vw non injury    XR spine lumbar minimum 4 views non injury      Other Visit Diagnoses     Cervical radiculopathy                Subjective:      Patient ID: Jaya Lei is a 75 y o  male  HPI  Leeanna Goodman is a 75year old male who presents to the office for evaluation of low back pain ongoing for multiple years  He is experiencing right sided low back pain that goes up to his neck  He also describes stiffness  He has difficulty with prolonged activities including standing and ambulation  He also notes difficulty wiping after bowel movements due to pain  He has tried physical therapy without improvement  He rates his pain on average at 2/10 on VAS  He denies any radiation into his bilateral lower extremities, numbness or tingling  The following portions of the patient's history were reviewed and updated as appropriate: current medications, past family history, past medical history, past social history, past surgical history and problem list     Review of Systems   Constitutional: Negative for fever and unexpected weight change  HENT: Negative for hearing loss, nosebleeds and sore throat  Eyes: Negative for pain, redness and visual disturbance  Respiratory: Negative for cough, shortness of breath and wheezing  Cardiovascular: Negative for chest pain, palpitations and leg swelling     Gastrointestinal: Negative for abdominal pain, nausea and vomiting  Endocrine: Negative for polydipsia and polyuria  Genitourinary: Negative for dysuria and hematuria  Musculoskeletal: Positive for back pain  Skin: Negative for rash and wound  Neurological: Negative for dizziness and headaches  Psychiatric/Behavioral: Negative for agitation and suicidal ideas  Objective:      /73   Pulse 74   Ht 5' 7" (1 702 m)   Wt 136 kg (300 lb)   BMI 46 99 kg/m²          Physical Exam   Constitutional: He is oriented to person, place, and time  He appears well-developed and well-nourished  HENT:   Head: Normocephalic and atraumatic  Eyes: Pupils are equal, round, and reactive to light  Neck: Neck supple  Cardiovascular: Intact distal pulses  Pulmonary/Chest: Breath sounds normal    Neurological: He is alert and oriented to person, place, and time  Skin: Skin is warm and dry  Psychiatric: He has a normal mood and affect   His behavior is normal        Patient ambulates without assistance  Tender to palpation right lumbosacral spine  Modified straight leg raise negative bilaterally  Strength L2-S1 5/5 bilaterally  Sensation L2-S1 intact bilaterally      Imaging  X-rays of lumbar spine 12/11/2019 were reviewed and shows multilevel degenerative changes  X-rays of cervical spine 12/11/2019 were reviewed and shows degenerative changes    Scribe Attestation    I,:   Mirza Kat am acting as a scribe while in the presence of the attending physician :        I,:   Cait Camilo MD personally performed the services described in this documentation    as scribed in my presence :

## 2019-12-30 DIAGNOSIS — I10 ESSENTIAL HYPERTENSION: ICD-10-CM

## 2019-12-30 RX ORDER — AMLODIPINE BESYLATE 10 MG/1
TABLET ORAL
Qty: 90 TABLET | Refills: 3 | Status: SHIPPED | OUTPATIENT
Start: 2019-12-30 | End: 2020-12-31 | Stop reason: SDUPTHER

## 2020-01-23 ENCOUNTER — OFFICE VISIT (OUTPATIENT)
Dept: UROLOGY | Facility: MEDICAL CENTER | Age: 75
End: 2020-01-23
Payer: MEDICARE

## 2020-01-23 VITALS
HEART RATE: 66 BPM | HEIGHT: 67 IN | DIASTOLIC BLOOD PRESSURE: 80 MMHG | WEIGHT: 298 LBS | SYSTOLIC BLOOD PRESSURE: 152 MMHG | BODY MASS INDEX: 46.77 KG/M2

## 2020-01-23 DIAGNOSIS — N40.1 BPH WITH URINARY OBSTRUCTION: Primary | ICD-10-CM

## 2020-01-23 DIAGNOSIS — N39.8 VOIDING DYSFUNCTION: ICD-10-CM

## 2020-01-23 DIAGNOSIS — N39.41 URGENCY INCONTINENCE: ICD-10-CM

## 2020-01-23 DIAGNOSIS — N13.8 BPH WITH URINARY OBSTRUCTION: Primary | ICD-10-CM

## 2020-01-23 LAB
POST-VOID RESIDUAL VOLUME, ML POC: 71 ML
SL AMB  POCT GLUCOSE, UA: NORMAL
SL AMB LEUKOCYTE ESTERASE,UA: NORMAL
SL AMB POCT BILIRUBIN,UA: NORMAL
SL AMB POCT BLOOD,UA: NORMAL
SL AMB POCT CLARITY,UA: CLEAR
SL AMB POCT COLOR,UA: YELLOW
SL AMB POCT KETONES,UA: NORMAL
SL AMB POCT NITRITE,UA: NORMAL
SL AMB POCT PH,UA: 6.5
SL AMB POCT SPECIFIC GRAVITY,UA: 1.02
SL AMB POCT URINE PROTEIN: NORMAL
SL AMB POCT UROBILINOGEN: 0.2

## 2020-01-23 PROCEDURE — 99214 OFFICE O/P EST MOD 30 MIN: CPT | Performed by: UROLOGY

## 2020-01-23 PROCEDURE — 81003 URINALYSIS AUTO W/O SCOPE: CPT | Performed by: UROLOGY

## 2020-01-23 PROCEDURE — 51798 US URINE CAPACITY MEASURE: CPT | Performed by: UROLOGY

## 2020-01-23 RX ORDER — OXYBUTYNIN CHLORIDE 5 MG/1
5 TABLET, EXTENDED RELEASE ORAL 2 TIMES DAILY
Qty: 60 TABLET | Refills: 0 | Status: SHIPPED | OUTPATIENT
Start: 2020-01-23 | End: 2020-03-23 | Stop reason: SDUPTHER

## 2020-01-23 NOTE — PROGRESS NOTES
HISTORY:    1  Worsening urgency, frequency, urge incontinence over the past 4-6 months  Has been on oxybutynin 5 mg per day for quite some time and helped a lot until recently  Flow is good overall  No hematuria infections stones  2  Mild BPH, PSA quite low at 0 3         ASSESSMENT / PLAN:     I suspect worsening urgency incontinence related to BPH, possibly overactive bladder  Emptying well with PVR only 71 mL     Increase oxybutynin, 5 mg twice per day  Cysto to evaluate  The following portions of the patient's history were reviewed and updated as appropriate: allergies, current medications, past family history, past medical history, past social history, past surgical history and problem list     Review of Systems   All other systems reviewed and are negative  Objective:     Physical Exam   Constitutional: He appears well-developed     Genitourinary:   Genitourinary Comments:  Penis testes unremarkable    Prostate hard to feel due to obesity, but no obvious nodules         0   Lab Value Date/Time    PSA 0 3 03/12/2019 0759    PSA 0 4 01/16/2018 1351    PSA 0 4 01/04/2017 0944    PSA 0 4 01/26/2016 0849    PSA 0 4 01/19/2015 0958    PSA 0 5 01/22/2014 0953   ]  BUN   Date Value Ref Range Status   11/18/2019 18 5 - 25 mg/dL Final   01/19/2015 19 5 - 25 mg/dL Final     Creatinine   Date Value Ref Range Status   11/18/2019 1 33 (H) 0 60 - 1 30 mg/dL Final     Comment:     Standardized to IDMS reference method   01/19/2015 1 07 0 60 - 1 30 mg/dL Final     Comment:     Standardized to IDMS reference method     No components found for: CBC      Patient Active Problem List   Diagnosis    Low back pain    Type 2 diabetes mellitus with complication ([facility] [address] )    Essential hypertension    Peripheral neuropathy    Isolated proteinuria with morphologic lesion    Obstructive sleep apnea syndrome    Morbid obesity with BMI of 40 0-44 9, adult (HCC)    Mood disturbance    Gastroesophageal reflux disease    Palpitation    Hypokalemia    Mood disorder (HCC)    Urgency incontinence    Onychomycosis    BPH with urinary obstruction        Diagnoses and all orders for this visit:    BPH with urinary obstruction    Urgency incontinence  -     Ambulatory referral to Urology  -     POCT urine dip auto non-scope  -     POCT Measure PVR    Voiding dysfunction  -     oxybutynin (DITROPAN-XL) 5 mg 24 hr tablet; Take 1 tablet (5 mg total) by mouth 2 (two) times a day           Patient ID: Too Mera is a 75 y o  male  Current Outpatient Medications:     amLODIPine (NORVASC) 10 mg tablet, TAKE 1 TABLET DAILY, Disp: 90 tablet, Rfl: 3    brimonidine-timolol (COMBIGAN) 0 2-0 5 %, Administer 1 drop into the left eye daily  , Disp: , Rfl:     citalopram (CeleXA) 10 mg tablet, TAKE 1 TABLET DAILY, Disp: 90 tablet, Rfl: 3    glimepiride (AMARYL) 1 mg tablet, Take 2 tablets (2 mg total) by mouth daily with breakfast, Disp: 180 tablet, Rfl: 2    latanoprost (XALATAN) 0 005 % ophthalmic solution, 1 drop daily at bedtime  , Disp: , Rfl:     metoprolol succinate (TOPROL-XL) 25 mg 24 hr tablet, TAKE 1 TABLET DAILY, Disp: 90 tablet, Rfl: 3    metroNIDAZOLE (METROGEL) 0 75 % gel, APPLY TO AFFECTED AREA(S) EVERY DAY, Disp: , Rfl: 3    omeprazole (PriLOSEC) 20 mg delayed release capsule, TAKE 1 CAPSULE DAILY, Disp: 90 capsule, Rfl: 3    oxybutynin (DITROPAN-XL) 5 mg 24 hr tablet, TAKE 1 TABLET AT BEDTIME, Disp: 90 tablet, Rfl: 3    oxybutynin (DITROPAN-XL) 5 mg 24 hr tablet, Take 1 tablet (5 mg total) by mouth 2 (two) times a day, Disp: 60 tablet, Rfl: 0    rosuvastatin (CRESTOR) 5 mg tablet, TAKE 1 TABLET DAILY, Disp: 90 tablet, Rfl: 3    valsartan-hydrochlorothiazide (DIOVAN-HCT) 160-12 5 MG per tablet, Take 1 tablet by mouth daily, Disp: 90 tablet, Rfl: 2    FLUZONE HIGH-DOSE 0 5 ML SHEILA, TO BE GIVEN BY PHARMACIST PER STANDING ORDER, Disp: , Rfl: 0    Past Medical History:   Diagnosis Date    Anxiety     Claustrophobia     Colon polyps     Depression     Diabetes mellitus (HCC)     Dyslipidemia     Esophageal polyp     GERD (gastroesophageal reflux disease)     Glaucoma     Headache     Hypertension     Low back pain     Neck pain     Numbness and tingling in left arm     resolved 2/20/17 / numbness and tingling left side last assessed 3/23/16    OAB (overactive bladder)     Paroxysmal supraventricular tachycardia (HCC)     Sleep apnea     wears cpap        Past Surgical History:   Procedure Laterality Date    ANOSCOPY      for polyp removal    BASAL CELL CARCINOMA EXCISION      right cheek    CATARACT EXTRACTION Bilateral     KNEE ARTHROSCOPY Bilateral     LUMBAR SPINE SURGERY      30years ago    MOUTH SURGERY      tooth extraction    MN COLONOSCOPY FLX DX W/COLLJ SPEC WHEN PFRMD N/A 2/13/2019    Procedure: COLONOSCOPY;  Surgeon: Laura Lanza MD;  Location: [facility]; Service: Gastroenterology    MN ESOPHAGOGASTRODUODENOSCOPY TRANSORAL DIAGNOSTIC N/A 2/13/2019    Procedure: ESOPHAGOGASTRODUODENOSCOPY (EGD); Surgeon: Laura Lanza MD;  Location: [facility];   Service: Gastroenterology    RECTAL BIOPSY      REPLACEMENT TOTAL KNEE Right 04/2017    REPLACEMENT TOTAL KNEE Left 03/2013    TOTAL KNEE ARTHROPLASTY Left        Social History

## 2020-01-29 ENCOUNTER — OFFICE VISIT (OUTPATIENT)
Dept: PODIATRY | Facility: CLINIC | Age: 75
End: 2020-01-29
Payer: MEDICARE

## 2020-01-29 VITALS
SYSTOLIC BLOOD PRESSURE: 140 MMHG | DIASTOLIC BLOOD PRESSURE: 86 MMHG | HEART RATE: 84 BPM | BODY MASS INDEX: 46.93 KG/M2 | WEIGHT: 299 LBS | HEIGHT: 67 IN

## 2020-01-29 DIAGNOSIS — B35.1 ONYCHOMYCOSIS: ICD-10-CM

## 2020-01-29 DIAGNOSIS — G62.9 PERIPHERAL POLYNEUROPATHY: ICD-10-CM

## 2020-01-29 DIAGNOSIS — E11.8 TYPE 2 DIABETES MELLITUS WITH COMPLICATION (HCC): Primary | ICD-10-CM

## 2020-01-29 PROCEDURE — 11721 DEBRIDE NAIL 6 OR MORE: CPT | Performed by: PODIATRIST

## 2020-01-29 NOTE — PROGRESS NOTES
Hector Fishman  1945  AT RISK FOOT CARE    1  Type 2 diabetes mellitus with complication (HCC)     2  Peripheral polyneuropathy     3  Onychomycosis         Patient presents for at-risk foot care  Patient has no acute concerns today  Patient is seen routinely due to neuropathy, parasthesia, edema, and trophic skin changes to the lower extremity  Today's treatment includes:  Debridement of toenails  Using nail nipper, krystle, and curette, nails were sharply debrided, reduced in thickness and length  Devitalized tissue removed  Patient tolerated well  Discussed proper shoe gear, daily inspections of feet, and general foot health with patient  Patient has Q9  findings and is recommended for at risk foot care every 9-10 weeks      Patients most recent DM foot exam was on: 6/19//20

## 2020-01-31 ENCOUNTER — OFFICE VISIT (OUTPATIENT)
Dept: INTERNAL MEDICINE CLINIC | Facility: CLINIC | Age: 75
End: 2020-01-31
Payer: MEDICARE

## 2020-01-31 VITALS
WEIGHT: 298.2 LBS | DIASTOLIC BLOOD PRESSURE: 74 MMHG | HEART RATE: 74 BPM | HEIGHT: 67 IN | BODY MASS INDEX: 46.8 KG/M2 | OXYGEN SATURATION: 97 % | TEMPERATURE: 98.9 F | SYSTOLIC BLOOD PRESSURE: 136 MMHG | RESPIRATION RATE: 16 BRPM

## 2020-01-31 DIAGNOSIS — G89.29 CHRONIC RIGHT-SIDED LOW BACK PAIN WITHOUT SCIATICA: ICD-10-CM

## 2020-01-31 DIAGNOSIS — G47.33 OBSTRUCTIVE SLEEP APNEA SYNDROME: ICD-10-CM

## 2020-01-31 DIAGNOSIS — I10 ESSENTIAL HYPERTENSION: Primary | ICD-10-CM

## 2020-01-31 DIAGNOSIS — E11.8 TYPE 2 DIABETES MELLITUS WITH COMPLICATION (HCC): ICD-10-CM

## 2020-01-31 DIAGNOSIS — M54.50 CHRONIC RIGHT-SIDED LOW BACK PAIN WITHOUT SCIATICA: ICD-10-CM

## 2020-01-31 PROCEDURE — 99214 OFFICE O/P EST MOD 30 MIN: CPT | Performed by: INTERNAL MEDICINE

## 2020-01-31 NOTE — ASSESSMENT & PLAN NOTE
Assessment of the patient's hypertension during today's visit indicates a blood pressure 136/74 representing good control of his hypertension I recommend the continuation of his medications for blood pressure control including valsartan hydrochlorothiazide 160-12 51 tablet daily, metoprolol succinate 25 mg daily, amlodipine 10 mg daily  Follow-up blood pressure assessment in 2 months

## 2020-01-31 NOTE — ASSESSMENT & PLAN NOTE
Chronic low back pain of the recommendation of his orthopedic consult in a MRI scan of his lumbar spine has been recommended  The patient is medically cleared to undergo sedation for this study

## 2020-01-31 NOTE — ASSESSMENT & PLAN NOTE
Lab Results   Component Value Date    HGBA1C 7 1 (H) 11/18/2019    Type 2 diabetes under adequate control patient is presently on Amaryl 1 mg 2 tablets daily at breakfast  If The patient will be NPO for this study he should hold the Amaryl on that morning of the study

## 2020-01-31 NOTE — PROGRESS NOTES
Assessment/Plan:    Type 2 diabetes mellitus with complication (HCC)    Lab Results   Component Value Date    HGBA1C 7 1 (H) 11/18/2019    Type 2 diabetes under adequate control patient is presently on Amaryl 1 mg 2 tablets daily at breakfast  If The patient will be NPO for this study he should hold the Amaryl on that morning of the study  Obstructive sleep apnea syndrome  Sleep apnea syndrome should not pose an issue for a short period of sedation to be in the MRI scanner  Recommend monitoring oxygen saturation during the course of his MRI scan  Essential hypertension  Assessment of the patient's hypertension during today's visit indicates a blood pressure 136/74 representing good control of his hypertension I recommend the continuation of his medications for blood pressure control including valsartan hydrochlorothiazide 160-12 51 tablet daily, metoprolol succinate 25 mg daily, amlodipine 10 mg daily  Follow-up blood pressure assessment in 2 months  Low back pain  Chronic low back pain of the recommendation of his orthopedic consult in a MRI scan of his lumbar spine has been recommended  The patient is medically cleared to undergo sedation for this study  Diagnoses and all orders for this visit:    Essential hypertension    Obstructive sleep apnea syndrome    Type 2 diabetes mellitus with complication (HCC)    Chronic right-sided low back pain without sciatica        Subjective:      Patient ID: Meryle Badder is a 75 y o  male  This 50-year-old gentleman with a history of chronic low back pain was evaluated recently by orthopedic services at [facility]  There advice is to pursue a MRI scan of the patient's lower spine prior to further evaluation for methods of treating his discomfort  The patient unfortunately has a significant claustrophobic condition in his unable to go into an MRI scanner awake    He is here today to have a medical evaluation prior to proceeding with sedation for the MRI procedure  He denies any recent colds or infections  He does not have any symptoms of chest pain palpitations or shortness of breath  His oxygen saturation on room air is 97%  He is noted to have a history of morbid obesity along with sleep apnea  His history also includes type 2 diabetes and hypertension  The following portions of the patient's history were reviewed and updated as appropriate:   He  has a past medical history of Anxiety, Claustrophobia, Colon polyps, Depression, Diabetes mellitus ([facility] ), Dyslipidemia, Esophageal polyp, GERD (gastroesophageal reflux disease), Glaucoma, Headache, Hypertension, Low back pain, Neck pain, Numbness and tingling in left arm, OAB (overactive bladder), Paroxysmal supraventricular tachycardia ([facility] ), and Sleep apnea  He   Patient Active Problem List    Diagnosis Date Noted    BPH with urinary obstruction 01/23/2020    Onychomycosis 11/25/2019    Urgency incontinence 11/20/2019    Mood disorder ([place] [address] ) 08/14/2019    Hypokalemia 07/24/2019    Palpitation 02/07/2019    Obstructive sleep apnea syndrome 06/04/2018    Morbid obesity with BMI of 40 0-44 9, adult ([place] [address] ) 06/04/2018    Mood disturbance 06/04/2018    Gastroesophageal reflux disease 06/04/2018    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    Type 2 diabetes mellitus with complication ([place] [address] ) 20/74/7801    Essential hypertension 01/29/2018    Peripheral neuropathy 01/29/2018    Low back pain      He  has a past surgical history that includes Cataract extraction (Bilateral); Knee arthroscopy (Bilateral); Lumbar spine surgery; Excision basal cell carcinoma; Total knee arthroplasty (Left); Replacement total knee (Right, 04/2017); Replacement total knee (Left, 03/2013); Anoscopy; Rectal biopsy; Mouth surgery; pr esophagogastroduodenoscopy transoral diagnostic (N/A, 2/13/2019); and pr colonoscopy flx dx w/collj spec when pfrmd (N/A, 2/13/2019)    His family history includes Alzheimer's disease in his mother; Diabetes in his mother and paternal grandmother; Esophageal cancer in his paternal grandfather; [place]i Race Hypertension in his father  He  reports that he quit smoking about 37 years ago  He has quit using smokeless tobacco  He reports that he drinks about 14 0 standard drinks of alcohol per week  He reports that he does not use drugs  Current Outpatient Medications   Medication Sig Dispense Refill    amLODIPine (NORVASC) 10 mg tablet TAKE 1 TABLET DAILY 90 tablet 3    brimonidine-timolol (COMBIGAN) 0 2-0 5 % Administer 1 drop into the left eye daily   citalopram (CeleXA) 10 mg tablet TAKE 1 TABLET DAILY 90 tablet 3    FLUZONE HIGH-DOSE 0 5 ML SHEILA TO BE GIVEN BY PHARMACIST PER STANDING ORDER  0    glimepiride (AMARYL) 1 mg tablet Take 2 tablets (2 mg total) by mouth daily with breakfast 180 tablet 2    latanoprost (XALATAN) 0 005 % ophthalmic solution 1 drop daily at bedtime   metoprolol succinate (TOPROL-XL) 25 mg 24 hr tablet TAKE 1 TABLET DAILY 90 tablet 3    metroNIDAZOLE (METROGEL) 0 75 % gel APPLY TO AFFECTED AREA(S) EVERY DAY  3    omeprazole (PriLOSEC) 20 mg delayed release capsule TAKE 1 CAPSULE DAILY 90 capsule 3    oxybutynin (DITROPAN-XL) 5 mg 24 hr tablet Take 1 tablet (5 mg total) by mouth 2 (two) times a day 60 tablet 0    rosuvastatin (CRESTOR) 5 mg tablet TAKE 1 TABLET DAILY 90 tablet 3    valsartan-hydrochlorothiazide (DIOVAN-HCT) 160-12 5 MG per tablet Take 1 tablet by mouth daily 90 tablet 2     No current facility-administered medications for this visit       Review of Systems   Genitourinary: Positive for frequency and urgency  Musculoskeletal: Positive for arthralgias, back pain and myalgias  All other systems reviewed and are negative          Objective:      /74   Pulse 74   Temp 98 9 °F (37 2 °C)   Resp 16   Ht 5' 7" (1 702 m)   Wt 135 kg (298 lb 3 2 oz)   SpO2 97%   BMI 46 70 kg/m²          Physical Exam   Constitutional: He is oriented to person, place, and time  He appears well-developed and well-nourished  No distress  HENT:   Head: Normocephalic and atraumatic  Right Ear: Hearing, tympanic membrane, external ear and ear canal normal    Left Ear: Hearing, tympanic membrane, external ear and ear canal normal    Nose: Nose normal    Mouth/Throat: Oropharynx is clear and moist and mucous membranes are normal    Eyes: Pupils are equal, round, and reactive to light  Conjunctivae are normal  Right eye exhibits no discharge  Left eye exhibits no discharge  Neck: No JVD present  No tracheal deviation present  No thyromegaly present  Cardiovascular: Normal rate, regular rhythm, S1 normal, S2 normal, normal heart sounds and intact distal pulses  No murmur heard  Pulmonary/Chest: Effort normal and breath sounds normal  No stridor  No respiratory distress  He has no wheezes  He has no rales  Abdominal: Soft  Bowel sounds are normal    Musculoskeletal: Normal range of motion  He exhibits no edema  Lymphadenopathy:     He has no cervical adenopathy  Neurological: He is alert and oriented to person, place, and time  He has normal reflexes  He displays normal reflexes  No cranial nerve deficit  Skin: Skin is warm and dry  He is not diaphoretic  Psychiatric: He has a normal mood and affect   His behavior is normal  Judgment and thought content normal

## 2020-01-31 NOTE — ASSESSMENT & PLAN NOTE
Sleep apnea syndrome should not pose an issue for a short period of sedation to be in the MRI scanner  Recommend monitoring oxygen saturation during the course of his MRI scan

## 2020-02-11 ENCOUNTER — TELEPHONE (OUTPATIENT)
Dept: PREADMISSION TESTING | Facility: HOSPITAL | Age: 75
End: 2020-02-11

## 2020-02-13 ENCOUNTER — ANESTHESIA EVENT (OUTPATIENT)
Dept: RADIOLOGY | Facility: HOSPITAL | Age: 75
End: 2020-02-13

## 2020-02-18 NOTE — PRE-PROCEDURE INSTRUCTIONS
Pre-Surgery Instructions:   Medication Instructions    amLODIPine (NORVASC) 10 mg tablet Instructed patient per Anesthesia Guidelines   brimonidine-timolol (COMBIGAN) 0 2-0 5 % Instructed patient per Anesthesia Guidelines   citalopram (CeleXA) 10 mg tablet Instructed patient per Anesthesia Guidelines   glimepiride (AMARYL) 1 mg tablet Instructed patient per Anesthesia Guidelines   latanoprost (XALATAN) 0 005 % ophthalmic solution Instructed patient per Anesthesia Guidelines   metoprolol succinate (TOPROL-XL) 25 mg 24 hr tablet Instructed patient per Anesthesia Guidelines   metroNIDAZOLE (METROGEL) 0 75 % gel Instructed patient per Anesthesia Guidelines   omeprazole (PriLOSEC) 20 mg delayed release capsule Instructed patient per Anesthesia Guidelines   oxybutynin (DITROPAN-XL) 5 mg 24 hr tablet Instructed patient per Anesthesia Guidelines   rosuvastatin (CRESTOR) 5 mg tablet Instructed patient per Anesthesia Guidelines   valsartan-hydrochlorothiazide (DIOVAN-HCT) 160-12 5 MG per tablet Instructed patient per Anesthesia Guidelines  Pt aware that he needs a ride home after the MRI  Pt instructed to take RX meds on day of MRI except for valsartan and glimepiride with 1-2 sips of water

## 2020-02-18 NOTE — PRE-PROCEDURE INSTRUCTIONS
Pre-Surgery Instructions:   Medication Instructions    amLODIPine (NORVASC) 10 mg tablet Instructed patient per Anesthesia Guidelines   brimonidine-timolol (COMBIGAN) 0 2-0 5 % Instructed patient per Anesthesia Guidelines   citalopram (CeleXA) 10 mg tablet Instructed patient per Anesthesia Guidelines   glimepiride (AMARYL) 1 mg tablet Instructed patient per Anesthesia Guidelines   latanoprost (XALATAN) 0 005 % ophthalmic solution Instructed patient per Anesthesia Guidelines   metoprolol succinate (TOPROL-XL) 25 mg 24 hr tablet Instructed patient per Anesthesia Guidelines   metroNIDAZOLE (METROGEL) 0 75 % gel Instructed patient per Anesthesia Guidelines   omeprazole (PriLOSEC) 20 mg delayed release capsule Instructed patient per Anesthesia Guidelines   oxybutynin (DITROPAN-XL) 5 mg 24 hr tablet Instructed patient per Anesthesia Guidelines   rosuvastatin (CRESTOR) 5 mg tablet Instructed patient per Anesthesia Guidelines   valsartan-hydrochlorothiazide (DIOVAN-HCT) 160-12 5 MG per tablet Instructed patient per Anesthesia Guidelines

## 2020-02-18 NOTE — ANESTHESIA PREPROCEDURE EVALUATION
Review of Systems/Medical History  Patient summary reviewed  Chart reviewed  No history of anesthetic complications     Cardiovascular  Hypertension ,    Pulmonary  Smoker ex-smoker  , Sleep apnea ,        GI/Hepatic    GERD ,        Negative  ROS        Endo/Other  Diabetes type 2 ,   Obesity  morbid obesity   GYN       Hematology  Negative hematology ROS      Musculoskeletal  Back pain , lumbar pain,        Neurology    Headaches,    Psychology   Anxiety, Depression ,          Previous MRI anesthesia:  MRI lumbar spine - MAC (no anesthetic given)  MRI brain - LMA 5    TTE 10/2016:  LEFT VENTRICLE:  The ventricle was mildly dilated  Systolic function was normal   There were no regional wall motion abnormalities  Wall thickness was mildly increased  There was mild concentric hypertrophy  RIGHT VENTRICLE: The size was normal  Systolic function was normal  Wall  thickness was normal     valve difficult to assess,but probably no significant regurg    Lab Results   Component Value Date    WBC 7 16 03/12/2019    HGB 12 1 03/12/2019    HCT 36 2 (L) 03/12/2019    MCV 98 03/12/2019     03/12/2019     Lab Results   Component Value Date    SODIUM 141 11/18/2019    K 3 3 (L) 11/18/2019     11/18/2019    CO2 27 11/18/2019    BUN 18 11/18/2019    CREATININE 1 33 (H) 11/18/2019    GLUC 121 10/05/2016    CALCIUM 8 8 11/18/2019     No results found for: INR, PROTIME    Lab Results   Component Value Date    HGBA1C 7 1 (H) 11/18/2019         Physical Exam    Airway    Mallampati score: II  TM Distance: >3 FB  Neck ROM: full     Dental   No notable dental hx     Cardiovascular  Cardiovascular exam normal    Pulmonary  Pulmonary exam normal     Other Findings        Anesthesia Plan  ASA Score- 3     Anesthesia Type- general with ASA Monitors  Additional Monitors:   Airway Plan: LMA  Plan Factors-    Induction- intravenous      Postoperative Plan-   Planned trial extubation    Informed Consent- Anesthetic plan and risks discussed with patient  I personally reviewed this patient with the CRNA  Discussed and agreed on the Anesthesia Plan with the CRNA  Ruslan Colmenares

## 2020-02-19 ENCOUNTER — ANESTHESIA (OUTPATIENT)
Dept: RADIOLOGY | Facility: HOSPITAL | Age: 75
End: 2020-02-19

## 2020-02-19 ENCOUNTER — HOSPITAL ENCOUNTER (OUTPATIENT)
Dept: RADIOLOGY | Facility: HOSPITAL | Age: 75
Discharge: HOME | End: 2020-02-19
Attending: ORTHOPAEDIC SURGERY
Payer: MEDICARE

## 2020-02-19 VITALS
OXYGEN SATURATION: 95 % | HEART RATE: 60 BPM | SYSTOLIC BLOOD PRESSURE: 146 MMHG | WEIGHT: 298 LBS | BODY MASS INDEX: 46.77 KG/M2 | DIASTOLIC BLOOD PRESSURE: 65 MMHG | HEIGHT: 67 IN | RESPIRATION RATE: 20 BRPM | TEMPERATURE: 98.2 F

## 2020-02-19 DIAGNOSIS — M51.36 DDD (DEGENERATIVE DISC DISEASE), LUMBAR: ICD-10-CM

## 2020-02-19 DIAGNOSIS — G89.29 CHRONIC MIDLINE LOW BACK PAIN WITHOUT SCIATICA: ICD-10-CM

## 2020-02-19 DIAGNOSIS — M54.50 CHRONIC MIDLINE LOW BACK PAIN WITHOUT SCIATICA: ICD-10-CM

## 2020-02-19 LAB — GLUCOSE SERPL-MCNC: 159 MG/DL (ref 65–140)

## 2020-02-19 PROCEDURE — 82948 REAGENT STRIP/BLOOD GLUCOSE: CPT

## 2020-02-19 PROCEDURE — 72148 MRI LUMBAR SPINE W/O DYE: CPT

## 2020-02-19 RX ORDER — LIDOCAINE HYDROCHLORIDE 10 MG/ML
INJECTION, SOLUTION EPIDURAL; INFILTRATION; INTRACAUDAL; PERINEURAL AS NEEDED
Status: DISCONTINUED | OUTPATIENT
Start: 2020-02-19 | End: 2020-02-19 | Stop reason: SURG

## 2020-02-19 RX ORDER — EPHEDRINE SULFATE 50 MG/ML
INJECTION INTRAVENOUS AS NEEDED
Status: DISCONTINUED | OUTPATIENT
Start: 2020-02-19 | End: 2020-02-19 | Stop reason: SURG

## 2020-02-19 RX ORDER — LIDOCAINE HYDROCHLORIDE 10 MG/ML
0.5 INJECTION, SOLUTION EPIDURAL; INFILTRATION; INTRACAUDAL; PERINEURAL ONCE AS NEEDED
Status: COMPLETED | OUTPATIENT
Start: 2020-02-19 | End: 2020-02-19

## 2020-02-19 RX ORDER — PROPOFOL 10 MG/ML
INJECTION, EMULSION INTRAVENOUS AS NEEDED
Status: DISCONTINUED | OUTPATIENT
Start: 2020-02-19 | End: 2020-02-19 | Stop reason: SURG

## 2020-02-19 RX ORDER — SODIUM CHLORIDE, SODIUM LACTATE, POTASSIUM CHLORIDE, CALCIUM CHLORIDE 600; 310; 30; 20 MG/100ML; MG/100ML; MG/100ML; MG/100ML
20 INJECTION, SOLUTION INTRAVENOUS CONTINUOUS
Status: DISCONTINUED | OUTPATIENT
Start: 2020-02-19 | End: 2020-02-20 | Stop reason: HOSPADM

## 2020-02-19 RX ORDER — ONDANSETRON 2 MG/ML
INJECTION INTRAMUSCULAR; INTRAVENOUS AS NEEDED
Status: DISCONTINUED | OUTPATIENT
Start: 2020-02-19 | End: 2020-02-19 | Stop reason: SURG

## 2020-02-19 RX ADMIN — EPHEDRINE SULFATE 10 MG: 50 INJECTION, SOLUTION INTRAVENOUS at 10:18

## 2020-02-19 RX ADMIN — LIDOCAINE HYDROCHLORIDE 50 MG: 10 INJECTION, SOLUTION EPIDURAL; INFILTRATION; INTRACAUDAL; PERINEURAL at 10:10

## 2020-02-19 RX ADMIN — PROPOFOL 300 MG: 10 INJECTION, EMULSION INTRAVENOUS at 10:10

## 2020-02-19 RX ADMIN — SODIUM CHLORIDE, SODIUM LACTATE, POTASSIUM CHLORIDE, AND CALCIUM CHLORIDE 20 ML/HR: .6; .31; .03; .02 INJECTION, SOLUTION INTRAVENOUS at 08:50

## 2020-02-19 RX ADMIN — ONDANSETRON 4 MG: 2 INJECTION INTRAMUSCULAR; INTRAVENOUS at 10:27

## 2020-02-19 RX ADMIN — EPHEDRINE SULFATE 10 MG: 50 INJECTION, SOLUTION INTRAVENOUS at 10:30

## 2020-02-19 RX ADMIN — LIDOCAINE HYDROCHLORIDE 0.1 ML: 10 INJECTION, SOLUTION EPIDURAL; INFILTRATION; INTRACAUDAL; PERINEURAL at 08:50

## 2020-02-19 NOTE — ANESTHESIA POSTPROCEDURE EVALUATION
Post-Op Assessment Note    CV Status:  Stable  Pain Score: 0    Pain management: adequate     Mental Status:  Alert and awake   Hydration Status:  Euvolemic   PONV Controlled:  Controlled   Airway Patency:  Patent   Post Op Vitals Reviewed: Yes      Staff: Anesthesiologist           /65 (02/19/20 1115)    Temp 98 2 °F (36 8 °C) (02/19/20 1115)    Pulse 60 (02/19/20 1115)   Resp 20 (02/19/20 1115)    SpO2 95 % (02/19/20 1115)

## 2020-02-19 NOTE — PROGRESS NOTES
Pt awake and alert  VS stable  Answered all of pt's questions, no further questions at this time  Pt notified of transfer to [facility], called [facility] spoke with [facility], [address]

## 2020-02-24 DIAGNOSIS — I10 ESSENTIAL HYPERTENSION: ICD-10-CM

## 2020-02-24 RX ORDER — VALSARTAN AND HYDROCHLOROTHIAZIDE 160; 12.5 MG/1; MG/1
1 TABLET, FILM COATED ORAL DAILY
Qty: 90 TABLET | Refills: 2 | Status: SHIPPED | OUTPATIENT
Start: 2020-02-24 | End: 2020-11-16

## 2020-02-27 ENCOUNTER — OFFICE VISIT (OUTPATIENT)
Dept: OBGYN CLINIC | Facility: HOSPITAL | Age: 75
End: 2020-02-27
Attending: INTERNAL MEDICINE
Payer: MEDICARE

## 2020-02-27 VITALS
HEART RATE: 65 BPM | SYSTOLIC BLOOD PRESSURE: 125 MMHG | WEIGHT: 299.4 LBS | BODY MASS INDEX: 46.99 KG/M2 | HEIGHT: 67 IN | DIASTOLIC BLOOD PRESSURE: 69 MMHG

## 2020-02-27 DIAGNOSIS — M47.816 LUMBAR FACET ARTHROPATHY: ICD-10-CM

## 2020-02-27 DIAGNOSIS — M48.061 LUMBAR FORAMINAL STENOSIS: ICD-10-CM

## 2020-02-27 DIAGNOSIS — G89.29 CHRONIC MIDLINE LOW BACK PAIN WITHOUT SCIATICA: Primary | ICD-10-CM

## 2020-02-27 DIAGNOSIS — M54.50 CHRONIC MIDLINE LOW BACK PAIN WITHOUT SCIATICA: Primary | ICD-10-CM

## 2020-02-27 PROCEDURE — 3066F NEPHROPATHY DOC TX: CPT | Performed by: ORTHOPAEDIC SURGERY

## 2020-02-27 PROCEDURE — 4040F PNEUMOC VAC/ADMIN/RCVD: CPT | Performed by: ORTHOPAEDIC SURGERY

## 2020-02-27 PROCEDURE — 3074F SYST BP LT 130 MM HG: CPT | Performed by: ORTHOPAEDIC SURGERY

## 2020-02-27 PROCEDURE — 3008F BODY MASS INDEX DOCD: CPT | Performed by: ORTHOPAEDIC SURGERY

## 2020-02-27 PROCEDURE — 1036F TOBACCO NON-USER: CPT | Performed by: ORTHOPAEDIC SURGERY

## 2020-02-27 PROCEDURE — 2022F DILAT RTA XM EVC RTNOPTHY: CPT | Performed by: ORTHOPAEDIC SURGERY

## 2020-02-27 PROCEDURE — 1160F RVW MEDS BY RX/DR IN RCRD: CPT | Performed by: ORTHOPAEDIC SURGERY

## 2020-02-27 PROCEDURE — 99213 OFFICE O/P EST LOW 20 MIN: CPT | Performed by: ORTHOPAEDIC SURGERY

## 2020-02-27 PROCEDURE — 3078F DIAST BP <80 MM HG: CPT | Performed by: ORTHOPAEDIC SURGERY

## 2020-02-27 NOTE — PROGRESS NOTES
Assessment:   Diagnosis ICD-10-CM Associated Orders   1  Chronic midline low back pain without sciatica M54 5 Ambulatory referral to Pain Management    G89 [id number]    2  Lumbar facet arthropathy M47 816 Ambulatory referral to Pain Management   3  Lumbar foraminal stenosis M48 061 Ambulatory referral to Pain Management       Plan:  Diagnostics reviewed and physical exam performed  Diagnosis, treatment options and associated risks were discussed with the patient including no treatment, nonsurgical treatment and potential for surgical intervention  The patient was given the opportunity to ask questions regarding each  Lumbar spine surgery is not indicated nor is it offered  Recommendation is to undergo injections with spine and pain in regards of epidurals as well as facet blocks  Patient was educated on maintaining a healthy lifestyle with proper weight management/loss and physician recommended home exercise program/routine for regular fitness  To do next visit:  Return for re-check on an as needed basis (PRN), for to Spine and Pain for non operative modalities  It was explained to the patient that if they developed new onset of incapacitating pain, new neurological deficit or deformity, they should contact our office or seek out the closest emergency room immediately as this may indicate a worrisome complication related to their condition  The above stated was discussed in layman's terms and the patient expressed understanding  All questions were answered to the patient's satisfaction         Scribe Attestation    I,:   Ashu Holliday am acting as a scribe while in the presence of the attending physician :        I,:   Cherry Person MD personally performed the services described in this documentation    as scribed in my presence :              Subjective:   Julian Sanchez is a 75 y o  male who presents presents today for discussion of his recent lumbar spine MRI due to left greater than right-sided low back pain  Patient denies any leg symptoms  Denies any bowel or bladder incontinence  Patient states that several years ago he had some sort of lumbar spine surgery in which he describes as a diskectomy  Review of systems negative unless otherwise specified in HPI    Past Medical History:   Diagnosis Date    Anxiety     Claustrophobia     Colon polyps     Depression     Diabetes mellitus ([address] )     Dyslipidemia     Esophageal polyp     GERD (gastroesophageal reflux disease)     Glaucoma     Headache     Hypertension     Low back pain     Neck pain     Numbness and tingling in left arm     resolved 2/20/17 / numbness and tingling left side last assessed 3/23/16    OAB (overactive bladder)     Paroxysmal supraventricular tachycardia (HCC)     Sleep apnea     wears cpap        Past Surgical History:   Procedure Laterality Date    ANOSCOPY      for polyp removal    BASAL CELL CARCINOMA EXCISION      right cheek    CATARACT EXTRACTION Bilateral     KNEE ARTHROSCOPY Bilateral     LUMBAR SPINE SURGERY      30years ago    MOUTH SURGERY      tooth extraction    [place] COLONOSCOPY FLX DX W/COLLJ SPEC WHEN PFRMD N/A 2/13/2019    Procedure: COLONOSCOPY;  Surgeon: Krishna Bran MD;  [facility]; Service: Gastroenterology    [place] ESOPHAGOGASTRODUODENOSCOPY TRANSORAL DIAGNOSTIC N/A 2/13/2019    Procedure: ESOPHAGOGASTRODUODENOSCOPY (EGD); Surgeon: Krishna Bran MD;  [facility];   Service: Gastroenterology    RECTAL BIOPSY      REPLACEMENT TOTAL KNEE Right 04/2017    REPLACEMENT TOTAL KNEE Left 03/2013    TOTAL KNEE ARTHROPLASTY Left        Family History   Problem Relation Age of Onset    Alzheimer's disease Mother     Diabetes Mother    Iraida Polanco Hypertension Father     Diabetes Paternal [de-identified]     Esophageal cancer Paternal Grandfather        Social History     Occupational History    Occupation: retired   Tobacco Use    Smoking status: Former Smoker     Last attempt to quit: 1983     Years since quittin 1    Smokeless tobacco: Former User   Substance and Sexual Activity    Alcohol use: Yes     Alcohol/week: 14 0 standard drinks     Types: 14 Shots of liquor per week     Frequency: 4 or more times a week     Drinks per session: 1 or 2     Binge frequency: Never     Comment: "couple of drinks each night"    Drug use: No    Sexual activity: Not Currently     Partners: Female         Current Outpatient Medications:     amLODIPine (NORVASC) 10 mg tablet, TAKE 1 TABLET DAILY, Disp: 90 tablet, Rfl: 3    brimonidine-timolol (COMBIGAN) 0 2-0 5 %, Administer 1 drop to both eyes 2 (two) times a day , Disp: , Rfl:     citalopram (CeleXA) 10 mg tablet, TAKE 1 TABLET DAILY, Disp: 90 tablet, Rfl: 3    glimepiride (AMARYL) 1 mg tablet, Take 2 tablets (2 mg total) by mouth daily with breakfast, Disp: 180 tablet, Rfl: 2    latanoprost (XALATAN) 0 005 % ophthalmic solution, Administer 1 drop to both eyes daily at bedtime , Disp: , Rfl:     metoprolol succinate (TOPROL-XL) 25 mg 24 hr tablet, TAKE 1 TABLET DAILY, Disp: 90 tablet, Rfl: 3    metroNIDAZOLE (METROGEL) 0 75 % gel, APPLY TO AFFECTED AREA(S) EVERY DAY, Disp: , Rfl: 3    omeprazole (PriLOSEC) 20 mg delayed release capsule, TAKE 1 CAPSULE DAILY, Disp: 90 capsule, Rfl: 3    oxybutynin (DITROPAN-XL) 5 mg 24 hr tablet, Take 1 tablet (5 mg total) by mouth 2 (two) times a day, Disp: 60 tablet, Rfl: 0    rosuvastatin (CRESTOR) 5 mg tablet, TAKE 1 TABLET DAILY, Disp: 90 tablet, Rfl: 3    valsartan-hydrochlorothiazide (DIOVAN-HCT) 160-12 5 MG per tablet, Take 1 tablet by mouth daily, Disp: 90 tablet, Rfl: 2    Allergies   Allergen Reactions    Acetazolamide     Codeine Hives    Sulfa Antibiotics Other (See Comments)     Nausea              Vitals:    20 1028   BP: 125/69   Pulse: 65       Objective:                    Ortho Exam     Lumbar Spine Exam  Alignment:  Loss of lumbar lordosis  Inspection:  No swelling  No edema  No erythema  No ecchymosis  Palpation:  No tenderness to palpation as his symptoms are deep  ROM:  Lumbar spine range of motion is restricted in all planes  Strength:  5/5 from L2-S1 BLE  Tests:  (-) Straight leg raise bilaterally  Neurovascular:  Sensation intact L1-S1 BLE  Gait:  Steady  Diagnostics, reviewed and taken today if performed as documented: The attending physician has personally reviewed the pertinent films in PACS and interpretation is as follows:    Lumbar spine MRI from 02/19/2020 was reviewed today and shows:  Disc bulge left-sided L3-4, right greater than left foraminal stenosis L4-5      Procedures, if performed today:    Procedures    None performed      Portions of the record may have been created with voice recognition software  Occasional wrong word or "sound a like" substitutions may have occurred due to the inherent limitations of voice recognition software  Read the chart carefully and recognize, using context, where substitutions have occurred

## 2020-03-13 ENCOUNTER — CONSULT (OUTPATIENT)
Dept: PAIN MEDICINE | Facility: MEDICAL CENTER | Age: 75
End: 2020-03-13
Payer: MEDICARE

## 2020-03-13 VITALS
BODY MASS INDEX: 46.46 KG/M2 | DIASTOLIC BLOOD PRESSURE: 66 MMHG | HEART RATE: 64 BPM | HEIGHT: 67 IN | WEIGHT: 296 LBS | RESPIRATION RATE: 18 BRPM | SYSTOLIC BLOOD PRESSURE: 128 MMHG

## 2020-03-13 DIAGNOSIS — M51.16 LUMBAR DISC DISEASE WITH RADICULOPATHY: Primary | ICD-10-CM

## 2020-03-13 DIAGNOSIS — M48.061 LUMBAR FORAMINAL STENOSIS: ICD-10-CM

## 2020-03-13 DIAGNOSIS — M47.816 LUMBAR FACET ARTHROPATHY: ICD-10-CM

## 2020-03-13 DIAGNOSIS — G89.29 CHRONIC MIDLINE LOW BACK PAIN WITHOUT SCIATICA: ICD-10-CM

## 2020-03-13 DIAGNOSIS — M54.50 CHRONIC MIDLINE LOW BACK PAIN WITHOUT SCIATICA: ICD-10-CM

## 2020-03-13 PROCEDURE — 3066F NEPHROPATHY DOC TX: CPT | Performed by: PHYSICAL MEDICINE & REHABILITATION

## 2020-03-13 PROCEDURE — 99204 OFFICE O/P NEW MOD 45 MIN: CPT | Performed by: PHYSICAL MEDICINE & REHABILITATION

## 2020-03-13 PROCEDURE — 1036F TOBACCO NON-USER: CPT | Performed by: PHYSICAL MEDICINE & REHABILITATION

## 2020-03-13 PROCEDURE — 4040F PNEUMOC VAC/ADMIN/RCVD: CPT | Performed by: PHYSICAL MEDICINE & REHABILITATION

## 2020-03-13 PROCEDURE — 3074F SYST BP LT 130 MM HG: CPT | Performed by: PHYSICAL MEDICINE & REHABILITATION

## 2020-03-13 PROCEDURE — 3008F BODY MASS INDEX DOCD: CPT | Performed by: PHYSICAL MEDICINE & REHABILITATION

## 2020-03-13 PROCEDURE — 1160F RVW MEDS BY RX/DR IN RCRD: CPT | Performed by: PHYSICAL MEDICINE & REHABILITATION

## 2020-03-13 PROCEDURE — 2022F DILAT RTA XM EVC RTNOPTHY: CPT | Performed by: PHYSICAL MEDICINE & REHABILITATION

## 2020-03-13 PROCEDURE — 3078F DIAST BP <80 MM HG: CPT | Performed by: PHYSICAL MEDICINE & REHABILITATION

## 2020-03-13 NOTE — PROGRESS NOTES
Assessment  1  Lumbar disc disease with radiculopathy    2  Chronic midline low back pain without sciatica    3  Lumbar facet arthropathy    4  Lumbar foraminal stenosis        Plan  Mr Ernesto Conway is a pleasant 50-year-old male who presents for initial evaluation regarding chronic low back pain with intermittent pain radiating down his left anterior thigh  He has tried conservative measures including physical therapy relative rest and over-the-counter NSAIDs with minimal relief in his pain  He has already been evaluated by Dr Gopal Underwood with no surgical intervention advised at this time  He also has MRI evidence of a left-sided subarticular extraforaminal disc protrusion at L3-L4 possibly contacting the left L3 nerve root this was corresponding with today's clinical exam   In addition believe his pain is multi factorial with lumbar facet syndrome of the bilateral L3, L4, L5 medial branches  He does not wish to trial further oral medications and would prefer interventional approaches to manage his pain  I would agree at this time an interventional approach would be recommended and beneficial   As such we will start with  1  Left-sided L3-L4 and L4-L5 TFESI under fluoro guidance  2  Continue relative rest at home exercises  3  Complete risks and benefits including bleeding, infection, tissue reaction, nerve injury and allergic reaction were discussed  The approach was demonstrated using models and literature was provided  Verbal and written consent was obtained  My impressions and treatment recommendations were discussed in detail with the patient who verbalized understanding and had no further questions  Discharge instructions were provided  I personally saw and examined the patient and I agree with the above discussed plan of care      Orders Placed This Encounter   Procedures    FL spine and pain procedure     Standing Status:   Future     Standing Expiration Date:   3/13/2024     Order Specific Question: Reason for Exam:     Answer:   left sided L3-L4 and L4-L5 TFESI     Order Specific Question:   Anticoagulant hold needed? Answer:   No     No orders of the defined types were placed in this encounter  History of Present Illness    Jennie Koch is a 75 y o  male presents to [place] [address] [facility] with complaints of 7 months duration of midline low back pain  Patient denies any significant inciting event or recent trauma  He states the pain is moderate to severe rated 4/10 and interfering with daily activities  Pain is nearly constant 60-95% of the time that is worse in the afternoon  He describes the pain as shooting, sharp and denies any significant weakness in the bilateral lower extremities  Pain is worsened with standing, bending, walking and he has had no significant relief with physical therapy and exercise  Currently not taking any over-the-counter NSAIDs for his pain  He has since seen Dr Kelli Jones on February 27, 2020 whom advised no surgical intervention and to follow up with pain management regarding chronic midline low back pain without sciatica and lumbar facet arthropathy  Presents today for evaluation of chronic low back pain  I have personally reviewed and/or updated the patient's past medical history, past surgical history, family history, social history, current medications, allergies, and vital signs today  Review of Systems   Constitutional: Negative for fever and unexpected weight change  HENT: Negative for trouble swallowing  Eyes: Negative for visual disturbance  Respiratory: Negative for shortness of breath and wheezing  Cardiovascular: Negative for chest pain and palpitations  Gastrointestinal: Negative for constipation, diarrhea, nausea and vomiting  Endocrine: Positive for polydipsia and polyuria  Negative for cold intolerance and heat intolerance  Genitourinary: Negative for difficulty urinating and frequency     Musculoskeletal: Positive for back pain and gait problem  Negative for arthralgias, joint swelling and myalgias  Skin: Negative for rash  Neurological: Negative for dizziness, seizures, syncope, weakness and headaches  Hematological: Does not bruise/bleed easily  Psychiatric/Behavioral: Negative for dysphoric mood  All other systems reviewed and are negative  Patient Active Problem List   Diagnosis    Low back pain    Type 2 diabetes mellitus with complication (HCC)    Essential hypertension    Peripheral neuropathy    Isolated proteinuria with morphologic lesion    Obstructive sleep apnea syndrome    Morbid obesity with BMI of 40 0-44 9, adult (HCC)    Mood disturbance    Gastroesophageal reflux disease    Palpitation    Hypokalemia    Mood disorder ([address] )    Urgency incontinence    Onychomycosis    BPH with urinary obstruction       Past Medical History:   Diagnosis Date    Anxiety     Claustrophobia     Colon polyps     Depression     Diabetes mellitus (HCC)     Dyslipidemia     Esophageal polyp     GERD (gastroesophageal reflux disease)     Glaucoma     Headache     Hypertension     Low back pain     Neck pain     Numbness and tingling in left arm     resolved 2/20/17 / numbness and tingling left side last assessed 3/23/16    OAB (overactive bladder)     Paroxysmal supraventricular tachycardia (HCC)     Sleep apnea     wears cpap        Past Surgical History:   Procedure Laterality Date    ANOSCOPY      for polyp removal    BASAL CELL CARCINOMA EXCISION      right cheek    CATARACT EXTRACTION Bilateral     KNEE ARTHROSCOPY Bilateral     LUMBAR SPINE SURGERY      30years ago    MOUTH SURGERY      tooth extraction    [place] COLONOSCOPY FLX DX W/COLLJ SPEC WHEN PFRMD N/A 2/13/2019    Procedure: COLONOSCOPY;  Surgeon: Som Davies MD;  [facility];   Service: Gastroenterology    [place] ESOPHAGOGASTRODUODENOSCOPY TRANSORAL DIAGNOSTIC N/A 2/13/2019    Procedure: ESOPHAGOGASTRODUODENOSCOPY (EGD); Surgeon: Sophie Mosher MD;  [facility]; Service: Gastroenterology    RECTAL BIOPSY      REPLACEMENT TOTAL KNEE Right 2017    REPLACEMENT TOTAL KNEE Left 2013    TOTAL KNEE ARTHROPLASTY Left        Family History   Problem Relation Age of Onset    Alzheimer's disease Mother     Diabetes Mother    Kulwant Salvage Hypertension Father     Diabetes Paternal [de-identified]     Esophageal cancer Paternal Grandfather        Social History     Occupational History    Occupation: retired   Tobacco Use    Smoking status: Former Smoker     Last attempt to quit: 1983     Years since quittin 1    Smokeless tobacco: Former User   Substance and Sexual Activity    Alcohol use:  Yes     Alcohol/week: 14 0 standard drinks     Types: 14 Shots of liquor per week     Frequency: 4 or more times a week     Drinks per session: 1 or 2     Binge frequency: Never     Comment: "couple of drinks each night"    Drug use: No    Sexual activity: Not Currently     Partners: Female       Current Outpatient Medications on File Prior to Visit   Medication Sig    amLODIPine (NORVASC) 10 mg tablet TAKE 1 TABLET DAILY    brimonidine-timolol (COMBIGAN) 0 2-0 5 % Administer 1 drop to both eyes 2 (two) times a day     citalopram (CeleXA) 10 mg tablet TAKE 1 TABLET DAILY    glimepiride (AMARYL) 1 mg tablet Take 2 tablets (2 mg total) by mouth daily with breakfast    latanoprost (XALATAN) 0 005 % ophthalmic solution Administer 1 drop to both eyes daily at bedtime     metoprolol succinate (TOPROL-XL) 25 mg 24 hr tablet TAKE 1 TABLET DAILY    metroNIDAZOLE (METROGEL) 0 75 % gel APPLY TO AFFECTED AREA(S) EVERY DAY    omeprazole (PriLOSEC) 20 mg delayed release capsule TAKE 1 CAPSULE DAILY    oxybutynin (DITROPAN-XL) 5 mg 24 hr tablet Take 1 tablet (5 mg total) by mouth 2 (two) times a day    rosuvastatin (CRESTOR) 5 mg tablet TAKE 1 TABLET DAILY    valsartan-hydrochlorothiazide (DIOVAN-HCT) 160-12 5 MG per tablet Take 1 tablet by mouth daily     No current facility-administered medications on file prior to visit  Allergies   Allergen Reactions    Codeine Hives    Sulfa Antibiotics Other (See Comments)     Nausea         Physical Exam    /66   Pulse 64   Resp 18   Ht 5' 7" (1 702 m)   Wt 134 kg (296 lb)   BMI 46 36 kg/m²     General: Well-developed, well-nourished individual in no acute distress  Mental: Appropriate mood and affect  Grossly oriented with coherent speech and thought processing  Neuro:  Cranial nerves: Cranial nerve function is grossly intact bilaterally  Strength: Bilateral lower extremity strength is normal and symmetric  No atrophy or tone abnormalities noted  Reflexes: Bilateral lower extremity muscle stretch reflexes are physiologic and symmetric  No ankle clonus is noted  Sensation:  Decreased sensation in patchy distribution left anterolateral thigh  SLR/Foraminal Compression Maneuvers: Straight leg raising in the   supine position is positive  for radicular pain left lower extremity  Gait:  Gait/gross motor: Gait is normal  Station is forward flexed posture  Musculoskeletal:  Palpation: Inspection and palpation of the spine and extremities are remarkable for tenderness to palpation left worse than right sided lumbar paraspinals  Spine:  Decreased active and passive range of motion lumbar spine with flexion and extension limited by pain  No gross axial skeletal deformities  POSITIVE pain with palpation lumbar facets with axial loading and rotational forces to the left and right-sided    Skin: Skin inspection grossly negative for erythema, breakdown, or concerning lesions in affected area  Lymph: No lymphadenopathy is appreciated in the involved extremity  Vessels: No lower extremity edema  Lungs: Breathing is comfortable and regular  No dyspnea noted during examination  Eyes: Visual field grossly intact to confrontation   No redness appreciated  ENT: No craniofacial deformities or asymmetry  No neck masses appreciated  Imaging  Study Result     MRI LUMBAR SPINE WITHOUT CONTRAST     INDICATION: Sciatica     COMPARISON:  Lumbar spine MRI 3/30/2016     TECHNIQUE:  Sagittal T1, sagittal T2, sagittal inversion recovery, axial T1 and axial T2, coronal T2     IMAGE QUALITY:  Diagnostic     FINDINGS:     VERTEBRAL BODIES:  There are 5 lumbar type vertebral bodies  There is normal alignment  There is no significant spondylolisthesis  Vertebrae heights are mainly preserved  No abnormal bone marrow signal   No suspicious discrete marrow lesion      SACRUM:  No abnormal signal within the sacrum  No evidence of insufficiency or stress fracture      DISTAL CORD AND CONUS:  Distal cord and conus are unremarkable      PARASPINAL SOFT TISSUES:  Unremarkable      Left renal cyst        LOWER THORACIC DISC SPACES:  Normal disc height and signal   No disc herniation, canal stenosis or foraminal narrowing      LUMBAR DISC SPACES:     L1-L2:  Unremarkable     L2-L3:  No significant disc bulge  Mild facet arthropathy  No canal or significant foraminal stenosis     L3-L4:  Left subarticular/extraforaminal disc protrusion possibly contacting exiting left L3 nerve root, not significantly changed from prior exam   Mild bilateral facet arthropathy  No significant canal stenosis  No foraminal stenosis     L4-L5:  Mild disc bulge and moderate bilateral facet arthropathy  Moderate right and mild left foraminal narrowing  No significant canal stenosis     L5-S1:  Minimal disc bulge  Moderate bilateral facet arthropathy  No canal stenosis  Mild left foraminal narrowing        IMPRESSION:     1   Left subarticular/extraforaminal disc protrusion at level L3-4 possibly contacting exiting left L3 nerve root, unchanged from prior MRI 3/30/2016  Correlate for left L3 radiculopathy      2  No significant canal stenosis at any level    Moderate right foraminal narrowing at L4-5, stable      Workstation performed: [id number]

## 2020-03-13 NOTE — PATIENT INSTRUCTIONS
Lumbar Radiculopathy   WHAT YOU NEED TO KNOW:   Lumbar radiculopathy is a painful condition that happens when a nerve in your lumbar spine (lower back) is pinched or irritated  Nerves control feeling and movement in your body  You may have numbness or pain that shoots down from your lower back towards your foot  DISCHARGE INSTRUCTIONS:   Medicines:   · Medicines:     ¨ NSAIDs , such as ibuprofen, help decrease swelling, pain, and fever  This medicine is available with or without a doctor's order  NSAIDs can cause stomach bleeding or kidney problems in certain people  If you take blood thinner medicine, always ask your healthcare provider if NSAIDs are safe for you  Always read the medicine label and follow directions  ¨ Muscle relaxers  help decrease pain and muscle spasms  ¨ Opioids: This is a strong medicine given to reduce severe pain  It is also called narcotic pain medicine  Take this medicine exactly as directed by your healthcare provider  ¨ Oral steroids: Steroids may also be given to reduce pain and swelling  ¨ Take your medicine as directed  Contact your healthcare provider if you think your medicine is not helping or if you have side effects  Tell him of her if you are allergic to any medicine  Keep a list of the medicines, vitamins, and herbs you take  Include the amounts, and when and why you take them  Bring the list or the pill bottles to follow-up visits  Carry your medicine list with you in case of an emergency  Follow up with your healthcare provider or spine specialist within 1 to 3 weeks:  After your first follow-up appointment, return to your healthcare provider or spine specialist every 2 weeks until you have healed  Ask for information about physical therapy for your condition  Write down your questions so you remember to ask them during your visits  Physical therapy:  You may need physical therapy to improve your condition   Your physical therapist may teach you certain exercises to improve posture (the way you stand and sit), flexibility, and strength in your lower back  Self care:   · Stay active: It is best to be active when you have lumbar radiculopathy  Your physical therapist or healthcare provider may tell you to take walks to ease yourself back into your daily routine  Avoid long periods of bed rest  Bed rest could worsen your symptoms  Do not move in ways that increase your pain  Ask for more information about the best ways to stay active  · Use ice or heat packs:  Use ice or heat packs as directed on the sore area of your body to decrease the pain and swelling  Put ice in a plastic bag covered with a towel on your low back  Cover heated items with a towel to avoid burns  Use ice and heat as directed  · Avoid heavy lifting: Your condition may worsen if you lift heavy things  Avoid lifting if possible  · Maintain a healthy weight:  Excess body weight may strain your back  Talk with your healthcare provider about ways to lose excess weight if you are overweight  Contact your healthcare provider or spine specialist if:   · Your pain does not improve within 1 to 3 weeks after treatment  · Your pain and weakness keep you from your normal activities at work, home, or school  · You lose more than 10 pounds in 6 months without trying  · You become depressed or sad because of the pain  · You have questions or concerns about your condition or care  Return to the emergency department if:   · You have a fever greater than 100 4°F for longer than 2 days  · You have new, severe back or leg pain, or your pain spreads to both legs  · You have any new signs of numbness or weakness, especially in your lower back, legs, arms, or genital area  · You have new trouble controlling your urine and bowel movements  · You do not feel like your bladder empties when you urinate    © 2017 [address] Information is for End User's use only and may not be sold, redistributed or otherwise used for commercial purposes  All illustrations and images included in CareNotes® are the copyrighted property of [employer]  or [employer]  The above information is an  only  It is not intended as medical advice for individual conditions or treatments  Talk to your doctor, nurse or pharmacist before following any medical regimen to see if it is safe and effective for you

## 2020-03-18 DIAGNOSIS — I10 ESSENTIAL HYPERTENSION: ICD-10-CM

## 2020-03-18 DIAGNOSIS — E78.00 PURE HYPERCHOLESTEROLEMIA: ICD-10-CM

## 2020-03-18 RX ORDER — METOPROLOL SUCCINATE 25 MG/1
25 TABLET, EXTENDED RELEASE ORAL DAILY
Qty: 90 TABLET | Refills: 3 | Status: ON HOLD | OUTPATIENT
Start: 2020-03-18 | End: 2021-01-12 | Stop reason: SDUPTHER

## 2020-03-18 RX ORDER — ROSUVASTATIN CALCIUM 5 MG/1
5 TABLET, COATED ORAL DAILY
Qty: 90 TABLET | Refills: 3 | Status: ON HOLD | OUTPATIENT
Start: 2020-03-18 | End: 2021-01-12 | Stop reason: SDUPTHER

## 2020-03-20 ENCOUNTER — APPOINTMENT (OUTPATIENT)
Dept: LAB | Age: 75
End: 2020-03-20
Payer: MEDICARE

## 2020-03-20 ENCOUNTER — TRANSCRIBE ORDERS (OUTPATIENT)
Dept: ADMINISTRATIVE | Age: 75
End: 2020-03-20

## 2020-03-20 DIAGNOSIS — E11.8 TYPE 2 DIABETES MELLITUS WITH COMPLICATION (HCC): ICD-10-CM

## 2020-03-20 DIAGNOSIS — E11.8 DIABETIC COMPLICATION (HCC): ICD-10-CM

## 2020-03-20 DIAGNOSIS — E11.8 DIABETIC COMPLICATION (HCC): Primary | ICD-10-CM

## 2020-03-20 LAB
ALBUMIN SERPL BCP-MCNC: 3.2 G/DL (ref 3.5–5)
ALP SERPL-CCNC: 145 U/L (ref 46–116)
ALT SERPL W P-5'-P-CCNC: 44 U/L (ref 12–78)
ANION GAP SERPL CALCULATED.3IONS-SCNC: 4 MMOL/L (ref 4–13)
AST SERPL W P-5'-P-CCNC: 22 U/L (ref 5–45)
BILIRUB SERPL-MCNC: 0.43 MG/DL (ref 0.2–1)
BUN SERPL-MCNC: 15 MG/DL (ref 5–25)
CALCIUM SERPL-MCNC: 8.8 MG/DL (ref 8.3–10.1)
CHLORIDE SERPL-SCNC: 105 MMOL/L (ref 100–108)
CO2 SERPL-SCNC: 30 MMOL/L (ref 21–32)
CREAT SERPL-MCNC: 1.13 MG/DL (ref 0.6–1.3)
EST. AVERAGE GLUCOSE BLD GHB EST-MCNC: 148 MG/DL
GFR SERPL CREATININE-BSD FRML MDRD: 63 ML/MIN/1.73SQ M
GLUCOSE P FAST SERPL-MCNC: 140 MG/DL (ref 65–99)
HBA1C MFR BLD: 6.8 %
POTASSIUM SERPL-SCNC: 3.5 MMOL/L (ref 3.5–5.3)
PROT SERPL-MCNC: 7.8 G/DL (ref 6.4–8.2)
SODIUM SERPL-SCNC: 139 MMOL/L (ref 136–145)

## 2020-03-20 PROCEDURE — 83036 HEMOGLOBIN GLYCOSYLATED A1C: CPT

## 2020-03-20 PROCEDURE — 80053 COMPREHEN METABOLIC PANEL: CPT

## 2020-03-20 PROCEDURE — 36415 COLL VENOUS BLD VENIPUNCTURE: CPT

## 2020-03-23 DIAGNOSIS — N39.8 VOIDING DYSFUNCTION: ICD-10-CM

## 2020-03-23 RX ORDER — OXYBUTYNIN CHLORIDE 5 MG/1
5 TABLET, EXTENDED RELEASE ORAL 2 TIMES DAILY
Qty: 180 TABLET | Refills: 0 | Status: SHIPPED | OUTPATIENT
Start: 2020-03-23 | End: 2020-06-19 | Stop reason: DRUGHIGH

## 2020-03-23 NOTE — TELEPHONE ENCOUNTER
Patient left a message on the Medication Refill voice mail line requesting a new prescription for Oxybutynin ER 5mg 1 PO BID, 30 day supply (60 tablets) to [facility]  He only has 3 days worth of medication left

## 2020-03-23 NOTE — TELEPHONE ENCOUNTER
The patient was last seen on 1/23/2020 by Dr Misael Kumar in the [facility] location  At that time, the patient's daily dosage was increased to 10mg  The patient has an upcoming office visit scheduled for 5/6/2020 with Dr Misael Kumar in the [facility] location but will run out of medication until then  I left a message for the patient to return my call

## 2020-03-23 NOTE — TELEPHONE ENCOUNTER
Patient returned my call  He is taking the medication BID not QD and he would prefer the 5mg dosage instead of the 10mg QD dosage  He is quite pleased with the medication increase at controlling his bothersome urinary symptoms    Script for same, 90 day supply (180 tablets) with NO refills was queued and forwarded to the Advanced Practitioner covering the [address] [place] [address] location for approval

## 2020-03-25 ENCOUNTER — TELEMEDICINE (OUTPATIENT)
Dept: INTERNAL MEDICINE CLINIC | Facility: CLINIC | Age: 75
End: 2020-03-25
Payer: MEDICARE

## 2020-03-25 DIAGNOSIS — R15.9 INCONTINENCE OF FECES WITH FECAL URGENCY: ICD-10-CM

## 2020-03-25 DIAGNOSIS — N39.41 URGENCY INCONTINENCE: ICD-10-CM

## 2020-03-25 DIAGNOSIS — I10 ESSENTIAL HYPERTENSION: ICD-10-CM

## 2020-03-25 DIAGNOSIS — E11.8 TYPE 2 DIABETES MELLITUS WITH COMPLICATION (HCC): Primary | ICD-10-CM

## 2020-03-25 DIAGNOSIS — R15.2 INCONTINENCE OF FECES WITH FECAL URGENCY: ICD-10-CM

## 2020-03-25 PROCEDURE — 99443 PR PHYS/QHP TELEPHONE EVALUATION 21-30 MIN: CPT | Performed by: INTERNAL MEDICINE

## 2020-03-25 NOTE — ASSESSMENT & PLAN NOTE
Patient continues under treatment for his hypertension he has no apparent symptoms of uncontrolled hypertension or hypotension and I recommend that he continue present treatment

## 2020-03-25 NOTE — ASSESSMENT & PLAN NOTE
Symptoms of urinary incontinence reviewed today it is believed that he either has incomplete relaxation of the bladder or BPH  He was scheduled for a cystoscopic examination however this has been postponed due to the current corona virus outbreak in our community  Once this cystoscopic is performed we will review the results and recommendations of his urologist in the meantime it is recommended that he continue on his oxybutynin therapy

## 2020-03-25 NOTE — ASSESSMENT & PLAN NOTE
Intermittent symptoms of diarrhea and fecal incontinence  He indicates that sometimes when he tries to pass gas he soils his underwear  There are times when he seems to have an urgent bowel movement which is not formed but is of a pudding consistency  We reviewed the possibility that this could be a dietary intolerance and we reviewed some of the foods that may be contributing to the symptoms it is also possible he may be developing autonomic neuropathy secondary to his type 2 diabetes

## 2020-03-25 NOTE — PROGRESS NOTES
Virtual Regular Visit    Problem List Items Addressed This Visit        Endocrine    Type 2 diabetes mellitus with complication ([facility] ) - Primary     During today's assessment of the patient's diabetes it appears that he is having occasional episodes of hypoglycemia corrected by consumption of peanut butter crackers or a candy bar  I have recommended that he try to space his meals no longer than 5 hours apart and also have requested that he try to minimize his concentrated sugar consumption at mealtime  He would benefit from increasing his complex carbohydrate and protein consumption  Strategies to minimize is hypoglycemia today were reviewed in detail  Patient appears to have adequate control of his diabetes otherwise with a hemoglobin A1c value of 6 8% I recommend that he continue his Amaryl at 2 mg daily with a follow-up assessment of his type 2 diabetes in 4 months  If he has further problems with hypoglycemia he should contact me  Lab Results   Component Value Date    HGBA1C 6 8 (H) 03/20/2020            Relevant Orders    Comprehensive metabolic panel    Hemoglobin A1C       Cardiovascular and Mediastinum    Essential hypertension     Patient continues under treatment for his hypertension he has no apparent symptoms of uncontrolled hypertension or hypotension and I recommend that he continue present treatment  Other    Urgency incontinence     Symptoms of urinary incontinence reviewed today it is believed that he either has incomplete relaxation of the bladder or BPH  He was scheduled for a cystoscopic examination however this has been postponed due to the current corona virus outbreak in our community  Once this cystoscopic is performed we will review the results and recommendations of his urologist in the meantime it is recommended that he continue on his oxybutynin therapy  Fecal incontinence     Intermittent symptoms of diarrhea and fecal incontinence    He indicates that sometimes when he tries to pass gas he soils his underwear  There are times when he seems to have an urgent bowel movement which is not formed but is of a pudding consistency  We reviewed the possibility that this could be a dietary intolerance and we reviewed some of the foods that may be contributing to the symptoms it is also possible he may be developing autonomic neuropathy secondary to his type 2 diabetes  Reason for visit is routine follow-up visit and assessment of type 2 diabetes control    Encounter provider Samm Lucia MD    Provider located at [address] [address]      Recent Visits  No visits were found meeting these conditions  Showing recent visits within past 7 days and meeting all other requirements     Today's Visits  Date Type Provider Dept   03/25/20 Telemedicine Samm Lucia MD [facility]   Showing today's visits and meeting all other requirements     Future Appointments  No visits were found meeting these conditions  Showing future appointments within next 150 days and meeting all other requirements        After connecting through [employer], the patient was identified by name and date of birth  Keerthi Zelaya was informed that this is a telemedicine visit and that the visit is being conducted through telephone which may not be secure and therefore, might not be HIPAA-compliant  My office door was closed  No one else was in the room  He acknowledged consent and understanding of privacy and security of the video platform  The patient has agreed to participate and understands they can discontinue the visit at any time  Subjective  Keerthi Zelaya is a 75 y o  male presents today for a routine follow-up visit to assess hypertension, type 2 diabetes and review of recent blood work      The patient has symptoms of occasional weakness which appear to be hypoglycemic based he indicates that when he eats something with these episodes they improved quickly  He also has of symptoms of bladder urgency and we reviewed his recent visit with Urology services at [facility]  He is scheduled to have a cysto scop examination as soon as the current corona virus epidemic has resolved  He also has symptoms at times of urgency of bowel movement along with an occasional loose bowel movement other times his bowel movements are perfectly formed         Past Medical History:   Diagnosis Date    Anxiety     Claustrophobia     Colon polyps     Depression     Diabetes mellitus (HCC)     Dyslipidemia     Esophageal polyp     GERD (gastroesophageal reflux disease)     Glaucoma     Headache     Hypertension     Low back pain     Neck pain     Numbness and tingling in left arm     resolved 2/20/17 / numbness and tingling left side last assessed 3/23/16    OAB (overactive bladder)     Paroxysmal supraventricular tachycardia (HCC)     Sleep apnea     wears cpap        Past Surgical History:   Procedure Laterality Date    ANOSCOPY      for polyp removal    BASAL CELL CARCINOMA EXCISION      right cheek    CATARACT EXTRACTION Bilateral     KNEE ARTHROSCOPY Bilateral     LUMBAR SPINE SURGERY      30years ago    MOUTH SURGERY      tooth extraction    RI COLONOSCOPY FLX DX W/COLLJ SPEC WHEN PFRMD N/A 2/13/2019    Procedure: COLONOSCOPY;  Surgeon: Neelima Almeida MD;  Location: [facility]; Service: Gastroenterology    RI ESOPHAGOGASTRODUODENOSCOPY TRANSORAL DIAGNOSTIC N/A 2/13/2019    Procedure: ESOPHAGOGASTRODUODENOSCOPY (EGD); Surgeon: Neelima Almeida MD;  Location: [facility];   Service: Gastroenterology    RECTAL BIOPSY      REPLACEMENT TOTAL KNEE Right 04/2017    REPLACEMENT TOTAL KNEE Left 03/2013    TOTAL KNEE ARTHROPLASTY Left        Current Outpatient Medications   Medication Sig Dispense Refill    amLODIPine (NORVASC) 10 mg tablet TAKE 1 TABLET DAILY 90 tablet 3    brimonidine-timolol (COMBIGAN) 0 2-0 5 % Administer 1 drop to both eyes 2 (two) times a day       citalopram (CeleXA) 10 mg tablet TAKE 1 TABLET DAILY 90 tablet 3    glimepiride (AMARYL) 1 mg tablet Take 2 tablets (2 mg total) by mouth daily with breakfast 180 tablet 2    latanoprost (XALATAN) 0 005 % ophthalmic solution Administer 1 drop to both eyes daily at bedtime       metoprolol succinate (TOPROL-XL) 25 mg 24 hr tablet Take 1 tablet (25 mg total) by mouth daily 90 tablet 3    metroNIDAZOLE (METROGEL) 0 75 % gel APPLY TO AFFECTED AREA(S) EVERY DAY  3    omeprazole (PriLOSEC) 20 mg delayed release capsule TAKE 1 CAPSULE DAILY 90 capsule 3    oxybutynin (DITROPAN-XL) 5 mg 24 hr tablet Take 1 tablet (5 mg total) by mouth 2 (two) times a day 180 tablet 0    rosuvastatin (CRESTOR) 5 mg tablet Take 1 tablet (5 mg total) by mouth daily 90 tablet 3    valsartan-hydrochlorothiazide (DIOVAN-HCT) 160-12 5 MG per tablet Take 1 tablet by mouth daily 90 tablet 2     No current facility-administered medications for this visit  Allergies   Allergen Reactions    Codeine Hives    Sulfa Antibiotics Other (See Comments)     Nausea         Review of Systems of weak spells associated with diaphoresis, of occasional loose bowel movements with stool incontinence and urgency and occasional episodes of bladder urgency  I spent 25 minutes with the patient during this visit

## 2020-03-25 NOTE — ASSESSMENT & PLAN NOTE
During today's assessment of the patient's diabetes it appears that he is having occasional episodes of hypoglycemia corrected by consumption of peanut butter crackers or a candy bar  I have recommended that he try to space his meals no longer than 5 hours apart and also have requested that he try to minimize his concentrated sugar consumption at mealtime  He would benefit from increasing his complex carbohydrate and protein consumption  Strategies to minimize is hypoglycemia today were reviewed in detail  Patient appears to have adequate control of his diabetes otherwise with a hemoglobin A1c value of 6 8% I recommend that he continue his Amaryl at 2 mg daily with a follow-up assessment of his type 2 diabetes in 4 months  If he has further problems with hypoglycemia he should contact me    Lab Results   Component Value Date    HGBA1C 6 8 (H) 03/20/2020

## 2020-04-15 ENCOUNTER — TELEPHONE (OUTPATIENT)
Dept: RHEUMATOLOGY | Facility: CLINIC | Age: 75
End: 2020-04-15

## 2020-04-23 DIAGNOSIS — E11.8 TYPE 2 DIABETES MELLITUS WITH COMPLICATION, WITHOUT LONG-TERM CURRENT USE OF INSULIN (HCC): ICD-10-CM

## 2020-04-23 RX ORDER — GLIMEPIRIDE 1 MG/1
2 TABLET ORAL
Qty: 180 TABLET | Refills: 3 | Status: SHIPPED | OUTPATIENT
Start: 2020-04-23 | End: 2020-07-29 | Stop reason: SDUPTHER

## 2020-04-27 ENCOUNTER — TELEPHONE (OUTPATIENT)
Dept: UROLOGY | Facility: MEDICAL CENTER | Age: 75
End: 2020-04-27

## 2020-04-27 ENCOUNTER — OFFICE VISIT (OUTPATIENT)
Dept: PODIATRY | Facility: CLINIC | Age: 75
End: 2020-04-27
Payer: MEDICARE

## 2020-04-27 VITALS — WEIGHT: 297 LBS | BODY MASS INDEX: 46.62 KG/M2 | HEIGHT: 67 IN

## 2020-04-27 DIAGNOSIS — G62.9 PERIPHERAL POLYNEUROPATHY: ICD-10-CM

## 2020-04-27 DIAGNOSIS — E11.8 TYPE 2 DIABETES MELLITUS WITH COMPLICATION (HCC): Primary | ICD-10-CM

## 2020-04-27 DIAGNOSIS — B35.1 ONYCHOMYCOSIS: ICD-10-CM

## 2020-04-27 PROCEDURE — 11721 DEBRIDE NAIL 6 OR MORE: CPT | Performed by: PODIATRIST

## 2020-04-27 RX ORDER — TIMOLOL MALEATE 5 MG/ML
SOLUTION/ DROPS OPHTHALMIC
COMMUNITY
Start: 2020-03-17 | End: 2022-01-10

## 2020-05-06 ENCOUNTER — PROCEDURE VISIT (OUTPATIENT)
Dept: UROLOGY | Facility: MEDICAL CENTER | Age: 75
End: 2020-05-06
Payer: MEDICARE

## 2020-05-06 VITALS
DIASTOLIC BLOOD PRESSURE: 84 MMHG | HEIGHT: 67 IN | BODY MASS INDEX: 46.77 KG/M2 | SYSTOLIC BLOOD PRESSURE: 132 MMHG | HEART RATE: 74 BPM | WEIGHT: 298 LBS

## 2020-05-06 DIAGNOSIS — N40.1 BPH WITH URINARY OBSTRUCTION: ICD-10-CM

## 2020-05-06 DIAGNOSIS — N13.8 BPH WITH URINARY OBSTRUCTION: ICD-10-CM

## 2020-05-06 DIAGNOSIS — N39.41 URGENCY INCONTINENCE: Primary | ICD-10-CM

## 2020-05-06 LAB
SL AMB  POCT GLUCOSE, UA: NEGATIVE
SL AMB LEUKOCYTE ESTERASE,UA: NEGATIVE
SL AMB POCT BILIRUBIN,UA: NEGATIVE
SL AMB POCT BLOOD,UA: ABNORMAL
SL AMB POCT CLARITY,UA: CLEAR
SL AMB POCT COLOR,UA: YELLOW
SL AMB POCT KETONES,UA: NEGATIVE
SL AMB POCT NITRITE,UA: NEGATIVE
SL AMB POCT PH,UA: 6
SL AMB POCT SPECIFIC GRAVITY,UA: 1.02
SL AMB POCT URINE PROTEIN: ABNORMAL
SL AMB POCT UROBILINOGEN: 0.2

## 2020-05-06 PROCEDURE — 81003 URINALYSIS AUTO W/O SCOPE: CPT | Performed by: UROLOGY

## 2020-05-06 PROCEDURE — 1036F TOBACCO NON-USER: CPT | Performed by: UROLOGY

## 2020-05-06 PROCEDURE — 3066F NEPHROPATHY DOC TX: CPT | Performed by: UROLOGY

## 2020-05-06 PROCEDURE — 1160F RVW MEDS BY RX/DR IN RCRD: CPT | Performed by: UROLOGY

## 2020-05-06 PROCEDURE — 99213 OFFICE O/P EST LOW 20 MIN: CPT | Performed by: UROLOGY

## 2020-05-06 PROCEDURE — 3044F HG A1C LEVEL LT 7.0%: CPT | Performed by: UROLOGY

## 2020-05-06 PROCEDURE — 4040F PNEUMOC VAC/ADMIN/RCVD: CPT | Performed by: UROLOGY

## 2020-05-06 PROCEDURE — 3079F DIAST BP 80-89 MM HG: CPT | Performed by: UROLOGY

## 2020-05-06 PROCEDURE — 52000 CYSTOURETHROSCOPY: CPT | Performed by: UROLOGY

## 2020-05-06 PROCEDURE — 3075F SYST BP GE 130 - 139MM HG: CPT | Performed by: UROLOGY

## 2020-05-06 PROCEDURE — 2022F DILAT RTA XM EVC RTNOPTHY: CPT | Performed by: UROLOGY

## 2020-05-06 PROCEDURE — 3008F BODY MASS INDEX DOCD: CPT | Performed by: UROLOGY

## 2020-05-06 RX ORDER — TAMSULOSIN HYDROCHLORIDE 0.4 MG/1
CAPSULE ORAL
Qty: 30 CAPSULE | Refills: 3 | Status: SHIPPED | OUTPATIENT
Start: 2020-05-06 | End: 2020-08-25

## 2020-06-15 ENCOUNTER — OFFICE VISIT (OUTPATIENT)
Dept: SLEEP CENTER | Facility: CLINIC | Age: 75
End: 2020-06-15
Payer: MEDICARE

## 2020-06-15 VITALS
SYSTOLIC BLOOD PRESSURE: 110 MMHG | WEIGHT: 299 LBS | HEART RATE: 73 BPM | DIASTOLIC BLOOD PRESSURE: 60 MMHG | HEIGHT: 67 IN | BODY MASS INDEX: 46.93 KG/M2

## 2020-06-15 DIAGNOSIS — R45.86 MOOD DISTURBANCE: ICD-10-CM

## 2020-06-15 DIAGNOSIS — I10 ESSENTIAL HYPERTENSION: ICD-10-CM

## 2020-06-15 DIAGNOSIS — E11.8 TYPE 2 DIABETES MELLITUS WITH COMPLICATION (HCC): ICD-10-CM

## 2020-06-15 DIAGNOSIS — K21.9 GASTROESOPHAGEAL REFLUX DISEASE, ESOPHAGITIS PRESENCE NOT SPECIFIED: ICD-10-CM

## 2020-06-15 DIAGNOSIS — E66.01 MORBID OBESITY WITH BMI OF 40.0-44.9, ADULT (HCC): ICD-10-CM

## 2020-06-15 DIAGNOSIS — R68.2 DRY MOUTH: ICD-10-CM

## 2020-06-15 DIAGNOSIS — G47.33 OSA (OBSTRUCTIVE SLEEP APNEA): Primary | ICD-10-CM

## 2020-06-15 DIAGNOSIS — M54.50 CHRONIC RIGHT-SIDED LOW BACK PAIN WITHOUT SCIATICA: ICD-10-CM

## 2020-06-15 DIAGNOSIS — G89.29 CHRONIC RIGHT-SIDED LOW BACK PAIN WITHOUT SCIATICA: ICD-10-CM

## 2020-06-15 PROCEDURE — 4040F PNEUMOC VAC/ADMIN/RCVD: CPT | Performed by: INTERNAL MEDICINE

## 2020-06-15 PROCEDURE — 3044F HG A1C LEVEL LT 7.0%: CPT | Performed by: INTERNAL MEDICINE

## 2020-06-15 PROCEDURE — 1160F RVW MEDS BY RX/DR IN RCRD: CPT | Performed by: INTERNAL MEDICINE

## 2020-06-15 PROCEDURE — 3008F BODY MASS INDEX DOCD: CPT | Performed by: INTERNAL MEDICINE

## 2020-06-15 PROCEDURE — 2022F DILAT RTA XM EVC RTNOPTHY: CPT | Performed by: INTERNAL MEDICINE

## 2020-06-15 PROCEDURE — 3066F NEPHROPATHY DOC TX: CPT | Performed by: INTERNAL MEDICINE

## 2020-06-15 PROCEDURE — 3078F DIAST BP <80 MM HG: CPT | Performed by: INTERNAL MEDICINE

## 2020-06-15 PROCEDURE — 99214 OFFICE O/P EST MOD 30 MIN: CPT | Performed by: INTERNAL MEDICINE

## 2020-06-15 PROCEDURE — 1036F TOBACCO NON-USER: CPT | Performed by: INTERNAL MEDICINE

## 2020-06-15 PROCEDURE — 3074F SYST BP LT 130 MM HG: CPT | Performed by: INTERNAL MEDICINE

## 2020-06-16 ENCOUNTER — TELEPHONE (OUTPATIENT)
Dept: SLEEP CENTER | Facility: CLINIC | Age: 75
End: 2020-06-16

## 2020-06-19 DIAGNOSIS — N39.41 URGENCY INCONTINENCE: Primary | ICD-10-CM

## 2020-06-19 RX ORDER — OXYBUTYNIN CHLORIDE 10 MG/1
10 TABLET, EXTENDED RELEASE ORAL DAILY
Qty: 90 TABLET | Refills: 3 | Status: SHIPPED | OUTPATIENT
Start: 2020-06-19 | End: 2020-11-06

## 2020-07-06 ENCOUNTER — OFFICE VISIT (OUTPATIENT)
Dept: PODIATRY | Facility: CLINIC | Age: 75
End: 2020-07-06
Payer: MEDICARE

## 2020-07-06 VITALS
HEIGHT: 67 IN | DIASTOLIC BLOOD PRESSURE: 86 MMHG | TEMPERATURE: 98.8 F | BODY MASS INDEX: 47.18 KG/M2 | SYSTOLIC BLOOD PRESSURE: 140 MMHG | HEART RATE: 76 BPM | WEIGHT: 300.6 LBS

## 2020-07-06 DIAGNOSIS — E66.01 MORBID OBESITY WITH BMI OF 40.0-44.9, ADULT (HCC): ICD-10-CM

## 2020-07-06 DIAGNOSIS — E11.42 DIABETIC POLYNEUROPATHY ASSOCIATED WITH TYPE 2 DIABETES MELLITUS (HCC): Primary | ICD-10-CM

## 2020-07-06 DIAGNOSIS — B35.1 ONYCHOMYCOSIS: ICD-10-CM

## 2020-07-06 PROCEDURE — 99213 OFFICE O/P EST LOW 20 MIN: CPT | Performed by: PODIATRIST

## 2020-07-06 PROCEDURE — 1036F TOBACCO NON-USER: CPT | Performed by: PODIATRIST

## 2020-07-06 PROCEDURE — 3066F NEPHROPATHY DOC TX: CPT | Performed by: PODIATRIST

## 2020-07-06 PROCEDURE — 3044F HG A1C LEVEL LT 7.0%: CPT | Performed by: PODIATRIST

## 2020-07-06 PROCEDURE — 3008F BODY MASS INDEX DOCD: CPT | Performed by: PODIATRIST

## 2020-07-06 PROCEDURE — 3077F SYST BP >= 140 MM HG: CPT | Performed by: PODIATRIST

## 2020-07-06 PROCEDURE — 4040F PNEUMOC VAC/ADMIN/RCVD: CPT | Performed by: PODIATRIST

## 2020-07-06 PROCEDURE — 2022F DILAT RTA XM EVC RTNOPTHY: CPT | Performed by: PODIATRIST

## 2020-07-06 PROCEDURE — 3079F DIAST BP 80-89 MM HG: CPT | Performed by: PODIATRIST

## 2020-07-06 PROCEDURE — 1160F RVW MEDS BY RX/DR IN RCRD: CPT | Performed by: PODIATRIST

## 2020-07-06 NOTE — PATIENT INSTRUCTIONS
Foot Care for People with Diabetes   WHAT YOU NEED TO KNOW:   · Foot care helps protect your feet and prevent foot ulcers or sores  Long-term high blood sugar levels can damage the blood vessels and nerves in your legs and feet  This damage makes it hard to feel pressure, pain, temperature, and touch  You may not be able to feel a cut or sore, or shoes that are too tight  Foot care is needed to prevent serious problems, such as an infection or amputation  · Diabetes may cause your toes to become crooked or curved under  These changes may affect the way you walk and can lead to increased pressure on your foot  The pressure can decrease blood flow to your feet  Lack of blood flow increases your risk for a foot ulcer  Do not ignore small problems, such as dry skin or small wounds  These can become life-threatening over time without proper care  DISCHARGE INSTRUCTIONS:   Contact your healthcare provider if:   · Your feet become numb, weak, or hard to move  · You have pus draining from a sore on your foot  · You have a wound on your foot that gets bigger, deeper, or does not heal      · You see blisters, cuts, scratches, calluses, or sores on your foot  · You have a fever, and your feet become red, warm, and swollen  · Your toenails become thick, curled, or yellow  · You find it hard to check your feet because your vision is poor  · You have questions or concerns about your condition or care  Foot care:   · Check your feet each day  Look at your whole foot, including the bottom, and between and under your toes  Check for wounds, corns, and calluses  Use a mirror to see the bottom of your feet  The skin on your feet may be shiny, tight, or darker than normal  Your feet may also be cold and pale  Feel your feet by running your hands along the tops, bottoms, sides, and between your toes  Redness, swelling, and warmth are signs of blood flow problems that can lead to a foot ulcer   Do not try to remove corns or calluses yourself  · Wash your feet each day with soap and warm water  Do not use hot water, because this can injure your foot  Dry your feet gently with a towel after you wash them  Dry between and under your toes  · Apply lotion or a moisturizer on your dry feet  Ask your healthcare provider what lotions are best to use  Do not put lotion or moisturizer between your toes  · Cut your toenails correctly  File or cut your toenails straight across  Use a soft brush to clean around your toenails  If your toenails are very thick, you may need to have a healthcare provider or specialist cut them  · Protect your feet  Do not walk barefoot or wear your shoes without socks  Check your shoes for rocks or other objects that can hurt your feet  Wear cotton socks to help keep your feet dry  Wear socks without toe seams, or wear them with the seams inside out  Change your socks each day  Do not wear socks that are dirty or damp  · Wear shoes that fit well  Wear shoes that do not rub against any area of your feet  Your shoes should be ½ to ¾ inch (1 to 2 centimeters) longer than your feet  Your shoes should also have extra space around the widest part of your feet  Walking or athletic shoes with laces or straps that adjust are best  Ask your healthcare provider for help to choose shoes that fit you best  Ask him if you need to wear an insert, orthotic, or bandage on your feet  Follow up with your healthcare provider or foot specialist as directed: You will need to have your feet checked at least once a year  You may need a foot exam more often if you have nerve damage, foot deformities, or ulcers  Write down your questions so you remember to ask them during your visits  © 2017 [address] Information is for End User's use only and may not be sold, redistributed or otherwise used for commercial purposes   All illustrations and images included in CareNotes® are the copyrighted property of [employer]  or [employer]  The above information is an  only  It is not intended as medical advice for individual conditions or treatments  Talk to your doctor, nurse or pharmacist before following any medical regimen to see if it is safe and effective for you

## 2020-07-06 NOTE — PROGRESS NOTES
Assessment/Plan:     Diagnoses and all orders for this visit:    Diabetic polyneuropathy associated with type 2 diabetes mellitus ([facility] )    Onychomycosis    Morbid obesity with BMI of 40 0-44 9, adult ([facility] )      -Educated on DM risk to lower extremities, proper shoe gear, and daily monitoring of feet    -Educated on A1C and the risks of poorly controlled Diabetes   -Discussed weight loss and suitable exercise regiment    -Trophic changes to skin, fungal toenails, swelling in limbs and neuropathy, foot deformity (pes planus and hammertoe)  Moderate risk for complications  At risk foot care every 9-12 weeks (Q9)  Subjective:      Patient ID: Jose Maria King is a 67 y o  male  Patient arrives with pmh significant for DM2, obesity, and thick painful toenails  They get very sore and often tear  He gets bothersome tingling and burning in his feet, especially at night but states it has not progressed over the past year  HE wears sneakers every day  His last A1C was 6 8  He is a former smoker (quit 40 years ago)  The following portions of the patient's history were reviewed and updated as appropriate: allergies, current medications, past family history, past medical history, past social history, past surgical history and problem list     Review of Systems   Constitutional: Negative  HENT: Negative for sinus pressure and sinus pain  Cardiovascular: Positive for leg swelling  Gastrointestinal: Negative for diarrhea and nausea  Musculoskeletal: Negative for arthralgias and gait problem  Skin: Positive for color change (thick toenails)  Negative for wound  Neurological: Positive for numbness  Objective:      /86   Pulse 76   Temp 98 8 °F (37 1 °C) (Tympanic)   Ht 5' 7" (1 702 m)   Wt (!) 136 kg (300 lb 9 6 oz)   BMI 47 08 kg/m²          Physical Exam   Constitutional: He is oriented to person, place, and time  He appears well-developed and well-nourished  No distress     Obese Cardiovascular: Intact distal pulses  Pulses are no weak pulses  Pulses:       Dorsalis pedis pulses are 2+ on the right side, and 2+ on the left side  Posterior tibial pulses are 2+ on the right side, and 2+ on the left side  Pulmonary/Chest: Effort normal  No respiratory distress  Musculoskeletal: He exhibits edema and deformity (hammertoes 4,5 b/l)  He exhibits no tenderness  Feet:    Feet:   Right Foot:   Skin Integrity: Positive for dry skin  Negative for ulcer, skin breakdown, erythema or callus  Left Foot:   Skin Integrity: Positive for dry skin  Negative for ulcer, skin breakdown, erythema or callus  Neurological: He is alert and oriented to person, place, and time  A sensory deficit is present  Coordination normal    Skin: Skin is warm  Capillary refill takes less than 2 seconds  No erythema  No pallor  Psychiatric: He has a normal mood and affect  Vitals reviewed  Diabetic Foot Exam    Patient's shoes and socks removed  Right Foot/Ankle   Right Foot Inspection  Skin Exam: skin intact, dry skin and abnormal color (nails thick yellow hypertrophicwith subungual debris x10) no callus, no erythema, no maceration, no ulcer and no callus                          Toe Exam: swelling (+1) and right toe deformity  Sensory   Vibration: diminished  Proprioception: diminished   Monofilament testing: diminished  Vascular  Capillary refills: < 3 seconds  The right DP pulse is 2+  The right PT pulse is 2+  Right Toe  - Comprehensive Exam  Arch: pes planus  Claw Toes: fifth toe and fourth toe    Left Foot/Ankle  Left Foot Inspection  Skin Exam: skin intact, dry skin and abnormal colorno erythema, no maceration, no ulcer and no callus                         Toe Exam: swelling (1) and left toe deformity                   Sensory   Vibration: diminished  Proprioception: diminished  Monofilament: diminished  Vascular  Capillary refills: < 3 seconds  The left DP pulse is 2+   The left PT pulse is 2+  Left Toe  - Comprehensive Exam  Arch: pes planus  Claw toes: fifth toe and fourth toe  Assign Risk Category:  Deformity present; Loss of protective sensation;  No weak pulses       Risk: 2

## 2020-07-27 ENCOUNTER — APPOINTMENT (OUTPATIENT)
Dept: LAB | Age: 75
End: 2020-07-27
Payer: MEDICARE

## 2020-07-27 DIAGNOSIS — E11.8 TYPE 2 DIABETES MELLITUS WITH COMPLICATION (HCC): ICD-10-CM

## 2020-07-27 LAB
ALBUMIN SERPL BCP-MCNC: 3 G/DL (ref 3.5–5)
ALP SERPL-CCNC: 146 U/L (ref 46–116)
ALT SERPL W P-5'-P-CCNC: 44 U/L (ref 12–78)
ANION GAP SERPL CALCULATED.3IONS-SCNC: 6 MMOL/L (ref 4–13)
AST SERPL W P-5'-P-CCNC: 25 U/L (ref 5–45)
BILIRUB SERPL-MCNC: 0.44 MG/DL (ref 0.2–1)
BUN SERPL-MCNC: 14 MG/DL (ref 5–25)
CALCIUM SERPL-MCNC: 8.7 MG/DL (ref 8.3–10.1)
CHLORIDE SERPL-SCNC: 101 MMOL/L (ref 100–108)
CO2 SERPL-SCNC: 32 MMOL/L (ref 21–32)
CREAT SERPL-MCNC: 1.12 MG/DL (ref 0.6–1.3)
EST. AVERAGE GLUCOSE BLD GHB EST-MCNC: 169 MG/DL
GFR SERPL CREATININE-BSD FRML MDRD: 63 ML/MIN/1.73SQ M
GLUCOSE P FAST SERPL-MCNC: 165 MG/DL (ref 65–99)
HBA1C MFR BLD: 7.5 %
POTASSIUM SERPL-SCNC: 3.4 MMOL/L (ref 3.5–5.3)
PROT SERPL-MCNC: 7.8 G/DL (ref 6.4–8.2)
SODIUM SERPL-SCNC: 139 MMOL/L (ref 136–145)

## 2020-07-27 PROCEDURE — 83036 HEMOGLOBIN GLYCOSYLATED A1C: CPT

## 2020-07-27 PROCEDURE — 36415 COLL VENOUS BLD VENIPUNCTURE: CPT

## 2020-07-27 PROCEDURE — 80053 COMPREHEN METABOLIC PANEL: CPT

## 2020-07-29 ENCOUNTER — OFFICE VISIT (OUTPATIENT)
Dept: INTERNAL MEDICINE CLINIC | Facility: CLINIC | Age: 75
End: 2020-07-29
Payer: MEDICARE

## 2020-07-29 VITALS
RESPIRATION RATE: 14 BRPM | HEART RATE: 82 BPM | HEIGHT: 67 IN | DIASTOLIC BLOOD PRESSURE: 78 MMHG | OXYGEN SATURATION: 96 % | WEIGHT: 298.4 LBS | SYSTOLIC BLOOD PRESSURE: 128 MMHG | BODY MASS INDEX: 46.83 KG/M2 | TEMPERATURE: 98.9 F

## 2020-07-29 DIAGNOSIS — F32.A DEPRESSION, UNSPECIFIED DEPRESSION TYPE: ICD-10-CM

## 2020-07-29 DIAGNOSIS — F39 MOOD DISORDER (HCC): ICD-10-CM

## 2020-07-29 DIAGNOSIS — N13.8 BPH WITH URINARY OBSTRUCTION: ICD-10-CM

## 2020-07-29 DIAGNOSIS — I10 ESSENTIAL HYPERTENSION: Primary | ICD-10-CM

## 2020-07-29 DIAGNOSIS — E87.6 HYPOKALEMIA: ICD-10-CM

## 2020-07-29 DIAGNOSIS — N40.1 BPH WITH URINARY OBSTRUCTION: ICD-10-CM

## 2020-07-29 DIAGNOSIS — E78.00 PURE HYPERCHOLESTEROLEMIA: ICD-10-CM

## 2020-07-29 DIAGNOSIS — G47.33 OBSTRUCTIVE SLEEP APNEA SYNDROME: ICD-10-CM

## 2020-07-29 DIAGNOSIS — K21.9 GASTROESOPHAGEAL REFLUX DISEASE, ESOPHAGITIS PRESENCE NOT SPECIFIED: ICD-10-CM

## 2020-07-29 DIAGNOSIS — E11.8 TYPE 2 DIABETES MELLITUS WITH COMPLICATION, WITHOUT LONG-TERM CURRENT USE OF INSULIN (HCC): ICD-10-CM

## 2020-07-29 DIAGNOSIS — E11.8 TYPE 2 DIABETES MELLITUS WITH COMPLICATION (HCC): ICD-10-CM

## 2020-07-29 PROCEDURE — 3066F NEPHROPATHY DOC TX: CPT | Performed by: INTERNAL MEDICINE

## 2020-07-29 PROCEDURE — 3074F SYST BP LT 130 MM HG: CPT | Performed by: INTERNAL MEDICINE

## 2020-07-29 PROCEDURE — 1036F TOBACCO NON-USER: CPT | Performed by: INTERNAL MEDICINE

## 2020-07-29 PROCEDURE — 1160F RVW MEDS BY RX/DR IN RCRD: CPT | Performed by: INTERNAL MEDICINE

## 2020-07-29 PROCEDURE — 4040F PNEUMOC VAC/ADMIN/RCVD: CPT | Performed by: INTERNAL MEDICINE

## 2020-07-29 PROCEDURE — 2022F DILAT RTA XM EVC RTNOPTHY: CPT | Performed by: INTERNAL MEDICINE

## 2020-07-29 PROCEDURE — 3078F DIAST BP <80 MM HG: CPT | Performed by: INTERNAL MEDICINE

## 2020-07-29 PROCEDURE — 99215 OFFICE O/P EST HI 40 MIN: CPT | Performed by: INTERNAL MEDICINE

## 2020-07-29 PROCEDURE — 3008F BODY MASS INDEX DOCD: CPT | Performed by: INTERNAL MEDICINE

## 2020-07-29 PROCEDURE — 3051F HG A1C>EQUAL 7.0%<8.0%: CPT | Performed by: INTERNAL MEDICINE

## 2020-07-29 RX ORDER — CITALOPRAM 10 MG/1
10 TABLET ORAL DAILY
Qty: 90 TABLET | Refills: 3
Start: 2020-07-29 | End: 2020-08-19 | Stop reason: SDUPTHER

## 2020-07-29 RX ORDER — GLIMEPIRIDE 1 MG/1
3 TABLET ORAL
Qty: 270 TABLET | Refills: 3 | Status: SHIPPED | OUTPATIENT
Start: 2020-07-29 | End: 2021-09-14

## 2020-07-29 RX ORDER — GLIMEPIRIDE 1 MG/1
2 TABLET ORAL
Qty: 270 TABLET | Refills: 3 | Status: SHIPPED | OUTPATIENT
Start: 2020-07-29 | End: 2020-07-29 | Stop reason: SDUPTHER

## 2020-07-29 NOTE — PROGRESS NOTES
Assessment/Plan:    Gastroesophageal reflux disease  A review of the patient's acid reflux symptoms today indicates good control with only rare flare-ups  He continues on omeprazole 20 mg daily and understands the foods that are high in acidity in caffeine should be avoided to minimize symptom breakthrough  Recommend avoiding food for 2-3 hours before bedtime and elevating head of bed approximately 6 in to minimize overnight acid reflux    Type 2 diabetes mellitus with complication ([address] )  A review of the patient's hemoglobin A1c and most recent fasting blood sugar shows suboptimal control  He is currently on Amaryl 2 mg prior to breakfast   Obviously he is caring too much weight with his body mass index of 46 74  Through the years he has been unable to make much headway on losing weight  In view of his elevated fasting blood sugar and hemoglobin A1c of recommended that we increase his Amaryl from 2 mg a day to 3 mg prior to breakfast   Follow-up blood work has been requested in a period of 4 months  Lab Results   Component Value Date    HGBA1C 7 5 (H) 07/27/2020       Obstructive sleep apnea syndrome  Recommend continuation of treatment for sleep apnea use of CPAP machine highly recommended due to the patient's prior history of obstructive sleep apnea    Essential hypertension  Blood pressure assessment today shows blood pressure of 128/78 using large size arm cuff  Of this is deemed good control of his hypertension I recommend that he continue his current blood pressure medications including valsartan hydrochlorothiazide 160-12 5 mg daily metoprolol succinate 25 mg daily and Norvasc 10 mg daily  Follow-up assessment of hypertension is requested in a period of 4 months      BPH with urinary obstruction  The patient has a history of enlarged prostate gland he is currently on Flomax 0 4 mg in the evening he indicates that this has provided him with better urine flow and also fewer visits to the bathroom overnight    Mood disorder ([facility])  History of mood disorder the patient currently is taking Celexa at 10 mg daily with good results  No apparent side effects of the drug recommend continuation of current therapy with re-evaluation in 4 months  Hypokalemia  A review of the patient's comprehensive metabolic profile today shows a mild decrease in his potassium level  I provided him with a list of foods that are rich in potassium to try to supplement into his system  He has no symptoms of muscle cramping  His deficiency is mild with a potassium level of 3 4  Follow-up assessment will be made in 4 months       Diagnoses and all orders for this visit:    Pure hypercholesterolemia  -     Lipid panel; Future    Depression, unspecified depression type  -     citalopram (CeleXA) 10 mg tablet; Take 1 tablet (10 mg total) by mouth daily    Type 2 diabetes mellitus with complication, without long-term current use of insulin ([facility])  -     glimepiride (AMARYL) 1 mg tablet; Take 2 tablets (2 mg total) by mouth daily with breakfast  -     Comprehensive metabolic panel; Future  -     Hemoglobin A1C; Future    Essential hypertension    Hypokalemia    Type 2 diabetes mellitus with complication ([facility])    Obstructive sleep apnea syndrome    BPH with urinary obstruction    M[facility] )        Subjective:      Patient ID: Jr Deutsch is a 67 y o  male  This 13-year-old gentleman presents today for routine 4 month follow-up visit  He has no specific complaints on today's visit he has had recent blood work for review pertaining to his type 2 diabetes  He denies any chest pains palpitations shortness of breath  He has no signs or symptoms of COVID-19 infection and understands the importance of physical distance Ng masking and frequent hand washing        The following portions of the patient's history were reviewed and updated as appropriate:   He  has a past medical history of Anxiety, Claustrophobia, Colon polyps, Depression, Diabetes mellitus ([place] [address] ), Dyslipidemia, Esophageal polyp, GERD (gastroesophageal reflux disease), Glaucoma, Headache, Hypertension, Low back pain, Neck pain, Numbness and tingling in left arm, OAB (overactive bladder), Paroxysmal supraventricular tachycardia ([place] [address] ), and Sleep apnea  He   Patient Active Problem List    Diagnosis Date Noted    Fecal incontinence 03/25/2020    BPH with urinary obstruction 01/23/2020    Onychomycosis 11/25/2019    Urgency incontinence 11/20/2019    Mood disorder ([place] [address] ) 08/14/2019    Hypokalemia 07/24/2019    Palpitation 02/07/2019    Obstructive sleep apnea syndrome 06/04/2018    Morbid obesity with BMI of 40 0-44 9, adult ([place] [address] ) 06/04/2018    Mood disturbance 06/04/2018    Gastroesophageal reflux disease 06/04/2018    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    Type 2 diabetes mellitus with complication ([place] [address] ) 41/08/1619    Essential hypertension 01/29/2018    Peripheral neuropathy 01/29/2018    Low back pain      He  has a past surgical history that includes Cataract extraction (Bilateral); Knee arthroscopy (Bilateral); Lumbar spine surgery; Excision basal cell carcinoma; Total knee arthroplasty (Left); Replacement total knee (Right, 04/2017); Replacement total knee (Left, 03/2013); Anoscopy; Rectal biopsy; Mouth surgery; pr esophagogastroduodenoscopy transoral diagnostic (N/A, 2/13/2019); and pr colonoscopy flx dx w/collj spec when pfrmd (N/A, 2/13/2019)  His family history includes Alzheimer's disease in his mother; Diabetes in his mother and paternal grandmother; Esophageal cancer in his paternal grandfather; [employer] Hypertension in his father  He  reports that he quit smoking about 37 years ago  He has quit using smokeless tobacco  He reports that he drinks about 14 0 standard drinks of alcohol per week  He reports that he does not use drugs    Current Outpatient Medications   Medication Sig Dispense Refill    amLODIPine (NORVASC) 10 mg tablet TAKE 1 TABLET DAILY 90 tablet 3    citalopram (CeleXA) 10 mg tablet Take 1 tablet (10 mg total) by mouth daily 90 tablet 3    glimepiride (AMARYL) 1 mg tablet Take 3 tablets (3 mg total) by mouth daily with breakfast 270 tablet 3    metoprolol succinate (TOPROL-XL) 25 mg 24 hr tablet Take 1 tablet (25 mg total) by mouth daily 90 tablet 3    omeprazole (PriLOSEC) 20 mg delayed release capsule TAKE 1 CAPSULE DAILY 90 capsule 3    oxybutynin (DITROPAN-XL) 10 MG 24 hr tablet Take 1 tablet (10 mg total) by mouth daily 90 tablet 3    rosuvastatin (CRESTOR) 5 mg tablet Take 1 tablet (5 mg total) by mouth daily 90 tablet 3    tamsulosin (FLOMAX) 0 4 mg Once a day right after supper 30 capsule 3    timolol (TIMOPTIC) 0 5 % ophthalmic solution       valsartan-hydrochlorothiazide (DIOVAN-HCT) 160-12 5 MG per tablet Take 1 tablet by mouth daily 90 tablet 2     No current facility-administered medications for this visit       Review of Systems   All other systems reviewed and are negative  Objective:      /78   Pulse 82   Temp 98 9 °F (37 2 °C)   Resp 14   Ht 5' 7" (1 702 m)   Wt 135 kg (298 lb 6 4 oz)   SpO2 96%   BMI 46 74 kg/m²          Physical Exam   Constitutional: He is oriented to person, place, and time  He appears well-developed and well-nourished  HENT:   Right Ear: Hearing, tympanic membrane, external ear and ear canal normal    Left Ear: Hearing, tympanic membrane, external ear and ear canal normal    Nose: Nose normal    Mouth/Throat: Oropharynx is clear and moist and mucous membranes are normal    Eyes: Pupils are equal, round, and reactive to light  Conjunctivae are normal    Neck: No thyromegaly present  Cardiovascular: Normal rate, regular rhythm, S1 normal, S2 normal, normal heart sounds and intact distal pulses  No murmur heard  Pulmonary/Chest: Effort normal and breath sounds normal    Abdominal: Soft  Bowel sounds are normal  There is no tenderness  Musculoskeletal: Normal range of motion  He exhibits no edema  Lymphadenopathy:     He has no cervical adenopathy  Neurological: He is alert and oriented to person, place, and time  He has normal reflexes  He displays normal reflexes  Skin: Skin is warm and dry  Psychiatric: He has a normal mood and affect  His behavior is normal  Judgment and thought content normal      BMI Counseling: Body mass index is 46 74 kg/m²  The BMI is above normal  Nutrition recommendations include reducing portion sizes, reducing fast food intake, consuming healthier snacks, moderation in carbohydrate intake, reducing intake of saturated fat and trans fat and reducing intake of cholesterol

## 2020-07-29 NOTE — ASSESSMENT & PLAN NOTE
Blood pressure assessment today shows blood pressure of 128/78 using large size arm cuff  Of this is deemed good control of his hypertension I recommend that he continue his current blood pressure medications including valsartan hydrochlorothiazide 160-12 5 mg daily metoprolol succinate 25 mg daily and Norvasc 10 mg daily  Follow-up assessment of hypertension is requested in a period of 4 months

## 2020-07-29 NOTE — ASSESSMENT & PLAN NOTE
A review of the patient's comprehensive metabolic profile today shows a mild decrease in his potassium level  I provided him with a list of foods that are rich in potassium to try to supplement into his system  He has no symptoms of muscle cramping  His deficiency is mild with a potassium level of 3 4    Follow-up assessment will be made in 4 months

## 2020-07-29 NOTE — ASSESSMENT & PLAN NOTE
A review of the patient's hemoglobin A1c and most recent fasting blood sugar shows suboptimal control  He is currently on Amaryl 2 mg prior to breakfast   Obviously he is caring too much weight with his body mass index of 46 74  Through the years he has been unable to make much headway on losing weight    In view of his elevated fasting blood sugar and hemoglobin A1c of recommended that we increase his Amaryl from 2 mg a day to 3 mg prior to breakfast   Follow-up blood work has been requested in a period of 4 months  Lab Results   Component Value Date    HGBA1C 7 5 (H) 07/27/2020

## 2020-07-29 NOTE — ASSESSMENT & PLAN NOTE
Recommend continuation of treatment for sleep apnea use of CPAP machine highly recommended due to the patient's prior history of obstructive sleep apnea

## 2020-07-29 NOTE — ASSESSMENT & PLAN NOTE
History of mood disorder the patient currently is taking Celexa at 10 mg daily with good results  No apparent side effects of the drug recommend continuation of current therapy with re-evaluation in 4 months

## 2020-07-29 NOTE — ASSESSMENT & PLAN NOTE
A review of the patient's acid reflux symptoms today indicates good control with only rare flare-ups  He continues on omeprazole 20 mg daily and understands the foods that are high in acidity in caffeine should be avoided to minimize symptom breakthrough    Recommend avoiding food for 2-3 hours before bedtime and elevating head of bed approximately 6 in to minimize overnight acid reflux

## 2020-07-29 NOTE — ASSESSMENT & PLAN NOTE
The patient has a history of enlarged prostate gland he is currently on Flomax 0 4 mg in the evening he indicates that this has provided him with better urine flow and also fewer visits to the bathroom overnight

## 2020-07-30 ENCOUNTER — TELEPHONE (OUTPATIENT)
Dept: INTERNAL MEDICINE CLINIC | Facility: CLINIC | Age: 75
End: 2020-07-30

## 2020-08-19 DIAGNOSIS — F32.A DEPRESSION, UNSPECIFIED DEPRESSION TYPE: ICD-10-CM

## 2020-08-19 RX ORDER — CITALOPRAM 10 MG/1
10 TABLET ORAL DAILY
Qty: 90 TABLET | Refills: 3 | Status: SHIPPED | OUTPATIENT
Start: 2020-08-19 | End: 2021-08-17

## 2020-08-21 DIAGNOSIS — K21.9 GASTROESOPHAGEAL REFLUX DISEASE WITHOUT ESOPHAGITIS: ICD-10-CM

## 2020-08-21 RX ORDER — OMEPRAZOLE 20 MG/1
20 CAPSULE, DELAYED RELEASE ORAL DAILY
Qty: 90 CAPSULE | Refills: 3 | Status: SHIPPED | OUTPATIENT
Start: 2020-08-21 | End: 2021-08-24

## 2020-08-25 DIAGNOSIS — N40.1 BPH WITH URINARY OBSTRUCTION: ICD-10-CM

## 2020-08-25 DIAGNOSIS — N13.8 BPH WITH URINARY OBSTRUCTION: ICD-10-CM

## 2020-08-25 RX ORDER — TAMSULOSIN HYDROCHLORIDE 0.4 MG/1
CAPSULE ORAL
Qty: 30 CAPSULE | Refills: 3 | Status: SHIPPED | OUTPATIENT
Start: 2020-08-25 | End: 2020-12-17 | Stop reason: SDUPTHER

## 2020-09-16 ENCOUNTER — OFFICE VISIT (OUTPATIENT)
Dept: PODIATRY | Facility: CLINIC | Age: 75
End: 2020-09-16
Payer: MEDICARE

## 2020-09-16 VITALS
SYSTOLIC BLOOD PRESSURE: 146 MMHG | HEIGHT: 67 IN | WEIGHT: 300 LBS | HEART RATE: 65 BPM | BODY MASS INDEX: 47.09 KG/M2 | DIASTOLIC BLOOD PRESSURE: 51 MMHG

## 2020-09-16 DIAGNOSIS — B35.1 ONYCHOMYCOSIS: ICD-10-CM

## 2020-09-16 DIAGNOSIS — E11.42 DIABETIC POLYNEUROPATHY ASSOCIATED WITH TYPE 2 DIABETES MELLITUS (HCC): Primary | ICD-10-CM

## 2020-09-16 PROCEDURE — 11721 DEBRIDE NAIL 6 OR MORE: CPT | Performed by: PODIATRIST

## 2020-09-16 NOTE — PROGRESS NOTES
Hector Fishman  1945  AT RISK FOOT CARE    1  Diabetic polyneuropathy associated with type 2 diabetes mellitus ([email or web address] )     2  Onychomycosis         Patient presents for at-risk foot care  Patient has no acute concerns today  Patient has significant lower extremity risk due to neuropathy, parasthesia, edema, and trophic skin changes to the lower extremity  On exam patient has thickened, hypertrophic, discolored, brittle toenails with subungual debris and tenderness x10   Patient has no callus  Patient has lower extremity edema  PAtients skin is atrophic, thickened nails, and decreased pedal hair  Patient has decreased pinprick and vibratory sensation to his feet and parasthesia    Today's treatment includes:  Debridement of toenails  Using nail nipper, krystle, and curette, nails were sharply debrided, reduced in thickness and length  Devitalized nail tissue and fungal debris excised and removed  Patient tolerated well  Discussed proper shoe gear, daily inspections of feet, and general foot health with patient  Patient has Q9  findings and is recommended for at risk foot care every 9-10 weeks      Patients most recent complete clinical foot exam was on: 7/6/2020

## 2020-10-19 ENCOUNTER — OFFICE VISIT (OUTPATIENT)
Dept: DERMATOLOGY | Facility: CLINIC | Age: 75
End: 2020-10-19
Payer: MEDICARE

## 2020-10-19 DIAGNOSIS — L91.8 SKIN TAG: ICD-10-CM

## 2020-10-19 DIAGNOSIS — D22.70 MULTIPLE BENIGN MELANOCYTIC NEVI OF UPPER EXTREMITY, LOWER EXTREMITY, AND TRUNK: ICD-10-CM

## 2020-10-19 DIAGNOSIS — D18.01 CHERRY ANGIOMA: ICD-10-CM

## 2020-10-19 DIAGNOSIS — D22.60 MULTIPLE BENIGN MELANOCYTIC NEVI OF UPPER EXTREMITY, LOWER EXTREMITY, AND TRUNK: ICD-10-CM

## 2020-10-19 DIAGNOSIS — Z85.828 HISTORY OF SQUAMOUS CELL CARCINOMA OF SKIN: ICD-10-CM

## 2020-10-19 DIAGNOSIS — L82.1 SEBORRHEIC KERATOSIS: ICD-10-CM

## 2020-10-19 DIAGNOSIS — L81.4 SOLAR LENTIGO: ICD-10-CM

## 2020-10-19 DIAGNOSIS — D22.5 MULTIPLE BENIGN MELANOCYTIC NEVI OF UPPER EXTREMITY, LOWER EXTREMITY, AND TRUNK: ICD-10-CM

## 2020-10-19 DIAGNOSIS — D48.5 NEOPLASM OF UNCERTAIN BEHAVIOR OF SKIN: Primary | ICD-10-CM

## 2020-10-19 DIAGNOSIS — L57.0 ACTINIC KERATOSIS: ICD-10-CM

## 2020-10-19 DIAGNOSIS — Z85.828 HISTORY OF BASAL CELL CARCINOMA OF SKIN: ICD-10-CM

## 2020-10-19 PROCEDURE — 17000 DESTRUCT PREMALG LESION: CPT | Performed by: DERMATOLOGY

## 2020-10-19 PROCEDURE — 88305 TISSUE EXAM BY PATHOLOGIST: CPT | Performed by: STUDENT IN AN ORGANIZED HEALTH CARE EDUCATION/TRAINING PROGRAM

## 2020-10-19 PROCEDURE — 17003 DESTRUCT PREMALG LES 2-14: CPT | Performed by: DERMATOLOGY

## 2020-10-19 PROCEDURE — 99204 OFFICE O/P NEW MOD 45 MIN: CPT | Performed by: DERMATOLOGY

## 2020-10-19 PROCEDURE — 11102 TANGNTL BX SKIN SINGLE LES: CPT | Performed by: DERMATOLOGY

## 2020-10-20 ENCOUNTER — IMMUNIZATIONS (OUTPATIENT)
Dept: INTERNAL MEDICINE CLINIC | Facility: CLINIC | Age: 75
End: 2020-10-20
Payer: MEDICARE

## 2020-10-20 DIAGNOSIS — Z23 ENCOUNTER FOR IMMUNIZATION: Primary | ICD-10-CM

## 2020-10-20 PROCEDURE — G0008 ADMIN INFLUENZA VIRUS VAC: HCPCS

## 2020-10-20 PROCEDURE — 90662 IIV NO PRSV INCREASED AG IM: CPT

## 2020-10-26 ENCOUNTER — TELEPHONE (OUTPATIENT)
Dept: DERMATOLOGY | Facility: CLINIC | Age: 75
End: 2020-10-26

## 2020-11-06 ENCOUNTER — OFFICE VISIT (OUTPATIENT)
Dept: UROLOGY | Facility: MEDICAL CENTER | Age: 75
End: 2020-11-06
Payer: MEDICARE

## 2020-11-06 VITALS
HEIGHT: 67 IN | WEIGHT: 300 LBS | SYSTOLIC BLOOD PRESSURE: 134 MMHG | DIASTOLIC BLOOD PRESSURE: 72 MMHG | BODY MASS INDEX: 47.09 KG/M2 | TEMPERATURE: 97.5 F

## 2020-11-06 DIAGNOSIS — N40.1 BPH WITH URINARY OBSTRUCTION: ICD-10-CM

## 2020-11-06 DIAGNOSIS — N13.8 BPH WITH URINARY OBSTRUCTION: ICD-10-CM

## 2020-11-06 DIAGNOSIS — R39.15 URGENCY OF URINATION: Primary | ICD-10-CM

## 2020-11-06 PROCEDURE — 99214 OFFICE O/P EST MOD 30 MIN: CPT | Performed by: UROLOGY

## 2020-11-06 RX ORDER — TROSPIUM CHLORIDE 20 MG/1
20 TABLET, FILM COATED ORAL 2 TIMES DAILY
Qty: 180 TABLET | Refills: 3 | Status: SHIPPED | OUTPATIENT
Start: 2020-11-06 | End: 2021-11-22 | Stop reason: SDUPTHER

## 2020-11-11 ENCOUNTER — PROCEDURE VISIT (OUTPATIENT)
Dept: DERMATOLOGY | Facility: CLINIC | Age: 75
End: 2020-11-11
Payer: MEDICARE

## 2020-11-11 VITALS
HEART RATE: 79 BPM | TEMPERATURE: 98.7 F | DIASTOLIC BLOOD PRESSURE: 54 MMHG | WEIGHT: 300 LBS | SYSTOLIC BLOOD PRESSURE: 122 MMHG | BODY MASS INDEX: 47.09 KG/M2 | HEIGHT: 67 IN

## 2020-11-11 DIAGNOSIS — D09.9 SQUAMOUS CELL CARCINOMA IN SITU: Primary | ICD-10-CM

## 2020-11-11 PROCEDURE — 17311 MOHS 1 STAGE H/N/HF/G: CPT | Performed by: DERMATOLOGY

## 2020-11-11 PROCEDURE — 15260 FTH/GFT FR N/E/E/L 20 SQCM/<: CPT | Performed by: DERMATOLOGY

## 2020-11-11 PROCEDURE — 17312 MOHS ADDL STAGE: CPT | Performed by: DERMATOLOGY

## 2020-11-15 DIAGNOSIS — I10 ESSENTIAL HYPERTENSION: ICD-10-CM

## 2020-11-16 RX ORDER — VALSARTAN AND HYDROCHLOROTHIAZIDE 160; 12.5 MG/1; MG/1
TABLET, FILM COATED ORAL
Qty: 90 TABLET | Refills: 2 | Status: SHIPPED | OUTPATIENT
Start: 2020-11-16 | End: 2021-01-12 | Stop reason: HOSPADM

## 2020-11-18 ENCOUNTER — OFFICE VISIT (OUTPATIENT)
Dept: DERMATOLOGY | Facility: CLINIC | Age: 75
End: 2020-11-18

## 2020-11-18 DIAGNOSIS — Z48.02 ENCOUNTER FOR REMOVAL OF SUTURES: Primary | ICD-10-CM

## 2020-11-18 PROCEDURE — 99024 POSTOP FOLLOW-UP VISIT: CPT | Performed by: DERMATOLOGY

## 2020-12-01 ENCOUNTER — APPOINTMENT (OUTPATIENT)
Dept: LAB | Age: 75
End: 2020-12-01
Payer: MEDICARE

## 2020-12-01 ENCOUNTER — TRANSCRIBE ORDERS (OUTPATIENT)
Dept: ADMINISTRATIVE | Age: 75
End: 2020-12-01

## 2020-12-01 DIAGNOSIS — E11.8 TYPE 2 DIABETES MELLITUS WITH COMPLICATION, WITHOUT LONG-TERM CURRENT USE OF INSULIN (HCC): Primary | ICD-10-CM

## 2020-12-01 DIAGNOSIS — E11.8 TYPE 2 DIABETES MELLITUS WITH COMPLICATION, WITHOUT LONG-TERM CURRENT USE OF INSULIN (HCC): ICD-10-CM

## 2020-12-01 DIAGNOSIS — E78.00 PURE HYPERCHOLESTEROLEMIA: ICD-10-CM

## 2020-12-01 LAB
ALBUMIN SERPL BCP-MCNC: 3.2 G/DL (ref 3.5–5)
ALP SERPL-CCNC: 160 U/L (ref 46–116)
ALT SERPL W P-5'-P-CCNC: 46 U/L (ref 12–78)
ANION GAP SERPL CALCULATED.3IONS-SCNC: 6 MMOL/L (ref 4–13)
AST SERPL W P-5'-P-CCNC: 26 U/L (ref 5–45)
BILIRUB SERPL-MCNC: 0.33 MG/DL (ref 0.2–1)
BUN SERPL-MCNC: 16 MG/DL (ref 5–25)
CALCIUM ALBUM COR SERPL-MCNC: 9.8 MG/DL (ref 8.3–10.1)
CALCIUM SERPL-MCNC: 9.2 MG/DL (ref 8.3–10.1)
CHLORIDE SERPL-SCNC: 105 MMOL/L (ref 100–108)
CHOLEST SERPL-MCNC: 151 MG/DL (ref 50–200)
CO2 SERPL-SCNC: 30 MMOL/L (ref 21–32)
CREAT SERPL-MCNC: 1.25 MG/DL (ref 0.6–1.3)
EST. AVERAGE GLUCOSE BLD GHB EST-MCNC: 154 MG/DL
GFR SERPL CREATININE-BSD FRML MDRD: 56 ML/MIN/1.73SQ M
GLUCOSE P FAST SERPL-MCNC: 131 MG/DL (ref 65–99)
HBA1C MFR BLD: 7 %
HDLC SERPL-MCNC: 37 MG/DL
LDLC SERPL CALC-MCNC: 91 MG/DL (ref 0–100)
NONHDLC SERPL-MCNC: 114 MG/DL
POTASSIUM SERPL-SCNC: 3.9 MMOL/L (ref 3.5–5.3)
PROT SERPL-MCNC: 7.9 G/DL (ref 6.4–8.2)
SODIUM SERPL-SCNC: 141 MMOL/L (ref 136–145)
TRIGL SERPL-MCNC: 115 MG/DL

## 2020-12-01 PROCEDURE — 80061 LIPID PANEL: CPT

## 2020-12-01 PROCEDURE — 83036 HEMOGLOBIN GLYCOSYLATED A1C: CPT

## 2020-12-01 PROCEDURE — 36415 COLL VENOUS BLD VENIPUNCTURE: CPT

## 2020-12-01 PROCEDURE — 80053 COMPREHEN METABOLIC PANEL: CPT

## 2020-12-02 ENCOUNTER — OFFICE VISIT (OUTPATIENT)
Dept: INTERNAL MEDICINE CLINIC | Facility: CLINIC | Age: 75
End: 2020-12-02
Payer: MEDICARE

## 2020-12-02 VITALS
HEART RATE: 78 BPM | WEIGHT: 293 LBS | RESPIRATION RATE: 18 BRPM | HEIGHT: 67 IN | TEMPERATURE: 98.2 F | SYSTOLIC BLOOD PRESSURE: 130 MMHG | OXYGEN SATURATION: 95 % | BODY MASS INDEX: 45.99 KG/M2 | DIASTOLIC BLOOD PRESSURE: 72 MMHG

## 2020-12-02 DIAGNOSIS — N13.8 BPH WITH URINARY OBSTRUCTION: ICD-10-CM

## 2020-12-02 DIAGNOSIS — K63.5 POLYP OF COLON, UNSPECIFIED PART OF COLON, UNSPECIFIED TYPE: ICD-10-CM

## 2020-12-02 DIAGNOSIS — N40.1 BPH WITH URINARY OBSTRUCTION: ICD-10-CM

## 2020-12-02 DIAGNOSIS — F39 MOOD DISORDER (HCC): ICD-10-CM

## 2020-12-02 DIAGNOSIS — I10 ESSENTIAL HYPERTENSION: ICD-10-CM

## 2020-12-02 DIAGNOSIS — E78.5 HYPERLIPIDEMIA, UNSPECIFIED HYPERLIPIDEMIA TYPE: ICD-10-CM

## 2020-12-02 DIAGNOSIS — K21.9 GASTROESOPHAGEAL REFLUX DISEASE, UNSPECIFIED WHETHER ESOPHAGITIS PRESENT: ICD-10-CM

## 2020-12-02 DIAGNOSIS — E11.8 TYPE 2 DIABETES MELLITUS WITH COMPLICATION (HCC): Primary | ICD-10-CM

## 2020-12-02 PROCEDURE — 99214 OFFICE O/P EST MOD 30 MIN: CPT | Performed by: INTERNAL MEDICINE

## 2020-12-02 RX ORDER — AMOXICILLIN 500 MG/1
CAPSULE ORAL
COMMUNITY
Start: 2020-11-24 | End: 2021-08-26 | Stop reason: ALTCHOICE

## 2020-12-16 DIAGNOSIS — N40.1 BPH WITH URINARY OBSTRUCTION: ICD-10-CM

## 2020-12-16 DIAGNOSIS — N13.8 BPH WITH URINARY OBSTRUCTION: ICD-10-CM

## 2020-12-17 RX ORDER — TAMSULOSIN HYDROCHLORIDE 0.4 MG/1
0.4 CAPSULE ORAL
Qty: 90 CAPSULE | Refills: 3 | Status: SHIPPED | OUTPATIENT
Start: 2020-12-17 | End: 2020-12-28

## 2020-12-21 ENCOUNTER — OFFICE VISIT (OUTPATIENT)
Dept: PODIATRY | Facility: CLINIC | Age: 75
End: 2020-12-21
Payer: MEDICARE

## 2020-12-21 VITALS — HEIGHT: 67 IN | BODY MASS INDEX: 46.3 KG/M2 | WEIGHT: 295 LBS

## 2020-12-21 DIAGNOSIS — B35.1 ONYCHOMYCOSIS: ICD-10-CM

## 2020-12-21 DIAGNOSIS — E11.42 DIABETIC POLYNEUROPATHY ASSOCIATED WITH TYPE 2 DIABETES MELLITUS (HCC): Primary | ICD-10-CM

## 2020-12-21 PROCEDURE — 11721 DEBRIDE NAIL 6 OR MORE: CPT | Performed by: PODIATRIST

## 2020-12-23 ENCOUNTER — OFFICE VISIT (OUTPATIENT)
Dept: DERMATOLOGY | Facility: CLINIC | Age: 75
End: 2020-12-23

## 2020-12-23 DIAGNOSIS — Z51.89 VISIT FOR WOUND CHECK: Primary | ICD-10-CM

## 2020-12-23 PROCEDURE — 99024 POSTOP FOLLOW-UP VISIT: CPT | Performed by: DERMATOLOGY

## 2020-12-27 DIAGNOSIS — N13.8 BPH WITH URINARY OBSTRUCTION: ICD-10-CM

## 2020-12-27 DIAGNOSIS — N40.1 BPH WITH URINARY OBSTRUCTION: ICD-10-CM

## 2020-12-28 RX ORDER — TAMSULOSIN HYDROCHLORIDE 0.4 MG/1
CAPSULE ORAL
Qty: 30 CAPSULE | Refills: 3 | Status: SHIPPED | OUTPATIENT
Start: 2020-12-28 | End: 2021-03-04 | Stop reason: SDUPTHER

## 2020-12-31 DIAGNOSIS — I10 ESSENTIAL HYPERTENSION: ICD-10-CM

## 2020-12-31 RX ORDER — AMLODIPINE BESYLATE 10 MG/1
10 TABLET ORAL DAILY
Qty: 90 TABLET | Refills: 3 | Status: SHIPPED | OUTPATIENT
Start: 2020-12-31 | End: 2021-12-28

## 2021-01-10 ENCOUNTER — APPOINTMENT (EMERGENCY)
Dept: RADIOLOGY | Facility: HOSPITAL | Age: 76
DRG: 287 | End: 2021-01-10
Payer: MEDICARE

## 2021-01-10 ENCOUNTER — HOSPITAL ENCOUNTER (INPATIENT)
Facility: HOSPITAL | Age: 76
LOS: 2 days | Discharge: HOME | DRG: 287 | End: 2021-01-12
Attending: EMERGENCY MEDICINE | Admitting: INTERNAL MEDICINE
Payer: MEDICARE

## 2021-01-10 DIAGNOSIS — I10 ESSENTIAL HYPERTENSION: ICD-10-CM

## 2021-01-10 DIAGNOSIS — M54.9 BACK PAIN: ICD-10-CM

## 2021-01-10 DIAGNOSIS — E78.00 PURE HYPERCHOLESTEROLEMIA: ICD-10-CM

## 2021-01-10 DIAGNOSIS — E87.6 HYPOKALEMIA: ICD-10-CM

## 2021-01-10 DIAGNOSIS — R07.9 CHEST PAIN, UNSPECIFIED TYPE: ICD-10-CM

## 2021-01-10 DIAGNOSIS — I21.4 NSTEMI (NON-ST ELEVATED MYOCARDIAL INFARCTION) (HCC): Primary | ICD-10-CM

## 2021-01-10 PROBLEM — E78.5 HYPERLIPIDEMIA: Status: ACTIVE | Noted: 2021-01-10

## 2021-01-10 LAB
ALBUMIN SERPL BCP-MCNC: 2.9 G/DL (ref 3.5–5)
ALP SERPL-CCNC: 156 U/L (ref 46–116)
ALT SERPL W P-5'-P-CCNC: 41 U/L (ref 12–78)
ANION GAP SERPL CALCULATED.3IONS-SCNC: 7 MMOL/L (ref 4–13)
APTT PPP: 18 SECONDS (ref 23–37)
APTT PPP: 28 SECONDS (ref 23–37)
AST SERPL W P-5'-P-CCNC: 52 U/L (ref 5–45)
ATRIAL RATE: 65 BPM
ATRIAL RATE: 66 BPM
ATRIAL RATE: 70 BPM
BASOPHILS # BLD AUTO: 0.07 THOUSANDS/ΜL (ref 0–0.1)
BASOPHILS NFR BLD AUTO: 1 % (ref 0–1)
BILIRUB SERPL-MCNC: 0.73 MG/DL (ref 0.2–1)
BUN SERPL-MCNC: 14 MG/DL (ref 5–25)
CALCIUM ALBUM COR SERPL-MCNC: 10 MG/DL (ref 8.3–10.1)
CALCIUM SERPL-MCNC: 9.1 MG/DL (ref 8.3–10.1)
CHLORIDE SERPL-SCNC: 104 MMOL/L (ref 100–108)
CO2 SERPL-SCNC: 23 MMOL/L (ref 21–32)
CREAT SERPL-MCNC: 1.22 MG/DL (ref 0.6–1.3)
EOSINOPHIL # BLD AUTO: 0.1 THOUSAND/ΜL (ref 0–0.61)
EOSINOPHIL NFR BLD AUTO: 1 % (ref 0–6)
ERYTHROCYTE [DISTWIDTH] IN BLOOD BY AUTOMATED COUNT: 12.9 % (ref 11.6–15.1)
FLUAV RNA RESP QL NAA+PROBE: NEGATIVE
FLUBV RNA RESP QL NAA+PROBE: NEGATIVE
GFR SERPL CREATININE-BSD FRML MDRD: 57 ML/MIN/1.73SQ M
GLUCOSE SERPL-MCNC: 159 MG/DL (ref 65–140)
HCT VFR BLD AUTO: 39.4 % (ref 36.5–49.3)
HGB BLD-MCNC: 13.3 G/DL (ref 12–17)
IMM GRANULOCYTES # BLD AUTO: 0.07 THOUSAND/UL (ref 0–0.2)
IMM GRANULOCYTES NFR BLD AUTO: 1 % (ref 0–2)
INR PPP: 0.98 (ref 0.84–1.19)
LYMPHOCYTES # BLD AUTO: 0.99 THOUSANDS/ΜL (ref 0.6–4.47)
LYMPHOCYTES NFR BLD AUTO: 8 % (ref 14–44)
MCH RBC QN AUTO: 33.3 PG (ref 26.8–34.3)
MCHC RBC AUTO-ENTMCNC: 33.8 G/DL (ref 31.4–37.4)
MCV RBC AUTO: 99 FL (ref 82–98)
MONOCYTES # BLD AUTO: 0.86 THOUSAND/ΜL (ref 0.17–1.22)
MONOCYTES NFR BLD AUTO: 7 % (ref 4–12)
NEUTROPHILS # BLD AUTO: 10.95 THOUSANDS/ΜL (ref 1.85–7.62)
NEUTS SEG NFR BLD AUTO: 82 % (ref 43–75)
NRBC BLD AUTO-RTO: 0 /100 WBCS
P AXIS: 182 DEGREES
P AXIS: 45 DEGREES
P AXIS: 74 DEGREES
PLATELET # BLD AUTO: 320 THOUSANDS/UL (ref 149–390)
PMV BLD AUTO: 10.2 FL (ref 8.9–12.7)
POTASSIUM SERPL-SCNC: 4.3 MMOL/L (ref 3.5–5.3)
PR INTERVAL: 154 MS
PR INTERVAL: 162 MS
PR INTERVAL: 192 MS
PROT SERPL-MCNC: 8.1 G/DL (ref 6.4–8.2)
PROTHROMBIN TIME: 13 SECONDS (ref 11.6–14.5)
QRS AXIS: 188 DEGREES
QRS AXIS: 44 DEGREES
QRS AXIS: 8 DEGREES
QRSD INTERVAL: 82 MS
QRSD INTERVAL: 84 MS
QRSD INTERVAL: 86 MS
QT INTERVAL: 388 MS
QT INTERVAL: 394 MS
QT INTERVAL: 396 MS
QTC INTERVAL: 406 MS
QTC INTERVAL: 409 MS
QTC INTERVAL: 424 MS
RBC # BLD AUTO: 3.99 MILLION/UL (ref 3.88–5.62)
RSV RNA RESP QL NAA+PROBE: NEGATIVE
SARS-COV-2 RNA RESP QL NAA+PROBE: NEGATIVE
SODIUM SERPL-SCNC: 134 MMOL/L (ref 136–145)
T WAVE AXIS: -28 DEGREES
T WAVE AXIS: 121 DEGREES
T WAVE AXIS: 200 DEGREES
TROPONIN I SERPL-MCNC: 0.5 NG/ML
TROPONIN I SERPL-MCNC: 0.7 NG/ML
TROPONIN I SERPL-MCNC: 0.72 NG/ML
TROPONIN I SERPL-MCNC: 0.89 NG/ML
VENTRICULAR RATE: 65 BPM
VENTRICULAR RATE: 66 BPM
VENTRICULAR RATE: 69 BPM
WBC # BLD AUTO: 13.04 THOUSAND/UL (ref 4.31–10.16)

## 2021-01-10 PROCEDURE — 96365 THER/PROPH/DIAG IV INF INIT: CPT

## 2021-01-10 PROCEDURE — 85730 THROMBOPLASTIN TIME PARTIAL: CPT | Performed by: INTERNAL MEDICINE

## 2021-01-10 PROCEDURE — 93308 TTE F-UP OR LMTD: CPT | Performed by: EMERGENCY MEDICINE

## 2021-01-10 PROCEDURE — 71275 CT ANGIOGRAPHY CHEST: CPT

## 2021-01-10 PROCEDURE — 93010 ELECTROCARDIOGRAM REPORT: CPT | Performed by: INTERNAL MEDICINE

## 2021-01-10 PROCEDURE — 36415 COLL VENOUS BLD VENIPUNCTURE: CPT | Performed by: EMERGENCY MEDICINE

## 2021-01-10 PROCEDURE — 80053 COMPREHEN METABOLIC PANEL: CPT | Performed by: EMERGENCY MEDICINE

## 2021-01-10 PROCEDURE — 1124F ACP DISCUSS-NO DSCNMKR DOCD: CPT | Performed by: EMERGENCY MEDICINE

## 2021-01-10 PROCEDURE — 93005 ELECTROCARDIOGRAM TRACING: CPT

## 2021-01-10 PROCEDURE — 84484 ASSAY OF TROPONIN QUANT: CPT | Performed by: INTERNAL MEDICINE

## 2021-01-10 PROCEDURE — G1004 CDSM NDSC: HCPCS

## 2021-01-10 PROCEDURE — 85610 PROTHROMBIN TIME: CPT | Performed by: EMERGENCY MEDICINE

## 2021-01-10 PROCEDURE — 74174 CTA ABD&PLVS W/CONTRAST: CPT

## 2021-01-10 PROCEDURE — 85730 THROMBOPLASTIN TIME PARTIAL: CPT | Performed by: EMERGENCY MEDICINE

## 2021-01-10 PROCEDURE — 84484 ASSAY OF TROPONIN QUANT: CPT | Performed by: EMERGENCY MEDICINE

## 2021-01-10 PROCEDURE — 99285 EMERGENCY DEPT VISIT HI MDM: CPT | Performed by: EMERGENCY MEDICINE

## 2021-01-10 PROCEDURE — 99285 EMERGENCY DEPT VISIT HI MDM: CPT

## 2021-01-10 PROCEDURE — 85025 COMPLETE CBC W/AUTO DIFF WBC: CPT | Performed by: EMERGENCY MEDICINE

## 2021-01-10 PROCEDURE — 0241U HB NFCT DS VIR RESP RNA 4 TRGT: CPT | Performed by: PHYSICIAN ASSISTANT

## 2021-01-10 PROCEDURE — 99223 1ST HOSP IP/OBS HIGH 75: CPT | Performed by: INTERNAL MEDICINE

## 2021-01-10 RX ORDER — ASPIRIN 325 MG
325 TABLET ORAL ONCE
Status: COMPLETED | OUTPATIENT
Start: 2021-01-10 | End: 2021-01-10

## 2021-01-10 RX ORDER — HEPARIN SODIUM 10000 [USP'U]/100ML
3-20 INJECTION, SOLUTION INTRAVENOUS
Status: DISCONTINUED | OUTPATIENT
Start: 2021-01-10 | End: 2021-01-11

## 2021-01-10 RX ORDER — TIMOLOL MALEATE 5 MG/ML
1 SOLUTION/ DROPS OPHTHALMIC DAILY
Status: DISCONTINUED | OUTPATIENT
Start: 2021-01-11 | End: 2021-01-12 | Stop reason: HOSPADM

## 2021-01-10 RX ORDER — CITALOPRAM 10 MG/1
10 TABLET ORAL DAILY
Status: DISCONTINUED | OUTPATIENT
Start: 2021-01-11 | End: 2021-01-12 | Stop reason: HOSPADM

## 2021-01-10 RX ORDER — TAMSULOSIN HYDROCHLORIDE 0.4 MG/1
0.4 CAPSULE ORAL
Status: DISCONTINUED | OUTPATIENT
Start: 2021-01-11 | End: 2021-01-12 | Stop reason: HOSPADM

## 2021-01-10 RX ORDER — METOPROLOL SUCCINATE 25 MG/1
25 TABLET, EXTENDED RELEASE ORAL DAILY
Status: DISCONTINUED | OUTPATIENT
Start: 2021-01-11 | End: 2021-01-12

## 2021-01-10 RX ORDER — HYDROCHLOROTHIAZIDE 12.5 MG/1
12.5 TABLET ORAL DAILY
Status: DISCONTINUED | OUTPATIENT
Start: 2021-01-11 | End: 2021-01-12

## 2021-01-10 RX ORDER — PRAVASTATIN SODIUM 40 MG
40 TABLET ORAL
Status: DISCONTINUED | OUTPATIENT
Start: 2021-01-11 | End: 2021-01-11

## 2021-01-10 RX ORDER — LOSARTAN POTASSIUM 50 MG/1
100 TABLET ORAL DAILY
Status: DISCONTINUED | OUTPATIENT
Start: 2021-01-11 | End: 2021-01-12 | Stop reason: HOSPADM

## 2021-01-10 RX ORDER — PANTOPRAZOLE SODIUM 40 MG/1
40 TABLET, DELAYED RELEASE ORAL
Status: DISCONTINUED | OUTPATIENT
Start: 2021-01-11 | End: 2021-01-12 | Stop reason: HOSPADM

## 2021-01-10 RX ORDER — ACETAMINOPHEN 325 MG/1
650 TABLET ORAL EVERY 6 HOURS PRN
Status: DISCONTINUED | OUTPATIENT
Start: 2021-01-10 | End: 2021-01-11

## 2021-01-10 RX ORDER — FENTANYL CITRATE 50 UG/ML
50 INJECTION, SOLUTION INTRAMUSCULAR; INTRAVENOUS ONCE
Status: COMPLETED | OUTPATIENT
Start: 2021-01-10 | End: 2021-01-10

## 2021-01-10 RX ORDER — NITROGLYCERIN 0.4 MG/1
0.4 TABLET SUBLINGUAL
Status: DISCONTINUED | OUTPATIENT
Start: 2021-01-10 | End: 2021-01-11

## 2021-01-10 RX ORDER — AMLODIPINE BESYLATE 10 MG/1
10 TABLET ORAL DAILY
Status: DISCONTINUED | OUTPATIENT
Start: 2021-01-11 | End: 2021-01-12 | Stop reason: HOSPADM

## 2021-01-10 RX ADMIN — FENTANYL CITRATE 25 MCG: 50 INJECTION, SOLUTION INTRAMUSCULAR; INTRAVENOUS at 16:30

## 2021-01-10 RX ADMIN — ACETAMINOPHEN 650 MG: 325 TABLET, FILM COATED ORAL at 22:27

## 2021-01-10 RX ADMIN — HEPARIN SODIUM 11.1 UNITS/KG/HR: 10000 INJECTION, SOLUTION INTRAVENOUS at 15:41

## 2021-01-10 RX ADMIN — ASPIRIN 325 MG ORAL TABLET 325 MG: 325 PILL ORAL at 12:47

## 2021-01-10 RX ADMIN — IOHEXOL 100 ML: 350 INJECTION, SOLUTION INTRAVENOUS at 13:16

## 2021-01-10 NOTE — H&P
H&P- Benji Morse 1945, 67 y o  male MRN: [id number]    Unit/Bed#: ED 01 Encounter: [id number]    Primary Care Provider: Karen Whyte MD   Date and time admitted to hospital: 1/10/2021 10:59 AM        Hyperlipidemia  Assessment & Plan  Continue home statin    BPH with urinary obstruction  Assessment & Plan  Continue home Flomax    Gastroesophageal reflux disease  Assessment & Plan  Continue home PPI    Essential hypertension  Assessment & Plan  Had continue home amlodipine, valsartan, HCTZ    Type 2 diabetes mellitus with complication [facility])  Assessment & Plan    Lab Results   Component Value Date    HGBA1C 7 0 (H) 12/01/2020   Hold home diabetic agents  Sliding scale insulin    * Chest pain  Assessment & Plan  Atypical chest pain  Patient with past medical history significant hypertension, hyperlipidemia, type 2 diabetes initially presented with substernal chest pain back pain  Pain improved with nitroglycerin  Initial EKG showed new T-wave inversions in V2 to V6  Initial troponin elevated 0 89  Will trend troponins  Will start heparin drip  Cardiology consult  Nitro p r n  Continuous tele              VTE Prophylaxis: Heparin Drip  / sequential compression device   Code Status: full code  POLST: There is no POLST form on file for this patient (pre-hospital)  Discussion with family: pt    Anticipated Length of Stay:  Patient will be admitted on an Inpatient basis with an anticipated length of stay of  > 2 midnights  Justification for Hospital Stay:  Chest pain    Total Time for Visit, including Counseling / Coordination of Care: 1 hour  Greater than 50% of this total time spent on direct patient counseling and coordination of care  Chief Complaint:   Chest pain    History of Present Illness:    Benji Morse is a 67 y o  male with past medical history significant of hypertension, type 2 diabetes, BPH, hyperlipidemia who initially presented with chest pain    Patient reports early this morning had substernal chest pain which caused him to come to the emergency department  You received nitroglycerin from EMS with improvement of pain  Reports non radiation of pain  Denies any shortness breath, palpitations  Denies nausea, vomiting, diarrhea, constipation, abdominal pain, dysuria or any other symptoms at this time  Review of Systems:    Review of Systems   Constitutional: Negative for appetite change, chills, diaphoresis, fatigue, fever and unexpected weight change  HENT: Negative for congestion, rhinorrhea and sore throat  Eyes: Negative for photophobia and visual disturbance  Respiratory: Negative for cough, shortness of breath and wheezing  Cardiovascular: Positive for chest pain  Negative for palpitations and leg swelling  Gastrointestinal: Negative for abdominal pain, anal bleeding, blood in stool, constipation, diarrhea, nausea and vomiting  Genitourinary: Negative for decreased urine volume, difficulty urinating, dysuria, flank pain, frequency, hematuria and urgency  Musculoskeletal: Negative for arthralgias, back pain, joint swelling and myalgias  Skin: Negative for color change and rash  Neurological: Negative for dizziness, seizures, facial asymmetry, speech difficulty, numbness and headaches  Psychiatric/Behavioral: Negative for agitation, confusion and decreased concentration  The patient is not nervous/anxious          Past Medical and Surgical History:     Past Medical History:   Diagnosis Date    Anxiety     Claustrophobia     Colon polyps     Depression     Diabetes mellitus [facility]     Dyslipidemia     Esophageal polyp     GERD (gastroesophageal reflux disease)     Glaucoma     Headache     Hypertension     Low back pain     Neck pain     Numbness and tingling in left arm     resolved 2/20/17 / numbness and tingling left side last assessed 3/23/16    OAB (overactive bladder)     Paroxysmal supraventricular tachycardia ([facility] )     Sleep apnea     wears cpap     Squamous cell skin cancer 10/19/2020    Right Nasal Ala, Dr Turcios       Past Surgical History:   Procedure Laterality Date    ANOSCOPY      for polyp removal    BASAL CELL CARCINOMA EXCISION      right cheek    CATARACT EXTRACTION Bilateral     KNEE ARTHROSCOPY Bilateral     LUMBAR SPINE SURGERY      30years ago    MOHS SURGERY  11/11/2020    [facility] Right Nasal Ala, Dr Graham Crutch      tooth extraction    FL COLONOSCOPY FLX DX W/COLLJ SPEC WHEN PFRMD N/A 2/13/2019    Procedure: COLONOSCOPY;  Surgeon: Joseline Carnes MD;  Location: [facility]; Service: Gastroenterology    FL ESOPHAGOGASTRODUODENOSCOPY TRANSORAL DIAGNOSTIC N/A 2/13/2019    Procedure: ESOPHAGOGASTRODUODENOSCOPY (EGD); Surgeon: Joseline Carnes MD;  Location: [facility]; Service: Gastroenterology    RECTAL BIOPSY      REPLACEMENT TOTAL KNEE Right 04/2017    REPLACEMENT TOTAL KNEE Left 03/2013    TOTAL KNEE ARTHROPLASTY Left        Meds/Allergies:    Prior to Admission medications    Medication Sig Start Date End Date Taking?  Authorizing Provider   amLODIPine (NORVASC) 10 mg tablet Take 1 tablet (10 mg total) by mouth daily 12/31/20  Yes Cruz Patterson MD   amoxicillin (AMOXIL) 500 mg capsule TAKE ONE CAPSULE BY MOUTH EVERY 6 HOURS TILL GONE 11/24/20  Yes Historical Provider, MD   citalopram (CeleXA) 10 mg tablet Take 1 tablet (10 mg total) by mouth daily 8/19/20  Yes Cruz Patterson MD   glimepiride (AMARYL) 1 mg tablet Take 3 tablets (3 mg total) by mouth daily with breakfast 7/29/20  Yes Cruz Patterson MD   metoprolol succinate (TOPROL-XL) 25 mg 24 hr tablet Take 1 tablet (25 mg total) by mouth daily 3/18/20  Yes Cruz Patterson MD   omeprazole (PriLOSEC) 20 mg delayed release capsule Take 1 capsule (20 mg total) by mouth daily 8/21/20  Yes Cruz Patterson MD   rosuvastatin (CRESTOR) 5 mg tablet Take 1 tablet (5 mg total) by mouth daily 3/18/20  Yes Cruz Patterson MD tamsulosin (FLOMAX) 0 4 mg TAKE 1 TABLET BY MOUTH ONCE DAILY RIGHT AFTER SUPPER 20  Yes Neil Negro MD   trospium chloride (SANCTURA) 20 mg tablet Take 1 tablet (20 mg total) by mouth 2 (two) times a day 20  Yes Neil Negro MD   valsartan-hydrochlorothiazide (DIOVAN-HCT) 160-12 5 MG per tablet TAKE 1 TABLET BY MOUTH EVERY DAY 20  Yes Leigh Ann Brady MD   mupirocin [facility]) 2 % ointment Apply topically once daily to the nose  20   Chevy Foote MD   timolol (TIMOPTIC) 0 5 % ophthalmic solution  3/17/20   Historical Provider, MD     I have reviewed home medications with patient personally  Allergies:    Allergies   Allergen Reactions    Codeine Hives    Sulfa Antibiotics Other (See Comments)     Nausea         Social History:     Marital Status: /Civil Union     Patient Pre-hospital Living Situation: home  Patient Pre-hospital Level of Mobility: independent  Patient Pre-hospital Diet Restrictions: diabetic  Substance Use History:   Social History     Substance and Sexual Activity   Alcohol Use Yes    Alcohol/week: 14 0 standard drinks    Types: 14 Shots of liquor per week    Frequency: 4 or more times a week    Drinks per session: 1 or 2    Binge frequency: Never    Comment: "couple of drinks each night"     Social History     Tobacco Use   Smoking Status Former Smoker    Quit date: 1983    Years since quittin 9   Smokeless Tobacco Former User     Social History     Substance and Sexual Activity   Drug Use No       Family History:    Family History   Problem Relation Age of Onset    Alzheimer's disease Mother     Diabetes Mother     [address] Hypertension Father     Diabetes Paternal Grandmother     Esophageal cancer Paternal Grandfather        Physical Exam:     Vitals:   Blood Pressure: 114/54 (01/10/21 1614)  Pulse: 71 (01/10/21 1614)  Temperature: 98 5 °F (36 9 °C) (01/10/21 1114)  Temp Source: Oral (01/10/21 1114)  Respirations: 22 (01/10/21 1614)  Weight - Scale: 134 kg (295 lb 6 7 oz) (01/10/21 1111)  SpO2: 96 % (01/10/21 1614)    Physical Exam  Constitutional:       General: He is not in acute distress  Appearance: He is well-developed  He is not diaphoretic  HENT:      Head: Normocephalic and atraumatic  Nose: Nose normal       Mouth/Throat:      Pharynx: No oropharyngeal exudate  Eyes:      General: No scleral icterus  Conjunctiva/sclera: Conjunctivae normal    Neck:      Musculoskeletal: Normal range of motion and neck supple  Cardiovascular:      Rate and Rhythm: Normal rate and regular rhythm  Heart sounds: Normal heart sounds  No murmur  No friction rub  No gallop  Pulmonary:      Effort: Pulmonary effort is normal  No respiratory distress  Breath sounds: Normal breath sounds  No wheezing or rales  Chest:      Chest wall: No tenderness  Abdominal:      General: Bowel sounds are normal  There is no distension  Palpations: Abdomen is soft  Tenderness: There is no abdominal tenderness  There is no guarding  Musculoskeletal: Normal range of motion  General: No tenderness or deformity  Skin:     General: Skin is warm and dry  Findings: No erythema  Neurological:      Mental Status: He is alert and oriented to person, place, and time  Additional Data:     Lab Results: I have personally reviewed pertinent reports        Results from last 7 days   Lab Units 01/10/21  1158   WBC Thousand/uL 13 04*   HEMOGLOBIN g/dL 13 3   HEMATOCRIT % 39 4   PLATELETS Thousands/uL 320   NEUTROS PCT % 82*   LYMPHS PCT % 8*   MONOS PCT % 7   EOS PCT % 1     Results from last 7 days   Lab Units 01/10/21  1158   SODIUM mmol/L 134*   POTASSIUM mmol/L 4 3   CHLORIDE mmol/L 104   CO2 mmol/L 23   BUN mg/dL 14   CREATININE mg/dL 1 22   ANION GAP mmol/L 7   CALCIUM mg/dL 9 1   ALBUMIN g/dL 2 9*   TOTAL BILIRUBIN mg/dL 0 73   ALK PHOS U/L 156*   ALT U/L 41   AST U/L 52*   GLUCOSE RANDOM mg/dL 159* Results from last 7 days   Lab Units 01/10/21  1506   INR  0 98                   Imaging: I have personally reviewed pertinent reports  CTA dissection protocol chest abdomen pelvis w wo contrast   Final Result by Cj Velazco MD (01/10 8170)      No aortic aneurysm or dissection  No other acute intrathoracic or intra-abdominal pathology  Hepatomegaly with steatosis  Cholelithiasis  Workstation performed: [id number]             EKG, Pathology, and Other Studies Reviewed on Admission:   · EKG: reviewed    Allscripts / Epic Records Reviewed: Yes     ** Please Note: This note has been constructed using a voice recognition system   **

## 2021-01-10 NOTE — ED PROVIDER NOTES
History  Chief Complaint   Patient presents with    Chest Pain     chest and back pain, given 1 SL nitro with relief of pain  66 y/o male with PMHx of HTN, HLD, DM, BPH, and ANALY presents to the ED via EMS with back pain radiating into his chest that started this morning after waking from sleep  He tried taking ibuprofen 600 mg with no relief of his pain  His wife called EMS and he received nitroglycerin en route, which reduced his pain from 10/10 at worst to 4/10 currently  He describes the pain as a sharp and aching pain  He has never had this pain in the past  He denies fever, chills, cough, SOB, abdominal pain, N/V/D, urinary symptoms, neck pain, headache, lightheadedness, numbness, and weakness  He denies prior history of cardiac disease  Prior to Admission Medications   Prescriptions Last Dose Informant Patient Reported? Taking? amLODIPine (NORVASC) 10 mg tablet   No Yes   Sig: Take 1 tablet (10 mg total) by mouth daily   amoxicillin (AMOXIL) 500 mg capsule   Yes Yes   Sig: TAKE ONE CAPSULE BY MOUTH EVERY 6 HOURS TILL GONE   citalopram (CeleXA) 10 mg tablet  Self No Yes   Sig: Take 1 tablet (10 mg total) by mouth daily   glimepiride (AMARYL) 1 mg tablet  Self No Yes   Sig: Take 3 tablets (3 mg total) by mouth daily with breakfast   metoprolol succinate (TOPROL-XL) 25 mg 24 hr tablet  Self No Yes   Sig: Take 1 tablet (25 mg total) by mouth daily   mupirocin (BACTROBAN) 2 % ointment   No No   Sig: Apply topically once daily to the nose     omeprazole (PriLOSEC) 20 mg delayed release capsule  Self No Yes   Sig: Take 1 capsule (20 mg total) by mouth daily   rosuvastatin (CRESTOR) 5 mg tablet  Self No Yes   Sig: Take 1 tablet (5 mg total) by mouth daily   tamsulosin (FLOMAX) 0 4 mg   No Yes   Sig: TAKE 1 TABLET BY MOUTH ONCE DAILY RIGHT AFTER SUPPER   timolol (TIMOPTIC) 0 5 % ophthalmic solution  Self Yes No   trospium chloride (SANCTURA) 20 mg tablet   No Yes   Sig: Take 1 tablet (20 mg total) by mouth 2 (two) times a day   valsartan-hydrochlorothiazide (DIOVAN-HCT) 160-12 5 MG per tablet   No Yes   Sig: TAKE 1 TABLET BY MOUTH EVERY DAY      Facility-Administered Medications: None       Past Medical History:   Diagnosis Date    Anxiety     Claustrophobia     Colon polyps     Depression     Diabetes mellitus ([address] )     Dyslipidemia     Esophageal polyp     GERD (gastroesophageal reflux disease)     Glaucoma     Headache     Hypertension     Low back pain     Neck pain     Numbness and tingling in left arm     resolved 2/20/17 / numbness and tingling left side last assessed 3/23/16    OAB (overactive bladder)     Paroxysmal supraventricular tachycardia (HCC)     Sleep apnea     wears cpap     Squamous cell skin cancer 10/19/2020    Right Nasal Ala, Dr Turcios       Past Surgical History:   Procedure Laterality Date    ANOSCOPY      for polyp removal    BASAL CELL CARCINOMA EXCISION      right cheek    CATARACT EXTRACTION Bilateral     KNEE ARTHROSCOPY Bilateral     LUMBAR SPINE SURGERY      30years ago    MOHS SURGERY  11/11/2020    [facility] Right Nasal Ala, Dr Leticia Ying      tooth extraction    HI COLONOSCOPY FLX DX W/COLLJ SPEC WHEN PFRMD N/A 2/13/2019    Procedure: COLONOSCOPY;  Surgeon: Sophie Mosher MD;  Location: [facility]; Service: Gastroenterology    HI ESOPHAGOGASTRODUODENOSCOPY TRANSORAL DIAGNOSTIC N/A 2/13/2019    Procedure: ESOPHAGOGASTRODUODENOSCOPY (EGD); Surgeon: Sophie Mosher MD;  Location: [facility]; Service: Gastroenterology    RECTAL BIOPSY      REPLACEMENT TOTAL KNEE Right 04/2017    REPLACEMENT TOTAL KNEE Left 03/2013    TOTAL KNEE ARTHROPLASTY Left        Family History   Problem Relation Age of Onset    Alzheimer's disease Mother     Diabetes Mother    Kulwant Salvage Hypertension Father     Diabetes Paternal Grandmother     Esophageal cancer Paternal Grandfather      I have reviewed and agree with the history as documented      E-Cigarette/Vaping    E-Cigarette Use Never User      E-Cigarette/Vaping Substances    Nicotine No     THC No     CBD No     Flavoring No     Other No     Unknown No      Social History     Tobacco Use    Smoking status: Former Smoker     Quit date: 1983     Years since quittin 9    Smokeless tobacco: Former User   Substance Use Topics    Alcohol use: Yes     Alcohol/week: 14 0 standard drinks     Types: 14 Shots of liquor per week     Frequency: 4 or more times a week     Drinks per session: 1 or 2     Binge frequency: Never     Comment: "couple of drinks each night"    Drug use: No        Review of Systems   Constitutional: Negative for chills and fever  HENT: Negative for congestion and sore throat  Respiratory: Negative for cough and shortness of breath  Cardiovascular: Positive for chest pain  Negative for palpitations and leg swelling  Gastrointestinal: Negative for abdominal pain, diarrhea, nausea and vomiting  Genitourinary: Negative for dysuria and hematuria  Musculoskeletal: Positive for back pain  Negative for neck pain  Neurological: Negative for syncope, facial asymmetry, light-headedness and headaches  All other systems reviewed and are negative  Physical Exam  ED Triage Vitals   Temperature Pulse Respirations Blood Pressure SpO2   01/10/21 1114 01/10/21 1111 01/10/21 1111 01/10/21 1111 01/10/21 1111   98 5 °F (36 9 °C) 66 18 109/52 96 %      Temp Source Heart Rate Source Patient Position - Orthostatic VS BP Location FiO2 (%)   01/10/21 1114 01/10/21 1248 01/10/21 1248 01/10/21 1248 --   Oral Monitor Lying Left arm       Pain Score       01/10/21 1248       5             Orthostatic Vital Signs  Vitals:    21 1010 21 2210 21 0735 21 1100   BP: 122/64 122/69 136/57 137/75   Pulse:       Patient Position - Orthostatic VS:           Physical Exam  Vitals signs and nursing note reviewed  Constitutional:       General: He is not in acute distress  Appearance: He is well-developed  He is obese  HENT:      Head: Normocephalic and atraumatic  Eyes:      Extraocular Movements: Extraocular movements intact  Pupils: Pupils are equal, round, and reactive to light  Neck:      Musculoskeletal: Normal range of motion and neck supple  Cardiovascular:      Rate and Rhythm: Normal rate and regular rhythm  Pulses:           Radial pulses are 2+ on the right side and 2+ on the left side  Dorsalis pedis pulses are 2+ on the right side and 2+ on the left side  Heart sounds: Normal heart sounds  Comments: On exam, LUE /52, RUE /50  Pulmonary:      Effort: Pulmonary effort is normal  No tachypnea or respiratory distress  Breath sounds: Normal breath sounds  Chest:      Chest wall: No tenderness  Abdominal:      General: Bowel sounds are normal       Palpations: Abdomen is soft  Tenderness: There is no abdominal tenderness  There is no guarding  Musculoskeletal: Normal range of motion  Right lower leg: He exhibits no tenderness  No edema  Left lower leg: He exhibits no tenderness  No edema  Skin:     General: Skin is warm and dry  Capillary Refill: Capillary refill takes less than 2 seconds  Neurological:      General: No focal deficit present  Mental Status: He is alert and oriented to person, place, and time           ED Medications  Medications   amLODIPine (NORVASC) tablet 10 mg (10 mg Oral Given 1/12/21 0946)   citalopram (CeleXA) tablet 10 mg (10 mg Oral Given 1/12/21 0947)   metoprolol succinate (TOPROL-XL) 24 hr tablet 25 mg (25 mg Oral Given 1/12/21 0946)   pantoprazole (PROTONIX) EC tablet 40 mg (40 mg Oral Given 1/12/21 5143)   tamsulosin (FLOMAX) capsule 0 4 mg (0 4 mg Oral Given 1/11/21 6546)   timolol (TIMOPTIC) 0 5 % ophthalmic solution 1 drop (1 drop Both Eyes Given 1/12/21 0949)   insulin lispro (HumaLOG) 100 units/mL subcutaneous injection 1-6 Units (1 Units Subcutaneous Not Given 1/12/21 0708)   insulin lispro (HumaLOG) 100 units/mL subcutaneous injection 1-5 Units (1 Units Subcutaneous Not Given 1/11/21 2232)   losartan (COZAAR) tablet 100 mg (100 mg Oral Given 1/12/21 0946)     And   hydrochlorothiazide (HYDRODIURIL) tablet 12 5 mg (12 5 mg Oral Given 1/12/21 1035)   aspirin (ECOTRIN LOW STRENGTH) EC tablet 81 mg (81 mg Oral Given 1/12/21 0945)   sodium chloride 0 9 % infusion (0 mL/hr Intravenous Hold 1/11/21 1133)   atorvastatin (LIPITOR) tablet 80 mg (80 mg Oral Given 1/11/21 1656)   ondansetron (ZOFRAN) injection 4 mg (has no administration in time range)   acetaminophen (TYLENOL) tablet 650 mg (0 mg Oral Return to [facility] 1/12/21 0947)   nitroglycerin (NITROSTAT) SL tablet 0 4 mg (has no administration in time range)   sodium chloride 0 9 % infusion (0 mL/hr Intravenous Stopped 1/12/21 0100)   colchicine (COLCRYS) tablet 0 6 mg (0 6 mg Oral Given 1/12/21 0945)   heparin (porcine) subcutaneous injection 5,000 Units (5,000 Units Subcutaneous Given 1/12/21 0543)   ibuprofen (MOTRIN) tablet 400 mg (400 mg Oral Given 1/12/21 0543)   potassium chloride (K-DUR,KLOR-CON) CR tablet 40 mEq (40 mEq Oral Given 1/12/21 0945)   aspirin tablet 325 mg (325 mg Oral Given 1/10/21 1247)   iohexol (OMNIPAQUE) 350 MG/ML injection (MULTI-DOSE) 100 mL (100 mL Intravenous Given 1/10/21 1316)   fentanyl citrate (PF) 100 MCG/2ML 50 mcg (25 mcg Intravenous Given 1/10/21 1630)   nitroglycerin (NITRO-BID) 2 % TD ointment 1 inch (1 inch Topical Given 1/11/21 0339)   morphine injection 2 mg (2 mg Intravenous Given 1/11/21 0916)   potassium chloride (K-DUR,KLOR-CON) CR tablet 40 mEq (40 mEq Oral Given 1/11/21 1118)   potassium chloride (K-DUR,KLOR-CON) CR tablet 40 mEq (40 mEq Oral Given 1/11/21 1438)   perflutren lipid microsphere (DEFINITY) injection (0 6 mL/min Intravenous Given 1/11/21 1030)   fentanyl citrate (PF) 100 MCG/2ML (25 mcg Intravenous Given 1/11/21 1203)   midazolam (VERSED) injection (1 mg Intravenous Given 1/11/21 1203)   sodium chloride 0 9 % infusion (75 mL/hr Intravenous New Bag 1/11/21 1159)   lidocaine (PF) (XYLOCAINE-MPF) 1 % injection (2 mL Infiltration Given 1/11/21 1202)   nitroGLYcerin (TRIDIL) 50 mg in 250 mL infusion (premix) (200 mcg Intra-arterial Given 1/11/21 1204)   heparin (porcine) injection (4,000 Units Intravenous Given 1/11/21 1204)   verapamil (ISOPTIN) injection (2 5 mg  Given 1/11/21 1204)   iohexol (OMNIPAQUE) 350 MG/ML injection (SINGLE-DOSE) (50 mL Intravenous Given 1/11/21 1213)       Diagnostic Studies  Results Reviewed     Procedure Component Value Units Date/Time    Basic metabolic panel [[id number]]  (Abnormal) Collected: 01/11/21 0522    Lab Status: Final result Specimen: Blood from Hand, Left Updated: 01/11/21 0117     Sodium 137 mmol/L      Potassium 3 0 mmol/L      Chloride 100 mmol/L      CO2 30 mmol/L      ANION GAP 7 mmol/L      BUN 12 mg/dL      Creatinine 1 18 mg/dL      Glucose 142 mg/dL      Calcium 8 7 mg/dL      eGFR 60 ml/min/1 73sq m     Narrative:      [place] guidelines for Chronic Kidney Disease (CKD):     Stage 1 with normal or high GFR (GFR > 90 mL/min/1 73 square meters)    Stage 2 Mild CKD (GFR = 60-89 mL/min/1 73 square meters)    Stage 3A Moderate CKD (GFR = 45-59 mL/min/1 73 square meters)    Stage 3B Moderate CKD (GFR = 30-44 mL/min/1 73 square meters)    Stage 4 Severe CKD (GFR = 15-29 mL/min/1 73 square meters)    Stage 5 End Stage CKD (GFR <15 mL/min/1 73 square meters)  Note: GFR calculation is accurate only with a steady state creatinine    CBC and differential [[id number]]  (Abnormal) Collected: 01/11/21 0522    Lab Status: Final result Specimen: Blood from Hand, Left Updated: 01/11/21 0548     WBC 8 56 Thousand/uL      RBC 3 62 Million/uL      Hemoglobin 12 0 g/dL      Hematocrit 35 9 %      MCV 99 fL      MCH 33 1 pg      MCHC 33 4 g/dL      RDW 12 8 %      MPV 9 6 fL      Platelets 442 Thousands/uL nRBC 0 /100 WBCs      Neutrophils Relative 67 %      Immat GRANS % 1 %      Lymphocytes Relative 19 %      Monocytes Relative 11 %      Eosinophils Relative 1 %      Basophils Relative 1 %      Neutrophils Absolute 5 78 Thousands/µL      Immature Grans Absolute 0 05 Thousand/uL      Lymphocytes Absolute 1 66 Thousands/µL      Monocytes Absolute 0 96 Thousand/µL      Eosinophils Absolute 0 06 Thousand/µL      Basophils Absolute 0 05 Thousands/µL     Troponin I [[id number]]  (Abnormal) Collected: 01/10/21 2234    Lab Status: Final result Specimen: Blood from Arm, Left Updated: 01/10/21 2302     Troponin I 0 72 ng/mL     Troponin I [[id number]]  (Abnormal) Collected: 01/10/21 1955    Lab Status: Final result Specimen: Blood from Arm, Left Updated: 01/10/21 2020     Troponin I 0 70 ng/mL     Troponin I [[id number]]  (Abnormal) Collected: 01/10/21 1630    Lab Status: Final result Specimen: Blood from Arm, Left Updated: 01/10/21 1702     Troponin I 0 50 ng/mL     APTT six (6) hours after Heparin bolus/drip initiation or dosing change [[id number]]  (Abnormal) Collected: 01/10/21 1506    Lab Status: Final result Specimen: Blood from Arm, Left Updated: 01/10/21 1537     PTT 18 seconds     Protime-INR [[id number]]  (Normal) Collected: 01/10/21 1506    Lab Status: Final result Specimen: Blood from Arm, Left Updated: 01/10/21 1537     Protime 13 0 seconds      INR 0 98    Comprehensive metabolic panel [[id number]]  (Abnormal) Collected: 01/10/21 1158    Lab Status: Final result Specimen: Blood from Arm, Right Updated: 01/10/21 1249     Sodium 134 mmol/L      Potassium 4 3 mmol/L      Chloride 104 mmol/L      CO2 23 mmol/L      ANION GAP 7 mmol/L      BUN 14 mg/dL      Creatinine 1 22 mg/dL      Glucose 159 mg/dL      Calcium 9 1 mg/dL      Corrected Calcium 10 0 mg/dL      AST 52 U/L      ALT 41 U/L      Alkaline Phosphatase 156 U/L      Total Protein 8 1 g/dL      Albumin 2 9 g/dL      Total Bilirubin 0 73 mg/dL      eGFR 57 ml/min/1 73sq m     Narrative:      [employer] guidelines for Chronic Kidney Disease (CKD):     Stage 1 with normal or high GFR (GFR > 90 mL/min/1 73 square meters)    Stage 2 Mild CKD (GFR = 60-89 mL/min/1 73 square meters)    Stage 3A Moderate CKD (GFR = 45-59 mL/min/1 73 square meters)    Stage 3B Moderate CKD (GFR = 30-44 mL/min/1 73 square meters)    Stage 4 Severe CKD (GFR = 15-29 mL/min/1 73 square meters)    Stage 5 End Stage CKD (GFR <15 mL/min/1 73 square meters)  Note: GFR calculation is accurate only with a steady state creatinine    Troponin I [[id number]]  (Abnormal) Collected: 01/10/21 1158    Lab Status: Final result Specimen: Blood from Arm, Right Updated: 01/10/21 1233     Troponin I 0 89 ng/mL     CBC and differential [[id number]]  (Abnormal) Collected: 01/10/21 1158    Lab Status: Final result Specimen: Blood from Arm, Right Updated: 01/10/21 1204     WBC 13 04 Thousand/uL      RBC 3 99 Million/uL      Hemoglobin 13 3 g/dL      Hematocrit 39 4 %      MCV 99 fL      MCH 33 3 pg      MCHC 33 8 g/dL      RDW 12 9 %      MPV 10 2 fL      Platelets 156 Thousands/uL      nRBC 0 /100 WBCs      Neutrophils Relative 82 %      Immat GRANS % 1 %      Lymphocytes Relative 8 %      Monocytes Relative 7 %      Eosinophils Relative 1 %      Basophils Relative 1 %      Neutrophils Absolute 10 95 Thousands/µL      Immature Grans Absolute 0 07 Thousand/uL      Lymphocytes Absolute 0 99 Thousands/µL      Monocytes Absolute 0 86 Thousand/µL      Eosinophils Absolute 0 10 Thousand/µL      Basophils Absolute 0 07 Thousands/µL                  CTA dissection protocol chest abdomen pelvis w wo contrast   Final Result by Hollie Frias MD (01/10 6630)      No aortic aneurysm or dissection  No other acute intrathoracic or intra-abdominal pathology  Hepatomegaly with steatosis  Cholelithiasis                    Workstation performed: [id number]               Procedures  POC Cardiac US    Date/Time: 1/10/2021 1:27 PM  Performed by: Negar Sheppard MD  Authorized by: Negar Sheppard MD     Patient location:  ED  Other Assisting Provider: Yes (comment) Corina Whelan MD)    Procedure details:     Exam Type:  Diagnostic    Indications: chest pain      Assessment / Evaluation for: cardiac function and pericardial effusion      Exam Type: initial exam      Image quality: limited diagnostic      Image availability:  Images available in PACS  Patient Details:     Cardiac Rhythm:  Regular  Cardiac findings:     Echo technique: limited 2D      Views obtained: parasternal long axis, parasternal short axis and apical      Pericardial effusion: trace      Tamponade physiology: absent      Wall motion: normal      LV systolic function: normal      RV dilation: none            ED Course  ED Course as of Jan 12 1125   Sun Too 10, 2021   1229 Procedure Note: EKG  Date/Time: 01/10/21 11:08 AM   Interpreted by: Negar Sheppard MD  Indications / Diagnosis: CP  ECG reviewed by me, the ED Physician: yes   The EKG demonstrates:  Rhythm: Normal sinus rhythm 66 bpm  Intervals: normal intervals  Axis: normal axis  QRS/Blocks: low voltage QRS  ST Changes: T wave inversions V2-V6, new compared to EKG from 9/14/2005  Inferior T wave inversions appear unchanged  1234 WBC(!): 13 04   1234 Troponin I(!): 0 89   1416 CTA dissection protocol chest abdomen pelvis w wo contrast             HEART Risk Score      Most Recent Value   Heart Score Risk Calculator   History  1 Filed at: 01/10/2021 2057   ECG  1 Filed at: 01/10/2021 2057   Age  2 Filed at: 01/10/2021 2057   Risk Factors  2 Filed at: 01/10/2021 2057   Troponin  2 Filed at: 01/10/2021 2057   HEART Score  8 Filed at: 01/10/2021 2057        Identification of Seniors at Risk      Most Recent Value   (ISAR) Identification of Seniors at Risk   Before the illness or injury that brought you to the Emergency, did you need someone to help you on a regular basis?   0 Filed at: 01/10/2021 1113   In the last 24 hours, have you needed more help than usual?  0 Filed at: 01/10/2021 1113   Have you been hospitalized for one or more nights during the past 6 months? 0 Filed at: 01/10/2021 1113   In general, do you see well?  0 Filed at: 01/10/2021 1113   In general, do you have serious problems with your memory? 0 Filed at: 01/10/2021 1113   Do you take more than three different medications every day? 1 Filed at: 01/10/2021 1113   ISAR Score  1 Filed at: 01/10/2021 1113                              MDM  Number of Diagnoses or Management Options  Back pain:   NSTEMI (non-ST elevated myocardial infarction) [facility]):   Diagnosis management comments: 68 y/o M with hx of HTN, HLD, DM, ANALY, and BPH presenting with back pain radiating into the chest that started this morning, improved with NTG given by EMS  On exam he is afebrile, VSS, with no focal physical exam findings  Cardiac work-up and CTA dissection protocol ordered  Trop elevated to 0 89  EKG with new T inversions in V2-V6, old inversions from 2005 inferior are unchanged  CTA dissection protocol negative  Patient given full dose ASA and started on an ACS heparin drip  Discussed indications for admission with the patient and his wife and they understand and agree  Case discussed with Dr Cindi Eubanks, who accepts the patient for admission        Disposition  Final diagnoses:   NSTEMI (non-ST elevated myocardial infarction) ([facility] [address] )   Back pain     Time reflects when diagnosis was documented in both MDM as applicable and the Disposition within this note     Time User Action Codes Description Comment    1/10/2021  4:20 PM [place] Res Add [I21 4] NSTEMI (non-ST elevated myocardial infarction) ([facility] [address] )     1/10/2021  4:20 PM [place] Res Add [M54 9] Back pain       ED Disposition     ED Disposition Condition Date/Time Comment    Admit Stable Sun Too 10, 2021  4:20 PM Case was discussed with Dr Cindi Eubanks, and the patient's admission status was agreed to be Admission Status: inpatient status to the service of Dr Jules Apley, [facility]          Follow-up Information    None         Current Discharge Medication List      CONTINUE these medications which have NOT CHANGED    Details   amLODIPine (NORVASC) 10 mg tablet Take 1 tablet (10 mg total) by mouth daily  Qty: 90 tablet, Refills: 3    Associated Diagnoses: Essential hypertension      amoxicillin (AMOXIL) 500 mg capsule TAKE ONE CAPSULE BY MOUTH EVERY 6 HOURS TILL GONE      citalopram (CeleXA) 10 mg tablet Take 1 tablet (10 mg total) by mouth daily  Qty: 90 tablet, Refills: 3    Associated Diagnoses: Depression, unspecified depression type      glimepiride (AMARYL) 1 mg tablet Take 3 tablets (3 mg total) by mouth daily with breakfast  Qty: 270 tablet, Refills: 3    Comments: Please ship immediately  Associated Diagnoses: Type 2 diabetes mellitus with complication, without long-term current use of insulin ([facility])      metoprolol succinate (TOPROL-XL) 25 mg 24 hr tablet Take 1 tablet (25 mg total) by mouth daily  Qty: 90 tablet, Refills: 3    Associated Diagnoses: Essential hypertension      omeprazole (PriLOSEC) 20 mg delayed release capsule Take 1 capsule (20 mg total) by mouth daily  Qty: 90 capsule, Refills: 3    Associated Diagnoses: Gastroesophageal reflux disease without esophagitis      rosuvastatin (CRESTOR) 5 mg tablet Take 1 tablet (5 mg total) by mouth daily  Qty: 90 tablet, Refills: 3    Associated Diagnoses: Pure hypercholesterolemia      tamsulosin (FLOMAX) 0 4 mg TAKE 1 TABLET BY MOUTH ONCE DAILY RIGHT AFTER SUPPER  Qty: 30 capsule, Refills: 3    Associated Diagnoses: BPH with urinary obstruction      trospium chloride (SANCTURA) 20 mg tablet Take 1 tablet (20 mg total) by mouth 2 (two) times a day  Qty: 180 tablet, Refills: 3    Associated Diagnoses: Urgency of urination      valsartan-hydrochlorothiazide (DIOVAN-HCT) 160-12 5 MG per tablet TAKE 1 TABLET BY MOUTH EVERY DAY  Qty: 90 tablet, Refills: 2 Associated Diagnoses: Essential hypertension      mupirocin (BACTROBAN) 2 % ointment Apply topically once daily to the nose  Qty: 22 g, Refills: 2    Associated Diagnoses: Encounter for removal of sutures      timolol (TIMOPTIC) 0 5 % ophthalmic solution            No discharge procedures on file  PDMP Review     None           ED Provider  Attending physically available and evaluated Tipmaría Cardozajanell HUMPHREY managed the patient along with the ED Attending      Electronically Signed by         Deepa Ward MD  01/12/21 [id number] [facility] Uvaldo Borden MD  01/28/21 [id number]

## 2021-01-10 NOTE — ASSESSMENT & PLAN NOTE
Lab Results   Component Value Date    HGBA1C 7 0 (H) 12/01/2020   Hold home diabetic agents  Sliding scale insulin

## 2021-01-10 NOTE — ED ATTENDING ATTESTATION
1/10/2021  IOksana MD, saw and evaluated the patient  I have discussed the patient with the resident/non-physician practitioner and agree with the resident's/non-physician practitioner's findings, Plan of Care, and MDM as documented in the resident's/non-physician practitioner's note, except where noted  All available labs and Radiology studies were reviewed  I was present for key portions of any procedure(s) performed by the resident/non-physician practitioner and I was immediately available to provide assistance  At this point I agree with the current assessment done in the Emergency Department  I have conducted an independent evaluation of this patient a history and physical is as follows:    ED Course     21-year-old male presenting to the emergency department for evaluation of chest and back pain  Patient states that last night he had heartburn, this subsequently resolved with symptomatic treatment  The patient states that this morning he had very severe pleuritic pain in his mid scapular area as well as in his midsternal chest   Patient was treated with nitroglycerin and had improvement of pain  Patient denies any shortness of breath  No new leg pain or swelling  No abdominal pain, nausea, vomiting  Ten systems reviewed and negative except as noted  Head is normocephalic and atraumatic  Eyelids lashes are normal   Neck is supple  Chest is clear to auscultation bilaterally  Heart is regular rate rhythm with no murmurs rubs or gallops  Abdomen is nondistended, soft and nontender  Extremities are unremarkable in external appearance  Distal pulses are equal and symmetric  No lower extremity edema  No calf tenderness  Negative Homans sign bilaterally  Concern for dissection      Labs Reviewed   CBC AND DIFFERENTIAL - Abnormal       Result Value Ref Range Status    WBC 13 04 (*) 4 31 - 10 16 Thousand/uL Final    RBC 3 99  3 88 - 5 62 Million/uL Final    Hemoglobin 13 3 12 0 - 17 0 g/dL Final    Hematocrit 39 4  36 5 - 49 3 % Final    MCV 99 (*) 82 - 98 fL Final    MCH 33 3  26 8 - 34 3 pg Final    MCHC 33 8  31 4 - 37 4 g/dL Final    RDW 12 9  11 6 - 15 1 % Final    MPV 10 2  8 9 - 12 7 fL Final    Platelets 198  299 - 390 Thousands/uL Final    nRBC 0  /100 WBCs Final    Neutrophils Relative 82 (*) 43 - 75 % Final    Immat GRANS % 1  0 - 2 % Final    Lymphocytes Relative 8 (*) 14 - 44 % Final    Monocytes Relative 7  4 - 12 % Final    Eosinophils Relative 1  0 - 6 % Final    Basophils Relative 1  0 - 1 % Final    Neutrophils Absolute 10 95 (*) 1 85 - 7 62 Thousands/µL Final    Immature Grans Absolute 0 07  0 00 - 0 20 Thousand/uL Final    Lymphocytes Absolute 0 99  0 60 - 4 47 Thousands/µL Final    Monocytes Absolute 0 86  0 17 - 1 22 Thousand/µL Final    Eosinophils Absolute 0 10  0 00 - 0 61 Thousand/µL Final    Basophils Absolute 0 07  0 00 - 0 10 Thousands/µL Final   COMPREHENSIVE METABOLIC PANEL - Abnormal    Sodium 134 (*) 136 - 145 mmol/L Final    Potassium 4 3  3 5 - 5 3 mmol/L Final    Comment: Moderately Hemolyzed; Results May be Affected    Chloride 104  100 - 108 mmol/L Final    CO2 23  21 - 32 mmol/L Final    ANION GAP 7  4 - 13 mmol/L Final    BUN 14  5 - 25 mg/dL Final    Creatinine 1 22  0 60 - 1 30 mg/dL Final    Comment: Standardized to IDMS reference method    Glucose 159 (*) 65 - 140 mg/dL Final    Comment: If the patient is fasting, the ADA then defines impaired fasting glucose as > 100 mg/dL and diabetes as > or equal to 123 mg/dL  Specimen collection should occur prior to Sulfasalazine administration due to the potential for falsely depressed results  Specimen collection should occur prior to Sulfapyridine administration due to the potential for falsely elevated results      Calcium 9 1  8 3 - 10 1 mg/dL Final    Corrected Calcium 10 0  8 3 - 10 1 mg/dL Final    AST 52 (*) 5 - 45 U/L Final    Comment: Specimen collection should occur prior to Sulfasalazine administration due to the potential for falsely depressed results  ALT 41  12 - 78 U/L Final    Comment: Specimen collection should occur prior to Sulfasalazine and/or Sulfapyridine administration due to the potential for falsely depressed results  Alkaline Phosphatase 156 (*) 46 - 116 U/L Final    Total Protein 8 1  6 4 - 8 2 g/dL Final    Albumin 2 9 (*) 3 5 - 5 0 g/dL Final    Total Bilirubin 0 73  0 20 - 1 00 mg/dL Final    Comment: Use of this assay is not recommended for patients undergoing treatment with eltrombopag due to the potential for falsely elevated results  eGFR 57  ml/min/1 73sq m Final    Narrative:     [place] guidelines for Chronic Kidney Disease (CKD):     Stage 1 with normal or high GFR (GFR > 90 mL/min/1 73 square meters)    Stage 2 Mild CKD (GFR = 60-89 mL/min/1 73 square meters)    Stage 3A Moderate CKD (GFR = 45-59 mL/min/1 73 square meters)    Stage 3B Moderate CKD (GFR = 30-44 mL/min/1 73 square meters)    Stage 4 Severe CKD (GFR = 15-29 mL/min/1 73 square meters)    Stage 5 End Stage CKD (GFR <15 mL/min/1 73 square meters)  Note: GFR calculation is accurate only with a steady state creatinine   TROPONIN I - Abnormal    Troponin I 0 89 (*) <=0 04 ng/mL Final    Comment: Siemens Chemistry analyzer 99% cutoff is > 0 04 ng/mL in network labs     o cTnI 99% cutoff is useful only when applied to patients in the clinical setting of myocardial ischemia   o cTnI 99% cutoff should be interpreted in the context of clinical history, ECG findings and possibly cardiac imaging to establish correct diagnosis  o cTnI 99% cutoff may be suggestive but clearly not indicative of a coronary event without the clinical setting of myocardial ischemia       APTT - Abnormal    PTT 18 (*) 23 - 37 seconds Final    Comment: Therapeutic Heparin Range =  60-90 seconds   PROTIME-INR - Normal    Protime 13 0  11 6 - 14 5 seconds Final    INR 0 98  0 84 - 1 19 Final   TROPONIN I   TROPONIN I       CTA dissection protocol chest abdomen pelvis w wo contrast   Final Result      No aortic aneurysm or dissection  No other acute intrathoracic or intra-abdominal pathology  Hepatomegaly with steatosis  Cholelithiasis  Workstation performed: [id number]           CT is negative for dissection  Patient has been treated with aspirin  If he continues to have chest pain, plan is to initiate heparin drip        Critical Care Time  Procedures

## 2021-01-10 NOTE — ASSESSMENT & PLAN NOTE
Atypical chest pain  Patient with past medical history significant hypertension, hyperlipidemia, type 2 diabetes initially presented with substernal chest pain back pain  Pain improved with nitroglycerin  Initial EKG showed new T-wave inversions in V2 to V6  Initial troponin elevated 0 89  Will trend troponins  Will start heparin drip  Cardiology consult  Nitro p r n    Continuous tele

## 2021-01-11 ENCOUNTER — APPOINTMENT (INPATIENT)
Dept: NON INVASIVE DIAGNOSTICS | Facility: HOSPITAL | Age: 76
DRG: 287 | End: 2021-01-11
Payer: MEDICARE

## 2021-01-11 LAB
ANION GAP SERPL CALCULATED.3IONS-SCNC: 7 MMOL/L (ref 4–13)
APTT PPP: 35 SECONDS (ref 23–37)
ATRIAL RATE: 72 BPM
ATRIAL RATE: 72 BPM
ATRIAL RATE: 74 BPM
ATRIAL RATE: 74 BPM
ATRIAL RATE: 76 BPM
ATRIAL RATE: 77 BPM
BASOPHILS # BLD AUTO: 0.05 THOUSANDS/ΜL (ref 0–0.1)
BASOPHILS NFR BLD AUTO: 1 % (ref 0–1)
BUN SERPL-MCNC: 12 MG/DL (ref 5–25)
CALCIUM SERPL-MCNC: 8.7 MG/DL (ref 8.3–10.1)
CHLORIDE SERPL-SCNC: 100 MMOL/L (ref 100–108)
CO2 SERPL-SCNC: 30 MMOL/L (ref 21–32)
CREAT SERPL-MCNC: 1.18 MG/DL (ref 0.6–1.3)
CRP SERPL QL: 93.2 MG/L
EOSINOPHIL # BLD AUTO: 0.06 THOUSAND/ΜL (ref 0–0.61)
EOSINOPHIL NFR BLD AUTO: 1 % (ref 0–6)
ERYTHROCYTE [DISTWIDTH] IN BLOOD BY AUTOMATED COUNT: 12.8 % (ref 11.6–15.1)
GFR SERPL CREATININE-BSD FRML MDRD: 60 ML/MIN/1.73SQ M
GLUCOSE SERPL-MCNC: 142 MG/DL (ref 65–140)
GLUCOSE SERPL-MCNC: 143 MG/DL (ref 65–140)
GLUCOSE SERPL-MCNC: 145 MG/DL (ref 65–140)
GLUCOSE SERPL-MCNC: 149 MG/DL (ref 65–140)
GLUCOSE SERPL-MCNC: 151 MG/DL (ref 65–140)
HCT VFR BLD AUTO: 35.9 % (ref 36.5–49.3)
HGB BLD-MCNC: 12 G/DL (ref 12–17)
IMM GRANULOCYTES # BLD AUTO: 0.05 THOUSAND/UL (ref 0–0.2)
IMM GRANULOCYTES NFR BLD AUTO: 1 % (ref 0–2)
LYMPHOCYTES # BLD AUTO: 1.66 THOUSANDS/ΜL (ref 0.6–4.47)
LYMPHOCYTES NFR BLD AUTO: 19 % (ref 14–44)
MAGNESIUM SERPL-MCNC: 1.6 MG/DL (ref 1.6–2.6)
MCH RBC QN AUTO: 33.1 PG (ref 26.8–34.3)
MCHC RBC AUTO-ENTMCNC: 33.4 G/DL (ref 31.4–37.4)
MCV RBC AUTO: 99 FL (ref 82–98)
MONOCYTES # BLD AUTO: 0.96 THOUSAND/ΜL (ref 0.17–1.22)
MONOCYTES NFR BLD AUTO: 11 % (ref 4–12)
NEUTROPHILS # BLD AUTO: 5.78 THOUSANDS/ΜL (ref 1.85–7.62)
NEUTS SEG NFR BLD AUTO: 67 % (ref 43–75)
NRBC BLD AUTO-RTO: 0 /100 WBCS
P AXIS: 0 DEGREES
P AXIS: 24 DEGREES
P AXIS: 28 DEGREES
P AXIS: 37 DEGREES
P AXIS: 39 DEGREES
P AXIS: 63 DEGREES
PLATELET # BLD AUTO: 265 THOUSANDS/UL (ref 149–390)
PMV BLD AUTO: 9.6 FL (ref 8.9–12.7)
POTASSIUM SERPL-SCNC: 3 MMOL/L (ref 3.5–5.3)
PR INTERVAL: 160 MS
PR INTERVAL: 162 MS
PR INTERVAL: 166 MS
PR INTERVAL: 174 MS
QRS AXIS: -17 DEGREES
QRS AXIS: -18 DEGREES
QRS AXIS: -2 DEGREES
QRS AXIS: 0 DEGREES
QRS AXIS: 1 DEGREES
QRS AXIS: 3 DEGREES
QRSD INTERVAL: 82 MS
QRSD INTERVAL: 84 MS
QRSD INTERVAL: 88 MS
QRSD INTERVAL: 88 MS
QRSD INTERVAL: 90 MS
QRSD INTERVAL: 90 MS
QT INTERVAL: 370 MS
QT INTERVAL: 380 MS
QT INTERVAL: 382 MS
QT INTERVAL: 384 MS
QT INTERVAL: 402 MS
QT INTERVAL: 404 MS
QTC INTERVAL: 416 MS
QTC INTERVAL: 421 MS
QTC INTERVAL: 424 MS
QTC INTERVAL: 434 MS
QTC INTERVAL: 440 MS
QTC INTERVAL: 499 MS
RBC # BLD AUTO: 3.62 MILLION/UL (ref 3.88–5.62)
SODIUM SERPL-SCNC: 137 MMOL/L (ref 136–145)
T WAVE AXIS: 58 DEGREES
T WAVE AXIS: 64 DEGREES
T WAVE AXIS: 67 DEGREES
T WAVE AXIS: 69 DEGREES
T WAVE AXIS: 71 DEGREES
T WAVE AXIS: 77 DEGREES
TROPONIN I SERPL-MCNC: 0.36 NG/ML
VENTRICULAR RATE: 72 BPM
VENTRICULAR RATE: 74 BPM
VENTRICULAR RATE: 74 BPM
VENTRICULAR RATE: 76 BPM
VENTRICULAR RATE: 77 BPM
VENTRICULAR RATE: 92 BPM
WBC # BLD AUTO: 8.56 THOUSAND/UL (ref 4.31–10.16)

## 2021-01-11 PROCEDURE — 85025 COMPLETE CBC W/AUTO DIFF WBC: CPT | Performed by: INTERNAL MEDICINE

## 2021-01-11 PROCEDURE — 80048 BASIC METABOLIC PNL TOTAL CA: CPT | Performed by: INTERNAL MEDICINE

## 2021-01-11 PROCEDURE — 99152 MOD SED SAME PHYS/QHP 5/>YRS: CPT | Performed by: INTERNAL MEDICINE

## 2021-01-11 PROCEDURE — 93454 CORONARY ARTERY ANGIO S&I: CPT | Performed by: INTERNAL MEDICINE

## 2021-01-11 PROCEDURE — 82948 REAGENT STRIP/BLOOD GLUCOSE: CPT

## 2021-01-11 PROCEDURE — 85730 THROMBOPLASTIN TIME PARTIAL: CPT | Performed by: INTERNAL MEDICINE

## 2021-01-11 PROCEDURE — 99152 MOD SED SAME PHYS/QHP 5/>YRS: CPT | Performed by: STUDENT IN AN ORGANIZED HEALTH CARE EDUCATION/TRAINING PROGRAM

## 2021-01-11 PROCEDURE — 99223 1ST HOSP IP/OBS HIGH 75: CPT | Performed by: INTERNAL MEDICINE

## 2021-01-11 PROCEDURE — 84484 ASSAY OF TROPONIN QUANT: CPT | Performed by: INTERNAL MEDICINE

## 2021-01-11 PROCEDURE — C1769 GUIDE WIRE: HCPCS | Performed by: STUDENT IN AN ORGANIZED HEALTH CARE EDUCATION/TRAINING PROGRAM

## 2021-01-11 PROCEDURE — C1894 INTRO/SHEATH, NON-LASER: HCPCS | Performed by: STUDENT IN AN ORGANIZED HEALTH CARE EDUCATION/TRAINING PROGRAM

## 2021-01-11 PROCEDURE — 93010 ELECTROCARDIOGRAM REPORT: CPT | Performed by: INTERNAL MEDICINE

## 2021-01-11 PROCEDURE — 83735 ASSAY OF MAGNESIUM: CPT | Performed by: STUDENT IN AN ORGANIZED HEALTH CARE EDUCATION/TRAINING PROGRAM

## 2021-01-11 PROCEDURE — 93306 TTE W/DOPPLER COMPLETE: CPT | Performed by: INTERNAL MEDICINE

## 2021-01-11 PROCEDURE — C8929 TTE W OR WO FOL WCON,DOPPLER: HCPCS

## 2021-01-11 PROCEDURE — 94760 N-INVAS EAR/PLS OXIMETRY 1: CPT

## 2021-01-11 PROCEDURE — 93454 CORONARY ARTERY ANGIO S&I: CPT | Performed by: STUDENT IN AN ORGANIZED HEALTH CARE EDUCATION/TRAINING PROGRAM

## 2021-01-11 PROCEDURE — 99232 SBSQ HOSP IP/OBS MODERATE 35: CPT | Performed by: INTERNAL MEDICINE

## 2021-01-11 PROCEDURE — 93005 ELECTROCARDIOGRAM TRACING: CPT

## 2021-01-11 PROCEDURE — 94660 CPAP INITIATION&MGMT: CPT

## 2021-01-11 PROCEDURE — 86140 C-REACTIVE PROTEIN: CPT | Performed by: STUDENT IN AN ORGANIZED HEALTH CARE EDUCATION/TRAINING PROGRAM

## 2021-01-11 PROCEDURE — B2111ZZ FLUOROSCOPY OF MULTIPLE CORONARY ARTERIES USING LOW OSMOLAR CONTRAST: ICD-10-PCS | Performed by: INTERNAL MEDICINE

## 2021-01-11 RX ORDER — ONDANSETRON 2 MG/ML
4 INJECTION INTRAMUSCULAR; INTRAVENOUS EVERY 6 HOURS PRN
Status: DISCONTINUED | OUTPATIENT
Start: 2021-01-11 | End: 2021-01-12 | Stop reason: HOSPADM

## 2021-01-11 RX ORDER — LIDOCAINE HYDROCHLORIDE 10 MG/ML
INJECTION, SOLUTION EPIDURAL; INFILTRATION; INTRACAUDAL; PERINEURAL CODE/TRAUMA/SEDATION MEDICATION
Status: COMPLETED | OUTPATIENT
Start: 2021-01-11 | End: 2021-01-11

## 2021-01-11 RX ORDER — NITROGLYCERIN 0.4 MG/1
0.4 TABLET SUBLINGUAL
Status: DISCONTINUED | OUTPATIENT
Start: 2021-01-11 | End: 2021-01-12 | Stop reason: HOSPADM

## 2021-01-11 RX ORDER — SODIUM CHLORIDE 9 MG/ML
125 INJECTION, SOLUTION INTRAVENOUS CONTINUOUS
Status: DISPENSED | OUTPATIENT
Start: 2021-01-11 | End: 2021-01-11

## 2021-01-11 RX ORDER — VERAPAMIL HYDROCHLORIDE 2.5 MG/ML
INJECTION, SOLUTION INTRAVENOUS CODE/TRAUMA/SEDATION MEDICATION
Status: COMPLETED | OUTPATIENT
Start: 2021-01-11 | End: 2021-01-11

## 2021-01-11 RX ORDER — POTASSIUM CHLORIDE 20 MEQ/1
40 TABLET, EXTENDED RELEASE ORAL ONCE
Status: COMPLETED | OUTPATIENT
Start: 2021-01-11 | End: 2021-01-11

## 2021-01-11 RX ORDER — SODIUM CHLORIDE 9 MG/ML
100 INJECTION, SOLUTION INTRAVENOUS CONTINUOUS
Status: DISPENSED | OUTPATIENT
Start: 2021-01-11 | End: 2021-01-12

## 2021-01-11 RX ORDER — ACETAMINOPHEN 325 MG/1
650 TABLET ORAL EVERY 6 HOURS PRN
Status: DISCONTINUED | OUTPATIENT
Start: 2021-01-11 | End: 2021-01-12 | Stop reason: HOSPADM

## 2021-01-11 RX ORDER — FENTANYL CITRATE 50 UG/ML
INJECTION, SOLUTION INTRAMUSCULAR; INTRAVENOUS CODE/TRAUMA/SEDATION MEDICATION
Status: COMPLETED | OUTPATIENT
Start: 2021-01-11 | End: 2021-01-11

## 2021-01-11 RX ORDER — ASPIRIN 81 MG/1
81 TABLET ORAL DAILY
Status: DISCONTINUED | OUTPATIENT
Start: 2021-01-11 | End: 2021-01-12 | Stop reason: HOSPADM

## 2021-01-11 RX ORDER — MORPHINE SULFATE 4 MG/ML
4 INJECTION, SOLUTION INTRAMUSCULAR; INTRAVENOUS ONCE
Status: DISCONTINUED | OUTPATIENT
Start: 2021-01-11 | End: 2021-01-11

## 2021-01-11 RX ORDER — NITROGLYCERIN 20 MG/100ML
5-200 INJECTION INTRAVENOUS
Status: DISCONTINUED | OUTPATIENT
Start: 2021-01-11 | End: 2021-01-11

## 2021-01-11 RX ORDER — HEPARIN SODIUM 5000 [USP'U]/ML
5000 INJECTION, SOLUTION INTRAVENOUS; SUBCUTANEOUS EVERY 8 HOURS SCHEDULED
Status: DISCONTINUED | OUTPATIENT
Start: 2021-01-11 | End: 2021-01-12 | Stop reason: HOSPADM

## 2021-01-11 RX ORDER — IBUPROFEN 400 MG/1
400 TABLET ORAL EVERY 8 HOURS SCHEDULED
Status: DISCONTINUED | OUTPATIENT
Start: 2021-01-12 | End: 2021-01-12 | Stop reason: HOSPADM

## 2021-01-11 RX ORDER — NITROGLYCERIN 20 MG/100ML
INJECTION INTRAVENOUS CODE/TRAUMA/SEDATION MEDICATION
Status: COMPLETED | OUTPATIENT
Start: 2021-01-11 | End: 2021-01-11

## 2021-01-11 RX ORDER — SODIUM CHLORIDE 9 MG/ML
INJECTION, SOLUTION INTRAVENOUS
Status: COMPLETED | OUTPATIENT
Start: 2021-01-11 | End: 2021-01-11

## 2021-01-11 RX ORDER — COLCHICINE 0.6 MG/1
0.6 TABLET ORAL 2 TIMES DAILY
Status: DISCONTINUED | OUTPATIENT
Start: 2021-01-11 | End: 2021-01-12 | Stop reason: HOSPADM

## 2021-01-11 RX ORDER — ATORVASTATIN CALCIUM 80 MG/1
80 TABLET, FILM COATED ORAL
Status: DISCONTINUED | OUTPATIENT
Start: 2021-01-11 | End: 2021-01-12 | Stop reason: HOSPADM

## 2021-01-11 RX ORDER — MIDAZOLAM HYDROCHLORIDE 2 MG/2ML
INJECTION, SOLUTION INTRAMUSCULAR; INTRAVENOUS CODE/TRAUMA/SEDATION MEDICATION
Status: COMPLETED | OUTPATIENT
Start: 2021-01-11 | End: 2021-01-11

## 2021-01-11 RX ORDER — HEPARIN SODIUM 1000 [USP'U]/ML
INJECTION, SOLUTION INTRAVENOUS; SUBCUTANEOUS CODE/TRAUMA/SEDATION MEDICATION
Status: COMPLETED | OUTPATIENT
Start: 2021-01-11 | End: 2021-01-11

## 2021-01-11 RX ADMIN — ACETAMINOPHEN 650 MG: 325 TABLET, FILM COATED ORAL at 16:58

## 2021-01-11 RX ADMIN — AMLODIPINE BESYLATE 10 MG: 10 TABLET ORAL at 08:22

## 2021-01-11 RX ADMIN — COLCHICINE 0.6 MG: 0.6 TABLET ORAL at 19:30

## 2021-01-11 RX ADMIN — TAMSULOSIN HYDROCHLORIDE 0.4 MG: 0.4 CAPSULE ORAL at 16:56

## 2021-01-11 RX ADMIN — LIDOCAINE HYDROCHLORIDE 2 ML: 10 INJECTION, SOLUTION EPIDURAL; INFILTRATION; INTRACAUDAL; PERINEURAL at 12:02

## 2021-01-11 RX ADMIN — NITROGLYCERIN 5 MCG/MIN: 20 INJECTION INTRAVENOUS at 07:30

## 2021-01-11 RX ADMIN — POTASSIUM CHLORIDE 40 MEQ: 1500 TABLET, EXTENDED RELEASE ORAL at 11:18

## 2021-01-11 RX ADMIN — IOHEXOL 50 ML: 350 INJECTION, SOLUTION INTRAVENOUS at 12:13

## 2021-01-11 RX ADMIN — COLCHICINE 0.6 MG: 0.6 TABLET ORAL at 14:00

## 2021-01-11 RX ADMIN — ASPIRIN 81 MG: 81 TABLET, COATED ORAL at 08:23

## 2021-01-11 RX ADMIN — NITROGLYCERIN 1 INCH: 20 OINTMENT TOPICAL at 03:39

## 2021-01-11 RX ADMIN — FENTANYL CITRATE 25 MCG: 50 INJECTION INTRAMUSCULAR; INTRAVENOUS at 12:03

## 2021-01-11 RX ADMIN — MORPHINE SULFATE 2 MG: 2 INJECTION, SOLUTION INTRAMUSCULAR; INTRAVENOUS at 09:16

## 2021-01-11 RX ADMIN — CITALOPRAM HYDROBROMIDE 10 MG: 10 TABLET ORAL at 08:23

## 2021-01-11 RX ADMIN — HEPARIN SODIUM 5000 UNITS: 5000 INJECTION INTRAVENOUS; SUBCUTANEOUS at 14:02

## 2021-01-11 RX ADMIN — HEPARIN SODIUM 5000 UNITS: 5000 INJECTION INTRAVENOUS; SUBCUTANEOUS at 22:00

## 2021-01-11 RX ADMIN — HEPARIN SODIUM 4000 UNITS: 1000 INJECTION INTRAVENOUS; SUBCUTANEOUS at 12:04

## 2021-01-11 RX ADMIN — TIMOLOL MALEATE 1 DROP: 5 SOLUTION/ DROPS OPHTHALMIC at 08:24

## 2021-01-11 RX ADMIN — HYDROCHLOROTHIAZIDE 12.5 MG: 12.5 TABLET ORAL at 08:23

## 2021-01-11 RX ADMIN — LOSARTAN POTASSIUM 100 MG: 50 TABLET, FILM COATED ORAL at 08:23

## 2021-01-11 RX ADMIN — MIDAZOLAM 1 MG: 1 INJECTION INTRAMUSCULAR; INTRAVENOUS at 12:03

## 2021-01-11 RX ADMIN — NITROGLYCERIN 0.4 MG: 0.4 TABLET SUBLINGUAL at 02:54

## 2021-01-11 RX ADMIN — METOPROLOL SUCCINATE 25 MG: 25 TABLET, FILM COATED, EXTENDED RELEASE ORAL at 08:23

## 2021-01-11 RX ADMIN — SODIUM CHLORIDE 100 ML/HR: 0.9 INJECTION, SOLUTION INTRAVENOUS at 14:05

## 2021-01-11 RX ADMIN — PERFLUTREN 0.6 ML/MIN: 6.52 INJECTION, SUSPENSION INTRAVENOUS at 10:30

## 2021-01-11 RX ADMIN — ATORVASTATIN CALCIUM 80 MG: 80 TABLET, FILM COATED ORAL at 16:56

## 2021-01-11 RX ADMIN — VERAPAMIL HYDROCHLORIDE 2.5 MG: 2.5 INJECTION, SOLUTION INTRAVENOUS at 12:04

## 2021-01-11 RX ADMIN — PANTOPRAZOLE SODIUM 40 MG: 40 TABLET, DELAYED RELEASE ORAL at 05:27

## 2021-01-11 RX ADMIN — SODIUM CHLORIDE 75 ML/HR: 0.9 INJECTION, SOLUTION INTRAVENOUS at 11:59

## 2021-01-11 RX ADMIN — MIDAZOLAM 2 MG: 1 INJECTION INTRAMUSCULAR; INTRAVENOUS at 11:59

## 2021-01-11 RX ADMIN — NITROGLYCERIN 0.4 MG: 0.4 TABLET SUBLINGUAL at 05:20

## 2021-01-11 RX ADMIN — POTASSIUM CHLORIDE 40 MEQ: 1500 TABLET, EXTENDED RELEASE ORAL at 14:38

## 2021-01-11 RX ADMIN — FENTANYL CITRATE 50 MCG: 50 INJECTION INTRAMUSCULAR; INTRAVENOUS at 11:59

## 2021-01-11 RX ADMIN — Medication 200 MCG: at 12:04

## 2021-01-11 NOTE — ASSESSMENT & PLAN NOTE
Atypical chest pain  Patient with past medical history significant hypertension, hyperlipidemia, type 2 diabetes initially presented with substernal chest pain and back pain  l EKG showed new T-wave inversions in V2 to V6  troponin elevated 0 89 and has since downtrended  Pain initially improved with sublingual nitroglycerin however pain persisted and now on nitro drip  Still reports some pain    Will provide morphine  Cardiology consult pending  Echo pending  Currently NPO for possible cath

## 2021-01-11 NOTE — PROGRESS NOTES
Progress Note Annemarie Me 1945, 67 y o  male MRN: [id number]    [facility] Encounter: [id number]    Primary Care Provider: Boubacar Nagy MD   Date and time admitted to hospital: 1/10/2021 10:59 AM        Hyperlipidemia  Assessment & Plan  Continue home statin    BPH with urinary obstruction  Assessment & Plan  Continue home Flomax    Gastroesophageal reflux disease  Assessment & Plan  Continue home PPI    Essential hypertension  Assessment & Plan   continue home amlodipine, valsartan, HCTZ    Type 2 diabetes mellitus with complication [facility])  Assessment & Plan    Lab Results   Component Value Date    HGBA1C 7 0 (H) 12/01/2020   Hold home diabetic agents  Sliding scale insulin    * Chest pain  Assessment & Plan  Atypical chest pain  Patient with past medical history significant hypertension, hyperlipidemia, type 2 diabetes initially presented with substernal chest pain and back pain  l EKG showed new T-wave inversions in V2 to V6  troponin elevated 0 89 and has since downtrended  Pain initially improved with sublingual nitroglycerin however pain persisted and now on nitro drip  Still reports some pain  Will provide morphine  Cardiology consult pending  Echo pending  Currently NPO for possible cath        VTE Pharmacologic Prophylaxis:   Pharmacologic: Heparin Drip  Mechanical VTE Prophylaxis in Place: Yes    Patient Centered Rounds: I have performed bedside rounds with nursing staff today  Discussions with Specialists or Other Care Team Provider: cm, nursing    Education and Discussions with Family / Patient: pt    Time Spent for Care: 1 hour  More than 50% of total time spent on counseling and coordination of care as described above      Current Length of Stay: 1 day(s)    Current Patient Status: Inpatient   Certification Statement: The patient will continue to require additional inpatient hospital stay due to Further evaluation chest pain    Discharge Plan:  Pending cardiology evaluation    Code Status: Level 1 - Full Code      Subjective:   No acute overnight events  Still reports chest pain however improved  Denies shortness breath, palpitations or any other symptoms at this time  Objective:     Vitals:   Temp (24hrs), Av °F (37 2 °C), Min:98 5 °F (36 9 °C), Max:99 8 °F (37 7 °C)    Temp:  [98 5 °F (36 9 °C)-99 8 °F (37 7 °C)] 98 9 °F (37 2 °C)  HR:  [64-78] 77  Resp:  [18-24] 22  BP: (104-153)/(49-71) 133/68  SpO2:  [96 %-97 %] 97 %  Body mass index is 46 27 kg/m²  Input and Output Summary (last 24 hours): Intake/Output Summary (Last 24 hours) at 2021 0859  Last data filed at 2021 0100  Gross per 24 hour   Intake    Output 150 ml   Net -150 ml       Physical Exam:     Physical Exam  Constitutional:       General: He is not in acute distress  Appearance: He is well-developed  He is not diaphoretic  HENT:      Head: Normocephalic and atraumatic  Nose: Nose normal       Mouth/Throat:      Pharynx: No oropharyngeal exudate  Eyes:      General: No scleral icterus  Conjunctiva/sclera: Conjunctivae normal    Neck:      Musculoskeletal: Normal range of motion  Vascular: No JVD  Cardiovascular:      Rate and Rhythm: Normal rate and regular rhythm  Heart sounds: Normal heart sounds  No murmur  No friction rub  No gallop  Pulmonary:      Effort: Pulmonary effort is normal  No respiratory distress  Breath sounds: Normal breath sounds  No wheezing or rales  Chest:      Chest wall: No tenderness  Abdominal:      General: Bowel sounds are normal  There is no distension  Palpations: Abdomen is soft  Tenderness: There is no abdominal tenderness  There is no guarding  Musculoskeletal: Normal range of motion  General: No tenderness or deformity  Lymphadenopathy:      Cervical: No cervical adenopathy  Skin:     General: Skin is warm and dry  Findings: No erythema     Neurological:      Mental Status: He is alert  Mental status is at baseline  Cranial Nerves: No cranial nerve deficit  Coordination: Coordination normal       Deep Tendon Reflexes: Reflexes normal          Additional Data:     Labs:    Results from last 7 days   Lab Units 01/11/21  0522   WBC Thousand/uL 8 56   HEMOGLOBIN g/dL 12 0   HEMATOCRIT % 35 9*   PLATELETS Thousands/uL 265   NEUTROS PCT % 67   LYMPHS PCT % 19   MONOS PCT % 11   EOS PCT % 1     Results from last 7 days   Lab Units 01/11/21  0522 01/10/21  1158   SODIUM mmol/L 137 134*   POTASSIUM mmol/L 3 0* 4 3   CHLORIDE mmol/L 100 104   CO2 mmol/L 30 23   BUN mg/dL 12 14   CREATININE mg/dL 1 18 1 22   ANION GAP mmol/L 7 7   CALCIUM mg/dL 8 7 9 1   ALBUMIN g/dL  --  2 9*   TOTAL BILIRUBIN mg/dL  --  0 73   ALK PHOS U/L  --  156*   ALT U/L  --  41   AST U/L  --  52*   GLUCOSE RANDOM mg/dL 142* 159*     Results from last 7 days   Lab Units 01/10/21  1506   INR  0 98     Results from last 7 days   Lab Units 01/11/21  0629   POC GLUCOSE mg/dl 151*                   * I Have Reviewed All Lab Data Listed Above  * Additional Pertinent Lab Tests Reviewed:  All Labs Within Last 24 Hours Reviewed    Imaging:    Imaging Reports Reviewed Today Include: na  Imaging Personally Reviewed by Myself Includes:  na    Recent Cultures (last 7 days):           Last 24 Hours Medication List:   Current Facility-Administered Medications   Medication Dose Route Frequency Provider Last Rate    acetaminophen  650 mg Oral Q6H PRN Paul Bush MD      amLODIPine  10 mg Oral Daily Paul Bush MD      aspirin  81 mg Oral Daily Margarete Schlatter, MD      citalopram  10 mg Oral Daily Paul Bush MD      heparin (porcine)  3-20 Units/kg/hr (Order-Specific) Intravenous Titrated Dean Lester MD 15 1 Units/kg/hr (01/10/21 1666)    losartan  100 mg Oral Daily Paul Bush MD      And    hydrochlorothiazide  12 5 mg Oral Daily Paul Bush MD      insulin lispro  1-5 Units Subcutaneous HS Tricia Hernandez MD     [facility] insulin lispro  1-6 Units Subcutaneous TID AC Paul Bush MD      metoprolol succinate  25 mg Oral Daily Paul Bush MD      morphine injection  2 mg Intravenous Once Paul Bush MD      nitroglycerin  0 4 mg Sublingual Q5 Min PRN Paul Bush MD      nitroGLYcerin  5-200 mcg/min Intravenous Titrated Betsy Crawford PA-C 30 mcg/min (01/11/21 0831)    pantoprazole  40 mg Oral Early Morning Paul Bush MD      pravastatin  40 mg Oral Daily With Toan Gaytan MD      tamsulosin  0 4 mg Oral Daily With Toan Gaytan MD      timolol  1 drop Both Eyes Daily Arabella Matute MD          Today, Patient Was Seen By: Arabella Matute MD    ** Please Note: Dictation voice to text software may have been used in the creation of this document   **

## 2021-01-11 NOTE — CONSULTS
Progress Note - Cardiology   Daphney Herrera 67 y o  male MRN: [id number]  Unit/Bed#: [facility] 709-01 Encounter: [id number]    Assessment:  Patient is a 48-year-old male with a past medical history of hypertension, type 2 diabetes (last A1c 7 0%), BPH, hyperlipidemia, GERD, glaucoma, ANALY, and paroxysmal supraventricular tachycardia that presented to the ED after waking up with substernal, sharp chest pain radiating to the back  It was associated with troponin level 0 89 and T-wave inversions V2 to V6 on his ECG  CTA CAP was negative for aortic involvement  Plan:  1  NSTEMI  -Patient with substernal chest pain radiating to the back that started the morning of 01/10/2021 while resting in bed  -No history of chest pain  -Approximately 10 pack year history, quit 1983  -Improved with nitroglycerin, unchanged with exertion  -Patient still having sharp, substernal chest pain at rest  -Troponin 0 89 on presentation, repeat troponin 0 5, 0 7, 0 72, 0 36  -ECG with new T-wave inversions in V2 to V6  -CTA of CAP revealed no aortic aneurysm or dissection; hepatomegaly with steatosis; cholelithiasis  -Continue heparin drip and PRN nitro  -Continue beta-blocker  -Potassium greater than 4, magnesium greater than 2  -Echo this morning  -Telemetry without any events overnight  -LISA score 4 points, MERLY score 121- 8% chance of death in 6 months, ASCVD 50 1% 10 year risk of cardiac event  -Initiate high-intensity statin-atorvastatin 80 mg daily  -Loaded with 300 mg of clopidogrel, start 75 mg daily after  -Likely catheterization later today or tomorrow, keep NPO    2  Hyperlipidemia  -Switch rousvastatin 5 mg daily to atorvastatin 80 mg daily  -Most recent lipid panel in December 2020 revealed //HDL 37/LDL 91    3  Hypertension  -Most recent blood pressure 133/68  -Continue home amlodipine 10 mg daily, metoprolol succinate 25 mg daily, valsartan-hydrochlorothiazide 160-12 5 mg daily    4   GERD  -Continue home omeprazole 20 mg daily    5  Type 2 diabetes  -Most recent A1c in December 2020 was 7 0%  -Sliding scale insulin    6  BPH  -Continue home Flomax    Plan finalization pending attending physician attestation  Subjective/Objective   Chief Complaint: Chest pain    Subjective:  Patient is a 13-year-old male with past medical history of hypertension, type 2 diabetes (last A1c 7 0%), BPH, hyperlipidemia, GERD, glaucoma, ANALY, and paroxysmal supraventricular tachycardia that presents ED yesterday after waking up with a sharp, substernal chest pain that radiated to his back  There was no change with exertion  The pain was relieved with nitroglycerin  He has never experienced chest pain before  Initial troponin was 0 89, and ECG revealed new T-wave inversions in V2 through V6  Patient's vital signs on presentation included:  Temperature 98 5°,  blood pressure 114/54, pulse 71, respirations 22, oxygen saturation 96%  CTA CAP revealed no aortic aneurysm or dissection, hepatomegaly with steatosis, and cholelithiasis  Other significant lab work included:  WBC 13 04, sodium 134, alk-phos 156, INR 0 98, creatinine 1 22  Patient was started on heparin drip, nitroglycerin PRN, and admitted  Most recent troponin 0 36 and 9:30 this morning  Today, the patient states that he is still having substernal chest pain radiating to his back; however, is improved from yesterday  He rates it about a 6/10  Denies shortness of breath, changes with exertion, diaphoresis, nausea, vomiting, diarrhea, constipation, dysuria, and peripheral edema  Plan for an echo this morning, the patient will maintain NPO status pending possible catheterization later this afternoon  Will initiate high-intensity statin therapy, continue heparin and beta-blocker  Also, will load with clopidogrel and initiate daily dosing      Objective:    Vitals: /71   Pulse 77   Temp 98 9 °F (37 2 °C)   Resp 22   Ht 5' 7" (1 702 m)   Wt 134 kg (295 lb 6 7 oz)   SpO2 97%   BMI 46 27 kg/m²   Vitals:    01/10/21 1111 01/11/21 0115   Weight: 134 kg (295 lb 6 7 oz) 134 kg (295 lb 6 7 oz)     Orthostatic Blood Pressures      Most Recent Value   Blood Pressure  139/71 filed at 01/11/2021 0756   Patient Position - Orthostatic VS  Lying filed at 01/10/2021 2131            Intake/Output Summary (Last 24 hours) at 1/11/2021 0758  Last data filed at 1/11/2021 0100  Gross per 24 hour   Intake    Output 150 ml   Net -150 ml       Invasive Devices     Peripheral Intravenous Line            Peripheral IV 01/10/21 Right;Ventral (anterior) Wrist less than 1 day    Peripheral IV 01/11/21 Left;Ventral (anterior) Forearm less than 1 day                Review of Systems: History obtained from the patient  General ROS: negative  Psychological ROS: negative  ENT ROS: negative  Allergy and Immunology ROS: negative  Hematological and Lymphatic ROS: negative  Endocrine ROS: negative  Respiratory ROS: no cough, shortness of breath, or wheezing  Cardiovascular ROS: positive for - chest pain  Gastrointestinal ROS: no abdominal pain, change in bowel habits, or black or bloody stools  Genito-Urinary ROS: positive for - incontinence and urinary frequency/urgency  Musculoskeletal ROS: negative    Physical Exam: /66   Pulse 77   Temp 98 9 °F (37 2 °C)   Resp 22   Ht 5' 7" (1 702 m)   Wt 134 kg (295 lb 6 7 oz)   SpO2 97%   BMI 46 27 kg/m²     General Appearance:    Alert, cooperative, no distress, appears stated age, obese   Head:    Normocephalic, without obvious abnormality, atraumatic   Eyes:    PERRL, conjunctiva/corneas clear, EOM's intact, fundi     benign, both eyes        Ears:    Normal TM's and external ear canals, both ears   Nose:   Nares normal, septum midline, mucosa normal, no drainage    or sinus tenderness   Throat:   Lips, mucosa, and tongue normal; teeth and gums normal   Neck:   Supple, symmetrical, trachea midline, no adenopathy;        thyroid:  No enlargement/tenderness/nodules; no carotid    bruit or JVD that can be appreciated   Back:     Symmetric, no curvature, ROM normal, no CVA tenderness   Lungs:     Clear to auscultation bilaterally, respirations unlabored   Chest wall:    No tenderness or deformity   Heart:    Regular rate and rhythm, S1 and S2 normal, no murmur, rub   or gallop   Abdomen:     Soft, non-tender, bowel sounds active all four quadrants,     no masses, no organomegaly   Genitalia:    Normal male without lesion, discharge or tenderness   Rectal:    Normal tone, normal prostate, no masses or tenderness;    guaiac negative stool   Extremities:   Extremities normal, atraumatic, no cyanosis or edema   Pulses:   2+ and symmetric all extremities   Skin:   Skin color, texture, turgor normal, no rashes or lesions   Lymph nodes:   Cervical, supraclavicular, and axillary nodes normal   Neurologic:   CNII-XII intact  Normal strength, sensation and reflexes       throughout       Lab Results:   CBC with diff:   Results from last 7 days   Lab Units 01/11/21  0522   WBC Thousand/uL 8 56   RBC Million/uL 3 62*   HEMOGLOBIN g/dL 12 0   HEMATOCRIT % 35 9*   MCV fL 99*   MCH pg 33 1   MCHC g/dL 33 4   RDW % 12 8   MPV fL 9 6   PLATELETS Thousands/uL 265     CMP:   Results from last 7 days   Lab Units 01/11/21  0522 01/10/21  1158   SODIUM mmol/L 137 134*   POTASSIUM mmol/L 3 0* 4 3   CHLORIDE mmol/L 100 104   CO2 mmol/L 30 23   BUN mg/dL 12 14   CREATININE mg/dL 1 18 1 22   CALCIUM mg/dL 8 7 9 1   AST U/L  --  52*   ALT U/L  --  41   ALK PHOS U/L  --  156*   EGFR ml/min/1 73sq m 60 57     Troponin:   0   Lab Value Date/Time    TROPONINI 0 72 (H) 01/10/2021 2234    TROPONINI 0 70 (H) 01/10/2021 1955    TROPONINI 0 50 (H) 01/10/2021 1630    TROPONINI 0 89 (H) 01/10/2021 1158     Magnesium:     Lipid Profile:     Imaging: I have personally reviewed pertinent reports      EKG: T wave inversion in V2-V6  VTE Pharmacologic Prophylaxis: Heparin  VTE Mechanical Prophylaxis: sequential compression device    Counseling / Coordination of Care  Total time spent today 60 minutes  Greater than 50% of total time was spent with the patient and / or family counseling and / or coordination of care   A description of the counseling / coordination of care: Plan for echo, repeat troponin, increasing statin, continue heparin, initiate clopidogrel, NPO for possible PCI

## 2021-01-11 NOTE — RESPIRATORY THERAPY NOTE
Resp Care Notes      01/11/21 0300   Non-Invasive Information   Interface Full face mask   SpO2 97 %   $ Pulse Oximetry Spot Check Charge Completed   Resp Comments Chest pain returned and CPAP D/C ed for now  NC running at 5 lpm implimented  SpO2 96%  RN doing EKG after providing nitro sublingual   Pt informed to stay on his NC for the rest of the night and to follow MD instructions this night concerning future uses of CPAP HS      Non-Invasive Settings   FiO2 (%) 21   PEEP/CPAP (cm H2O) 10

## 2021-01-12 VITALS
DIASTOLIC BLOOD PRESSURE: 72 MMHG | SYSTOLIC BLOOD PRESSURE: 131 MMHG | WEIGHT: 295.42 LBS | BODY MASS INDEX: 46.37 KG/M2 | HEIGHT: 67 IN | RESPIRATION RATE: 18 BRPM | TEMPERATURE: 99.5 F | OXYGEN SATURATION: 97 % | HEART RATE: 77 BPM

## 2021-01-12 LAB
ANION GAP SERPL CALCULATED.3IONS-SCNC: 4 MMOL/L (ref 4–13)
BUN SERPL-MCNC: 15 MG/DL (ref 5–25)
CALCIUM SERPL-MCNC: 8.6 MG/DL (ref 8.3–10.1)
CHLORIDE SERPL-SCNC: 100 MMOL/L (ref 100–108)
CO2 SERPL-SCNC: 28 MMOL/L (ref 21–32)
CREAT SERPL-MCNC: 1.09 MG/DL (ref 0.6–1.3)
GFR SERPL CREATININE-BSD FRML MDRD: 66 ML/MIN/1.73SQ M
GLUCOSE SERPL-MCNC: 145 MG/DL (ref 65–140)
GLUCOSE SERPL-MCNC: 150 MG/DL (ref 65–140)
GLUCOSE SERPL-MCNC: 151 MG/DL (ref 65–140)
GLUCOSE SERPL-MCNC: 96 MG/DL (ref 65–140)
MAGNESIUM SERPL-MCNC: 1.8 MG/DL (ref 1.6–2.6)
POTASSIUM SERPL-SCNC: 3.2 MMOL/L (ref 3.5–5.3)
SODIUM SERPL-SCNC: 132 MMOL/L (ref 136–145)

## 2021-01-12 PROCEDURE — 82948 REAGENT STRIP/BLOOD GLUCOSE: CPT

## 2021-01-12 PROCEDURE — 99232 SBSQ HOSP IP/OBS MODERATE 35: CPT | Performed by: INTERNAL MEDICINE

## 2021-01-12 PROCEDURE — 99238 HOSP IP/OBS DSCHRG MGMT 30/<: CPT | Performed by: PHYSICIAN ASSISTANT

## 2021-01-12 PROCEDURE — 80048 BASIC METABOLIC PNL TOTAL CA: CPT | Performed by: INTERNAL MEDICINE

## 2021-01-12 PROCEDURE — 83735 ASSAY OF MAGNESIUM: CPT | Performed by: INTERNAL MEDICINE

## 2021-01-12 RX ORDER — POTASSIUM CHLORIDE 20 MEQ/1
40 TABLET, EXTENDED RELEASE ORAL EVERY 4 HOURS
Status: COMPLETED | OUTPATIENT
Start: 2021-01-12 | End: 2021-01-12

## 2021-01-12 RX ORDER — METOPROLOL SUCCINATE 50 MG/1
50 TABLET, EXTENDED RELEASE ORAL DAILY
Status: DISCONTINUED | OUTPATIENT
Start: 2021-01-13 | End: 2021-01-12 | Stop reason: HOSPADM

## 2021-01-12 RX ORDER — ROSUVASTATIN CALCIUM 20 MG/1
20 TABLET, COATED ORAL DAILY
Qty: 30 TABLET | Refills: 0 | Status: SHIPPED | OUTPATIENT
Start: 2021-01-12 | End: 2021-01-12 | Stop reason: SDUPTHER

## 2021-01-12 RX ORDER — COLCHICINE 0.6 MG/1
0.6 TABLET ORAL 2 TIMES DAILY
Qty: 60 TABLET | Refills: 0 | Status: SHIPPED | OUTPATIENT
Start: 2021-01-12 | End: 2021-02-04 | Stop reason: SDUPTHER

## 2021-01-12 RX ORDER — POTASSIUM CHLORIDE 750 MG/1
10 TABLET, FILM COATED, EXTENDED RELEASE ORAL 2 TIMES DAILY
Qty: 60 TABLET | Refills: 0 | Status: SHIPPED | OUTPATIENT
Start: 2021-01-12 | End: 2021-01-12 | Stop reason: SDUPTHER

## 2021-01-12 RX ORDER — LOSARTAN POTASSIUM 100 MG/1
100 TABLET ORAL DAILY
Qty: 30 TABLET | Refills: 0 | Status: SHIPPED | OUTPATIENT
Start: 2021-01-13 | End: 2021-01-12

## 2021-01-12 RX ORDER — POTASSIUM CHLORIDE 750 MG/1
10 TABLET, FILM COATED, EXTENDED RELEASE ORAL 2 TIMES DAILY
Qty: 60 TABLET | Refills: 0 | Status: SHIPPED | OUTPATIENT
Start: 2021-01-12 | End: 2021-03-09 | Stop reason: SDUPTHER

## 2021-01-12 RX ORDER — COLCHICINE 0.6 MG/1
0.6 TABLET ORAL 2 TIMES DAILY
Qty: 60 TABLET | Refills: 0 | Status: SHIPPED | OUTPATIENT
Start: 2021-01-12 | End: 2021-01-12

## 2021-01-12 RX ORDER — ASPIRIN 81 MG/1
81 TABLET ORAL DAILY
Qty: 30 TABLET | Refills: 0 | Status: SHIPPED | OUTPATIENT
Start: 2021-01-13

## 2021-01-12 RX ORDER — IBUPROFEN 200 MG
TABLET ORAL
Qty: 84 TABLET | Refills: 0 | Status: SHIPPED | OUTPATIENT
Start: 2021-01-12 | End: 2021-01-12 | Stop reason: SDUPTHER

## 2021-01-12 RX ORDER — LOSARTAN POTASSIUM 100 MG/1
100 TABLET ORAL DAILY
Qty: 30 TABLET | Refills: 0 | Status: SHIPPED | OUTPATIENT
Start: 2021-01-13 | End: 2021-02-04 | Stop reason: SDUPTHER

## 2021-01-12 RX ORDER — ROSUVASTATIN CALCIUM 20 MG/1
20 TABLET, COATED ORAL DAILY
Qty: 30 TABLET | Refills: 0 | Status: SHIPPED | OUTPATIENT
Start: 2021-01-12 | End: 2021-02-04 | Stop reason: SDUPTHER

## 2021-01-12 RX ORDER — ASPIRIN 81 MG/1
81 TABLET ORAL DAILY
Qty: 30 TABLET | Refills: 0 | Status: SHIPPED | OUTPATIENT
Start: 2021-01-13 | End: 2021-01-12

## 2021-01-12 RX ORDER — IBUPROFEN 200 MG
TABLET ORAL
Qty: 84 TABLET | Refills: 0 | Status: SHIPPED | OUTPATIENT
Start: 2021-01-12 | End: 2021-02-25 | Stop reason: ALTCHOICE

## 2021-01-12 RX ORDER — METOPROLOL SUCCINATE 50 MG/1
50 TABLET, EXTENDED RELEASE ORAL DAILY
Qty: 30 TABLET | Refills: 0 | Status: SHIPPED | OUTPATIENT
Start: 2021-01-13 | End: 2021-01-12 | Stop reason: HOSPADM

## 2021-01-12 RX ORDER — METOPROLOL SUCCINATE 50 MG/1
50 TABLET, EXTENDED RELEASE ORAL DAILY
Qty: 60 TABLET | Refills: 0 | Status: SHIPPED | OUTPATIENT
Start: 2021-01-12 | End: 2021-01-22

## 2021-01-12 RX ADMIN — HEPARIN SODIUM 5000 UNITS: 5000 INJECTION INTRAVENOUS; SUBCUTANEOUS at 05:43

## 2021-01-12 RX ADMIN — COLCHICINE 0.6 MG: 0.6 TABLET ORAL at 09:45

## 2021-01-12 RX ADMIN — IBUPROFEN 400 MG: 400 TABLET ORAL at 05:43

## 2021-01-12 RX ADMIN — TAMSULOSIN HYDROCHLORIDE 0.4 MG: 0.4 CAPSULE ORAL at 16:25

## 2021-01-12 RX ADMIN — LOSARTAN POTASSIUM 100 MG: 50 TABLET, FILM COATED ORAL at 09:46

## 2021-01-12 RX ADMIN — HYDROCHLOROTHIAZIDE 12.5 MG: 12.5 TABLET ORAL at 10:35

## 2021-01-12 RX ADMIN — POTASSIUM CHLORIDE 40 MEQ: 1500 TABLET, EXTENDED RELEASE ORAL at 09:45

## 2021-01-12 RX ADMIN — INSULIN LISPRO 1 UNITS: 100 INJECTION, SOLUTION INTRAVENOUS; SUBCUTANEOUS at 12:13

## 2021-01-12 RX ADMIN — POTASSIUM CHLORIDE 40 MEQ: 1500 TABLET, EXTENDED RELEASE ORAL at 12:12

## 2021-01-12 RX ADMIN — ATORVASTATIN CALCIUM 80 MG: 80 TABLET, FILM COATED ORAL at 16:25

## 2021-01-12 RX ADMIN — IBUPROFEN 400 MG: 400 TABLET ORAL at 13:06

## 2021-01-12 RX ADMIN — METOPROLOL SUCCINATE 25 MG: 25 TABLET, FILM COATED, EXTENDED RELEASE ORAL at 09:46

## 2021-01-12 RX ADMIN — CITALOPRAM HYDROBROMIDE 10 MG: 10 TABLET ORAL at 09:47

## 2021-01-12 RX ADMIN — AMLODIPINE BESYLATE 10 MG: 10 TABLET ORAL at 09:46

## 2021-01-12 RX ADMIN — TIMOLOL MALEATE 1 DROP: 5 SOLUTION/ DROPS OPHTHALMIC at 09:49

## 2021-01-12 RX ADMIN — PANTOPRAZOLE SODIUM 40 MG: 40 TABLET, DELAYED RELEASE ORAL at 05:43

## 2021-01-12 RX ADMIN — HEPARIN SODIUM 5000 UNITS: 5000 INJECTION INTRAVENOUS; SUBCUTANEOUS at 13:06

## 2021-01-12 RX ADMIN — COLCHICINE 0.6 MG: 0.6 TABLET ORAL at 17:41

## 2021-01-12 RX ADMIN — ASPIRIN 81 MG: 81 TABLET, COATED ORAL at 09:45

## 2021-01-12 NOTE — RESTORATIVE TECHNICIAN NOTE
Restorative Specialist Mobility Note       Activity: Ambulate in [place], Ambulate in room, Bathroom privileges, Chair, Dangle, Stand at bedside(Educated/encouraged pt to ambulate with assistance 3-4 x's/day   Pt callbell, phone/tray within reach )     Assistive Device: None       Magdalena BAIN, Restorative Technician, [employer]

## 2021-01-12 NOTE — DISCHARGE INSTRUCTIONS
Left Heart Catheterization   WHAT YOU NEED TO KNOW:   A left heart catheterization is a procedure to look at your heart and its arteries  You may need this procedure if you have chest pain, heart disease, or your heart is not working as it should  DISCHARGE INSTRUCTIONS:   Call 911 for any of the following:   · You have any of the following signs of a heart attack:      ? Squeezing, pressure, or pain in your chest     ? and  any of the following:     ? Discomfort or pain in your back, neck, jaw, stomach, or arm     ? Shortness of breath    ? Nausea or vomiting    ? Lightheadedness or a sudden cold sweat    · You have any of the following signs of a stroke:      ? Numbness or drooping on one side of your face     ? Weakness in an arm or leg    ? Confusion or difficulty speaking    ? Dizziness, a severe headache, or vision loss    Seek care immediately if:   · Your arm or leg feels warm, tender, and painful  It may look swollen and red  · The leg or arm used for your angiogram is numb, painful, or changes color  · The bruise at your catheter site gets bigger or becomes swollen  · Your wound does not stop bleeding even after you apply firm pressure for 15 minutes  · You have weakness in an arm or leg  · You become confused or have difficulty speaking  · You have dizziness, a severe headache, or vision loss  Contact your healthcare provider if:   · You have a fever  · Your catheter site is red, leaks pus, or smells bad  · You have increasing pain at your catheter site  · You have questions or concerns about your condition or care  Limit activity as directed:   · Avoid unnecessary stair climbing for 48 hours, if a catheter was put in your groin  · Do not place pressure on your arm, hand, or wrist, if the catheter was placed in your wrist  Avoid pushing, pulling, or heavy lifting with that arm      · If you need to cough, support the area where the catheter was inserted with your hand  · Ask your healthcare provider how long you need to limit movement and avoid certain activities  · You may feel like resting more after your procedure  Slowly start to do more each day  Rest when you feel it is needed  Keep your bandage clean and dry:  Ask your healthcare provider when you can bathe and shower  Do not take baths or go in pools or hot tubs  Check your site every day for signs of infection such as swelling, redness, or pus  Drink liquids as directed:  Liquids help flush the dye used for your procedure out of your body  Ask your healthcare provider how much liquid to drink each day, and which liquids to drink  Some foods, such as soup and fruit, also provide liquid  Limit alcohol:  Do not drink alcohol for 24 hours after your procedure  Then limit alcohol  Women should limit alcohol to 1 drink a day  Men should limit alcohol to 2 drinks a day  A drink of alcohol is 12 ounces of beer, 5 ounces of wine, or 1½ ounces of liquor  Do not smoke:  Nicotine and other chemicals in cigarettes and cigars can damage your blood vessels  Ask your healthcare provider for information if you currently smoke and need help to quit  E-cigarettes or smokeless tobacco still contain nicotine  Talk to your healthcare provider before you use these products  Follow up with your healthcare provider as directed:  Write down your questions so you remember to ask them during your visits  © Copyright [address] 2018 Information is for End User's use only and may not be sold, redistributed or otherwise used for commercial purposes  All illustrations and images included in CareNotes® are the copyrighted property of A D A M , Inc  or [facility] Leonel Lindsay   The above information is an  only  It is not intended as medical advice for individual conditions or treatments   Talk to your doctor, nurse or pharmacist before following any medical regimen to see if it is safe and effective for you       Acute Pericarditis   WHAT YOU NEED TO KNOW:   Acute pericarditis is inflammation of the pericardium  The pericardium is the thin sac that surrounds your heart  A small amount of clear fluid between the heart and the sac allows the heart to beat easily  With acute pericarditis, the amount of fluid increases and may contain pus  This can lead to problems with the way that your heart beats  DISCHARGE INSTRUCTIONS:   Return to the emergency department if:   · You have shortness of breath that is worse when you lie down  · Your chest pain gets worse or does not get better  Call your doctor if:   · You have a fever  · You have questions or concerns about your condition or care  Medicines: You may need any of the following:  · NSAIDs  help decrease swelling and pain or fever  This medicine is available with or without a doctor's order  NSAIDs can cause stomach bleeding or kidney problems in certain people  If you take blood thinner medicine, always ask your healthcare provider if NSAIDs are safe for you  Always read the medicine label and follow directions  · Antibiotics  help prevent or treat a bacterial infection  · Steroid medicine  helps lower inflammation  · Take your medicine as directed  Contact your healthcare provider if you think your medicine is not helping or if you have side effects  Tell him of her if you are allergic to any medicine  Keep a list of the medicines, vitamins, and herbs you take  Include the amounts, and when and why you take them  Bring the list or the pill bottles to follow-up visits  Carry your medicine list with you in case of an emergency  Self-care:   · Eat a variety of healthy foods  This may help you have more energy and heal faster  Healthy foods include fruits, vegetables, whole-grain breads, low-fat dairy products, beans, lean meat, and fish  Ask if you need to be on a special diet  · Drink liquids as directed    Adults should drink between 9 and 13 eight-ounce cups of liquid every day  Ask what amount is best for you  For most people, good liquids to drink are water, juice, and milk  · Get plenty of exercise  Talk to your healthcare provider about the best exercise plan for you  Exercise can decrease your blood pressure and improve your health  · Do not smoke  Nicotine and other chemicals in cigarettes and cigars can cause lung damage  Ask your healthcare provider for information if you currently smoke and need help to quit  E-cigarettes or smokeless tobacco still contain nicotine  Talk to your healthcare provider before you use these products  · Manage stress  Stress may slow healing and cause illness  Learn new ways to relax, such as deep breathing  Prevent infections: The following can help prevent the spread of viruses and bacteria that can cause acute pericarditis or make it worse:  · Wash your hands often  Wash your hands several times each day  Wash after you use the bathroom, change a child's diaper, and before you prepare or eat food  Use soap and water every time  Rub your soapy hands together, lacing your fingers  Wash the front and back of your hands, and in between your fingers  Use the fingers of one hand to scrub under the fingernails of the other hand  Wash for at least 20 seconds  Rinse with warm, running water for several seconds  Then dry your hands with a clean towel or paper towel  Use hand  that contains alcohol if soap and water are not available  Do not touch your eyes, nose, or mouth without washing your hands first          · Cover a sneeze or cough  Use a tissue that covers your mouth and nose  Throw the tissue away in a trash can right away  Use the bend of your arm if a tissue is not available  Wash your hands well with soap and water or use a hand   · Clean surfaces often  Clean doorknobs, countertops, cell phones, and other surfaces that are touched often   Use a disinfecting wipe, a single-use sponge, or a cloth you can wash and reuse  Use disinfecting  if you do not have wipes  You can create a disinfecting  by mixing 1 part bleach with 10 parts water  · Ask about vaccines you may need  Vaccines help protect against viruses and bacteria that cause certain diseases  Your healthcare provider can tell you which vaccines you may need and when to get them  ? Get the influenza (flu) vaccine as soon as recommended each year  The flu vaccine is usually available starting in September or October  Flu viruses change, so it is important to get a flu vaccine every year  ? Get the pneumonia vaccine if recommended  This vaccine is usually recommended every 5 years  Your provider will tell you when to get this vaccine, if needed  Follow up with your doctor as directed:  Write down your questions so you remember to ask them during your visits  © Copyright [address] [place] [address] Drive Information is for End User's use only and may not be sold, redistributed or otherwise used for commercial purposes  All illustrations and images included in CareNotes® are the copyrighted property of A D A M , Inc  or [facility] Leonel Lindsay   The above information is an  only  It is not intended as medical advice for individual conditions or treatments  Talk to your doctor, nurse or pharmacist before following any medical regimen to see if it is safe and effective for you

## 2021-01-12 NOTE — CASE MANAGEMENT
Pt is not a 30 day readmission  Pt is not a bundle  Unplanned readmission risk color- Green  CM met pt at bedside, introduce self and made aware of CM role at dc  Pt has a LW but no POA  Primary contact is his wife Ailin Alcocer- [phone]  Pt lives with his wife Ailin Alcocer in a 2 story house with 2 IRVING from the garage and 13-14 steps to the 2nd flr bathroom and bedroom  There is a 1/2 bath on the 1sr flr  Pt was IPTA with all ADL's, drive and retired  Pt does not use any AD and has CPAP at home  PCP si Dr Tori Blankenship  Pharmacy is [facility] in [place]  Pt has no hx with HHC, STR, alc, drug and IP psych tx   Pt's wife will transport pt when dc  CM reviewed d/c planning process including the following: identifying help at home, patient preference for d/c planning needs, Discharge Lounge, [facility] Meds to Bed program, availability of treatment team to discuss questions or concerns patient and/or family may have regarding understanding medications and recognizing signs and symptoms once discharged  CM also encouraged patient to follow up with all recommended appointments after discharge  Patient advised of importance for patient and family to participate in managing patients medical well being

## 2021-01-12 NOTE — MALNUTRITION/BMI
This medical record reflects one or more clinical indicators suggestive of morbid obesity  BMI Findings:  Adult BMI Classifications: Morbid Obesity 45-49 9     Body mass index is 46 27 kg/m²  See Nutrition note dated 01/12/2021   for additional details  Completed nutrition assessment is viewable in the nutrition documentation

## 2021-01-12 NOTE — PROGRESS NOTES
Progress Note - Cardiology   Emeterio Soriano 67 y o  male MRN: [id number]  [facility] Encounter: [id number]    Assessment:  Patient is a 50-year-old male with a past medical history of hypertension, type 2 diabetes (last A1c 7%), hyperlipidemia, paroxysmal supraventricular tachycardia, CKD stage 2, ANALY, GERD, and cough, that presented 2 days ago with sharp, substernal chest pain that worsened with inspiration and lying down, but improved with nitroglycerin  Initial troponin was 0 89, and ECG revealed T-wave inversions in V2 to V6  There was concern for ischemia with the patient's past medical history of body habitus  An echo revealed EF 60%, no regional wall motion abnormalities, concentric hypertrophy, grade 1 diastolic dysfunction, and tricuspid valve regurgitation  Catheterization was negative for occlusive coronary disease  He was started on colchicine 0 6 mg BID for concerns of myopericarditis  Plan:  1  Chest Pain: NSTEMI vs Myopericarditis  Patient with substernal chest pain radiating to the back that started the morning of 01/10/2021 while resting in bed  Remote history of self-resolving chest pain  Approximately 10 pack year history, quit 1983  Improved with nitroglycerin, unchanged with exertion  Troponin 0 89 on presentation, repeat troponin 0 5, 0 7, 0 72, 0 36  ECG with new T-wave inversions in V2 to V6  CTA of CAP revealed no aortic aneurysm or dissection; hepatomegaly with steatosis; cholelithiasis  LISA score 4 points, MERLY score 121- 8% chance of death in 6 months, ASCVD 50 1% 10 year risk of cardiac event  Echo revealed ejection fraction os 60%, no regional wall motion abnormalities, concentric hypertrophy, grade 1 diastolic dysfunction, and tricuspid valve regurgitation  Catheterization was negative for occlusive coronary disease  ECG continues to show the same T-wave inversions  Telemetry without events   CRP elevated at 93 2     -Heparin and nitro drips discontinued  -Continue beta-blocker, high-intensity statin, daily aspirin  -Potassium greater than 4, magnesium greater than 2  -Colchicine 0 6 mg BID initiated yesterday, continue for next 2 months- patient reports his chest pain is minimal to nonexistent today  -Begin ibuprofen 400 mg TID 7 days, then 200 mg TID for 7 days, then 200 mg BID for 7 days, then 200 mg daily for 7 days, then stop  -Continue PPI for gastric mucosa protection (on omeprazole 20 mg QD as an outpatient)  -Repeat BMP in one week as an outpatient to monitor kidney function and electrolytes  -Follow-up with Dr Issa Curry on 2/4 at 2:00 PM     2  Hyperlipidemia  -Switch rousvastatin 5 mg daily to atorvastatin 80 mg daily while inpatient, discharge on rosuvastatin 20 mg daily  -Most recent lipid panel in December 2020 revealed //HDL 37/LDL 91     3  Hypertension  -Most recent blood pressure 136/57  -Continue home amlodipine 10 mg daily  -Patient with potassium 3 2 this morning, sodium 132, may be a combination of hypertensive treatment and diet- discontinue HCTZ, continue losartan 100 mg QD, double metoprolol to 50 mg BID  -Discharge on potassium supplements     4  GERD  -Continue home PPI     5  Type 2 diabetes  -Most recent A1c in December 2020 was 7 0%  -Sliding scale insulin, resume outpatient glimepiride 3 mg daily upon discharge     6  BPH  -Continue home Flomax     Plan finalization pending attending physician attestation  Subjective/Objective   Chief Complaint: Chest pain    Subjective:  Patient was seen and examined  No acute events overnight  Patient reports that his chest pain is minimal to nonexistent today  Patient does report some fatigue because he has not been able to sleep while being in the hospital   Patient denies any recent sick contacts or travel    Denies fevers, chills, night sweats, headaches, dysphagia, acid reflux, shortness of breath, abdominal pain, nausea, vomiting, diarrhea, constipation, dysuria, and peripheral edema  Objective:    Vitals: /57   Pulse 77   Temp 98 9 °F (37 2 °C)   Resp 22   Ht 5' 7" (1 702 m)   Wt 134 kg (295 lb 6 7 oz)   SpO2 97%   BMI 46 27 kg/m²   Vitals:    01/10/21 1111 01/11/21 0115   Weight: 134 kg (295 lb 6 7 oz) 134 kg (295 lb 6 7 oz)     Orthostatic Blood Pressures      Most Recent Value   Blood Pressure  136/57 filed at 01/12/2021 0735   Patient Position - Orthostatic VS  Lying filed at 01/10/2021 2131            Intake/Output Summary (Last 24 hours) at 1/12/2021 0834  Last data filed at 1/11/2021 1900  Gross per 24 hour   Intake 240 ml   Output 400 ml   Net -160 ml       Invasive Devices     Peripheral Intravenous Line            Peripheral IV 01/11/21 Dorsal (posterior); Right Hand less than 1 day                Review of Systems: History obtained from the patient  General ROS: positive for  - fatigue  Psychological ROS: negative  ENT ROS: negative  Hematological and Lymphatic ROS: negative  Endocrine ROS: negative  Respiratory ROS: no cough, shortness of breath, or wheezing  Cardiovascular ROS: positive for - chest pain  Gastrointestinal ROS: no abdominal pain, change in bowel habits, or black or bloody stools  Genito-Urinary ROS: no dysuria, trouble voiding, or hematuria  Musculoskeletal ROS: negative  Neurological ROS: no TIA or stroke symptoms  Dermatological ROS: negative    Physical Exam: /57   Pulse 77   Temp 98 9 °F (37 2 °C)   Resp 22   Ht 5' 7" (1 702 m)   Wt 134 kg (295 lb 6 7 oz)   SpO2 97%   BMI 46 27 kg/m²     General Appearance:    Alert, cooperative, no distress, appears stated age, fatigued, obese   Head:    Normocephalic, without obvious abnormality, atraumatic   Eyes:    PERRL, conjunctiva/corneas clear, EOM's intact, fundi     benign, both eyes        Ears:    Normal TM's and external ear canals, both ears   Nose:   Nares normal, septum midline, mucosa normal, no drainage    or sinus tenderness   Throat:   Lips, mucosa, and tongue normal; teeth and gums normal   Neck:   Supple, symmetrical, trachea midline, no adenopathy;        thyroid:  No enlargement/tenderness/nodules; no carotid    bruit or JVD   Back:     Symmetric, no curvature, ROM normal, no CVA tenderness   Lungs:     Clear to auscultation bilaterally, respirations unlabored   Chest wall:    No tenderness or deformity   Heart:    Regular rate and rhythm, S1 and S2 normal, no murmur, rub   or gallop   Abdomen:     Soft, non-tender, bowel sounds active all four quadrants,     no masses, no organomegaly   Genitalia:    Normal male without lesion, discharge or tenderness   Rectal:    Normal tone, normal prostate, no masses or tenderness;    guaiac negative stool   Extremities:   Extremities normal, atraumatic, no cyanosis or edema   Pulses:   2+ and symmetric all extremities   Skin:   Skin color, texture, turgor normal, no rashes or lesions   Lymph nodes:   Cervical, supraclavicular, and axillary nodes normal   Neurologic:   CNII-XII intact  Normal strength, sensation and reflexes       throughout       Lab Results:   CBC with diff:   Results from last 7 days   Lab Units 01/11/21  0522   WBC Thousand/uL 8 56   RBC Million/uL 3 62*   HEMOGLOBIN g/dL 12 0   HEMATOCRIT % 35 9*   MCV fL 99*   MCH pg 33 1   MCHC g/dL 33 4   RDW % 12 8   MPV fL 9 6   PLATELETS Thousands/uL 265     CMP:   Results from last 7 days   Lab Units 01/12/21  0544  01/10/21  1158   SODIUM mmol/L 132*   < > 134*   POTASSIUM mmol/L 3 2*   < > 4 3   CHLORIDE mmol/L 100   < > 104   CO2 mmol/L 28   < > 23   BUN mg/dL 15   < > 14   CREATININE mg/dL 1 09   < > 1 22   CALCIUM mg/dL 8 6   < > 9 1   AST U/L  --   --  52*   ALT U/L  --   --  41   ALK PHOS U/L  --   --  156*   EGFR ml/min/1 73sq m 66   < > 57    < > = values in this interval not displayed       Troponin:   0   Lab Value Date/Time    TROPONINI 0 36 (H) 01/11/2021 0931    TROPONINI 0 72 (H) 01/10/2021 2234    TROPONINI 0 70 (H) 01/10/2021 1955    TROPONINI 0 50 (H) 01/10/2021 1630    TROPONINI 0 89 (H) 01/10/2021 1158     Magnesium:   Results from last 7 days   Lab Units 01/12/21  0544   MAGNESIUM mg/dL 1 8     Imaging: I have personally reviewed pertinent reports  EKG: TWI V2-V6  VTE Pharmacologic Prophylaxis: Heparin  VTE Mechanical Prophylaxis: sequential compression device    Counseling / Coordination of Care  Total time spent today 30 minutes  Greater than 50% of total time was spent with the patient and / or family counseling and / or coordination of care   A description of the counseling / coordination of care: treatment of perimyocarditis

## 2021-01-12 NOTE — ASSESSMENT & PLAN NOTE
Patient with past medical history significant hypertension, hyperlipidemia, type 2 diabetes initially presented with substernal chest pain and back pain  EKG showed new T-wave inversions in V2 to V6  troponin elevated 0 89 and has since downtrended  Pain initially improved with sublingual nitroglycerin however pain persisted and placed on nitro drip on the morning of 1/11/21  Nitro drip held for cardiac cath at 1132 and was not restarted  Cardiology consultation appreciated   Likely pericarditis given cardiac cathiterization was normal and patient's pain improved with ibuprofen colchicine   Cardiology recommending medications on discharge:   Ibuprofen 400 mg PO TID x 7 days, then 200 mg PO TID x 7 days, then 200 mg PO BID x 7 days, then 200 mg po daily x 7  days, then stop  colchicine 0 6 mg BID x 2 months    Continue aspirin  Increase home rosuvastatin to 20 mg daily   Omeprazole 20 mg daily   Losartan 100 mg daily   Metoprolol increased to 50 mg bid   Potassium supplements       Discontinue- Hydrochlorothiazide   BMP in 1 week  Outpatient follow up with Dr Liset Hogue on 2/4/21 at 2:00 pm

## 2021-01-12 NOTE — DISCHARGE SUMMARY
Discharge- Ashish Garcia 1945, 67 y o  male MRN: [id number]    Unit/Bed#: [facility] 709-01 Encounter: [id number]    Primary Care Provider: Leigh Ann Brady MD   Date and time admitted to hospital: 1/10/2021 10:59 AM        * Chest pain  Assessment & Plan  Patient with past medical history significant hypertension, hyperlipidemia, type 2 diabetes initially presented with substernal chest pain and back pain  EKG showed new T-wave inversions in V2 to V6  troponin elevated 0 89 and has since downtrended  Pain initially improved with sublingual nitroglycerin however pain persisted and placed on nitro drip on the morning of 1/11/21  Nitro drip held for cardiac cath at 1132 and was not restarted  Cardiology consultation appreciated   Likely pericarditis given cardiac cathiterization was normal and patient's pain improved with ibuprofen colchicine   Cardiology recommending medications on discharge:   Ibuprofen 400 mg PO TID x 7 days, then 200 mg PO TID x 7 days, then 200 mg PO BID x 7 days, then 200 mg po daily x 7  days, then stop  colchicine 0 6 mg BID x 2 months    Continue aspirin  Increase home rosuvastatin to 20 mg daily   Omeprazole 20 mg daily   Losartan 100 mg daily   Metoprolol increased to 50 mg bid   Potassium supplements       Discontinue- Hydrochlorothiazide   BMP in 1 week  Outpatient follow up with Dr Hood Calderon on 2/4/21 at 2:00 pm        Essential hypertension  Assessment & Plan  Continue amlodipine and losartan  Discontinue HCTZ  Metoprolol increased to 50 mg bid    Type 2 diabetes mellitus with complication [facility])  Assessment & Plan    Lab Results   Component Value Date    HGBA1C 7 0 (H) 12/01/2020     Resume home regimen     Gastroesophageal reflux disease  Assessment & Plan  Continue home PPI    Hyperlipidemia  Assessment & Plan  Continue home statin of rosuvastatin but increase from 5 mg to 20 mg daily    BPH with urinary obstruction  Assessment & Plan  Continue home Flomax      Morbid obesity  Due to excess calories   BMI 46 27  Lifestyle modifications are recommended including weight loss, improving his diet, and increasing his amount of activity  Non MI troponin elevation  In the setting of chest pain a/e/b cardiac catheter impression showing- symptoms and small troponin reevaluation not from epicardial CAD  CP likely from acute pericarditis per cardiology  Discharging Physician / Practitioner: Brittney Lunsford PA-C  PCP: Sherrill Singh MD  Admission Date:   Admission Orders (From admission, onward)     Ordered        01/10/21 1621  Inpatient Admission  Once                   Discharge Date: 01/12/21    Resolved Problems  Date Reviewed: 1/12/2021    None          Consultations During Hospital Stay:  · Cardiology    Procedures Performed:   · Cardiac catheterization on 1/11/21    Significant Findings / Test Results:   Cta Dissection Protocol Chest Abdomen Pelvis W Wo Contrast    Result Date: 1/10/2021  · Impression: No aortic aneurysm or dissection  No other acute intrathoracic or intra-abdominal pathology  Hepatomegaly with steatosis  Cholelithiasis  Workstation performed: [id number]   ·     Incidental Findings:   · none     Test Results Pending at Discharge (will require follow up):   · none     Outpatient Tests Requested:  · BMP in 1 week- outpatient follow-up with PCP regarding results    Complications:  none    Reason for Admission: chest pain, elevated troponin     Hospital Course:     Víctor Sanchez is a 67 y o  male patient who originally presented to the hospital on 1/10/2021 due to chest pain  The patient has a past medical history significant of hypertension, type 2 diabetes, BPH, hyperlipidemia who initially presented with chest pain  Patient reports early this morning had substernal chest pain which caused him to come to the emergency department  You received nitroglycerin from EMS with improvement of pain  Reports non radiation of pain    Denies any shortness breath, palpitations  Denies nausea, vomiting, diarrhea, constipation, abdominal pain, dysuria or any other symptoms at this time  Please see above list of diagnoses and related plan for additional information  Condition at Discharge: good     Discharge Day Visit / Exam:     Subjective:  Chest pain resolved   Vitals: Blood Pressure: 131/72 (01/12/21 1608)  Pulse: 77 (01/10/21 2131)  Temperature: 99 5 °F (37 5 °C) (01/12/21 1608)  Temp Source: Oral (01/10/21 2131)  Respirations: 18 (01/12/21 1608)  Height: 5' 7" (170 2 cm) (01/11/21 0115)  Weight - Scale: 134 kg (295 lb 6 7 oz) (01/11/21 0115)  SpO2: 97 % (01/11/21 0300)  Exam:   Physical Exam  Vitals signs and nursing note reviewed  Constitutional:       Appearance: Normal appearance  HENT:      Head: Normocephalic and atraumatic  Cardiovascular:      Rate and Rhythm: Normal rate and regular rhythm  Pulmonary:      Effort: Pulmonary effort is normal       Breath sounds: Normal breath sounds  Abdominal:      General: Bowel sounds are normal  There is no distension  Palpations: Abdomen is soft  Tenderness: There is no abdominal tenderness  Skin:     General: Skin is warm and dry  Neurological:      General: No focal deficit present  Mental Status: He is alert and oriented to person, place, and time  Discharge instructions/Information to patient and family:   See after visit summary for information provided to patient and family  Provisions for Follow-Up Care:  See after visit summary for information related to follow-up care and any pertinent home health orders  Disposition:     Home    For Discharges to   [facility]:   · Not Applicable to this Patient - Not Applicable to this Patient    Planned Readmission: no     Discharge Statement:  I spent 25 minutes discharging the patient  This time was spent on the day of discharge  I had direct contact with the patient on the day of discharge   Greater than 50% of the total time was spent examining patient, answering all patient questions, arranging and discussing plan of care with patient as well as directly providing post-discharge instructions  Additional time then spent on discharge activities  Discharge Medications:  See after visit summary for reconciled discharge medications provided to patient and family        ** Please Note: This note has been constructed using a voice recognition system **  '

## 2021-01-13 ENCOUNTER — TELEPHONE (OUTPATIENT)
Dept: INTERNAL MEDICINE CLINIC | Facility: CLINIC | Age: 76
End: 2021-01-13

## 2021-01-13 NOTE — TELEPHONE ENCOUNTER
Patient's dentist Dr Reynoso is calling to speak with Nichelle Greco about patient's ED visit on 1/10/21  Dr Reynoso can be reached back at [phone]      Thank you

## 2021-01-14 ENCOUNTER — TELEPHONE (OUTPATIENT)
Dept: INTERNAL MEDICINE CLINIC | Facility: CLINIC | Age: 76
End: 2021-01-14

## 2021-01-15 ENCOUNTER — TRANSITIONAL CARE MANAGEMENT (OUTPATIENT)
Dept: INTERNAL MEDICINE CLINIC | Facility: CLINIC | Age: 76
End: 2021-01-15

## 2021-01-20 ENCOUNTER — IMMUNIZATIONS (OUTPATIENT)
Dept: FAMILY MEDICINE CLINIC | Facility: HOSPITAL | Age: 76
End: 2021-01-20

## 2021-01-20 DIAGNOSIS — Z23 ENCOUNTER FOR IMMUNIZATION: Primary | ICD-10-CM

## 2021-01-20 PROCEDURE — 0001A SARS-COV-2 / COVID-19 MRNA VACCINE (PFIZER-BIONTECH) 30 MCG: CPT

## 2021-01-20 PROCEDURE — 91300 SARS-COV-2 / COVID-19 MRNA VACCINE (PFIZER-BIONTECH) 30 MCG: CPT

## 2021-01-22 ENCOUNTER — OFFICE VISIT (OUTPATIENT)
Dept: INTERNAL MEDICINE CLINIC | Facility: CLINIC | Age: 76
End: 2021-01-22
Payer: MEDICARE

## 2021-01-22 VITALS
BODY MASS INDEX: 45.2 KG/M2 | DIASTOLIC BLOOD PRESSURE: 76 MMHG | SYSTOLIC BLOOD PRESSURE: 132 MMHG | HEIGHT: 67 IN | RESPIRATION RATE: 18 BRPM | TEMPERATURE: 98.6 F | OXYGEN SATURATION: 98 % | WEIGHT: 288 LBS | HEART RATE: 69 BPM

## 2021-01-22 DIAGNOSIS — R09.82 PND (POST-NASAL DRIP): ICD-10-CM

## 2021-01-22 DIAGNOSIS — I10 ESSENTIAL HYPERTENSION: ICD-10-CM

## 2021-01-22 DIAGNOSIS — I30.0 ACUTE IDIOPATHIC PERICARDITIS: Primary | ICD-10-CM

## 2021-01-22 DIAGNOSIS — E11.8 TYPE 2 DIABETES MELLITUS WITH COMPLICATION (HCC): ICD-10-CM

## 2021-01-22 PROCEDURE — 99495 TRANSJ CARE MGMT MOD F2F 14D: CPT | Performed by: INTERNAL MEDICINE

## 2021-01-22 RX ORDER — METOPROLOL SUCCINATE 50 MG/1
50 TABLET, EXTENDED RELEASE ORAL 2 TIMES DAILY
Qty: 60 TABLET | Refills: 0
Start: 2021-01-22 | End: 2021-02-04 | Stop reason: SDUPTHER

## 2021-01-24 PROBLEM — R09.82 PND (POST-NASAL DRIP): Status: ACTIVE | Noted: 2021-01-24

## 2021-01-24 PROBLEM — J32.9 PURULENT POSTNASAL DRAINAGE: Status: RESOLVED | Noted: 2021-01-24 | Resolved: 2021-01-24

## 2021-01-24 PROBLEM — J32.9 PURULENT POSTNASAL DRAINAGE: Status: ACTIVE | Noted: 2021-01-24

## 2021-01-24 NOTE — ASSESSMENT & PLAN NOTE
Pericarditis diagnosis and hospitalization along with workup reviewed in detail with the patient and his son today  He indicates that he did have an infected tooth before the onset of the pericarditis indicating possibility that there is a connection between that dental abscess and the pericarditis  He is doing well and continuing the taper of his a nonsteroidal anti-inflammatory and continuing on colchicine as per cardiology's recommendation  I have read the notes and reviewed the notes of the Cardiology Service taking care of this patient recommend that he continue regular follow-up with cardiology

## 2021-01-24 NOTE — ASSESSMENT & PLAN NOTE
I have reviewed the patient's  Blood pressure on today's visit  Blood pressure is 132/76 representing good control of his hypertension recommend the continuation of his losartan at 100 mg daily follow-up within 1 month is recommended

## 2021-01-24 NOTE — PROGRESS NOTES
Assessment/Plan:    Type 2 diabetes mellitus with complication ([facility] [address] )   Most recent fasting blood sugars 150  The patient reports no signs or symptoms of uncontrolled hyperglycemia or hypoglycemia  I recommend that he continue his Amaryl at 3 mg daily at breakfast time and continue with a diabetic diet follow-up fasting blood sugar has been requested and a request for comprehensive metabolic profile was provided to the patient today  Lab Results   Component Value Date    HGBA1C 7 0 (H) 12/01/2020       Essential hypertension    I have reviewed the patient's  Blood pressure on today's visit  Blood pressure is 132/76 representing good control of his hypertension recommend the continuation of his losartan at 100 mg daily follow-up within 1 month is recommended  Acute idiopathic pericarditis    Pericarditis diagnosis and hospitalization along with workup reviewed in detail with the patient and his son today  He indicates that he did have an infected tooth before the onset of the pericarditis indicating possibility that there is a connection between that dental abscess and the pericarditis  He is doing well and continuing the taper of his a nonsteroidal anti-inflammatory and continuing on colchicine as per cardiology's recommendation  I have read the notes and reviewed the notes of the Cardiology Service taking care of this patient recommend that he continue regular follow-up with cardiology  PND (post-nasal drip)    Postnasal drainage symptoms today reviewed with the patient examination shows clear mucus no indication of active infection I recommend the use of Claritin 10 mg daily      Mood disturbance ([facility] [address] )    Mood disorder reviewed during today's visit the recommend continuation of citalopram 10 mg daily seems to be working well with no apparent side effects reassessment in 1 month    TCM Call (since 12/24/2020)     Date and time call was made  1/13/2021  3:04 PM    Hospital care reviewed  Records reviewed Patient was hospitialized at  [facility]        Date of Admission  01/10/21    Date of discharge  01/12/21    Diagnosis  Chest pain    Disposition  Home    Current Symptoms  None      TCM Call (since 12/24/2020)     Post hospital issues  None    Scheduled for follow up? Yes    I have advised the patient to call PCP with any new or worsening symptoms  [facility]    Counseling  Patient    Comments  Patient appointment scheduled for 1/22/21           Diagnoses and all orders for this visit:    Acute idiopathic pericarditis    Essential hypertension  -     metoprolol succinate (TOPROL-XL) 50 mg 24 hr tablet; Take 1 tablet (50 mg total) by mouth 2 (two) times a day  -     Comprehensive metabolic panel; Future    Type 2 diabetes mellitus with complication (HCC)    PND (post-nasal drip)        Subjective:      Patient ID: Karina Mendes is a 67 y o  male  This 58-year-old gentleman presents today for a transition of care visit  He has recently hospitalized at [address] [place] [address] for chest pain  This pain was evaluated including cardiac catheterization and echocardiogram   Findings were consistent with pericarditis  He was started on a combination of nonsteroidal anti-inflammatory as well as colchicine  He has no significant chest pains at this time  Well the exact cause of his pericarditis is uncertain he does indicate today that he had infected tooth prior to the onset of his pericarditis pain  I suspect that this may be causing affect  Patient indicates no chest pain shortness of breath peripheral edema or palpitations today  He has had no signs or symptoms of COVID-19 infection  His only concern is that of of postnasal drainage which she indicates is a clear drainage  During today's visit I have reviewed in detail with the patient the events and testing that was done during his hospitalization as well as discharge medications and ongoing management    I also had a phone call with his son in the presence of the patient today to review the patient's  Condition and hospitalization  A period of 30 minutes was spent with the patient today greater than 50% of this time was spent in counseling and coordination of care  The following portions of the patient's history were reviewed and updated as appropriate:   He  has a past medical history of Anxiety, Claustrophobia, Colon polyps, Depression, Diabetes mellitus ([facility] [address] ), Dyslipidemia, Esophageal polyp, GERD (gastroesophageal reflux disease), Glaucoma, Headache, Hypertension, Low back pain, Neck pain, Numbness and tingling in left arm, OAB (overactive bladder), Paroxysmal supraventricular tachycardia ([facility] [address] ), Sleep apnea, and Squamous cell skin cancer (10/19/2020)  He   Patient Active Problem List    Diagnosis Date Noted    PND (post-nasal drip) 01/24/2021    Acute idiopathic pericarditis 01/22/2021    Hyperlipidemia 01/10/2021    Chest pain 01/10/2021    Urgency of urination 11/06/2020    Fecal incontinence 03/25/2020    BPH with urinary obstruction 01/23/2020    Onychomycosis 11/25/2019    Urgency incontinence 11/20/2019    Mood disorder ([facility] ) 08/14/2019    Hypokalemia 07/24/2019    Palpitation 02/07/2019    Obstructive sleep apnea syndrome 06/04/2018    Morbid obesity with BMI of 40 0-44 9, adult ([facility] ) 06/04/2018    Mood disturbance 06/04/2018    Gastroesophageal reflux disease 06/04/2018    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    Type 2 diabetes mellitus with complication ([facility] ) 39/95/9012    Essential hypertension 01/29/2018    Peripheral neuropathy 01/29/2018    Low back pain      He  has a past surgical history that includes Cataract extraction (Bilateral); Knee arthroscopy (Bilateral); Lumbar spine surgery; Excision basal cell carcinoma; Total knee arthroplasty (Left); Replacement total knee (Right, 04/2017); Replacement total knee (Left, 03/2013); Anoscopy;  Rectal biopsy; Mouth surgery; pr esophagogastroduodenoscopy transoral diagnostic (N/A, 2/13/2019); pr colonoscopy flx dx w/collj spec when pfrmd (N/A, 2/13/2019); and Mohs surgery (11/11/2020)  His family history includes Alzheimer's disease in his mother; Diabetes in his mother and paternal grandmother; Esophageal cancer in his paternal grandfather; [address] Hypertension in his father  He  reports that he quit smoking about 38 years ago  He has quit using smokeless tobacco  He reports current alcohol use of about 14 0 standard drinks of alcohol per week  He reports that he does not use drugs  Current Outpatient Medications   Medication Sig Dispense Refill    amLODIPine (NORVASC) 10 mg tablet Take 1 tablet (10 mg total) by mouth daily 90 tablet 3    amoxicillin (AMOXIL) 500 mg capsule TAKE ONE CAPSULE BY MOUTH EVERY 6 HOURS TILL GONE      aspirin (ECOTRIN LOW STRENGTH) 81 mg EC tablet Take 1 tablet (81 mg total) by mouth daily 30 tablet 0    citalopram (CeleXA) 10 mg tablet Take 1 tablet (10 mg total) by mouth daily 90 tablet 3    colchicine (COLCRYS) 0 6 mg tablet Take 1 tablet (0 6 mg total) by mouth 2 (two) times a day 60 tablet 0    glimepiride (AMARYL) 1 mg tablet Take 3 tablets (3 mg total) by mouth daily with breakfast 270 tablet 3    losartan (COZAAR) 100 MG tablet Take 1 tablet (100 mg total) by mouth daily 30 tablet 0    metoprolol succinate (TOPROL-XL) 50 mg 24 hr tablet Take 1 tablet (50 mg total) by mouth 2 (two) times a day 60 tablet 0    mupirocin (BACTROBAN) 2 % ointment Apply topically once daily to the nose   22 g 2    omeprazole (PriLOSEC) 20 mg delayed release capsule Take 1 capsule (20 mg total) by mouth daily 90 capsule 3    potassium chloride (Klor-Con) 10 mEq tablet Take 1 tablet (10 mEq total) by mouth 2 (two) times a day 60 tablet 0    rosuvastatin (CRESTOR) 20 MG tablet Take 1 tablet (20 mg total) by mouth daily 30 tablet 0    tamsulosin (FLOMAX) 0 4 mg TAKE 1 TABLET BY MOUTH ONCE DAILY RIGHT AFTER SUPPER 30 capsule 3    timolol (TIMOPTIC) 0 5 % ophthalmic solution       trospium chloride (SANCTURA) 20 mg tablet Take 1 tablet (20 mg total) by mouth 2 (two) times a day 180 tablet 3    ibuprofen (MOTRIN) 200 mg tablet Take 2 tablets (400 mg total) by mouth every 8 (eight) hours for 7 days, THEN 1 tablet (200 mg total) every 8 (eight) hours for 7 days, THEN 1 tablet (200 mg total) every 12 (twelve) hours for 7 days, THEN 1 tablet (200 mg total) daily for 7 days  (Patient not taking: Reported on 1/22/2021) 84 tablet 0     No current facility-administered medications for this visit       Review of Systems   Constitutional: Positive for fatigue  HENT: Positive for postnasal drip  Neurological: Positive for weakness  All other systems reviewed and are negative  Objective:      /76   Pulse 69   Temp 98 6 °F (37 °C)   Resp 18   Ht 5' 7" (1 702 m)   Wt 131 kg (288 lb)   SpO2 98%   BMI 45 11 kg/m²          Physical Exam  Constitutional:       General: He is not in acute distress  Appearance: He is well-developed  He is not ill-appearing  HENT:      Head: Normocephalic  Right Ear: Hearing, tympanic membrane, ear canal and external ear normal       Left Ear: Hearing, tympanic membrane, ear canal and external ear normal       Nose: Nose normal       Mouth/Throat:      Mouth: Mucous membranes are moist       Pharynx: Oropharynx is clear  No oropharyngeal exudate or posterior oropharyngeal erythema  Comments:   Mild posterior pharyngeal irritation from postnasal drainage  Eyes:      General:         Right eye: No discharge  Left eye: No discharge  Conjunctiva/sclera: Conjunctivae normal       Pupils: Pupils are equal, round, and reactive to light  Neck:      Thyroid: No thyromegaly  Cardiovascular:      Rate and Rhythm: Normal rate and regular rhythm  Heart sounds: Normal heart sounds, S1 normal and S2 normal  No murmur     Pulmonary:      Effort: Pulmonary effort is normal  No respiratory distress  Breath sounds: Normal breath sounds  No wheezing, rhonchi or rales  Abdominal:      General: Bowel sounds are normal       Palpations: Abdomen is soft  Tenderness: There is no abdominal tenderness  Musculoskeletal: Normal range of motion  Right lower leg: No edema  Left lower leg: No edema  Lymphadenopathy:      Cervical: No cervical adenopathy  Skin:     General: Skin is warm and dry  Neurological:      Mental Status: He is alert and oriented to person, place, and time  Deep Tendon Reflexes: Reflexes are normal and symmetric  Reflexes normal    Psychiatric:         Behavior: Behavior normal          Thought Content:  Thought content normal          Judgment: Judgment normal

## 2021-01-24 NOTE — ASSESSMENT & PLAN NOTE
Postnasal drainage symptoms today reviewed with the patient examination shows clear mucus no indication of active infection I recommend the use of Claritin 10 mg daily

## 2021-01-24 NOTE — ASSESSMENT & PLAN NOTE
Most recent fasting blood sugars 150  The patient reports no signs or symptoms of uncontrolled hyperglycemia or hypoglycemia  I recommend that he continue his Amaryl at 3 mg daily at breakfast time and continue with a diabetic diet follow-up fasting blood sugar has been requested and a request for comprehensive metabolic profile was provided to the patient today    Lab Results   Component Value Date    HGBA1C 7 0 (H) 12/01/2020

## 2021-01-24 NOTE — ASSESSMENT & PLAN NOTE
Mood disorder reviewed during today's visit the recommend continuation of citalopram 10 mg daily seems to be working well with no apparent side effects reassessment in 1 month

## 2021-02-04 ENCOUNTER — OFFICE VISIT (OUTPATIENT)
Dept: CARDIOLOGY CLINIC | Facility: CLINIC | Age: 76
End: 2021-02-04
Payer: MEDICARE

## 2021-02-04 VITALS
DIASTOLIC BLOOD PRESSURE: 68 MMHG | HEART RATE: 69 BPM | HEIGHT: 67 IN | WEIGHT: 284.7 LBS | OXYGEN SATURATION: 96 % | SYSTOLIC BLOOD PRESSURE: 120 MMHG | BODY MASS INDEX: 44.69 KG/M2

## 2021-02-04 DIAGNOSIS — E78.00 PURE HYPERCHOLESTEROLEMIA: ICD-10-CM

## 2021-02-04 DIAGNOSIS — R07.9 CHEST PAIN, UNSPECIFIED TYPE: ICD-10-CM

## 2021-02-04 DIAGNOSIS — I10 ESSENTIAL HYPERTENSION: ICD-10-CM

## 2021-02-04 DIAGNOSIS — I10 ESSENTIAL HYPERTENSION: Primary | ICD-10-CM

## 2021-02-04 DIAGNOSIS — R00.2 PALPITATION: ICD-10-CM

## 2021-02-04 PROCEDURE — 93000 ELECTROCARDIOGRAM COMPLETE: CPT | Performed by: INTERNAL MEDICINE

## 2021-02-04 PROCEDURE — 99214 OFFICE O/P EST MOD 30 MIN: CPT | Performed by: INTERNAL MEDICINE

## 2021-02-04 RX ORDER — LOSARTAN POTASSIUM 100 MG/1
100 TABLET ORAL DAILY
Qty: 90 TABLET | Refills: 3 | Status: SHIPPED | OUTPATIENT
Start: 2021-02-04 | End: 2021-02-05 | Stop reason: SDUPTHER

## 2021-02-04 RX ORDER — COLCHICINE 0.6 MG/1
0.6 TABLET ORAL 2 TIMES DAILY
Qty: 60 TABLET | Refills: 2 | Status: SHIPPED | OUTPATIENT
Start: 2021-02-04 | End: 2021-02-05 | Stop reason: SDUPTHER

## 2021-02-04 RX ORDER — ROSUVASTATIN CALCIUM 20 MG/1
20 TABLET, COATED ORAL DAILY
Qty: 90 TABLET | Refills: 3 | Status: SHIPPED | OUTPATIENT
Start: 2021-02-04 | End: 2021-02-05 | Stop reason: SDUPTHER

## 2021-02-04 RX ORDER — METOPROLOL SUCCINATE 100 MG/1
100 TABLET, EXTENDED RELEASE ORAL DAILY
Qty: 90 TABLET | Refills: 3
Start: 2021-02-04 | End: 2021-02-05 | Stop reason: SDUPTHER

## 2021-02-04 NOTE — PROGRESS NOTES
Outpatient Cardiology Progress Note   Sepideh Ayala 67 y o  male   MRN: [id number]  @ Encounter: [id number]    Fito Neumann MD  Consults      Risk factors:  -HTN  -HLD  -DM2  -previous tobacco use  -class 3 obesity      Interval History:   Since last encounter, patient any recurrence of his initial presenting symptoms  He remains free of pleuritic chest discomfort and back pain  On review of symptoms, he denies lightheadedness, dizziness, syncope, headache, vision changes, diaphoresis, chest pain, palpitations, shortness of breath, PND, orthopnea, nausea, vomiting, abdominal pain or lower extremity edema  He was a bit confused about the ibuprofen and colchicine tapers  Cardiac History Summary:   Sepideh Ayala is a 67y o  year old male, now retired but formally employed as  for [employer] 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, who was initially evaluated at [facility] for pleuritic chest discomfort with associated back pain  He has a history of essential metabolic syndrome (hypertension, dyslipidemia, controlled diabetes mellitus type 2), class 3 obesity, previous tobacco use disorder and obstructive sleep apnea on CPAP  On our initial encounter, patient was diagnosed with myopericarditis with troponin peaking at 0 72  Cardiac catheterization revealed grossly normal coronary circulation with minor luminal irregularities in LAD and RCA territories  Patient was discharged on colchicine and ibuprofen slow taper  Objective:  Review of Systems  ROS as noted in interval history  Physical Exam:  Vitals: Blood pressure 120/68, pulse 69, height 5' 7" (1 702 m), weight 129 kg (284 lb 11 2 oz), SpO2 96 %  , Body mass index is 44 59 kg/m² ,       GEN: Sepideh Ayala appears well, alert and oriented x 3, pleasant and cooperative   HEENT:  Normocephalic, atraumatic, anicteric, moist mucous membranes  NECK: No JVD or carotid bruits   HEART:  Regular rhythm, normal rate, normal S1 and S2, no murmurs, clicks, gallops or rubs   LUNGS: Clear to auscultation bilaterally; no wheezes, rales, or rhonchi; respiration nonlabored   ABDOMEN:  Normoactive bowel sounds, soft, no tenderness, no distention  EXTREMITIES: peripheral pulses palpable; no edema  NEURO: no gross focal findings; cranial nerves grossly intact   SKIN:  Dry, intact, warm to touch    Home Medications: (Not in a hospital admission)      Current Outpatient Medications:     amLODIPine (NORVASC) 10 mg tablet, Take 1 tablet (10 mg total) by mouth daily, Disp: 90 tablet, Rfl: 3    amoxicillin (AMOXIL) 500 mg capsule, TAKE ONE CAPSULE BY MOUTH EVERY 6 HOURS TILL GONE, Disp: , Rfl:     aspirin (ECOTRIN LOW STRENGTH) 81 mg EC tablet, Take 1 tablet (81 mg total) by mouth daily, Disp: 30 tablet, Rfl: 0    citalopram (CeleXA) 10 mg tablet, Take 1 tablet (10 mg total) by mouth daily, Disp: 90 tablet, Rfl: 3    colchicine (COLCRYS) 0 6 mg tablet, Take 1 tablet (0 6 mg total) by mouth 2 (two) times a day, Disp: 60 tablet, Rfl: 0    glimepiride (AMARYL) 1 mg tablet, Take 3 tablets (3 mg total) by mouth daily with breakfast, Disp: 270 tablet, Rfl: 3    ibuprofen (MOTRIN) 200 mg tablet, Take 2 tablets (400 mg total) by mouth every 8 (eight) hours for 7 days, THEN 1 tablet (200 mg total) every 8 (eight) hours for 7 days, THEN 1 tablet (200 mg total) every 12 (twelve) hours for 7 days, THEN 1 tablet (200 mg total) daily for 7 days   (Patient not taking: Reported on 1/22/2021), Disp: 84 tablet, Rfl: 0    losartan (COZAAR) 100 MG tablet, Take 1 tablet (100 mg total) by mouth daily, Disp: 30 tablet, Rfl: 0    metoprolol succinate (TOPROL-XL) 50 mg 24 hr tablet, Take 1 tablet (50 mg total) by mouth 2 (two) times a day, Disp: 60 tablet, Rfl: 0    mupirocin (BACTROBAN) 2 % ointment, Apply topically once daily to the nose , Disp: 22 g, Rfl: 2    omeprazole (PriLOSEC) 20 mg delayed release capsule, Take 1 capsule (20 mg total) by mouth daily, Disp: 90 capsule, Rfl: 3    potassium chloride (Klor-Con) 10 mEq tablet, Take 1 tablet (10 mEq total) by mouth 2 (two) times a day, Disp: 60 tablet, Rfl: 0    rosuvastatin (CRESTOR) 20 MG tablet, Take 1 tablet (20 mg total) by mouth daily, Disp: 30 tablet, Rfl: 0    tamsulosin (FLOMAX) 0 4 mg, TAKE 1 TABLET BY MOUTH ONCE DAILY RIGHT AFTER SUPPER, Disp: 30 capsule, Rfl: 3    timolol (TIMOPTIC) 0 5 % ophthalmic solution, , Disp: , Rfl:     trospium chloride (SANCTURA) 20 mg tablet, Take 1 tablet (20 mg total) by mouth 2 (two) times a day, Disp: 180 tablet, Rfl: 3    Labs & Results:    Lab Results   Component Value Date    TROPONINI 0 36 (H) 01/11/2021    TROPONINI 0 72 (H) 01/10/2021    TROPONINI 0 70 (H) 01/10/2021     Lab Results   Component Value Date    CHOL 162 09/10/2015    CHOL 175 05/06/2015     Lab Results   Component Value Date    HDL 37 (L) 12/01/2020    HDL 37 (L) 03/12/2019     Lab Results   Component Value Date    LDLCALC 91 12/01/2020    LDLCALC 74 03/12/2019     Lab Results   Component Value Date    TRIG 115 12/01/2020    TRIG 118 03/12/2019       Lab Results   Component Value Date    GLUCOSE 125 01/19/2015    CALCIUM 8 6 01/12/2021     01/19/2015    K 3 2 (L) 01/12/2021    CO2 28 01/12/2021     01/12/2021    BUN 15 01/12/2021    CREATININE 1 09 01/12/2021     Lab Results   Component Value Date    ALT 41 01/10/2021    AST 52 (H) 01/10/2021       Lab Results   Component Value Date    WBC 8 56 01/11/2021    HGB 12 0 01/11/2021    HCT 35 9 (L) 01/11/2021    MCV 99 (H) 01/11/2021     01/11/2021           EKG:  Date:  01/11/2021        Date:  02/04/2021 in office  Interpretation:  Sinus rhythm with low voltage, HR 69,  ms      Holter/Device Interrogation:   None to be reviewed    Imaging: I have personally reviewed pertinent reports     and I have personally reviewed pertinent films in PACS    Echo:   Results for orders placed during the hospital encounter of 01/10/21   Echo complete with contrast if indicated    Narrative [address]  [facility] [place], [address] [facility]  [phone]    Transthoracic Echocardiogram  2D, M-mode, Doppler, and Color Doppler    Study date:  2021    Patient: Leticia Zhu  MR number: [id number]  Account number: [de-identified]  : 1945  Age: 67 years  Gender: Male  Status: Inpatient  Location: Bedside  Height: 67 in  Weight: 295 lb  BP: 133/ 68 mmHg    Indications: NSTEMI; Chest pain    Diagnoses: I21 4 - Non-ST elevation (NSTEMI) myocardial infarction, R07 9 - Chest pain, unspecified    Sonographer:  Juana Talavera,  VeedMe  Primary Physician:  Evans Corrigan MD  Referring Physician:  Ny Monday  Mik Inman MD  Group:  [id number]  Cardiology Fellow:  Julissa Muñoz MD  Interpreting Physician:  Christian David MD    SUMMARY    PROCEDURE INFORMATION:  This was a technically difficult study  Intravenous contrast ( 0 6mL/min Definity in NSS) was administered to opacify the left ventricle  LEFT VENTRICLE:  Systolic function was at the lower limits of normal  Ejection fraction was estimated to be 60 %  There were no regional wall motion abnormalities  Wall thickness was mildly increased  Concentric hypertrophy was present  Doppler parameters were consistent with abnormal left ventricular relaxation (grade 1 diastolic dysfunction)  RIGHT VENTRICLE:  Wall thickness was mildly increased  TRICUSPID VALVE:  There was trace regurgitation  HISTORY: PRIOR HISTORY: Hyperlipidemia; Hypertension; Chest pain; DM2; GERD; Palpitations; ANALY    PROCEDURE: The procedure was performed at the bedside  This was a routine study  The transthoracic approach was used  The study included complete 2D imaging, M-mode, complete spectral Doppler, and color Doppler  The heart rate was 68 bpm,  at the start of the study   Images were obtained from the parasternal, apical, subcostal, and suprasternal notch acoustic windows  Intravenous contrast ( 0 6mL/min Definity in NSS) was administered to opacify the left ventricle  Echocardiographic views were limited due to poor acoustic window availability, decreased penetration, and lung interference  This was a technically difficult study  LEFT VENTRICLE: Size was normal  Systolic function was at the lower limits of normal  Ejection fraction was estimated to be 60 %  There were no regional wall motion abnormalities  Wall thickness was mildly increased  Concentric hypertrophy  was present  DOPPLER: Doppler parameters were consistent with abnormal left ventricular relaxation (grade 1 diastolic dysfunction)  RIGHT VENTRICLE: The size was normal  Systolic function was normal  Wall thickness was mildly increased  LEFT ATRIUM: Size was normal     RIGHT ATRIUM: Size was normal     MITRAL VALVE: Valve structure was normal  There was normal leaflet separation  DOPPLER: The transmitral velocity was within the normal range  There was no evidence for stenosis  There was no regurgitation  AORTIC VALVE: The valve was trileaflet  Leaflets exhibited normal thickness and normal cuspal separation  DOPPLER: Transaortic velocity was within the normal range  There was no evidence for stenosis  There was no regurgitation  TRICUSPID VALVE: The valve structure was normal  There was normal leaflet separation  DOPPLER: The transtricuspid velocity was within the normal range  There was no evidence for stenosis  There was trace regurgitation  Pulmonary artery  systolic pressure was within the normal range  Estimated peak PA pressure was 30 mmHg  PULMONIC VALVE: Not well visualized  DOPPLER: The transpulmonic velocity was within the normal range  There was no significant regurgitation  PERICARDIUM: There was no pericardial effusion  AORTA: The root exhibited normal size  SYSTEMIC VEINS: IVC: The inferior vena cava was not well visualized      SYSTEM MEASUREMENT TABLES    2D  %FS: 33 78 %  Ao Diam: 3 64 cm  Ao asc: 3 71 cm  EDV(Teich): 131 04 ml  EF(Teich): 62 21 %  ESV(Teich): 49 52 ml  IVSd: 1 18 cm  LA Diam: 4 53 cm  LVEDV MOD A4C: 136 16 ml  LVEF MOD A4C: 51 56 %  LVESV MOD A4C: 65 96 ml  LVIDd: 5 23 cm  LVIDs: 3 46 cm  LVLd A4C: 9 85 cm  LVLs A4C: 8 63 cm  LVPWd: 1 39 cm  SV MOD A4C: 70 2 ml  SV(Teich): 81 52 ml    CW  TR Vmax: 2 42 m/s  TR maxP 34 mmHg    MM  TAPSE: 1 47 cm    PW  E' Sept: 0 07 m/s  E/E' Sept: 12 05  MV A Phillip: 0 88 m/s  MV Dec Pine: 3 54 m/s2  MV DecT: 238 03 ms  MV E Phillip: 0 84 m/s  MV E/A Ratio: 0 96  MV PHT: 69 03 ms  MVA By PHT: 3 19 cm2    [employer] Accredited Echocardiography Laboratory    Prepared and electronically signed by    Alton Brooks MD  Signed 2021 17:02:25         Cath:  Medardo Mcmahon  Invasive Cardiovascular Lab Complete Report    SUMMARY  CORONARY VESSELS:     --  The coronary circulation is right dominant  --  There was no angiographic evidence for occlusive coronary artery disease  --  Left main: Normal   --  LAD: Angiography showed minor luminal irregularities  --  Circumflex: Normal   --  RCA: Angiography showed minor luminal irregularities      IMPRESSIONS:  Symptoms and small troponin elevation, not from epicardial CAD  Mild luminal irregularities     RECOMMENDATIONS  GDMT CAD, weight loss     Prepared and signed by  Denny Powers DO      Assessment/Plan     Assessment:    1  Myopericarditis  -peak troponin 0 72  -2021 TTE:  LVEF 60%, mild cLVH, no RWMA, grade 1 DD, normal RV size/function with mild RVH, no significant valvulopathy  -colchicine + ibuprofen taper    2  Coronary artery disease  -risk factors HTN, HLD, DM2, previous tobacco use  - LHC:  Minor luminal irregularities in LAD and RCA; normal epicardial vessels elsewhere  -2020 lipids:   Total 151, , HDL 35, calculated LDL 91; A1c 7%  -aspirin, rosuvastatin, metoprolol succinate, losartan  -in office BP today 120/68      Plan:  1  In regards to patient's myopericarditis, he is to continue his ibuprofen and colchicine tapers  Instructions were clearly written down for him  He is to be taking ibuprofen 200 b i d until this Sunday 2/7 then daily until 2/14 and then stop  His colchicine taper is as follow; 0 6 mg b i d  until 4/11 then 0 6 mg daily until 4/25 and lastly 0 6 mg every other day until 5/11 then stop  2  Medications were refilled and metoprolol succinate was converted to 100 mg q d  3  He is to follow-up in our office in 6 months  Case discussed and reviewed with Dr Yosvany Marcos who agrees with my assessment and plan  Kimo Olivares MD  Cardiology Fellow PGY-5      Epic/ Allscripts/Care Everywhere records reviewed:  Yes    ** Please Note: Fluency DirectDictation voice to text software may have been used in the creation of this document   **

## 2021-02-05 DIAGNOSIS — I10 ESSENTIAL HYPERTENSION: ICD-10-CM

## 2021-02-05 DIAGNOSIS — E78.00 PURE HYPERCHOLESTEROLEMIA: ICD-10-CM

## 2021-02-05 DIAGNOSIS — R07.9 CHEST PAIN, UNSPECIFIED TYPE: ICD-10-CM

## 2021-02-05 RX ORDER — COLCHICINE 0.6 MG/1
0.6 TABLET ORAL 2 TIMES DAILY
Qty: 180 TABLET | Refills: 3 | Status: SHIPPED | OUTPATIENT
Start: 2021-02-05 | End: 2021-03-01 | Stop reason: SDUPTHER

## 2021-02-05 RX ORDER — LOSARTAN POTASSIUM 100 MG/1
100 TABLET ORAL DAILY
Qty: 90 TABLET | Refills: 3 | Status: SHIPPED | OUTPATIENT
Start: 2021-02-05 | End: 2021-03-01 | Stop reason: SDUPTHER

## 2021-02-05 RX ORDER — METOPROLOL SUCCINATE 100 MG/1
100 TABLET, EXTENDED RELEASE ORAL DAILY
Qty: 90 TABLET | Refills: 3
Start: 2021-02-05 | End: 2021-03-01 | Stop reason: SDUPTHER

## 2021-02-05 RX ORDER — ROSUVASTATIN CALCIUM 20 MG/1
20 TABLET, COATED ORAL DAILY
Qty: 90 TABLET | Refills: 3 | Status: SHIPPED | OUTPATIENT
Start: 2021-02-05 | End: 2021-03-01 | Stop reason: SDUPTHER

## 2021-02-05 RX ORDER — METOPROLOL SUCCINATE 25 MG/1
TABLET, EXTENDED RELEASE ORAL
Qty: 90 TABLET | Refills: 3 | OUTPATIENT
Start: 2021-02-05

## 2021-02-09 ENCOUNTER — LAB (OUTPATIENT)
Dept: LAB | Age: 76
End: 2021-02-09
Payer: MEDICARE

## 2021-02-09 ENCOUNTER — IMMUNIZATIONS (OUTPATIENT)
Dept: FAMILY MEDICINE CLINIC | Facility: HOSPITAL | Age: 76
End: 2021-02-09
Payer: MEDICARE

## 2021-02-09 DIAGNOSIS — Z23 ENCOUNTER FOR IMMUNIZATION: Primary | ICD-10-CM

## 2021-02-09 DIAGNOSIS — I10 ESSENTIAL HYPERTENSION: ICD-10-CM

## 2021-02-09 LAB
ALBUMIN SERPL BCP-MCNC: 3.3 G/DL (ref 3.5–5)
ALP SERPL-CCNC: 159 U/L (ref 46–116)
ALT SERPL W P-5'-P-CCNC: 52 U/L (ref 12–78)
ANION GAP SERPL CALCULATED.3IONS-SCNC: 6 MMOL/L (ref 4–13)
AST SERPL W P-5'-P-CCNC: 30 U/L (ref 5–45)
BILIRUB SERPL-MCNC: 0.53 MG/DL (ref 0.2–1)
BUN SERPL-MCNC: 12 MG/DL (ref 5–25)
CALCIUM ALBUM COR SERPL-MCNC: 9.1 MG/DL (ref 8.3–10.1)
CALCIUM SERPL-MCNC: 8.5 MG/DL (ref 8.3–10.1)
CHLORIDE SERPL-SCNC: 105 MMOL/L (ref 100–108)
CO2 SERPL-SCNC: 30 MMOL/L (ref 21–32)
CREAT SERPL-MCNC: 1.04 MG/DL (ref 0.6–1.3)
GFR SERPL CREATININE-BSD FRML MDRD: 69 ML/MIN/1.73SQ M
GLUCOSE P FAST SERPL-MCNC: 125 MG/DL (ref 65–99)
POTASSIUM SERPL-SCNC: 3.9 MMOL/L (ref 3.5–5.3)
PROT SERPL-MCNC: 7.8 G/DL (ref 6.4–8.2)
SODIUM SERPL-SCNC: 141 MMOL/L (ref 136–145)

## 2021-02-09 PROCEDURE — 91300 SARS-COV-2 / COVID-19 MRNA VACCINE (PFIZER-BIONTECH) 30 MCG: CPT

## 2021-02-09 PROCEDURE — 0002A SARS-COV-2 / COVID-19 MRNA VACCINE (PFIZER-BIONTECH) 30 MCG: CPT

## 2021-02-09 PROCEDURE — 36415 COLL VENOUS BLD VENIPUNCTURE: CPT

## 2021-02-09 PROCEDURE — 80053 COMPREHEN METABOLIC PANEL: CPT

## 2021-02-15 ENCOUNTER — OFFICE VISIT (OUTPATIENT)
Dept: INTERNAL MEDICINE CLINIC | Facility: CLINIC | Age: 76
End: 2021-02-15
Payer: MEDICARE

## 2021-02-15 VITALS
HEIGHT: 67 IN | WEIGHT: 281.4 LBS | DIASTOLIC BLOOD PRESSURE: 70 MMHG | HEART RATE: 79 BPM | SYSTOLIC BLOOD PRESSURE: 130 MMHG | OXYGEN SATURATION: 98 % | BODY MASS INDEX: 44.17 KG/M2 | TEMPERATURE: 98.8 F

## 2021-02-15 DIAGNOSIS — I30.0 ACUTE IDIOPATHIC PERICARDITIS: ICD-10-CM

## 2021-02-15 DIAGNOSIS — I77.9 BILATERAL CAROTID ARTERY DISEASE, UNSPECIFIED TYPE (HCC): ICD-10-CM

## 2021-02-15 DIAGNOSIS — I10 ESSENTIAL HYPERTENSION: ICD-10-CM

## 2021-02-15 DIAGNOSIS — G45.9 TIA (TRANSIENT ISCHEMIC ATTACK): Primary | ICD-10-CM

## 2021-02-15 PROCEDURE — 99214 OFFICE O/P EST MOD 30 MIN: CPT | Performed by: INTERNAL MEDICINE

## 2021-02-16 NOTE — ASSESSMENT & PLAN NOTE
Assessment of the patient's hypertension today reveals a reading of 130/70  I recommend the continuation of his current blood pressure medication including amlodipine 10 mg daily losartan 100 mg daily and metoprolol succinate 100 mg daily

## 2021-02-16 NOTE — ASSESSMENT & PLAN NOTE
I have reviewed the patient's most recent visit with his cardiologist and agree with the continued plans for tapering of his ibuprofen and colchicine medications  He describes no chest pain palpitations or shortness of breath symptoms at this time  Recommend continued regular follow-up with cardiology

## 2021-02-16 NOTE — PROGRESS NOTES
Assessment/Plan:    Essential hypertension   Assessment of the patient's hypertension today reveals a reading of 130/70  I recommend the continuation of his current blood pressure medication including amlodipine 10 mg daily losartan 100 mg daily and metoprolol succinate 100 mg daily  Acute idiopathic pericarditis    I have reviewed the patient's most recent visit with his cardiologist and agree with the continued plans for tapering of his ibuprofen and colchicine medications  He describes no chest pain palpitations or shortness of breath symptoms at this time  Recommend continued regular follow-up with cardiology  TIA (transient ischemic attack)    Possible TIA event earlier today prior to his arrival in the  Office  He has no residual symptoms at this time he describes the event as lasting approximately 5 minutes he had and numbness in his right handed his felt like his hand was swollen he had numbness on the right side of his face and did not see any asymmetry  His right leg was not affected  Motor strength in both upper extremities is equally normal of there is no evidence of any asymmetry or weakness of his face  In view of the symptoms I have  Asked him to have an updated carotid artery study  He is currently on aspirin 81 mg daily which I would like him to continue and I have also asked him to continue on his rosuvastatin 20 mg daily  He knows that if he has additional symptoms like this he should notify us immediately or report to the emergency room immediately  I will see him for follow-up visit after he completes his carotid Doppler testing  Diagnoses and all orders for this visit:    Bilateral carotid artery disease, unspecified type ([facility] [place] [address] )    TIA (transient ischemic attack)  -     VAS carotid complete study; Future        Subjective:      Patient ID: Bob Edmond is a 67 y o  male  This 76-year-old gentleman returns today for follow-up visit    He was recently hospitalized for symptoms of chest pain found to be pericarditis  He continues on a slow taper of his nonsteroidal anti-inflammatory and colchicine medications  He continues to have no relapse of his pericarditis symptoms  He denies any chest pains palpitations shortness of breath  The patient did have an interesting event just prior to coming into our office earlier today he indicates that he had a brief episode that lasted several minutes where he had numbness in his right hand of felt like the hand was swollen he also had a transient weakness on the right side of his face where he says it felt numb temporarily he did not see any asymmetry of his face  He denies any abnormality in his right leg period the symptoms had resolved by the time he arrived at our office  He has not experienced any similar events to this in the past   He is on low-dose aspirin 81 mg daily he did have a carotid Doppler study done in 2016 and it revealed no significant stenosis  The patient had no palpitations prior to the onset of the symptoms  Patient continues to be successful at losing weight over the past month he has been able to lose approximately 7 lb  I have congratulated on this achievement and recommend that he continue to strive for reduction in body weight  He indicates that since being on the medication for his pericarditis his appetite has been decreased  The following portions of the patient's history were reviewed and updated as appropriate:   He  has a past medical history of Anxiety, Claustrophobia, Colon polyps, Depression, Diabetes mellitus ([address] ), Dyslipidemia, Esophageal polyp, GERD (gastroesophageal reflux disease), Glaucoma, Headache, Hypertension, Low back pain, Neck pain, Numbness and tingling in left arm, OAB (overactive bladder), Paroxysmal supraventricular tachycardia ([address] ), Sleep apnea, and Squamous cell skin cancer (10/19/2020)    He   Patient Active Problem List    Diagnosis Date Noted    TIA (transient ischemic attack) 02/15/2021    PND (post-nasal drip) 01/24/2021    Acute idiopathic pericarditis 01/22/2021    Hyperlipidemia 01/10/2021    Chest pain 01/10/2021    Urgency of urination 11/06/2020    Fecal incontinence 03/25/2020    BPH with urinary obstruction 01/23/2020    Onychomycosis 11/25/2019    Urgency incontinence 11/20/2019    Mood disorder ([place] [id number] ) 08/14/2019    Hypokalemia 07/24/2019    Palpitation 02/07/2019    Obstructive sleep apnea syndrome 06/04/2018    Morbid obesity with BMI of 40 0-44 9, adult ([place] [address] ) 06/04/2018    Mood disturbance 06/04/2018    Gastroesophageal reflux disease 06/04/2018    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    Type 2 diabetes mellitus with complication ([place] [address] ) 03/08/9381    Essential hypertension 01/29/2018    Peripheral neuropathy 01/29/2018    Low back pain      He  has a past surgical history that includes Cataract extraction (Bilateral); Knee arthroscopy (Bilateral); Lumbar spine surgery; Excision basal cell carcinoma; Total knee arthroplasty (Left); Replacement total knee (Right, 04/2017); Replacement total knee (Left, 03/2013); Anoscopy; Rectal biopsy; Mouth surgery; pr esophagogastroduodenoscopy transoral diagnostic (N/A, 2/13/2019); pr colonoscopy flx dx w/collj spec when pfrmd (N/A, 2/13/2019); and Mohs surgery (11/11/2020)  His family history includes Alzheimer's disease in his mother; Diabetes in his mother and paternal grandmother; Esophageal cancer in his paternal grandfather; [address] Hypertension in his father  He  reports that he quit smoking about 38 years ago  He has quit using smokeless tobacco  He reports current alcohol use of about 14 0 standard drinks of alcohol per week  He reports that he does not use drugs    Current Outpatient Medications   Medication Sig Dispense Refill    amLODIPine (NORVASC) 10 mg tablet Take 1 tablet (10 mg total) by mouth daily 90 tablet 3    amoxicillin (AMOXIL) 500 mg capsule TAKE ONE CAPSULE BY MOUTH EVERY 6 HOURS TILL GONE      aspirin (ECOTRIN LOW STRENGTH) 81 mg EC tablet Take 1 tablet (81 mg total) by mouth daily 30 tablet 0    citalopram (CeleXA) 10 mg tablet Take 1 tablet (10 mg total) by mouth daily 90 tablet 3    colchicine (COLCRYS) 0 6 mg tablet Take 1 tablet (0 6 mg total) by mouth 2 (two) times a day 180 tablet 3    glimepiride (AMARYL) 1 mg tablet Take 3 tablets (3 mg total) by mouth daily with breakfast 270 tablet 3    losartan (COZAAR) 100 MG tablet Take 1 tablet (100 mg total) by mouth daily 90 tablet 3    metoprolol succinate (TOPROL-XL) 100 mg 24 hr tablet Take 1 tablet (100 mg total) by mouth daily 90 tablet 3    mupirocin (BACTROBAN) 2 % ointment Apply topically once daily to the nose  22 g 2    omeprazole (PriLOSEC) 20 mg delayed release capsule Take 1 capsule (20 mg total) by mouth daily 90 capsule 3    potassium chloride (Klor-Con) 10 mEq tablet Take 1 tablet (10 mEq total) by mouth 2 (two) times a day 60 tablet 0    rosuvastatin (CRESTOR) 20 MG tablet Take 1 tablet (20 mg total) by mouth daily 90 tablet 3    tamsulosin (FLOMAX) 0 4 mg TAKE 1 TABLET BY MOUTH ONCE DAILY RIGHT AFTER SUPPER 30 capsule 3    timolol (TIMOPTIC) 0 5 % ophthalmic solution       trospium chloride (SANCTURA) 20 mg tablet Take 1 tablet (20 mg total) by mouth 2 (two) times a day 180 tablet 3    ibuprofen (MOTRIN) 200 mg tablet Take 2 tablets (400 mg total) by mouth every 8 (eight) hours for 7 days, THEN 1 tablet (200 mg total) every 8 (eight) hours for 7 days, THEN 1 tablet (200 mg total) every 12 (twelve) hours for 7 days, THEN 1 tablet (200 mg total) daily for 7 days  (Patient not taking: Reported on 1/22/2021) 84 tablet 0     No current facility-administered medications for this visit       Review of Systems   Neurological: Positive for numbness            See HPI for description of right hand numbness and right-sided face numbness   All other systems reviewed and are negative  Objective:      /70   Pulse 79   Temp 98 8 °F (37 1 °C)   Ht 5' 7" (1 702 m)   Wt 128 kg (281 lb 6 4 oz)   SpO2 98%   BMI 44 07 kg/m²          Physical Exam  Constitutional:       General: He is not in acute distress  Appearance: He is well-developed  He is not ill-appearing  HENT:      Head: Normocephalic  Right Ear: Hearing and external ear normal       Left Ear: Hearing and external ear normal       Nose: Nose normal    Eyes:      Conjunctiva/sclera: Conjunctivae normal       Pupils: Pupils are equal, round, and reactive to light  Neck:      Musculoskeletal: Normal range of motion and neck supple  No neck rigidity or muscular tenderness  Thyroid: No thyromegaly  Vascular: No carotid bruit  Cardiovascular:      Rate and Rhythm: Normal rate and regular rhythm  Heart sounds: Normal heart sounds, S1 normal and S2 normal  No murmur  Pulmonary:      Effort: Pulmonary effort is normal  No respiratory distress  Breath sounds: Normal breath sounds  No wheezing, rhonchi or rales  Abdominal:      General: Bowel sounds are normal       Palpations: Abdomen is soft  Tenderness: There is no abdominal tenderness  Musculoskeletal: Normal range of motion  Lymphadenopathy:      Cervical: No cervical adenopathy  Skin:     General: Skin is warm and dry  Neurological:      General: No focal deficit present  Mental Status: He is alert and oriented to person, place, and time  Mental status is at baseline  Sensory: No sensory deficit  Motor: No weakness  Gait: Gait normal       Deep Tendon Reflexes: Reflexes are normal and symmetric  Reflexes normal    Psychiatric:         Behavior: Behavior normal          Thought Content: Thought content normal          Judgment: Judgment normal        BMI Counseling: Body mass index is 44 07 kg/m²   The BMI is above normal  Nutrition recommendations include reducing portion sizes, decreasing overall calorie intake, consuming healthier snacks and moderation in carbohydrate intake  Exercise recommendations include exercising 3-5 times per week

## 2021-02-16 NOTE — ASSESSMENT & PLAN NOTE
Possible TIA event earlier today prior to his arrival in the  Office  He has no residual symptoms at this time he describes the event as lasting approximately 5 minutes he had and numbness in his right handed his felt like his hand was swollen he had numbness on the right side of his face and did not see any asymmetry  His right leg was not affected  Motor strength in both upper extremities is equally normal of there is no evidence of any asymmetry or weakness of his face  In view of the symptoms I have  Asked him to have an updated carotid artery study  He is currently on aspirin 81 mg daily which I would like him to continue and I have also asked him to continue on his rosuvastatin 20 mg daily  He knows that if he has additional symptoms like this he should notify us immediately or report to the emergency room immediately  I will see him for follow-up visit after he completes his carotid Doppler testing

## 2021-02-23 ENCOUNTER — HOSPITAL ENCOUNTER (OUTPATIENT)
Dept: VASCULAR ULTRASOUND | Facility: HOSPITAL | Age: 76
Discharge: HOME | End: 2021-02-23
Attending: INTERNAL MEDICINE
Payer: MEDICARE

## 2021-02-23 ENCOUNTER — CONSULT (OUTPATIENT)
Dept: GASTROENTEROLOGY | Facility: CLINIC | Age: 76
End: 2021-02-23
Payer: MEDICARE

## 2021-02-23 VITALS
HEIGHT: 67 IN | WEIGHT: 276 LBS | BODY MASS INDEX: 43.32 KG/M2 | TEMPERATURE: 98.7 F | SYSTOLIC BLOOD PRESSURE: 132 MMHG | DIASTOLIC BLOOD PRESSURE: 89 MMHG

## 2021-02-23 DIAGNOSIS — K21.9 GASTROESOPHAGEAL REFLUX DISEASE WITHOUT ESOPHAGITIS: Primary | ICD-10-CM

## 2021-02-23 DIAGNOSIS — E66.01 MORBID OBESITY WITH BMI OF 40.0-44.9, ADULT (HCC): ICD-10-CM

## 2021-02-23 DIAGNOSIS — G45.9 TIA (TRANSIENT ISCHEMIC ATTACK): ICD-10-CM

## 2021-02-23 DIAGNOSIS — D12.3 ADENOMATOUS POLYP OF TRANSVERSE COLON: ICD-10-CM

## 2021-02-23 DIAGNOSIS — K63.5 POLYP OF COLON, UNSPECIFIED PART OF COLON, UNSPECIFIED TYPE: ICD-10-CM

## 2021-02-23 PROCEDURE — 99204 OFFICE O/P NEW MOD 45 MIN: CPT | Performed by: INTERNAL MEDICINE

## 2021-02-23 PROCEDURE — 93880 EXTRACRANIAL BILAT STUDY: CPT | Performed by: SURGERY

## 2021-02-23 PROCEDURE — 93880 EXTRACRANIAL BILAT STUDY: CPT

## 2021-02-23 NOTE — PROGRESS NOTES
Outpatient Consultation  [address]   Maki Henriquezrayna MINA  Ph : [phone]  Fax : [phone]  Mobile : [phone]  Email : [email or web address]  Also available on [email or web address]      Shubham Elias 67 y o  male MRN: [id number]    PCP: Milvia Valladares MD  Referring: Milvia Valladares MD  [address]  [address] Floor, [address] [place],  [address]    Shubham Elias was seen in consultation  My recommendations are included  Please do not hesitate to contact me with any questions you may have  ASSESSMENT AND PLAN:      No problem-specific Assessment & Plan notes found for this encounter  Diagnoses and all orders for this visit:    Gastroesophageal reflux disease without esophagitis    Polyp of colon, unspecified part of colon, unspecified type  -     Ambulatory referral to Gastroenterology    Morbid obesity with BMI of 40 0-44 9, adult ([facility] [address] )    Adenomatous polyp of transverse colon      1  History of adenomatous colon polyp in 2015  Subsequent 2 colonoscopies were normal   Last colonoscopy in 2019  At this time no colonoscopy is recommended  I discussed doing a colonoscopy in 10 years from the last 1 which would be in 2029  At that time the patient is in good health then we can certainly address it otherwise he does not need to have a colonoscopy as he will be above 74  He is agreeable  However if he does get any symptoms and follow-up the sooner  2  Gastroesophageal reflux disease  Continue omeprazole  Discussed that he should use of that pillow, exercise, not lay down right after eating  We discussed using a prop up pillow in bed while watching TV  Dietary recommendations were also made  If his symptoms are persistent that he can follow-up with us for further management recommendations  3  He does have increased mucus at nighttime    Recommend using a Neti pot before sleeping and using Pepcid at night in case this is associated reflux  Patient recommendations :  1  Pepcid 20 mg before bed time  2  Wedge pillow  3  Prop up pillow  4  Try to give yourself 2 hours between finishing dinner and laying down  5  Sylvia pot  6  Follow up as needed  7  Colonoscopy can be considered 80 (if in good health and no other issues)  ______________________________________________________________________    HPI:  Donovan Aguillon is a 62-year-old gentleman who has been referred to GI for evaluation for colonoscopy  He had a colonoscopy done in 2015 which showed a hepatic flexure polyp which was a tubular adenoma  He had 1 repeated again in 2017 which showed a hyperplastic polyp and then 1 again in 2019 which was normal   The prep was clean  Repeat colonoscopy was recommended in 10 years  Since then the physician who did his colonoscopy has retired  He also had an EGD done in 2019 which was unremarkable  He did however have a fundic gland polyp found on a previous endoscopy  At this time he is not having any significant symptoms of reflux  He only has this once in awhile while he is taking the omeprazole 20 mg daily  He does not have any nausea vomiting  He does not have any fevers or chills  He does feel like he fills up with mucus the back of his throat especially at night when lying down  He tends to lay down 1/2 hour to 1 hour after eating supper  No change in his bowel habits  No diarrhea constipation  No bleeding  PRIOR ENDOSCOPIC EVALUATION :     Endoscopy : yes    Colonoscopy : yes      REVIEW OF SYSTEMS:    CONSTITUTIONAL: Denies any fever, chills, rigors, and weight loss  HEENT: No earache or tinnitus  Denies hearing loss or visual disturbances  CARDIOVASCULAR: No chest pain or palpitations  RESPIRATORY: Denies any cough, hemoptysis, shortness of breath or dyspnea on exertion  GASTROINTESTINAL: As noted in the History of Present Illness     GENITOURINARY: No problems with urination  Denies any hematuria or dysuria  NEUROLOGIC: No dizziness or vertigo, denies headaches  MUSCULOSKELETAL: Denies any muscle or joint pain  SKIN: Denies skin rashes or itching  ENDOCRINE: Denies excessive thirst  Denies intolerance to heat or cold  PSYCHOSOCIAL: Denies depression or anxiety  Denies any recent memory loss  Historical Information   Past Medical History:   Diagnosis Date    Anxiety     Claustrophobia     Colon polyps     Depression     Diabetes mellitus ([address] )     Dyslipidemia     Esophageal polyp     GERD (gastroesophageal reflux disease)     Glaucoma     Headache     Hypertension     Low back pain     Neck pain     Numbness and tingling in left arm     resolved 2/20/17 / numbness and tingling left side last assessed 3/23/16    OAB (overactive bladder)     Paroxysmal supraventricular tachycardia (HCC)     Sleep apnea     wears cpap     Squamous cell skin cancer 10/19/2020    Right Nasal Ala, Dr Turcios     Past Surgical History:   Procedure Laterality Date    ANOSCOPY      for polyp removal    BASAL CELL CARCINOMA EXCISION      right cheek    CATARACT EXTRACTION Bilateral     KNEE ARTHROSCOPY Bilateral     LUMBAR SPINE SURGERY      30years ago    MOHS SURGERY  11/11/2020    [facility] Right Nasal Ala, Dr Yu Francisco      tooth extraction    IL COLONOSCOPY FLX DX W/COLLJ SPEC WHEN PFRMD N/A 2/13/2019    Procedure: COLONOSCOPY;  Surgeon: Thomas Portillo MD;  Location: [facility]; Service: Gastroenterology    IL ESOPHAGOGASTRODUODENOSCOPY TRANSORAL DIAGNOSTIC N/A 2/13/2019    Procedure: ESOPHAGOGASTRODUODENOSCOPY (EGD); Surgeon: Thomas Portillo MD;  Location: [facility];   Service: Gastroenterology    RECTAL BIOPSY      REPLACEMENT TOTAL KNEE Right 04/2017    REPLACEMENT TOTAL KNEE Left 03/2013    TOTAL KNEE ARTHROPLASTY Left      Social History   Social History     Substance and Sexual Activity   Alcohol Use Yes    Alcohol/week: 14 0 standard drinks    Types: 14 Shots of liquor per week    Frequency: 4 or more times a week    Drinks per session: 1 or 2    Binge frequency: Never    Comment: "couple of drinks each night"     Social History     Substance and Sexual Activity   Drug Use No     Social History     Tobacco Use   Smoking Status Former Smoker    Quit date: 1983    Years since quittin 1   Smokeless Tobacco Former User     Family History   Problem Relation Age of Onset    Alzheimer's disease Mother     Diabetes Mother    Iliana Ariza Hypertension Father     Diabetes Paternal Grandmother     Esophageal cancer Paternal Grandfather        Meds/Allergies       Current Outpatient Medications:     amLODIPine (NORVASC) 10 mg tablet    aspirin (ECOTRIN LOW STRENGTH) 81 mg EC tablet    glimepiride (AMARYL) 1 mg tablet    losartan (COZAAR) 100 MG tablet    metoprolol succinate (TOPROL-XL) 100 mg 24 hr tablet    omeprazole (PriLOSEC) 20 mg delayed release capsule    potassium chloride (Klor-Con) 10 mEq tablet    rosuvastatin (CRESTOR) 20 MG tablet    timolol (TIMOPTIC) 0 5 % ophthalmic solution    trospium chloride (SANCTURA) 20 mg tablet    amoxicillin (AMOXIL) 500 mg capsule    citalopram (CeleXA) 10 mg tablet    colchicine (COLCRYS) 0 6 mg tablet    ibuprofen (MOTRIN) 200 mg tablet    mupirocin (BACTROBAN) 2 % ointment    tamsulosin (FLOMAX) 0 4 mg    Allergies   Allergen Reactions    Codeine Hives     Pt does not remember if it was codeine or if the reaction was hives   Sulfa Antibiotics Other (See Comments)     Nausea             Objective     Blood pressure 132/89, temperature 98 7 °F (37 1 °C), temperature source Tympanic, height 5' 7" (1 702 m), weight 125 kg (276 lb)  Body mass index is 43 23 kg/m²  PHYSICAL EXAM:      Physical Exam  Constitutional:       Appearance: Normal appearance  He is well-developed  He is obese  HENT:      Head: Normocephalic and atraumatic     Eyes:      General: No scleral icterus  Conjunctiva/sclera: Conjunctivae normal       Pupils: Pupils are equal, round, and reactive to light  Neck:      Musculoskeletal: Normal range of motion  Cardiovascular:      Rate and Rhythm: Normal rate and regular rhythm  Heart sounds: Normal heart sounds  Pulmonary:      Effort: Pulmonary effort is normal  No respiratory distress  Breath sounds: Normal breath sounds  Abdominal:      General: Bowel sounds are normal  There is no distension  Palpations: Abdomen is soft  There is no mass  Tenderness: There is no abdominal tenderness  Hernia: No hernia is present  Musculoskeletal: Normal range of motion  Lymphadenopathy:      Cervical: No cervical adenopathy  Skin:     General: Skin is warm  Neurological:      Mental Status: He is alert and oriented to person, place, and time  Psychiatric:         Behavior: Behavior normal          Thought Content: Thought content normal              Lab Results:     Lab Results   Component Value Date    WBC 8 56 01/11/2021    HGB 12 0 01/11/2021    HCT 35 9 (L) 01/11/2021    MCV 99 (H) 01/11/2021     01/11/2021       Lab Results   Component Value Date     01/19/2015    K 3 9 02/09/2021     02/09/2021    CO2 30 02/09/2021    ANIONGAP 10 01/19/2015    BUN 12 02/09/2021    CREATININE 1 04 02/09/2021    GLUCOSE 125 01/19/2015    GLUF 125 (H) 02/09/2021    CALCIUM 8 5 02/09/2021    CORRECTEDCA 9 1 02/09/2021    AST 30 02/09/2021    ALT 52 02/09/2021    ALKPHOS 159 (H) 02/09/2021    PROT 7 6 01/19/2015    BILITOT 0 31 01/19/2015    EGFR 69 02/09/2021       Lab Results   Component Value Date    INR 0 98 01/10/2021    PROTIME 13 0 01/10/2021         Radiology Results:   No results found

## 2021-02-23 NOTE — PATIENT INSTRUCTIONS
1  Pepcid 20 mg before bed time  2  Wedge pillow  3  Prop up pillow  4  Try to give yourself 2 hours between finishing dinner and laying down  5  Sylvia pot  6  Follow up as needed  7  Colonoscopy can be considered 80 (if in good health and no other issues)

## 2021-02-25 ENCOUNTER — OFFICE VISIT (OUTPATIENT)
Dept: INTERNAL MEDICINE CLINIC | Facility: CLINIC | Age: 76
End: 2021-02-25
Payer: MEDICARE

## 2021-02-25 VITALS
DIASTOLIC BLOOD PRESSURE: 80 MMHG | HEIGHT: 67 IN | HEART RATE: 67 BPM | BODY MASS INDEX: 44.51 KG/M2 | SYSTOLIC BLOOD PRESSURE: 126 MMHG | WEIGHT: 283.6 LBS | TEMPERATURE: 97.7 F | OXYGEN SATURATION: 98 %

## 2021-02-25 DIAGNOSIS — E11.8 TYPE 2 DIABETES MELLITUS WITH COMPLICATION (HCC): ICD-10-CM

## 2021-02-25 DIAGNOSIS — G45.9 TIA (TRANSIENT ISCHEMIC ATTACK): Primary | ICD-10-CM

## 2021-02-25 DIAGNOSIS — I30.0 ACUTE IDIOPATHIC PERICARDITIS: ICD-10-CM

## 2021-02-25 DIAGNOSIS — I10 ESSENTIAL HYPERTENSION: ICD-10-CM

## 2021-02-25 PROCEDURE — 99214 OFFICE O/P EST MOD 30 MIN: CPT | Performed by: INTERNAL MEDICINE

## 2021-02-26 ENCOUNTER — TELEPHONE (OUTPATIENT)
Dept: NEUROLOGY | Facility: CLINIC | Age: 76
End: 2021-02-26

## 2021-02-26 NOTE — ASSESSMENT & PLAN NOTE
A careful assessment of the patient's hypertension today indicates a blood pressure reading of 126/80 which represents adequate blood pressure management  Recommend that he continue his amlodipine at 10 mg daily losartan at 100 mg daily metoprolol succinate at 100 mg daily    A follow-up assessment of his blood pressure in 2 months has been requested

## 2021-02-26 NOTE — ASSESSMENT & PLAN NOTE
Patient reports no breakthrough symptoms of his previously diagnosed acute idiopathic pericarditis    There is no evidence of any rub on examination of the heart today I have reviewed his most recent evaluation by Cardiology including his cardiac catheterization which revealed no evidence of significant coronary artery disease period a tapering of medications for treatment of his pericarditis will be managed by his cardiologist

## 2021-02-26 NOTE — ASSESSMENT & PLAN NOTE
I have reviewed with the patient his most recent fasting blood sugar from laboratory testing performed this month reading is 125 for his fasting blood sugar most recent hemoglobin A1c is a satisfactory 7 0%  I recommended that he continue on his current course of careful diet along with Amaryl 3 mg daily in the morning  Regular follow-up testing every 4 months is recommended    Lab Results   Component Value Date    HGBA1C 7 0 (H) 12/01/2020

## 2021-02-26 NOTE — PROGRESS NOTES
Assessment/Plan:    Type 2 diabetes mellitus with complication ([facility] [address] )   I have reviewed with the patient his most recent fasting blood sugar from laboratory testing performed this month reading is 125 for his fasting blood sugar most recent hemoglobin A1c is a satisfactory 7 0%  I recommended that he continue on his current course of careful diet along with Amaryl 3 mg daily in the morning  Regular follow-up testing every 4 months is recommended  Lab Results   Component Value Date    HGBA1C 7 0 (H) 12/01/2020       TIA (transient ischemic attack)    Her brief episode lasting approximately 5 minutes of transient right side face and hand numbness prior to the patient's last visit with us was followed up with a carotid Doppler study showing no evidence of any critical stenosis nor any evidence of ulcerated plaques  Recent echocardiogram of the heart revealed no evidence of any mural thrombi  I  Have referred the patient on to Neurology services for further evaluation and guidance in treatment to reduce risk of future events  His current therapy is aspirin 81 mg daily and rosuvastatin 20 mg daily  Essential hypertension    A careful assessment of the patient's hypertension today indicates a blood pressure reading of 126/80 which represents adequate blood pressure management  Recommend that he continue his amlodipine at 10 mg daily losartan at 100 mg daily metoprolol succinate at 100 mg daily  A follow-up assessment of his blood pressure in 2 months has been requested    Acute idiopathic pericarditis   Patient reports no breakthrough symptoms of his previously diagnosed acute idiopathic pericarditis    There is no evidence of any rub on examination of the heart today I have reviewed his most recent evaluation by Cardiology including his cardiac catheterization which revealed no evidence of significant coronary artery disease period a tapering of medications for treatment of his pericarditis will be managed by his cardiologist        Diagnoses and all orders for this visit:    TIA (transient ischemic attack)  -     Ambulatory referral to Neurology; Future    Essential hypertension    Acute idiopathic pericarditis    Type 2 diabetes mellitus with complication (HCC)        Subjective:      Patient ID: Sophie Mackey is a 67 y o  male  This 48-year-old gentleman returns today for follow-up visit  On his last visit with us he described transient numbness in his right hand and right side of his face prompting a review of his carotid arteries  He underwent a Doppler test which showed no evidence of significant stenosis nor any ulcerated plaques  The blood flow in his vertebral arteries is antegrade bilaterally  Patient had a recent echocardiogram during recent hospitalization with no evidence of any thrombi on that examination  Patient had no residual symptoms  I have reviewed the results of his study with him today of recommended that he have a neurology consultation for further evaluation of what sounds like a TIA event  Currently is on aspirin and statin therapy  We would like to know from the neurology department if they believe this is adequate management for his symptoms or if he should be on a stronger anti-platelet agent period    He has had no chest pains palpitations shortness of breath  We did review today with the patient's family by telephone his recent hospitalization including his diagnosis of idiopathic pericarditis  Patient continues to wean off of his indomethacin and colchicine  He reports no relapse of symptoms  The patient's type 2 diabetes remains adequately controlled with fasting blood sugar 125 on his last blood work this month  He has had no signs or symptoms of uncontrolled hyperglycemia or hypoglycemia      A period of 30 minutes was spent with the patient today greater than 50% of this time was spent in counseling and coordination of activities and treatment      The following portions of the patient's history were reviewed and updated as appropriate:   He  has a past medical history of Anxiety, Claustrophobia, Colon polyps, Depression, Diabetes mellitus ([place] [address] ), Dyslipidemia, Esophageal polyp, GERD (gastroesophageal reflux disease), Glaucoma, Headache, Hypertension, Low back pain, Neck pain, Numbness and tingling in left arm, OAB (overactive bladder), Paroxysmal supraventricular tachycardia ([facility] ), Sleep apnea, and Squamous cell skin cancer (10/19/2020)  He   Patient Active Problem List    Diagnosis Date Noted    Adenomatous polyp of transverse colon 02/23/2021    TIA (transient ischemic attack) 02/15/2021    PND (post-nasal drip) 01/24/2021    Acute idiopathic pericarditis 01/22/2021    Hyperlipidemia 01/10/2021    Chest pain 01/10/2021    Urgency of urination 11/06/2020    Fecal incontinence 03/25/2020    BPH with urinary obstruction 01/23/2020    Onychomycosis 11/25/2019    Urgency incontinence 11/20/2019    Mood disorder ([place] [address] ) 08/14/2019    Hypokalemia 07/24/2019    Palpitation 02/07/2019    Obstructive sleep apnea syndrome 06/04/2018    Morbid obesity with BMI of 40 0-44 9, adult ([place] [address] ) 06/04/2018    Mood disturbance 06/04/2018    Gastroesophageal reflux disease 06/04/2018    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    Type 2 diabetes mellitus with complication ([place] [address] ) 18/26/8934    Essential hypertension 01/29/2018    Peripheral neuropathy 01/29/2018    Low back pain      He  has a past surgical history that includes Cataract extraction (Bilateral); Knee arthroscopy (Bilateral); Lumbar spine surgery; Excision basal cell carcinoma; Total knee arthroplasty (Left); Replacement total knee (Right, 04/2017); Replacement total knee (Left, 03/2013); Anoscopy; Rectal biopsy; Mouth surgery; pr esophagogastroduodenoscopy transoral diagnostic (N/A, 2/13/2019); pr colonoscopy flx dx w/collj spec when pfrmd (N/A, 2/13/2019); and Mohs surgery (11/11/2020)    His family history includes Alzheimer's disease in his mother; Diabetes in his mother and paternal grandmother; Esophageal cancer in his paternal grandfather; Roselyn Farrellvers Hypertension in his father  He  reports that he quit smoking about 38 years ago  He has quit using smokeless tobacco  He reports current alcohol use of about 14 0 standard drinks of alcohol per week  He reports that he does not use drugs  Current Outpatient Medications   Medication Sig Dispense Refill    amLODIPine (NORVASC) 10 mg tablet Take 1 tablet (10 mg total) by mouth daily 90 tablet 3    amoxicillin (AMOXIL) 500 mg capsule TAKE ONE CAPSULE BY MOUTH EVERY 6 HOURS TILL GONE      aspirin (ECOTRIN LOW STRENGTH) 81 mg EC tablet Take 1 tablet (81 mg total) by mouth daily 30 tablet 0    citalopram (CeleXA) 10 mg tablet Take 1 tablet (10 mg total) by mouth daily 90 tablet 3    colchicine (COLCRYS) 0 6 mg tablet Take 1 tablet (0 6 mg total) by mouth 2 (two) times a day 180 tablet 3    glimepiride (AMARYL) 1 mg tablet Take 3 tablets (3 mg total) by mouth daily with breakfast 270 tablet 3    losartan (COZAAR) 100 MG tablet Take 1 tablet (100 mg total) by mouth daily 90 tablet 3    metoprolol succinate (TOPROL-XL) 100 mg 24 hr tablet Take 1 tablet (100 mg total) by mouth daily 90 tablet 3    mupirocin (BACTROBAN) 2 % ointment Apply topically once daily to the nose   22 g 2    omeprazole (PriLOSEC) 20 mg delayed release capsule Take 1 capsule (20 mg total) by mouth daily 90 capsule 3    potassium chloride (Klor-Con) 10 mEq tablet Take 1 tablet (10 mEq total) by mouth 2 (two) times a day 60 tablet 0    rosuvastatin (CRESTOR) 20 MG tablet Take 1 tablet (20 mg total) by mouth daily 90 tablet 3    tamsulosin (FLOMAX) 0 4 mg TAKE 1 TABLET BY MOUTH ONCE DAILY RIGHT AFTER SUPPER 30 capsule 3    timolol (TIMOPTIC) 0 5 % ophthalmic solution       trospium chloride (SANCTURA) 20 mg tablet Take 1 tablet (20 mg total) by mouth 2 (two) times a day 180 tablet 3 No current facility-administered medications for this visit       Review of Systems   Neurological:          No new symptoms of TIA type since last visit   All other systems reviewed and are negative  Objective:      /80   Pulse 67   Temp 97 7 °F (36 5 °C) (Tympanic)   Ht 5' 7" (1 702 m)   Wt 129 kg (283 lb 9 6 oz)   SpO2 98%   BMI 44 42 kg/m²          Physical Exam  Constitutional:       General: He is not in acute distress  Appearance: He is well-developed  He is not ill-appearing  HENT:      Head: Normocephalic  Right Ear: Hearing, tympanic membrane, ear canal and external ear normal       Left Ear: Hearing, tympanic membrane, ear canal and external ear normal       Nose: Nose normal    Eyes:      Conjunctiva/sclera: Conjunctivae normal       Pupils: Pupils are equal, round, and reactive to light  Neck:      Musculoskeletal: Normal range of motion and neck supple  No neck rigidity or muscular tenderness  Thyroid: No thyromegaly  Vascular: No carotid bruit  Cardiovascular:      Rate and Rhythm: Normal rate and regular rhythm  Heart sounds: Normal heart sounds, S1 normal and S2 normal  No murmur  Pulmonary:      Effort: Pulmonary effort is normal       Breath sounds: Normal breath sounds  No wheezing, rhonchi or rales  Abdominal:      General: Bowel sounds are normal       Palpations: Abdomen is soft  Tenderness: There is no abdominal tenderness  Musculoskeletal: Normal range of motion  Lymphadenopathy:      Cervical: No cervical adenopathy  Skin:     General: Skin is warm and dry  Neurological:      General: No focal deficit present  Mental Status: He is alert and oriented to person, place, and time  Mental status is at baseline  Deep Tendon Reflexes: Reflexes are normal and symmetric  Reflexes normal    Psychiatric:         Behavior: Behavior normal          Thought Content:  Thought content normal          Judgment: Judgment normal

## 2021-02-26 NOTE — TELEPHONE ENCOUNTER
Best contact number for patient:  [phone]  Emergency Contact name and number:  Ashley Gabriel [phone]  Referring provider and telephone number:  Karson Elias [phone]  Primary Care Provider Name and if affiliated with [place] [address] Yes  Reason for Appointment/Dx:  TIA   Have you seen and followed up with a pediatric Neurologist for this disease in the past?  No (If yes ok to schedule with Dr Samreen Painting)    Neurology Location patient would like to be seen:  Any  Order received? Yes                                                 Records Received? Yes    Have you ever seen another Neurologist?       No    Insurance Information    Insurance Name:Medicare A/B     ID/Policy #:[id number]    Secondary Insurance:    ID/Policy#: Workman's Comp/ Accident/ School  Information      Workman's Comp/Accident/School related?        No    If yes name of Insurance company:    Date of Injury:    Type of Injury:    [address] Name and Telephone Number:    Notes:                   Appointment date:   6/21/2021

## 2021-02-26 NOTE — ASSESSMENT & PLAN NOTE
Her brief episode lasting approximately 5 minutes of transient right side face and hand numbness prior to the patient's last visit with us was followed up with a carotid Doppler study showing no evidence of any critical stenosis nor any evidence of ulcerated plaques  Recent echocardiogram of the heart revealed no evidence of any mural thrombi  I  Have referred the patient on to Neurology services for further evaluation and guidance in treatment to reduce risk of future events  His current therapy is aspirin 81 mg daily and rosuvastatin 20 mg daily

## 2021-03-01 ENCOUNTER — OFFICE VISIT (OUTPATIENT)
Dept: PODIATRY | Facility: CLINIC | Age: 76
End: 2021-03-01
Payer: MEDICARE

## 2021-03-01 VITALS
HEIGHT: 67 IN | BODY MASS INDEX: 44.48 KG/M2 | WEIGHT: 283.4 LBS | DIASTOLIC BLOOD PRESSURE: 87 MMHG | SYSTOLIC BLOOD PRESSURE: 140 MMHG | HEART RATE: 73 BPM

## 2021-03-01 DIAGNOSIS — E11.42 DIABETIC POLYNEUROPATHY ASSOCIATED WITH TYPE 2 DIABETES MELLITUS (HCC): Primary | ICD-10-CM

## 2021-03-01 DIAGNOSIS — N40.1 BPH WITH URINARY OBSTRUCTION: ICD-10-CM

## 2021-03-01 DIAGNOSIS — N13.8 BPH WITH URINARY OBSTRUCTION: ICD-10-CM

## 2021-03-01 DIAGNOSIS — B35.1 ONYCHOMYCOSIS: ICD-10-CM

## 2021-03-01 DIAGNOSIS — R07.9 CHEST PAIN, UNSPECIFIED TYPE: ICD-10-CM

## 2021-03-01 DIAGNOSIS — E78.00 PURE HYPERCHOLESTEROLEMIA: ICD-10-CM

## 2021-03-01 DIAGNOSIS — I10 ESSENTIAL HYPERTENSION: ICD-10-CM

## 2021-03-01 PROCEDURE — 11721 DEBRIDE NAIL 6 OR MORE: CPT | Performed by: PODIATRIST

## 2021-03-01 NOTE — TELEPHONE ENCOUNTER
Wife called to state these went to mail order and they were supposed to go to local [facility] in [place]

## 2021-03-01 NOTE — PROGRESS NOTES
Hector Fishman  1945  AT RISK FOOT CARE    1  Diabetic polyneuropathy associated with type 2 diabetes mellitus ([facility]     2  Onychomycosis         Patient presents for at-risk foot care  Patient has no acute concerns today  Patient has significant lower extremity risk due to neuropathy, parasthesia, edema, and trophic skin changes to the lower extremity  On exam patient has thickened, hypertrophic, discolored, brittle toenails with subungual debris and tenderness x10   Patient has callus on none  Patient has lower extremity edema  PAtients skin is atrophic, thickened nails, and decreased pedal hair  Patient has decreased pinprick and vibratory sensation to his feet and parasthesia    Today's treatment includes:  Debridement of toenails  Using nail nipper, krystle, and curette, nails were sharply debrided, reduced in thickness and length  Devitalized nail tissue and fungal debris excised and removed  Patient tolerated well  Discussed proper shoe gear, daily inspections of feet, and general foot health with patient  Patient has Q9  findings and is recommended for at risk foot care every 9-10 weeks      Patients most recent complete clinical foot exam was on: 7/6/2020

## 2021-03-02 DIAGNOSIS — N40.1 BPH WITH URINARY OBSTRUCTION: ICD-10-CM

## 2021-03-02 DIAGNOSIS — N13.8 BPH WITH URINARY OBSTRUCTION: ICD-10-CM

## 2021-03-02 RX ORDER — LOSARTAN POTASSIUM 100 MG/1
100 TABLET ORAL DAILY
Qty: 90 TABLET | Refills: 3 | Status: SHIPPED | OUTPATIENT
Start: 2021-03-02 | End: 2022-02-28

## 2021-03-02 RX ORDER — ROSUVASTATIN CALCIUM 20 MG/1
20 TABLET, COATED ORAL DAILY
Qty: 90 TABLET | Refills: 3 | Status: SHIPPED | OUTPATIENT
Start: 2021-03-02 | End: 2022-04-23

## 2021-03-02 RX ORDER — METOPROLOL SUCCINATE 100 MG/1
100 TABLET, EXTENDED RELEASE ORAL DAILY
Qty: 90 TABLET | Refills: 3 | Status: SHIPPED | OUTPATIENT
Start: 2021-03-02 | End: 2022-02-14

## 2021-03-02 RX ORDER — COLCHICINE 0.6 MG/1
0.6 TABLET ORAL 2 TIMES DAILY
Qty: 180 TABLET | Refills: 3 | Status: SHIPPED | OUTPATIENT
Start: 2021-03-02 | End: 2021-08-16 | Stop reason: ALTCHOICE

## 2021-03-02 NOTE — TELEPHONE ENCOUNTER
Patient left a message on the Medication Refill voice mail line requesting a new prescription for Tamsulosin 0 4mg, 90 day supply to [facility]

## 2021-03-04 RX ORDER — TAMSULOSIN HYDROCHLORIDE 0.4 MG/1
0.4 CAPSULE ORAL
Qty: 90 CAPSULE | Refills: 2 | Status: SHIPPED | OUTPATIENT
Start: 2021-03-04 | End: 2021-11-22 | Stop reason: SDUPTHER

## 2021-03-04 NOTE — TELEPHONE ENCOUNTER
The patient has an upcoming office visit scheduled for 11/18/21 with Dr Karina Stanford in the [facility] location but will run out of medication until then    Request for same, 90 day supply with 2 refills was queued and forwarded to the Advanced Practitioner covering the [facility] location for approval

## 2021-03-08 DIAGNOSIS — E87.6 HYPOKALEMIA: ICD-10-CM

## 2021-03-08 RX ORDER — POTASSIUM CHLORIDE 750 MG/1
10 TABLET, FILM COATED, EXTENDED RELEASE ORAL 2 TIMES DAILY
Qty: 180 TABLET | Refills: 3 | OUTPATIENT
Start: 2021-03-08

## 2021-03-09 RX ORDER — POTASSIUM CHLORIDE 750 MG/1
10 TABLET, FILM COATED, EXTENDED RELEASE ORAL 2 TIMES DAILY
Qty: 60 TABLET | Refills: 0 | Status: SHIPPED | OUTPATIENT
Start: 2021-03-09 | End: 2021-04-05 | Stop reason: SDUPTHER

## 2021-03-09 NOTE — TELEPHONE ENCOUNTER
Patient was put on potassium by cardiology resident Katt Thomas on 1/12/21, signed by Dr Barbara Porras   Patient is asking for a refill

## 2021-03-15 ENCOUNTER — OFFICE VISIT (OUTPATIENT)
Dept: PAIN MEDICINE | Facility: MEDICAL CENTER | Age: 76
End: 2021-03-15
Payer: MEDICARE

## 2021-03-15 VITALS
TEMPERATURE: 97.1 F | BODY MASS INDEX: 45.2 KG/M2 | HEIGHT: 67 IN | WEIGHT: 288 LBS | DIASTOLIC BLOOD PRESSURE: 72 MMHG | SYSTOLIC BLOOD PRESSURE: 144 MMHG

## 2021-03-15 DIAGNOSIS — M46.1 SACROILIITIS (HCC): ICD-10-CM

## 2021-03-15 DIAGNOSIS — G89.29 CHRONIC RIGHT-SIDED LOW BACK PAIN WITHOUT SCIATICA: ICD-10-CM

## 2021-03-15 DIAGNOSIS — G89.4 CHRONIC PAIN SYNDROME: Primary | ICD-10-CM

## 2021-03-15 DIAGNOSIS — M47.816 LUMBAR SPONDYLOSIS: ICD-10-CM

## 2021-03-15 DIAGNOSIS — M54.50 CHRONIC RIGHT-SIDED LOW BACK PAIN WITHOUT SCIATICA: ICD-10-CM

## 2021-03-15 PROCEDURE — 99214 OFFICE O/P EST MOD 30 MIN: CPT | Performed by: NURSE PRACTITIONER

## 2021-03-15 NOTE — PATIENT INSTRUCTIONS
Sacroiliitis   WHAT YOU NEED TO KNOW:   Sacroiliitis is a painful swelling of one or both of your sacroiliac joints that lasts at least 3 months  The sacroiliac joint connects your pelvis to the base of your spine  DISCHARGE INSTRUCTIONS:   Medicines:  Ask for more information about these and other medicines you may need to treat sacroiliitis:  · Pain medicine: You may be given medicine to take away or decrease pain  Do not wait until the pain is severe before you take your medicine  You may be given the medicine as a pill to swallow or as a lotion that you put on the painful area  · NSAIDs  help decrease swelling and pain or fever  This medicine is available with or without a doctor's order  NSAIDs can cause stomach bleeding or kidney problems in certain people  If you take blood thinner medicine, always ask your healthcare provider if NSAIDs are safe for you  Always read the medicine label and follow directions  · Muscle relaxers  help decrease pain and muscle spasms  · Take your medicine as directed  Contact your healthcare provider if you think your medicine is not helping or if you have side effects  Tell him of her if you are allergic to any medicine  Keep a list of the medicines, vitamins, and herbs you take  Include the amounts, and when and why you take them  Bring the list or the pill bottles to follow-up visits  Carry your medicine list with you in case of an emergency  Physical therapy:  Your healthcare provider may suggest physical therapy  Your physical therapist may teach you exercises to improve posture (the way you stand and sit), flexibility, and strength in your lower back  He may also teach you how to remain safely active and avoid further injury  Follow the exercise plan given to you by your physical therapist   Rest:  Follow your healthcare provider's instructions about how much rest you should get  Avoid activity that worsens your pain    Ice or heat packs:  Use ice or heat packs on the sore area of your body to decrease the pain and swelling  Put ice in a plastic bag covered with a towel on your low back  Cover heated items with a towel to avoid burns  Use ice and heat as directed  Follow up with your healthcare provider or spine specialist within 1 to 2 weeks:  Write down your questions so you remember to ask them during your visits  Contact your healthcare provider or spine specialist if:   · Your pain makes it hard for you to do your daily activities, such as work or school  · Your pain does not go away after treatment  · You feel depressed or anxious  · You have questions about your condition or care  Return to the emergency department if:   · You have a fever  · Your pain is worse than before  · Your pain prevents you from sleeping  © Copyright [address] Information is for End User's use only and may not be sold, redistributed or otherwise used for commercial purposes  All illustrations and images included in CareNotes® are the copyrighted property of A D A M , [employer]  or [address] [place], [place] [address]sophie   The above information is an  only  It is not intended as medical advice for individual conditions or treatments  Talk to your doctor, nurse or pharmacist before following any medical regimen to see if it is safe and effective for you

## 2021-03-15 NOTE — PROGRESS NOTES
Assessment:  1  Chronic pain syndrome    2  Chronic right-sided low back pain without sciatica    3  Sacroiliitis ([address] )    4  Lumbar spondylosis        Plan:   While the patient was in the office today, I did have a thorough conversation regarding their chronic pain syndrome, medication management, and treatment plan options  Patient is a 50-year-old male with chronic pain syndrome related to chronic low back pain, sacroiliitis, lumbar spondylosis  Patient was initially seen here in March of 2020 complaining of low back and left leg pain at that time  Today, he states that the pain in his lower legs has resolved  His biggest complaint is right low back pain, nonradiating  Pain that he is demonstrating on history and physical is consistent with sacroiliac mediated pain as well as possibly facetogenic pain  In the office today, we reviewed the nature of sacroiliac joint pathology in depth using a spine model  We discussed the approach we would use for the sacroiliac joint injection and provided literature for home review  Complete risks and benefits including bleeding, infection, tissue reaction, allergic reaction were reviewed and verbal and written consent was obtained  Most recent hemoglobin A1c was 7 0 in December, 2020  May consider diagnostic lumbar facet medial branch blocks in the future if pain persists  Complete risks and benefits including bleeding, infection, tissue reaction, nerve injury and allergic reaction were discussed  The approach was demonstrated using models and literature was provided  Verbal and written consent was obtained  History of Present Illness: The patient is a 67 y o  male who presents for a follow up office visit in regards to Back Pain  The patients current symptoms include   Complaints of right-sided low back pain, nonradiating  Current pain level 7/10  Quality pain is described as sharp and shooting  Current pain medications includes: none   I have personally reviewed and/or updated the patient's past medical history, past surgical history, family history, social history, current medications, allergies, and vital signs today  Review of Systems  Review of Systems   Constitutional: Negative for fatigue  HENT: Negative for ear pain and hearing loss  Eyes: Negative for redness  Respiratory: Negative for cough  Cardiovascular: Negative for leg swelling  Gastrointestinal: Negative for anal bleeding and rectal pain  Endocrine: Negative for polydipsia  Genitourinary: Negative for hematuria  Musculoskeletal: Positive for arthralgias (joint stiffness), back pain and gait problem  Negative for joint swelling  Skin: Negative for rash  Neurological: Negative for headaches  Hematological: Does not bruise/bleed easily  Psychiatric/Behavioral: Negative for behavioral problems and hallucinations           Past Medical History:   Diagnosis Date    Anxiety     Claustrophobia     Colon polyps     Depression     Diabetes mellitus ([address] )     Dyslipidemia     Esophageal polyp     GERD (gastroesophageal reflux disease)     Glaucoma     Headache     Hypertension     Low back pain     Neck pain     Numbness and tingling in left arm     resolved 2/20/17 / numbness and tingling left side last assessed 3/23/16    OAB (overactive bladder)     Paroxysmal supraventricular tachycardia (HCC)     Sleep apnea     wears cpap     Squamous cell skin cancer 10/19/2020    Right Nasal Dr Tam Herrera       Past Surgical History:   Procedure Laterality Date    ANOSCOPY      for polyp removal    BASAL CELL CARCINOMA EXCISION      right cheek    CATARACT EXTRACTION Bilateral     KNEE ARTHROSCOPY Bilateral     LUMBAR SPINE SURGERY      30years ago    MOHS SURGERY  11/11/2020    [facility] Right Nasal Dr Bailey Herrera Spray      tooth extraction    ORTHOPEDIC SURGERY      [place] COLONOSCOPY FLX DX W/COLLJ SPEC WHEN PFRMD N/A 2/13/2019 Procedure: COLONOSCOPY;  Surgeon: Daxa Ibarra MD;  [facility]; Service: Gastroenterology    [place] ESOPHAGOGASTRODUODENOSCOPY TRANSORAL DIAGNOSTIC N/A 2019    Procedure: ESOPHAGOGASTRODUODENOSCOPY (EGD); Surgeon: Daxa Ibarra MD;  [facility]; Service: Gastroenterology    RECTAL BIOPSY      REPLACEMENT TOTAL KNEE Right 2017    REPLACEMENT TOTAL KNEE Left 2013    TOTAL KNEE ARTHROPLASTY Left        Family History   Problem Relation Age of Onset    Alzheimer's disease Mother     Diabetes Mother    Yogesh Guzman Hypertension Father     Diabetes Paternal [de-identified]     Esophageal cancer Paternal Grandfather        Social History     Occupational History    Occupation: retired   Tobacco Use    Smoking status: Former Smoker     Quit date: 1983     Years since quittin 1    Smokeless tobacco: Former User   Substance and Sexual Activity    Alcohol use:  Yes     Alcohol/week: 14 0 standard drinks     Types: 14 Shots of liquor per week     Frequency: 4 or more times a week     Drinks per session: 1 or 2     Binge frequency: Never     Comment: "couple of drinks each night"    Drug use: No    Sexual activity: Not Currently     Partners: Female         Current Outpatient Medications:     amLODIPine (NORVASC) 10 mg tablet, Take 1 tablet (10 mg total) by mouth daily, Disp: 90 tablet, Rfl: 3    amoxicillin (AMOXIL) 500 mg capsule, TAKE ONE CAPSULE BY MOUTH EVERY 6 HOURS TILL GONE, Disp: , Rfl:     aspirin (ECOTRIN LOW STRENGTH) 81 mg EC tablet, Take 1 tablet (81 mg total) by mouth daily, Disp: 30 tablet, Rfl: 0    citalopram (CeleXA) 10 mg tablet, Take 1 tablet (10 mg total) by mouth daily, Disp: 90 tablet, Rfl: 3    colchicine (COLCRYS) 0 6 mg tablet, Take 1 tablet (0 6 mg total) by mouth 2 (two) times a day, Disp: 180 tablet, Rfl: 3    glimepiride (AMARYL) 1 mg tablet, Take 3 tablets (3 mg total) by mouth daily with breakfast, Disp: 270 tablet, Rfl: 3    losartan (COZAAR) 100 MG tablet, Take 1 tablet (100 mg total) by mouth daily, Disp: 90 tablet, Rfl: 3    metoprolol succinate (TOPROL-XL) 100 mg 24 hr tablet, Take 1 tablet (100 mg total) by mouth daily, Disp: 90 tablet, Rfl: 3    mupirocin (BACTROBAN) 2 % ointment, Apply topically once daily to the nose , Disp: 22 g, Rfl: 2    omeprazole (PriLOSEC) 20 mg delayed release capsule, Take 1 capsule (20 mg total) by mouth daily, Disp: 90 capsule, Rfl: 3    potassium chloride (Klor-Con) 10 mEq tablet, Take 1 tablet (10 mEq total) by mouth 2 (two) times a day, Disp: 60 tablet, Rfl: 0    rosuvastatin (CRESTOR) 20 MG tablet, Take 1 tablet (20 mg total) by mouth daily, Disp: 90 tablet, Rfl: 3    tamsulosin (FLOMAX) 0 4 mg, Take 1 capsule (0 4 mg total) by mouth daily with dinner, Disp: 90 capsule, Rfl: 2    timolol (TIMOPTIC) 0 5 % ophthalmic solution, , Disp: , Rfl:     trospium chloride (SANCTURA) 20 mg tablet, Take 1 tablet (20 mg total) by mouth 2 (two) times a day, Disp: 180 tablet, Rfl: 3    Allergies   Allergen Reactions    Codeine Hives     Pt does not remember if it was codeine or if the reaction was hives   Sulfa Antibiotics Other (See Comments)     Nausea         Physical Exam:    /72 (BP Location: Right arm, Patient Position: Sitting, Cuff Size: Large)   Temp (!) 97 1 °F (36 2 °C)   Ht 5' 7" (1 702 m)   Wt 131 kg (288 lb)   BMI 45 11 kg/m²     Constitutional:normal, well developed, well nourished, alert, in no distress and non-toxic and no overt pain behavior  Eyes:anicteric  HEENT:grossly intact  Neck:supple, symmetric, trachea midline and no masses   Pulmonary:even and unlabored  Cardiovascular:No edema or pitting edema present  Skin:Normal without rashes or lesions and well hydrated  Psychiatric:Mood and affect appropriate  Neurologic:Cranial Nerves II-XII grossly intact  Musculoskeletal:antalgic range of motion of the lumbar spine is limited in all planes    There is tenderness over the right lower lumbar area   There is significant tenderness over the right sacroiliac joint  Isaiah's maneuver is positive radiating pain to the right low back      Imaging  FL spine and pain procedure    (Results Pending)       Orders Placed This Encounter   Procedures    FL spine and pain procedure

## 2021-03-19 ENCOUNTER — TELEPHONE (OUTPATIENT)
Dept: INTERNAL MEDICINE CLINIC | Facility: CLINIC | Age: 76
End: 2021-03-19

## 2021-03-19 DIAGNOSIS — E78.5 HYPERLIPIDEMIA, UNSPECIFIED HYPERLIPIDEMIA TYPE: ICD-10-CM

## 2021-03-19 DIAGNOSIS — E11.8 TYPE 2 DIABETES MELLITUS WITH COMPLICATION (HCC): ICD-10-CM

## 2021-03-19 DIAGNOSIS — E78.00 PURE HYPERCHOLESTEROLEMIA: Primary | ICD-10-CM

## 2021-03-19 DIAGNOSIS — I10 ESSENTIAL HYPERTENSION: ICD-10-CM

## 2021-03-19 DIAGNOSIS — Z13.0 SCREENING FOR DEFICIENCY ANEMIA: ICD-10-CM

## 2021-03-19 DIAGNOSIS — Z12.11 COLON CANCER SCREENING: ICD-10-CM

## 2021-03-19 DIAGNOSIS — Z12.5 SCREENING FOR PROSTATE CANCER: ICD-10-CM

## 2021-03-19 NOTE — TELEPHONE ENCOUNTER
Pt has a medicare physical scheduled with you for 4/13/21  Please generate necessary lab order and call pt at [phone] once done  Pt uses [facility]      Thank you

## 2021-03-19 NOTE — TELEPHONE ENCOUNTER
Please let the patient know that orders have been entered for his annual physical blood work thank you

## 2021-03-22 ENCOUNTER — HOSPITAL ENCOUNTER (OUTPATIENT)
Dept: RADIOLOGY | Facility: MEDICAL CENTER | Age: 76
Discharge: HOME | End: 2021-03-22
Attending: PHYSICAL MEDICINE & REHABILITATION
Payer: MEDICARE

## 2021-03-22 VITALS
OXYGEN SATURATION: 94 % | RESPIRATION RATE: 20 BRPM | TEMPERATURE: 98.1 F | SYSTOLIC BLOOD PRESSURE: 127 MMHG | HEART RATE: 60 BPM | DIASTOLIC BLOOD PRESSURE: 73 MMHG

## 2021-03-22 DIAGNOSIS — G89.4 CHRONIC PAIN SYNDROME: ICD-10-CM

## 2021-03-22 DIAGNOSIS — M54.50 CHRONIC RIGHT-SIDED LOW BACK PAIN WITHOUT SCIATICA: ICD-10-CM

## 2021-03-22 DIAGNOSIS — M46.1 SACROILIITIS (HCC): ICD-10-CM

## 2021-03-22 DIAGNOSIS — M47.816 LUMBAR SPONDYLOSIS: ICD-10-CM

## 2021-03-22 DIAGNOSIS — G89.29 CHRONIC RIGHT-SIDED LOW BACK PAIN WITHOUT SCIATICA: ICD-10-CM

## 2021-03-22 PROCEDURE — 27096 INJECT SACROILIAC JOINT: CPT | Performed by: PHYSICAL MEDICINE & REHABILITATION

## 2021-03-22 RX ORDER — METHYLPREDNISOLONE ACETATE 80 MG/ML
80 INJECTION, SUSPENSION INTRA-ARTICULAR; INTRALESIONAL; INTRAMUSCULAR; PARENTERAL; SOFT TISSUE ONCE
Status: COMPLETED | OUTPATIENT
Start: 2021-03-22 | End: 2021-03-22

## 2021-03-22 RX ORDER — LIDOCAINE HYDROCHLORIDE 10 MG/ML
5 INJECTION, SOLUTION EPIDURAL; INFILTRATION; INTRACAUDAL; PERINEURAL ONCE
Status: COMPLETED | OUTPATIENT
Start: 2021-03-22 | End: 2021-03-22

## 2021-03-22 RX ORDER — BUPIVACAINE HCL/PF 2.5 MG/ML
10 VIAL (ML) INJECTION ONCE
Status: COMPLETED | OUTPATIENT
Start: 2021-03-22 | End: 2021-03-22

## 2021-03-22 RX ADMIN — METHYLPREDNISOLONE ACETATE 80 MG: 80 INJECTION, SUSPENSION INTRA-ARTICULAR; INTRALESIONAL; INTRAMUSCULAR; PARENTERAL; SOFT TISSUE at 11:27

## 2021-03-22 RX ADMIN — Medication 2 ML: at 11:27

## 2021-03-22 RX ADMIN — LIDOCAINE HYDROCHLORIDE 2 ML: 10 INJECTION, SOLUTION EPIDURAL; INFILTRATION; INTRACAUDAL; PERINEURAL at 11:26

## 2021-03-22 RX ADMIN — IOHEXOL 1 ML: 300 INJECTION, SOLUTION INTRAVENOUS at 11:26

## 2021-03-22 NOTE — DISCHARGE INSTRUCTIONS

## 2021-03-22 NOTE — H&P
History of Present Illness:  The patient is a 67 y o  male who presents with complaints of Right-sided low back and buttock pain    Patient Active Problem List   Diagnosis    Chronic right-sided low back pain without sciatica    Type 2 diabetes mellitus with complication (HCC)    Essential hypertension    Peripheral neuropathy    Isolated proteinuria with morphologic lesion    Obstructive sleep apnea syndrome    Morbid obesity with BMI of 40 0-44 9, adult (HCC)    Mood disturbance    Gastroesophageal reflux disease    Palpitation    Hypokalemia    Mood disorder (HCC)    Urgency incontinence    Onychomycosis    BPH with urinary obstruction    Fecal incontinence    Urgency of urination    Hyperlipidemia    Chest pain    Acute idiopathic pericarditis    PND (post-nasal drip)    TIA (transient ischemic attack)    Adenomatous polyp of transverse colon    Chronic pain syndrome    Sacroiliitis ([address] )    Lumbar spondylosis       Past Medical History:   Diagnosis Date    Anxiety     Claustrophobia     Colon polyps     Depression     Diabetes mellitus ([address] )     Dyslipidemia     Esophageal polyp     GERD (gastroesophageal reflux disease)     Glaucoma     Headache     Hypertension     Low back pain     Neck pain     Numbness and tingling in left arm     resolved 2/20/17 / numbness and tingling left side last assessed 3/23/16    OAB (overactive bladder)     Paroxysmal supraventricular tachycardia (HCC)     Sleep apnea     wears cpap     Squamous cell skin cancer 10/19/2020    Right Nasal Dr Tam Herrera       Past Surgical History:   Procedure Laterality Date    ANOSCOPY      for polyp removal    BASAL CELL CARCINOMA EXCISION      right cheek    CATARACT EXTRACTION Bilateral     KNEE ARTHROSCOPY Bilateral     LUMBAR SPINE SURGERY      30years ago    MOHS SURGERY  11/11/2020    [facility] Right Nasal Dr Stevan Herrera Appl      tooth extraction    ORTHOPEDIC SURGERY      [place] COLONOSCOPY FLX DX W/COLLJ SPEC WHEN PFRMD N/A 2/13/2019    Procedure: COLONOSCOPY;  Surgeon: Joseline Carnes MD;  [facility]; Service: Gastroenterology    [place] ESOPHAGOGASTRODUODENOSCOPY TRANSORAL DIAGNOSTIC N/A 2/13/2019    Procedure: ESOPHAGOGASTRODUODENOSCOPY (EGD); Surgeon: Joseline Carnes MD;  [facility]; Service: Gastroenterology    RECTAL BIOPSY      REPLACEMENT TOTAL KNEE Right 04/2017    REPLACEMENT TOTAL KNEE Left 03/2013    TOTAL KNEE ARTHROPLASTY Left          Current Outpatient Medications:     amLODIPine (NORVASC) 10 mg tablet, Take 1 tablet (10 mg total) by mouth daily, Disp: 90 tablet, Rfl: 3    amoxicillin (AMOXIL) 500 mg capsule, TAKE ONE CAPSULE BY MOUTH EVERY 6 HOURS TILL GONE, Disp: , Rfl:     aspirin (ECOTRIN LOW STRENGTH) 81 mg EC tablet, Take 1 tablet (81 mg total) by mouth daily, Disp: 30 tablet, Rfl: 0    citalopram (CeleXA) 10 mg tablet, Take 1 tablet (10 mg total) by mouth daily, Disp: 90 tablet, Rfl: 3    colchicine (COLCRYS) 0 6 mg tablet, Take 1 tablet (0 6 mg total) by mouth 2 (two) times a day, Disp: 180 tablet, Rfl: 3    glimepiride (AMARYL) 1 mg tablet, Take 3 tablets (3 mg total) by mouth daily with breakfast, Disp: 270 tablet, Rfl: 3    losartan (COZAAR) 100 MG tablet, Take 1 tablet (100 mg total) by mouth daily, Disp: 90 tablet, Rfl: 3    metoprolol succinate (TOPROL-XL) 100 mg 24 hr tablet, Take 1 tablet (100 mg total) by mouth daily, Disp: 90 tablet, Rfl: 3    mupirocin (BACTROBAN) 2 % ointment, Apply topically once daily to the nose   (Patient not taking: Reported on 3/22/2021), Disp: 22 g, Rfl: 2    omeprazole (PriLOSEC) 20 mg delayed release capsule, Take 1 capsule (20 mg total) by mouth daily, Disp: 90 capsule, Rfl: 3    potassium chloride (Klor-Con) 10 mEq tablet, Take 1 tablet (10 mEq total) by mouth 2 (two) times a day, Disp: 60 tablet, Rfl: 0    rosuvastatin (CRESTOR) 20 MG tablet, Take 1 tablet (20 mg total) by mouth daily, Disp: 90 tablet, Rfl: 3    tamsulosin (FLOMAX) 0 4 mg, Take 1 capsule (0 4 mg total) by mouth daily with dinner, Disp: 90 capsule, Rfl: 2    timolol (TIMOPTIC) 0 5 % ophthalmic solution, , Disp: , Rfl:     trospium chloride (SANCTURA) 20 mg tablet, Take 1 tablet (20 mg total) by mouth 2 (two) times a day, Disp: 180 tablet, Rfl: 3    Current Facility-Administered Medications:     bupivacaine (PF) (MARCAINE) 0 25 % injection 10 mL, 10 mL, Intra-articular, Once, Dewain Yayo, DO    iohexol (OMNIPAQUE) 300 mg/mL injection 50 mL, 50 mL, Intra-articular, Once, Kenny Zee, DO    lidocaine (PF) (XYLOCAINE-MPF) 1 % injection 5 mL, 5 mL, Infiltration, Once, Kenny Zee, DO    methylPREDNISolone acetate (DEPO-MEDROL) injection 80 mg, 80 mg, Intra-articular, Once, Kenny Zee, DO    Allergies   Allergen Reactions    Codeine Hives     Pt does not remember if it was codeine or if the reaction was hives   Sulfa Antibiotics Other (See Comments)     Nausea         Physical Exam:   Vitals:    03/22/21 1110   BP: 134/72   Pulse: 58   Resp: 20   Temp: 98 1 °F (36 7 °C)   SpO2: 95%     General: Awake, Alert, Oriented x 3, Mood and affect appropriate  Respiratory: Respirations even and unlabored  Cardiovascular: Peripheral pulses intact; no edema  Musculoskeletal Exam:  Tenderness to palpation right-sided lumbar paraspinals and distal to PSIS    ASA Score: 2   Patient has been made explicitly aware that the injection will consist of steroid which may cause a temporary suppression in immune system activity  This, in turn, may increase the patient's risk of afshin corona virus  He was advised to continue with social distancing and self quarantine  Patient wishes to proceed with the injection    Patient/Chart Verification  Patient ID Verified: Verbal  ID Band Applied: No  Consents Confirmed: Procedural  H&P( within 30 days) Verified:  To be obtained in the Pre-Procedure area  Interval H&P(within 24 hr) Complete (required for Outpatients and Surgery Admit only): To be obtained in the Pre-Procedure area  Allergies Reviewed: Yes  Anticoag/NSAID held?: No(Pt takes 81 mg aspirin  Hold not required for this procedure  )  Currently on antibiotics?: No    Assessment:   1  Chronic pain syndrome    2  Chronic right-sided low back pain without sciatica    3  Sacroiliitis ([facility] [address] )    4   Lumbar spondylosis        Plan: right SI joint injection

## 2021-03-29 ENCOUNTER — TELEPHONE (OUTPATIENT)
Dept: PAIN MEDICINE | Facility: CLINIC | Age: 76
End: 2021-03-29

## 2021-04-05 DIAGNOSIS — E87.6 HYPOKALEMIA: ICD-10-CM

## 2021-04-05 RX ORDER — POTASSIUM CHLORIDE 750 MG/1
10 TABLET, FILM COATED, EXTENDED RELEASE ORAL 2 TIMES DAILY
Qty: 60 TABLET | Refills: 0 | Status: SHIPPED | OUTPATIENT
Start: 2021-04-05 | End: 2021-05-04 | Stop reason: SDUPTHER

## 2021-04-05 NOTE — TELEPHONE ENCOUNTER
Patient states he has 0 % of improvement & pain level 5/10  When active   Please be advise thank you      Patient call back # [phone]

## 2021-04-12 ENCOUNTER — APPOINTMENT (OUTPATIENT)
Dept: LAB | Age: 76
End: 2021-04-12
Payer: MEDICARE

## 2021-04-12 DIAGNOSIS — Z13.0 SCREENING FOR DEFICIENCY ANEMIA: ICD-10-CM

## 2021-04-12 DIAGNOSIS — E11.8 TYPE 2 DIABETES MELLITUS WITH COMPLICATION (HCC): ICD-10-CM

## 2021-04-12 DIAGNOSIS — E78.5 HYPERLIPIDEMIA, UNSPECIFIED HYPERLIPIDEMIA TYPE: ICD-10-CM

## 2021-04-12 DIAGNOSIS — E78.00 PURE HYPERCHOLESTEROLEMIA: ICD-10-CM

## 2021-04-12 DIAGNOSIS — E87.6 HYPOKALEMIA: ICD-10-CM

## 2021-04-12 DIAGNOSIS — I10 ESSENTIAL HYPERTENSION: ICD-10-CM

## 2021-04-12 DIAGNOSIS — R07.9 CHEST PAIN, UNSPECIFIED TYPE: ICD-10-CM

## 2021-04-12 DIAGNOSIS — Z12.5 SCREENING FOR PROSTATE CANCER: ICD-10-CM

## 2021-04-12 LAB
ALBUMIN SERPL BCP-MCNC: 3.3 G/DL (ref 3.5–5)
ALP SERPL-CCNC: 127 U/L (ref 46–116)
ALT SERPL W P-5'-P-CCNC: 42 U/L (ref 12–78)
ANION GAP SERPL CALCULATED.3IONS-SCNC: 6 MMOL/L (ref 4–13)
AST SERPL W P-5'-P-CCNC: 21 U/L (ref 5–45)
BASOPHILS # BLD AUTO: 0.05 THOUSANDS/ΜL (ref 0–0.1)
BASOPHILS NFR BLD AUTO: 1 % (ref 0–1)
BILIRUB SERPL-MCNC: 0.47 MG/DL (ref 0.2–1)
BUN SERPL-MCNC: 16 MG/DL (ref 5–25)
CALCIUM ALBUM COR SERPL-MCNC: 9.3 MG/DL (ref 8.3–10.1)
CALCIUM SERPL-MCNC: 8.7 MG/DL (ref 8.3–10.1)
CHLORIDE SERPL-SCNC: 108 MMOL/L (ref 100–108)
CHOLEST SERPL-MCNC: 134 MG/DL (ref 50–200)
CO2 SERPL-SCNC: 27 MMOL/L (ref 21–32)
CREAT SERPL-MCNC: 1.37 MG/DL (ref 0.6–1.3)
CREAT UR-MCNC: 133 MG/DL
EOSINOPHIL # BLD AUTO: 0.08 THOUSAND/ΜL (ref 0–0.61)
EOSINOPHIL NFR BLD AUTO: 1 % (ref 0–6)
ERYTHROCYTE [DISTWIDTH] IN BLOOD BY AUTOMATED COUNT: 13.8 % (ref 11.6–15.1)
EST. AVERAGE GLUCOSE BLD GHB EST-MCNC: 120 MG/DL
GFR SERPL CREATININE-BSD FRML MDRD: 50 ML/MIN/1.73SQ M
GLUCOSE P FAST SERPL-MCNC: 99 MG/DL (ref 65–99)
HBA1C MFR BLD: 5.8 %
HCT VFR BLD AUTO: 38.7 % (ref 36.5–49.3)
HDLC SERPL-MCNC: 40 MG/DL
HGB BLD-MCNC: 12.4 G/DL (ref 12–17)
IMM GRANULOCYTES # BLD AUTO: 0.03 THOUSAND/UL (ref 0–0.2)
IMM GRANULOCYTES NFR BLD AUTO: 1 % (ref 0–2)
LDLC SERPL CALC-MCNC: 77 MG/DL (ref 0–100)
LYMPHOCYTES # BLD AUTO: 1.38 THOUSANDS/ΜL (ref 0.6–4.47)
LYMPHOCYTES NFR BLD AUTO: 24 % (ref 14–44)
MCH RBC QN AUTO: 32.2 PG (ref 26.8–34.3)
MCHC RBC AUTO-ENTMCNC: 32 G/DL (ref 31.4–37.4)
MCV RBC AUTO: 101 FL (ref 82–98)
MICROALBUMIN UR-MCNC: 252 MG/L (ref 0–20)
MICROALBUMIN/CREAT 24H UR: 189 MG/G CREATININE (ref 0–30)
MONOCYTES # BLD AUTO: 0.65 THOUSAND/ΜL (ref 0.17–1.22)
MONOCYTES NFR BLD AUTO: 11 % (ref 4–12)
NEUTROPHILS # BLD AUTO: 3.69 THOUSANDS/ΜL (ref 1.85–7.62)
NEUTS SEG NFR BLD AUTO: 62 % (ref 43–75)
NONHDLC SERPL-MCNC: 94 MG/DL
NRBC BLD AUTO-RTO: 0 /100 WBCS
PLATELET # BLD AUTO: 202 THOUSANDS/UL (ref 149–390)
PMV BLD AUTO: 10.5 FL (ref 8.9–12.7)
POTASSIUM SERPL-SCNC: 4 MMOL/L (ref 3.5–5.3)
PROT SERPL-MCNC: 7.4 G/DL (ref 6.4–8.2)
PSA SERPL-MCNC: 0.3 NG/ML (ref 0–4)
RBC # BLD AUTO: 3.85 MILLION/UL (ref 3.88–5.62)
SODIUM SERPL-SCNC: 141 MMOL/L (ref 136–145)
TRIGL SERPL-MCNC: 87 MG/DL
WBC # BLD AUTO: 5.88 THOUSAND/UL (ref 4.31–10.16)

## 2021-04-12 PROCEDURE — 83036 HEMOGLOBIN GLYCOSYLATED A1C: CPT

## 2021-04-12 PROCEDURE — 85025 COMPLETE CBC W/AUTO DIFF WBC: CPT

## 2021-04-12 PROCEDURE — 82570 ASSAY OF URINE CREATININE: CPT | Performed by: INTERNAL MEDICINE

## 2021-04-12 PROCEDURE — 36415 COLL VENOUS BLD VENIPUNCTURE: CPT

## 2021-04-12 PROCEDURE — G0103 PSA SCREENING: HCPCS

## 2021-04-12 PROCEDURE — 82043 UR ALBUMIN QUANTITATIVE: CPT | Performed by: INTERNAL MEDICINE

## 2021-04-12 PROCEDURE — 80053 COMPREHEN METABOLIC PANEL: CPT

## 2021-04-12 PROCEDURE — 80061 LIPID PANEL: CPT

## 2021-04-13 ENCOUNTER — OFFICE VISIT (OUTPATIENT)
Dept: INTERNAL MEDICINE CLINIC | Facility: CLINIC | Age: 76
End: 2021-04-13
Payer: MEDICARE

## 2021-04-13 VITALS
TEMPERATURE: 98.2 F | DIASTOLIC BLOOD PRESSURE: 80 MMHG | WEIGHT: 289.8 LBS | HEART RATE: 61 BPM | HEIGHT: 67 IN | BODY MASS INDEX: 45.48 KG/M2 | SYSTOLIC BLOOD PRESSURE: 128 MMHG | OXYGEN SATURATION: 98 %

## 2021-04-13 DIAGNOSIS — Z00.00 HEALTHCARE MAINTENANCE: ICD-10-CM

## 2021-04-13 DIAGNOSIS — N13.8 BPH WITH URINARY OBSTRUCTION: ICD-10-CM

## 2021-04-13 DIAGNOSIS — E11.8 TYPE 2 DIABETES MELLITUS WITH COMPLICATION (HCC): Primary | ICD-10-CM

## 2021-04-13 DIAGNOSIS — I10 ESSENTIAL HYPERTENSION: ICD-10-CM

## 2021-04-13 DIAGNOSIS — I30.0 ACUTE IDIOPATHIC PERICARDITIS: ICD-10-CM

## 2021-04-13 DIAGNOSIS — N39.41 URGENCY INCONTINENCE: ICD-10-CM

## 2021-04-13 DIAGNOSIS — R00.2 PALPITATIONS: ICD-10-CM

## 2021-04-13 DIAGNOSIS — N40.1 BPH WITH URINARY OBSTRUCTION: ICD-10-CM

## 2021-04-13 DIAGNOSIS — G45.9 TIA (TRANSIENT ISCHEMIC ATTACK): ICD-10-CM

## 2021-04-13 PROBLEM — R39.15 URGENCY OF URINATION: Status: RESOLVED | Noted: 2020-11-06 | Resolved: 2021-04-13

## 2021-04-13 PROCEDURE — 99215 OFFICE O/P EST HI 40 MIN: CPT | Performed by: INTERNAL MEDICINE

## 2021-04-13 PROCEDURE — G0439 PPPS, SUBSEQ VISIT: HCPCS | Performed by: INTERNAL MEDICINE

## 2021-04-13 NOTE — PATIENT INSTRUCTIONS
Medicare Preventive Visit Patient Instructions  Thank you for completing your Welcome to Medicare Visit or Medicare Annual Wellness Visit today  Your next wellness visit will be due in one year (4/14/2022)  The screening/preventive services that you may require over the next 5-10 years are detailed below  Some tests may not apply to you based off risk factors and/or age  Screening tests ordered at today's visit but not completed yet may show as past due  Also, please note that scanned in results may not display below  Preventive Screenings:  Service Recommendations Previous Testing/Comments   Colorectal Cancer Screening  · Colonoscopy    · Fecal Occult Blood Test (FOBT)/Fecal Immunochemical Test (FIT)  · Fecal DNA/Cologuard Test  · Flexible Sigmoidoscopy Age: 54-65 years old   Colonoscopy: every 10 years (May be performed more frequently if at higher risk)  OR  FOBT/FIT: every 1 year  OR  Cologuard: every 3 years  OR  Sigmoidoscopy: every 5 years  Screening may be recommended earlier than age 48 if at higher risk for colorectal cancer  Also, an individualized decision between you and your healthcare provider will decide whether screening between the ages of 74-80 would be appropriate   Colonoscopy: 01/05/2017  FOBT/FIT: 03/12/2019  Cologuard: Not on file  Sigmoidoscopy: Not on file          Prostate Cancer Screening Individualized decision between patient and health care provider in men between ages of 53-78   Medicare will cover every 12 months beginning on the day after your 50th birthday PSA: 0 3 ng/mL     Screening Not Indicated     Hepatitis C Screening Once for adults born between 1945 and 1965  More frequently in patients at high risk for Hepatitis C Hep C Antibody: Not on file        Diabetes Screening 1-2 times per year if you're at risk for diabetes or have pre-diabetes Fasting glucose: 99 mg/dL   A1C: 5 8 %    Screening Not Indicated  History Diabetes   Cholesterol Screening Once every 5 years if you don't have a lipid disorder  May order more often based on risk factors  Lipid panel: 04/12/2021    Screening Not Indicated  History Lipid Disorder      Other Preventive Screenings Covered by Medicare:  1  Abdominal Aortic Aneurysm (AAA) Screening: covered once if your at risk  You're considered to be at risk if you have a family history of AAA or a male between the age of 73-68 who smoking at least 100 cigarettes in your lifetime  2  Lung Cancer Screening: covers low dose CT scan once per year if you meet all of the following conditions: (1) Age 50-69; (2) No signs or symptoms of lung cancer; (3) Current smoker or have quit smoking within the last 15 years; (4) You have a tobacco smoking history of at least 30 pack years (packs per day x number of years you smoked); (5) You get a written order from a healthcare provider  3  Glaucoma Screening: covered annually if you're considered high risk: (1) You have diabetes OR (2) Family history of glaucoma OR (3)  aged 48 and older OR (3)  American aged 72 and older  3  Osteoporosis Screening: covered every 2 years if you meet one of the following conditions: (1) Have a vertebral abnormality; (2) On glucocorticoid therapy for more than 3 months; (3) Have primary hyperparathyroidism; (4) On osteoporosis medications and need to assess response to drug therapy  5  HIV Screening: covered annually if you're between the age of 12-76  Also covered annually if you are younger than 13 and older than 72 with risk factors for HIV infection  For pregnant patients, it is covered up to 3 times per pregnancy      Immunizations:  Immunization Recommendations   Influenza Vaccine Annual influenza vaccination during flu season is recommended for all persons aged >= 6 months who do not have contraindications   Pneumococcal Vaccine (Prevnar and Pneumovax)  * Prevnar = PCV13  * Pneumovax = PPSV23 Adults 25-60 years old: 1-3 doses may be recommended based on certain risk factors  Adults 72 years old: Prevnar (PCV13) vaccine recommended followed by Pneumovax (PPSV23) vaccine  If already received PPSV23 since turning 65, then PCV13 recommended at least one year after PPSV23 dose  Hepatitis B Vaccine 3 dose series if at intermediate or high risk (ex: diabetes, end stage renal disease, liver disease)   Tetanus (Td) Vaccine - COST NOT COVERED BY MEDICARE PART B Following completion of primary series, a booster dose should be given every 10 years to maintain immunity against tetanus  Td may also be given as tetanus wound prophylaxis  Tdap Vaccine - COST NOT COVERED BY MEDICARE PART B Recommended at least once for all adults  For pregnant patients, recommended with each pregnancy  Shingles Vaccine (Shingrix) - COST NOT COVERED BY MEDICARE PART B  2 shot series recommended in those aged 48 and above     Health Maintenance Due:  There are no preventive care reminders to display for this patient  Immunizations Due:      Topic Date Due    DTaP,Tdap,and Td Vaccines (1 - Tdap) Never done     Advance Directives   What are advance directives? Advance directives are legal documents that state your wishes and plans for medical care  These plans are made ahead of time in case you lose your ability to make decisions for yourself  Advance directives can apply to any medical decision, such as the treatments you want, and if you want to donate organs  What are the types of advance directives? There are many types of advance directives, and each state has rules about how to use them  You may choose a combination of any of the following:  · Living will: This is a written record of the treatment you want  You can also choose which treatments you do not want, which to limit, and which to stop at a certain time  This includes surgery, medicine, IV fluid, and tube feedings  · Durable power of  for healthcare [place] [facility]):   This is a written record that states who you want to make healthcare choices for you when you are unable to make them for yourself  This person, called a proxy, is usually a family member or a friend  You may choose more than 1 proxy  · Do not resuscitate (DNR) order:  A DNR order is used in case your heart stops beating or you stop breathing  It is a request not to have certain forms of treatment, such as CPR  A DNR order may be included in other types of advance directives  · Medical directive: This covers the care that you want if you are in a coma, near death, or unable to make decisions for yourself  You can list the treatments you want for each condition  Treatment may include pain medicine, surgery, blood transfusions, dialysis, IV or tube feedings, and a ventilator (breathing machine)  · Values history: This document has questions about your views, beliefs, and how you feel and think about life  This information can help others choose the care that you would choose  Why are advance directives important? An advance directive helps you control your care  Although spoken wishes may be used, it is better to have your wishes written down  Spoken wishes can be misunderstood, or not followed  Treatments may be given even if you do not want them  An advance directive may make it easier for your family to make difficult choices about your care  Fall Prevention    Fall prevention  includes ways to make your home and other areas safer  It also includes ways you can move more carefully to prevent a fall  Health conditions that cause changes in your blood pressure, vision, or muscle strength and coordination may increase your risk for falls  Medicines may also increase your risk for falls if they make you dizzy, weak, or sleepy  Fall prevention tips:   · Stand or sit up slowly  · Use assistive devices as directed  · Wear shoes that fit well and have soles that   · Wear a personal alarm  · Stay active  · Manage your medical conditions      Home Safety Tips:  · Add items to prevent falls in the bathroom  · Keep paths clear  · Install bright lights in your home  · Keep items you use often on shelves within reach  · Paint or place reflective tape on the edges of your stairs  Weight Management   Why it is important to manage your weight:  Being overweight increases your risk of health conditions such as heart disease, high blood pressure, type 2 diabetes, and certain types of cancer  It can also increase your risk for osteoarthritis, sleep apnea, and other respiratory problems  Aim for a slow, steady weight loss  Even a small amount of weight loss can lower your risk of health problems  How to lose weight safely:  A safe and healthy way to lose weight is to eat fewer calories and get regular exercise  You can lose up about 1 pound a week by decreasing the number of calories you eat by 500 calories each day  Healthy meal plan for weight management:  A healthy meal plan includes a variety of foods, contains fewer calories, and helps you stay healthy  A healthy meal plan includes the following:  · Eat whole-grain foods more often  A healthy meal plan should contain fiber  Fiber is the part of grains, fruits, and vegetables that is not broken down by your body  Whole-grain foods are healthy and provide extra fiber in your diet  Some examples of whole-grain foods are whole-wheat breads and pastas, oatmeal, brown rice, and bulgur  · Eat a variety of vegetables every day  Include dark, leafy greens such as spinach, kale, rishi greens, and mustard greens  Eat yellow and orange vegetables such as carrots, sweet potatoes, and winter squash  · Eat a variety of fruits every day  Choose fresh or canned fruit (canned in its own juice or light syrup) instead of juice  Fruit juice has very little or no fiber  · Eat low-fat dairy foods  Drink fat-free (skim) milk or 1% milk  Eat fat-free yogurt and low-fat cottage cheese   Try low-fat cheeses such as mozzarella and other reduced-fat cheeses  · Choose meat and other protein foods that are low in fat  Choose beans or other legumes such as split peas or lentils  Choose fish, skinless poultry (chicken or turkey), or lean cuts of red meat (beef or pork)  Before you cook meat or poultry, cut off any visible fat  · Use less fat and oil  Try baking foods instead of frying them  Add less fat, such as margarine, sour cream, regular salad dressing and mayonnaise to foods  Eat fewer high-fat foods  Some examples of high-fat foods include french fries, doughnuts, ice cream, and cakes  · Eat fewer sweets  Limit foods and drinks that are high in sugar  This includes candy, cookies, regular soda, and sweetened drinks  Exercise:  Exercise at least 30 minutes per day on most days of the week  Some examples of exercise include walking, biking, dancing, and swimming  You can also fit in more physical activity by taking the stairs instead of the elevator or parking farther away from stores  Ask your healthcare provider about the best exercise plan for you  © Copyright [employer] 2018 Information is for End User's use only and may not be sold, redistributed or otherwise used for commercial purposes   All illustrations and images included in CareNotes® are the copyrighted property of A D A M , Inc  or [address] [place] [address] [employer]

## 2021-04-13 NOTE — ASSESSMENT & PLAN NOTE
Occasional palpitations symptoms seem to occur only in the morning hours before he takes his metoprolol succinate indicating most likely the previous day's dose has  recommend early dosing metoprolol in the morning to minimize breakthrough palpitations symptoms he has had no evidence of cardiac  arterydisease on recent catheterization

## 2021-04-13 NOTE — PROGRESS NOTES
Assessment/Plan:    Type 2 diabetes mellitus with complication ([facility] [address] )   This patient presents today for a evaluation of his type 2 diabetes he has some occasional episodes of mild hypoglycemia in the afternoon he is well aware of the symptoms and usually can control it by consuming a small amount of food  His hemoglobin A1c is remarkably good 5 8% and his most recent fasting blood sugar is 99  He is currently on 3 mg of Amaryl daily  To control the hypoglycemia I have made some recommendations to increase the protein content of his lunch time meals  Should he continue to experience of hypoglycemic mid afternoon events would consider decreasing his Amaryl to 2 and half for 2 mg period follow-up visit in 4 months is requested with accompanying comprehensive metabolic profile and hemoglobin A1c  Lab Results   Component Value Date    HGBA1C 5 8 (H) 04/12/2021       Essential hypertension    A careful assessment of the patient's hypertension today indicates initial reading of 132/90 a subsequent reading is 128/80  I have recommended the continuation of current therapy for hypertensive control including amlodipine 10 mg daily and losartan at 100 mg daily and metoprolol succinate at 100 mg daily follow-up assessment in 4 months has been requested  Acute idiopathic pericarditis    The patient reports no breakthrough symptoms of pericarditis since his last visit with us  He continues under the care of cardiology and his gradually tapering his medication under their guidance  The etiology of his pericarditis is idiopathic    TIA (transient ischemic attack)   The patient reports no new episodes of TIA nature of since his last visit with us he has an upcoming evaluation by Neurology services at [facility]  Recommend continuation of his low-dose aspirin at 81 mg daily and continuation of statin medication rosuvastatin at 20 mg daily      BPH with urinary obstruction    Patient  Continues on tamsulosin as recommended by his urologist he indicates improvement in his voiding symptoms  Recommend continued follow-up with Urology  Palpitations    Occasional palpitations symptoms seem to occur only in the morning hours before he takes his metoprolol succinate indicating most likely the previous day's dose has  recommend early dosing metoprolol in the morning to minimize breakthrough palpitations symptoms he has had no evidence of cardiac  arterydisease on recent catheterization    Urgency incontinence   Urgency incontinence reviewed with the patient today recommend the continuation of Sanctura 20 mg daily as recommended by his urologist most recent consultation with Urology new reviewed recommend continued follow-up with their services for voiding symptoms treatment       Diagnoses and all orders for this visit:    Type 2 diabetes mellitus with complication ([facility] [address] )  -     Comprehensive metabolic panel; Future  -     Hemoglobin A1C; Future    Essential hypertension    Acute idiopathic pericarditis    TIA (transient ischemic attack)    BPH with urinary obstruction    Urgency incontinence    Palpitations        Subjective:      Patient ID: Jose Maria King is a 67 y o  male  This 53-year-old gentleman with a history of pericarditis, hypertension, type 2 diabetes, obesity presents today for an annual complete physical examination and review of recent blood work  He has 2 complaints on today's visit the 1st is that of occasional morning palpitations which are brief and self-limiting period of they do not seem to occur later in the day and I suspect that they may occur because his daily dose of metoprolol succinate from the previous day as wearing off when he awakens  He does not have any exertional chest pain  He had a cardiac catheterization within the past several months which indicated no critical stenosis of his coronary arteries        Patient's 2nd concern is regarding symptoms that are suggestive of mild hypoglycemia in the afternoon  He indicates that on some days around 2:00 a m  in the afternoon he experiences a mild feeling of fatigue and drowsiness with a mild sweat associated it improves with the consumption of food  He continues under treatment for his previously diagnosed pericarditis and is gradually tapering his colchicine as directed by his cardiologist he has had no further chest pain symptoms associated with the pericarditis  The following portions of the patient's history were reviewed and updated as appropriate:   He  has a past medical history of Anxiety, Claustrophobia, Colon polyps, Depression, Diabetes mellitus ([address] ), Dyslipidemia, Esophageal polyp, GERD (gastroesophageal reflux disease), Glaucoma, Headache, Hypertension, Low back pain, Neck pain, Numbness and tingling in left arm, OAB (overactive bladder), Paroxysmal supraventricular tachycardia ([address] ), Sleep apnea, and Squamous cell skin cancer (10/19/2020)    He   Patient Active Problem List    Diagnosis Date Noted    Chronic pain syndrome 03/15/2021    [facility] 03/15/2021    Lumbar spondylosis 03/15/2021    Adenomatous polyp of transverse colon 02/23/2021    TIA (transient ischemic attack) 02/15/2021    PND (post-nasal drip) 01/24/2021    Acute idiopathic pericarditis 01/22/2021    Hyperlipidemia 01/10/2021    Chest pain 01/10/2021    Fecal incontinence 03/25/2020    BPH with urinary obstruction 01/23/2020    Onychomycosis 11/25/2019    Urgency incontinence 11/20/2019    Mood disorder [facility] ) 08/14/2019    Hypokalemia 07/24/2019    Palpitation 02/07/2019    Palpitations 09/17/2018    Obstructive sleep apnea syndrome 06/04/2018    Morbid obesity with BMI of 40 0-44 9, adult ([facility] ) 06/04/2018    Mood disturbance 06/04/2018    Gastroesophageal reflux disease 06/04/2018    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    Type 2 diabetes mellitus with complication ([facility] ) 57/62/1810    Essential hypertension 01/29/2018    Peripheral neuropathy 01/29/2018    Chronic right-sided low back pain without sciatica      He  has a past surgical history that includes Cataract extraction (Bilateral); Knee arthroscopy (Bilateral); Lumbar spine surgery; Excision basal cell carcinoma; Total knee arthroplasty (Left); Replacement total knee (Right, 04/2017); Replacement total knee (Left, 03/2013); Anoscopy; Rectal biopsy; Mouth surgery; pr esophagogastroduodenoscopy transoral diagnostic (N/A, 2/13/2019); pr colonoscopy flx dx w/collj spec when pfrmd (N/A, 2/13/2019); Mohs surgery (11/11/2020); and orthopedic surgery  His family history includes Alzheimer's disease in his mother; Diabetes in his mother and paternal grandmother; Esophageal cancer in his paternal grandfather; Victory Layman Hypertension in his father  He  reports that he quit smoking about 38 years ago  He has quit using smokeless tobacco  He reports current alcohol use of about 14 0 standard drinks of alcohol per week  He reports that he does not use drugs    Current Outpatient Medications   Medication Sig Dispense Refill    amLODIPine (NORVASC) 10 mg tablet Take 1 tablet (10 mg total) by mouth daily 90 tablet 3    amoxicillin (AMOXIL) 500 mg capsule TAKE ONE CAPSULE BY MOUTH EVERY 6 HOURS TILL GONE      aspirin (ECOTRIN LOW STRENGTH) 81 mg EC tablet Take 1 tablet (81 mg total) by mouth daily 30 tablet 0    citalopram (CeleXA) 10 mg tablet Take 1 tablet (10 mg total) by mouth daily 90 tablet 3    colchicine (COLCRYS) 0 6 mg tablet Take 1 tablet (0 6 mg total) by mouth 2 (two) times a day 180 tablet 3    glimepiride (AMARYL) 1 mg tablet Take 3 tablets (3 mg total) by mouth daily with breakfast 270 tablet 3    losartan (COZAAR) 100 MG tablet Take 1 tablet (100 mg total) by mouth daily 90 tablet 3    metoprolol succinate (TOPROL-XL) 100 mg 24 hr tablet Take 1 tablet (100 mg total) by mouth daily 90 tablet 3    omeprazole (PriLOSEC) 20 mg delayed release capsule Take 1 capsule (20 mg total) by mouth daily 90 capsule 3    potassium chloride (Klor-Con) 10 mEq tablet Take 1 tablet (10 mEq total) by mouth 2 (two) times a day 60 tablet 0    rosuvastatin (CRESTOR) 20 MG tablet Take 1 tablet (20 mg total) by mouth daily 90 tablet 3    tamsulosin (FLOMAX) 0 4 mg Take 1 capsule (0 4 mg total) by mouth daily with dinner 90 capsule 2    timolol (TIMOPTIC) 0 5 % ophthalmic solution       trospium chloride (SANCTURA) 20 mg tablet Take 1 tablet (20 mg total) by mouth 2 (two) times a day 180 tablet 3    mupirocin (BACTROBAN) 2 % ointment Apply topically once daily to the nose  (Patient not taking: Reported on 3/22/2021) 22 g 2     No current facility-administered medications for this visit       Review of Systems   Cardiovascular: Positive for palpitations  Endocrine:        Occasional hypoglycemic episodes in the afternoon around 2:00 p m  Genitourinary: Positive for urgency  Occasional incontinence   All other systems reviewed and are negative  Objective:      /80   Pulse 61   Temp 98 2 °F (36 8 °C) (Tympanic)   Ht 5' 7" (1 702 m)   Wt 131 kg (289 lb 12 8 oz)   SpO2 98%   BMI 45 39 kg/m²          Physical Exam  Constitutional:       General: He is not in acute distress  Appearance: He is well-developed  He is not ill-appearing  HENT:      Head: Normocephalic  Right Ear: Hearing, tympanic membrane, ear canal and external ear normal       Left Ear: Hearing, tympanic membrane, ear canal and external ear normal       Nose: Nose normal  No congestion  Mouth/Throat:      Mouth: Mucous membranes are moist       Pharynx: Oropharynx is clear  No oropharyngeal exudate  Eyes:      General: No scleral icterus  Right eye: No discharge  Left eye: No discharge  Conjunctiva/sclera: Conjunctivae normal       Pupils: Pupils are equal, round, and reactive to light     Neck:      Musculoskeletal: Normal range of motion and neck supple  No neck rigidity or muscular tenderness  Thyroid: No thyromegaly  Vascular: No carotid bruit  Cardiovascular:      Rate and Rhythm: Normal rate and regular rhythm  Heart sounds: Normal heart sounds, S1 normal and S2 normal  No murmur  Pulmonary:      Effort: Pulmonary effort is normal  No respiratory distress  Breath sounds: Normal breath sounds  No wheezing, rhonchi or rales  Abdominal:      General: Bowel sounds are normal  There is no distension  Palpations: Abdomen is soft  There is no mass  Tenderness: There is no abdominal tenderness  There is no right CVA tenderness, left CVA tenderness or guarding  Hernia: No hernia is present  There is no hernia in the left inguinal area or right inguinal area  Genitourinary:     Penis: Normal        Scrotum/Testes: Normal       Epididymis:      Right: Normal       Left: Normal       Comments:   Rectal examination deferred unable  To reach prostate gland on digital examination  Musculoskeletal: Normal range of motion  General: No swelling or tenderness  Lymphadenopathy:      Cervical: No cervical adenopathy  Lower Body: No right inguinal adenopathy  No left inguinal adenopathy  Skin:     General: Skin is warm and dry  Coloration: Skin is not jaundiced or pale  Neurological:      Mental Status: He is alert and oriented to person, place, and time  Mental status is at baseline  Cranial Nerves: No cranial nerve deficit  Sensory: No sensory deficit  Motor: No weakness  Coordination: Coordination normal       Gait: Gait normal       Deep Tendon Reflexes: Reflexes are normal and symmetric  Reflexes normal    Psychiatric:         Mood and Affect: Mood normal          Behavior: Behavior normal          Thought Content:  Thought content normal          Judgment: Judgment normal

## 2021-04-13 NOTE — ASSESSMENT & PLAN NOTE
A careful assessment of the patient's hypertension today indicates initial reading of 132/90 a subsequent reading is 128/80  I have recommended the continuation of current therapy for hypertensive control including amlodipine 10 mg daily and losartan at 100 mg daily and metoprolol succinate at 100 mg daily follow-up assessment in 4 months has been requested

## 2021-04-13 NOTE — ASSESSMENT & PLAN NOTE
The patient reports no breakthrough symptoms of pericarditis since his last visit with us  He continues under the care of cardiology and his gradually tapering his medication under their guidance    The etiology of his pericarditis is idiopathic

## 2021-04-13 NOTE — ASSESSMENT & PLAN NOTE
This patient presents today for a evaluation of his type 2 diabetes he has some occasional episodes of mild hypoglycemia in the afternoon he is well aware of the symptoms and usually can control it by consuming a small amount of food  His hemoglobin A1c is remarkably good 5 8% and his most recent fasting blood sugar is 99  He is currently on 3 mg of Amaryl daily  To control the hypoglycemia I have made some recommendations to increase the protein content of his lunch time meals  Should he continue to experience of hypoglycemic mid afternoon events would consider decreasing his Amaryl to 2 and half for 2 mg period follow-up visit in 4 months is requested with accompanying comprehensive metabolic profile and hemoglobin A1c    Lab Results   Component Value Date    HGBA1C 5 8 (H) 04/12/2021

## 2021-04-13 NOTE — ASSESSMENT & PLAN NOTE
Patient  Continues on tamsulosin as recommended by his urologist he indicates improvement in his voiding symptoms  Recommend continued follow-up with Urology

## 2021-04-13 NOTE — ASSESSMENT & PLAN NOTE
Urgency incontinence reviewed with the patient today recommend the continuation of Sanctura 20 mg daily as recommended by his urologist most recent consultation with Urology new reviewed recommend continued follow-up with their services for voiding symptoms treatment

## 2021-04-13 NOTE — PROGRESS NOTES
Assessment and Plan:     Problem List Items Addressed This Visit     None           Preventive health issues were discussed with patient, and age appropriate screening tests were ordered as noted in patient's After Visit Summary  Personalized health advice and appropriate referrals for health education or preventive services given if needed, as noted in patient's After Visit Summary       History of Present Illness:     Patient presents for Medicare Annual Wellness visit    Patient Care Team:  Elizabeth Chavez MD as PCP - General  Reynaldo Root, MD Alysia Phillips MD as Endoscopist     Problem List:     Patient Active Problem List   Diagnosis    Chronic right-sided low back pain without sciatica    Type 2 diabetes mellitus with complication ([facility]    Essential hypertension    Peripheral neuropathy    Isolated proteinuria with morphologic lesion    Obstructive sleep apnea syndrome    Morbid obesity with BMI of 40 0-44 9, adult [facility]    Mood disturbance    Gastroesophageal reflux disease    Palpitation    Hypokalemia    Mood disorder [facility]    Urgency incontinence    Onychomycosis    BPH with urinary obstruction    Fecal incontinence    Urgency of urination    Hyperlipidemia    Chest pain    Acute idiopathic pericarditis    PND (post-nasal drip)    TIA (transient ischemic attack)    Adenomatous polyp of transverse colon    Chronic pain syndrome    [facility]roiliitis ([facility]    Lumbar spondylosis      Past Medical and Surgical History:     Past Medical History:   Diagnosis Date    Anxiety     Claustrophobia     Colon polyps     Depression     Diabetes mellitus ([facility]     Dyslipidemia     Esophageal polyp     GERD (gastroesophageal reflux disease)     Glaucoma     Headache     Hypertension     Low back pain     Neck pain     Numbness and tingling in left arm     resolved 2/20/17 / numbness and tingling left side last assessed 3/23/16    OAB (overactive bladder)     Paroxysmal supraventricular tachycardia (HCC)     Sleep apnea     wears cpap     Squamous cell skin cancer 10/19/2020    Right Nasal Ala, Dr Turcios     Past Surgical History:   Procedure Laterality Date    ANOSCOPY      for polyp removal    BASAL CELL CARCINOMA EXCISION      right cheek    CATARACT EXTRACTION Bilateral     KNEE ARTHROSCOPY Bilateral     LUMBAR SPINE SURGERY      30years ago    MOHS SURGERY  11/11/2020    [facility] Right Nasal Ala, Dr Jd Barton      tooth extraction    ORTHOPEDIC SURGERY      MD COLONOSCOPY FLX DX W/COLLJ SPEC WHEN PFRMD N/A 2/13/2019    Procedure: COLONOSCOPY;  Surgeon: Jemima Diehl MD;  Location: [facility]; Service: Gastroenterology    MD ESOPHAGOGASTRODUODENOSCOPY TRANSORAL DIAGNOSTIC N/A 2/13/2019    Procedure: ESOPHAGOGASTRODUODENOSCOPY (EGD); Surgeon: Jemima Diehl MD;  Location: [facility];   Service: Gastroenterology    RECTAL BIOPSY      REPLACEMENT TOTAL KNEE Right 04/2017    REPLACEMENT TOTAL KNEE Left 03/2013    TOTAL KNEE ARTHROPLASTY Left       Family History:     Family History   Problem Relation Age of Onset    Alzheimer's disease Mother     Diabetes Mother    Ethyl Ifeanyi Hypertension Father     Diabetes Paternal Grandmother     Esophageal cancer Paternal Grandfather       Social History:     E-Cigarette/Vaping    E-Cigarette Use Never User      E-Cigarette/Vaping Substances    Nicotine No     THC No     CBD No     Flavoring No     Other No     Unknown No      Social History     Socioeconomic History    Marital status: /Civil Union     Spouse name: None    Number of children: None    Years of education: None    Highest education level: None   Occupational History    Occupation: retired   Social Needs    Financial resource strain: None    Food insecurity     Worry: None     Inability: None    Transportation needs     Medical: None     Non-medical: None   Tobacco Use    Smoking status: Former Smoker     Quit date: 1983     Years since quittin 2    Smokeless tobacco: Former User   Substance and Sexual Activity    Alcohol use:  Yes     Alcohol/week: 14 0 standard drinks     Types: 14 Shots of liquor per week     Frequency: 4 or more times a week     Drinks per session: 1 or 2     Binge frequency: Never     Comment: "couple of drinks each night"    Drug use: No    Sexual activity: Not Currently     Partners: Female   Lifestyle    Physical activity     Days per week: None     Minutes per session: None    Stress: None   Relationships    Social connections     Talks on phone: None     Gets together: None     Attends [employer] service: None     Active member of club or organization: None     Attends meetings of clubs or organizations: None     Relationship status: None    Intimate partner violence     Fear of current or ex partner: None     Emotionally abused: None     Physically abused: None     Forced sexual activity: None   Other Topics Concern    None   Social History Narrative    None      Medications and Allergies:     Current Outpatient Medications   Medication Sig Dispense Refill    amLODIPine (NORVASC) 10 mg tablet Take 1 tablet (10 mg total) by mouth daily 90 tablet 3    amoxicillin (AMOXIL) 500 mg capsule TAKE ONE CAPSULE BY MOUTH EVERY 6 HOURS TILL GONE      aspirin (ECOTRIN LOW STRENGTH) 81 mg EC tablet Take 1 tablet (81 mg total) by mouth daily 30 tablet 0    citalopram (CeleXA) 10 mg tablet Take 1 tablet (10 mg total) by mouth daily 90 tablet 3    colchicine (COLCRYS) 0 6 mg tablet Take 1 tablet (0 6 mg total) by mouth 2 (two) times a day 180 tablet 3    glimepiride (AMARYL) 1 mg tablet Take 3 tablets (3 mg total) by mouth daily with breakfast 270 tablet 3    losartan (COZAAR) 100 MG tablet Take 1 tablet (100 mg total) by mouth daily 90 tablet 3    metoprolol succinate (TOPROL-XL) 100 mg 24 hr tablet Take 1 tablet (100 mg total) by mouth daily 90 tablet 3    mupirocin (BACTROBAN) 2 % ointment Apply topically once daily to the nose  (Patient not taking: Reported on 3/22/2021) 22 g 2    omeprazole (PriLOSEC) 20 mg delayed release capsule Take 1 capsule (20 mg total) by mouth daily 90 capsule 3    potassium chloride (Klor-Con) 10 mEq tablet Take 1 tablet (10 mEq total) by mouth 2 (two) times a day 60 tablet 0    rosuvastatin (CRESTOR) 20 MG tablet Take 1 tablet (20 mg total) by mouth daily 90 tablet 3    tamsulosin (FLOMAX) 0 4 mg Take 1 capsule (0 4 mg total) by mouth daily with dinner 90 capsule 2    timolol (TIMOPTIC) 0 5 % ophthalmic solution       trospium chloride (SANCTURA) 20 mg tablet Take 1 tablet (20 mg total) by mouth 2 (two) times a day 180 tablet 3     No current facility-administered medications for this visit  Allergies   Allergen Reactions    Codeine Hives     Pt does not remember if it was codeine or if the reaction was hives   Sulfa Antibiotics Other (See Comments)     Nausea        Immunizations:     Immunization History   Administered Date(s) Administered    INFLUENZA 10/28/2016, 10/17/2017, 10/22/2018    Influenza Split High Dose Preservative Free IM 10/28/2016, 10/17/2017, 10/22/2018    Influenza, high dose seasonal 0 7 mL 09/30/2019, 10/20/2020    Pneumococcal Conjugate 13-Valent 11/02/2017    Pneumococcal Polysaccharide PPV23 02/07/2019    SARS-CoV-2 / COVID-19 mRNA IM (Pfizer-BioNTech) 01/20/2021, 02/09/2021      Health Maintenance: There are no preventive care reminders to display for this patient  Topic Date Due    DTaP,Tdap,and Td Vaccines (1 - Tdap) Never done      Medicare Health Risk Assessment:     Temp 98 2 °F (36 8 °C) (Tympanic)   Ht 5' 7" (1 702 m)   BMI 45 11 kg/m²      Hector is here for his Subsequent Wellness visit  Health Risk Assessment:   Patient rates overall health as fair  Patient feels that their physical health rating is slightly worse  Patient is satisfied with their life   Eyesight was rated as slightly worse  Hearing was rated as slightly worse  Patient feels that their emotional and mental health rating is same  Patients states they are sometimes angry  Patient states they are often unusually tired/fatigued  Pain experienced in the last 7 days has been some  Patient's pain rating has been 7/10  Patient states that he has experienced weight loss or gain in last 6 months  Depression Screening:   PHQ-2 Score: 1      Fall Risk Screening: In the past year, patient has experienced: history of falling in past year    Number of falls: 2 or more  Injured during fall?: No    Feels unsteady when standing or walking?: No    Worried about falling?: No      Home Safety:  Patient has trouble with stairs inside or outside of their home  Patient has working smoke alarms and has no working carbon monoxide detector  Home safety hazards include: loose rugs on the floor and poor household lighting  Nutrition:   Current diet is Unhealthy and Frequent junk food  Medications:   Patient is not currently taking any over-the-counter supplements  Patient is able to manage medications  Activities of Daily Living (ADLs)/Instrumental Activities of Daily Living (IADLs):   Walk and transfer into and out of bed and chair?: Yes  Dress and groom yourself?: Yes    Bathe or shower yourself?: Yes    Feed yourself?  Yes  Do your laundry/housekeeping?: Yes  Manage your money, pay your bills and track your expenses?: Yes  Make your own meals?: Yes    Do your own shopping?: Yes    Previous Hospitalizations:   Any hospitalizations or ED visits within the last 12 months?: Yes    How many hospitalizations have you had in the last year?: 1-2    PREVENTIVE SCREENINGS      Cardiovascular Screening:    General: Screening Not Indicated and History Lipid Disorder      Diabetes Screening:     General: Screening Not Indicated and History Diabetes      Prostate Cancer Screening:    General: Screening Not Indicated      Osteoporosis Screening:    General: Screening Not Indicated      Abdominal Aortic Aneurysm (AAA) Screening:    Risk factors include: tobacco use        Lung Cancer Screening:     General: Screening Not Indicated      Hepatitis C Screening:    General: Screening Not Indicated    Screening, Brief Intervention, and Referral to Treatment (SBIRT)    Screening  Typical number of drinks in a day: 2  Typical number of drinks in a week: 14  Interpretation: Low risk drinking behavior      Single Item Drug Screening:  How often have you used an illegal drug (including marijuana) or a prescription medication for non-medical reasons in the past year? never    Single Item Drug Screen Score: 0  Interpretation: Negative screen for possible drug use disorder      Nahun Lam MD

## 2021-04-13 NOTE — ASSESSMENT & PLAN NOTE
The patient reports no new episodes of TIA nature of since his last visit with us he has an upcoming evaluation by Neurology services at [facility]  Recommend continuation of his low-dose aspirin at 81 mg daily and continuation of statin medication rosuvastatin at 20 mg daily

## 2021-04-26 ENCOUNTER — APPOINTMENT (EMERGENCY)
Dept: RADIOLOGY | Facility: HOSPITAL | Age: 76
End: 2021-04-26
Payer: MEDICARE

## 2021-04-26 ENCOUNTER — HOSPITAL ENCOUNTER (EMERGENCY)
Facility: HOSPITAL | Age: 76
Discharge: HOME | End: 2021-04-26
Attending: EMERGENCY MEDICINE
Payer: MEDICARE

## 2021-04-26 VITALS
OXYGEN SATURATION: 96 % | DIASTOLIC BLOOD PRESSURE: 79 MMHG | SYSTOLIC BLOOD PRESSURE: 151 MMHG | RESPIRATION RATE: 19 BRPM | TEMPERATURE: 98.9 F | HEART RATE: 62 BPM

## 2021-04-26 DIAGNOSIS — Z86.79 HISTORY OF PERICARDITIS: ICD-10-CM

## 2021-04-26 DIAGNOSIS — M54.9 UPPER BACK PAIN: Primary | ICD-10-CM

## 2021-04-26 LAB
ALBUMIN SERPL BCP-MCNC: 3 G/DL (ref 3.5–5)
ALP SERPL-CCNC: 134 U/L (ref 46–116)
ALT SERPL W P-5'-P-CCNC: 33 U/L (ref 12–78)
ANION GAP SERPL CALCULATED.3IONS-SCNC: 2 MMOL/L (ref 4–13)
AST SERPL W P-5'-P-CCNC: 21 U/L (ref 5–45)
ATRIAL RATE: 62 BPM
BASOPHILS # BLD AUTO: 0.04 THOUSANDS/ΜL (ref 0–0.1)
BASOPHILS NFR BLD AUTO: 1 % (ref 0–1)
BILIRUB SERPL-MCNC: 0.45 MG/DL (ref 0.2–1)
BUN SERPL-MCNC: 12 MG/DL (ref 5–25)
CALCIUM ALBUM COR SERPL-MCNC: 9.4 MG/DL (ref 8.3–10.1)
CALCIUM SERPL-MCNC: 8.6 MG/DL (ref 8.3–10.1)
CHLORIDE SERPL-SCNC: 110 MMOL/L (ref 100–108)
CO2 SERPL-SCNC: 27 MMOL/L (ref 21–32)
CREAT SERPL-MCNC: 1.15 MG/DL (ref 0.6–1.3)
EOSINOPHIL # BLD AUTO: 0.1 THOUSAND/ΜL (ref 0–0.61)
EOSINOPHIL NFR BLD AUTO: 2 % (ref 0–6)
ERYTHROCYTE [DISTWIDTH] IN BLOOD BY AUTOMATED COUNT: 13.9 % (ref 11.6–15.1)
GFR SERPL CREATININE-BSD FRML MDRD: 61 ML/MIN/1.73SQ M
GLUCOSE SERPL-MCNC: 83 MG/DL (ref 65–140)
GLUCOSE SERPL-MCNC: 90 MG/DL (ref 65–140)
HCT VFR BLD AUTO: 38 % (ref 36.5–49.3)
HGB BLD-MCNC: 12.5 G/DL (ref 12–17)
IMM GRANULOCYTES # BLD AUTO: 0.04 THOUSAND/UL (ref 0–0.2)
IMM GRANULOCYTES NFR BLD AUTO: 1 % (ref 0–2)
LYMPHOCYTES # BLD AUTO: 1.33 THOUSANDS/ΜL (ref 0.6–4.47)
LYMPHOCYTES NFR BLD AUTO: 21 % (ref 14–44)
MCH RBC QN AUTO: 32.9 PG (ref 26.8–34.3)
MCHC RBC AUTO-ENTMCNC: 32.9 G/DL (ref 31.4–37.4)
MCV RBC AUTO: 100 FL (ref 82–98)
MONOCYTES # BLD AUTO: 0.82 THOUSAND/ΜL (ref 0.17–1.22)
MONOCYTES NFR BLD AUTO: 13 % (ref 4–12)
NEUTROPHILS # BLD AUTO: 4.08 THOUSANDS/ΜL (ref 1.85–7.62)
NEUTS SEG NFR BLD AUTO: 62 % (ref 43–75)
NRBC BLD AUTO-RTO: 0 /100 WBCS
PLATELET # BLD AUTO: 244 THOUSANDS/UL (ref 149–390)
PMV BLD AUTO: 10 FL (ref 8.9–12.7)
POTASSIUM SERPL-SCNC: 4 MMOL/L (ref 3.5–5.3)
PROT SERPL-MCNC: 7.7 G/DL (ref 6.4–8.2)
QRS AXIS: 47 DEGREES
QRSD INTERVAL: 82 MS
QT INTERVAL: 404 MS
QTC INTERVAL: 410 MS
RBC # BLD AUTO: 3.8 MILLION/UL (ref 3.88–5.62)
SODIUM SERPL-SCNC: 139 MMOL/L (ref 136–145)
T WAVE AXIS: 30 DEGREES
TROPONIN I SERPL-MCNC: <0.02 NG/ML
VENTRICULAR RATE: 62 BPM
WBC # BLD AUTO: 6.41 THOUSAND/UL (ref 4.31–10.16)

## 2021-04-26 PROCEDURE — 99283 EMERGENCY DEPT VISIT LOW MDM: CPT

## 2021-04-26 PROCEDURE — 84484 ASSAY OF TROPONIN QUANT: CPT | Performed by: EMERGENCY MEDICINE

## 2021-04-26 PROCEDURE — 85025 COMPLETE CBC W/AUTO DIFF WBC: CPT | Performed by: EMERGENCY MEDICINE

## 2021-04-26 PROCEDURE — 93308 TTE F-UP OR LMTD: CPT | Performed by: EMERGENCY MEDICINE

## 2021-04-26 PROCEDURE — 80053 COMPREHEN METABOLIC PANEL: CPT | Performed by: EMERGENCY MEDICINE

## 2021-04-26 PROCEDURE — 93010 ELECTROCARDIOGRAM REPORT: CPT | Performed by: INTERNAL MEDICINE

## 2021-04-26 PROCEDURE — 93005 ELECTROCARDIOGRAM TRACING: CPT

## 2021-04-26 PROCEDURE — 36415 COLL VENOUS BLD VENIPUNCTURE: CPT | Performed by: EMERGENCY MEDICINE

## 2021-04-26 PROCEDURE — 99285 EMERGENCY DEPT VISIT HI MDM: CPT | Performed by: EMERGENCY MEDICINE

## 2021-04-26 PROCEDURE — 82948 REAGENT STRIP/BLOOD GLUCOSE: CPT

## 2021-04-26 PROCEDURE — 71046 X-RAY EXAM CHEST 2 VIEWS: CPT

## 2021-04-26 RX ORDER — LIDOCAINE 50 MG/G
2 PATCH TOPICAL ONCE
Status: DISCONTINUED | OUTPATIENT
Start: 2021-04-26 | End: 2021-04-26 | Stop reason: HOSPADM

## 2021-04-26 RX ORDER — LIDOCAINE 50 MG/G
1 PATCH TOPICAL DAILY
Qty: 30 PATCH | Refills: 0 | Status: SHIPPED | OUTPATIENT
Start: 2021-04-26 | End: 2022-01-10

## 2021-04-26 RX ORDER — LIDOCAINE 50 MG/G
1 PATCH TOPICAL DAILY
Qty: 30 PATCH | Refills: 0 | Status: SHIPPED | OUTPATIENT
Start: 2021-04-26 | End: 2021-04-26 | Stop reason: SDUPTHER

## 2021-04-26 RX ORDER — ACETAMINOPHEN 325 MG/1
975 TABLET ORAL ONCE
Status: COMPLETED | OUTPATIENT
Start: 2021-04-26 | End: 2021-04-26

## 2021-04-26 RX ADMIN — LIDOCAINE 2 PATCH: 50 PATCH TOPICAL at 16:58

## 2021-04-26 RX ADMIN — ACETAMINOPHEN 975 MG: 325 TABLET ORAL at 16:58

## 2021-04-26 NOTE — ED ATTENDING ATTESTATION
4/26/2021  Daphne Melo DO, saw and evaluated the patient  I have discussed the patient with the resident/non-physician practitioner and agree with the resident's/non-physician practitioner's findings, Plan of Care, and MDM as documented in the resident's/non-physician practitioner's note, except where noted  All available labs and Radiology studies were reviewed  I was present for key portions of any procedure(s) performed by the resident/non-physician practitioner and I was immediately available to provide assistance  At this point I agree with the current assessment done in the Emergency Department  I have conducted an independent evaluation of this patient a history and physical is as follows:    68 yo male presents for evaluation of upper left sided back pain worse with deep breath  Denies anterior chest pain, leg pain or swelling  No other c/o at this time  Moderate severity, non radiating  No other a/e factors  No other c/o at this time  Hx pericarditis admitted January  Tapering down on medications  MS 5/5 all ext  Normal sensation throughout     Imp: upper back pain likely [facility] plan: POCUS echo, ECG, tx sx, reassess        ED Course         Critical Care Time  Procedures

## 2021-04-26 NOTE — ED PROCEDURE NOTE
Procedure  POC Cardiac US    Date/Time: 4/26/2021 4:19 PM  Performed by: Patti Polo MD  Authorized by: Patti Polo MD     Patient location:  ED  Other Assisting Provider: Yes (comment) (Pester)    Procedure details:     Exam Type:  Diagnostic and educational    Assessment / Evaluation for: pericardial effusion      Exam Type: initial exam      Image quality: diagnostic      Image availability:  Images available in PACS  Patient Details:     Cardiac Rhythm:  Regular  Cardiac findings:     Echo technique: limited 2D      Views obtained: parasternal long axis and subcostal      Pericardial effusion: absent                       Patti Polo MD  04/26/21 1622

## 2021-04-26 NOTE — ED PROVIDER NOTES
History  Chief Complaint   Patient presents with    Back Pain     66 yo M with h/o HTN, DM and recent pericarditis in January for which he is still on medications for including colchicine presenting for evaluation of left upper back/trapezius pain  Patient does report that when he had pericarditis he mostly had anterior chest pain but did have an aspect of back pain as well which is what he is concerned about today  The pain isn't exertional in nature  Feels worse when he lays on his back  No associated shortness of breath  No lightheadedness/dizziness  Has been coming down on his meds for his pericarditis including his colchicine  Denies any other recent illness, no concerns about covid-19 today  ROS otherwise neg as below  History provided by:  Patient   used: No        Prior to Admission Medications   Prescriptions Last Dose Informant Patient Reported? Taking? amLODIPine (NORVASC) 10 mg tablet  Self No No   Sig: Take 1 tablet (10 mg total) by mouth daily   amoxicillin (AMOXIL) 500 mg capsule  Self Yes No   Sig: TAKE ONE CAPSULE BY MOUTH EVERY 6 HOURS TILL GONE   aspirin (ECOTRIN LOW STRENGTH) 81 mg EC tablet  Self No No   Sig: Take 1 tablet (81 mg total) by mouth daily   citalopram (CeleXA) 10 mg tablet  Self No No   Sig: Take 1 tablet (10 mg total) by mouth daily   colchicine (COLCRYS) 0 6 mg tablet   No No   Sig: Take 1 tablet (0 6 mg total) by mouth 2 (two) times a day   glimepiride (AMARYL) 1 mg tablet  Self No No   Sig: Take 3 tablets (3 mg total) by mouth daily with breakfast   losartan (COZAAR) 100 MG tablet   No No   Sig: Take 1 tablet (100 mg total) by mouth daily   metoprolol succinate (TOPROL-XL) 100 mg 24 hr tablet   No No   Sig: Take 1 tablet (100 mg total) by mouth daily   mupirocin (BACTROBAN) 2 % ointment  Self No No   Sig: Apply topically once daily to the nose     Patient not taking: Reported on 3/22/2021   omeprazole (PriLOSEC) 20 mg delayed release capsule  Self No No   Sig: Take 1 capsule (20 mg total) by mouth daily   potassium chloride (Klor-Con) 10 mEq tablet   No No   Sig: Take 1 tablet (10 mEq total) by mouth 2 (two) times a day   rosuvastatin (CRESTOR) 20 MG tablet   No No   Sig: Take 1 tablet (20 mg total) by mouth daily   tamsulosin (FLOMAX) 0 4 mg   No No   Sig: Take 1 capsule (0 4 mg total) by mouth daily with dinner   timolol (TIMOPTIC) 0 5 % ophthalmic solution  Self Yes No   trospium chloride (SANCTURA) 20 mg tablet  Self No No   Sig: Take 1 tablet (20 mg total) by mouth 2 (two) times a day      Facility-Administered Medications: None       Past Medical History:   Diagnosis Date    Anxiety     Claustrophobia     Colon polyps     Depression     Diabetes mellitus (HCC)     Dyslipidemia     Esophageal polyp     GERD (gastroesophageal reflux disease)     Glaucoma     Headache     Hypertension     Low back pain     Neck pain     Numbness and tingling in left arm     resolved 2/20/17 / numbness and tingling left side last assessed 3/23/16    OAB (overactive bladder)     Paroxysmal supraventricular tachycardia (HCC)     Sleep apnea     wears cpap     Squamous cell skin cancer 10/19/2020    Right Nasal Ala, Dr Turcios       Past Surgical History:   Procedure Laterality Date    ANOSCOPY      for polyp removal    BASAL CELL CARCINOMA EXCISION      right cheek    CATARACT EXTRACTION Bilateral     KNEE ARTHROSCOPY Bilateral     LUMBAR SPINE SURGERY      30years ago    MOHS SURGERY  11/11/2020    [facility] Right Nasal Ala, Dr Wang Ovens      tooth extraction    ORTHOPEDIC SURGERY      IA COLONOSCOPY FLX DX W/COLLJ SPEC WHEN PFRMD N/A 2/13/2019    Procedure: COLONOSCOPY;  Surgeon: Rell Treviño MD;  Location: [facility]; Service: Gastroenterology    IA ESOPHAGOGASTRODUODENOSCOPY TRANSORAL DIAGNOSTIC N/A 2/13/2019    Procedure: ESOPHAGOGASTRODUODENOSCOPY (EGD); Surgeon: Rell Treviño MD;  Location: [facility];   Service: Gastroenterology   Carly Barrios RECTAL BIOPSY      REPLACEMENT TOTAL KNEE Right 2017    REPLACEMENT TOTAL KNEE Left 2013    TOTAL KNEE ARTHROPLASTY Left        Family History   Problem Relation Age of Onset    Alzheimer's disease Mother     Diabetes Mother    Penny Treviño Hypertension Father     Diabetes Paternal Grandmother     Esophageal cancer Paternal Grandfather      I have reviewed and agree with the history as documented  E-Cigarette/Vaping    E-Cigarette Use Never User      E-Cigarette/Vaping Substances    Nicotine No     THC No     CBD No     Flavoring No     Other No     Unknown No      Social History     Tobacco Use    Smoking status: Former Smoker     Quit date: 1983     Years since quittin 2    Smokeless tobacco: Former User   Substance Use Topics    Alcohol use: Yes     Alcohol/week: 14 0 standard drinks     Types: 14 Shots of liquor per week     Frequency: 4 or more times a week     Drinks per session: 1 or 2     Binge frequency: Never     Comment: "couple of drinks each night"    Drug use: No        Review of Systems   Constitutional: Negative for chills, fatigue and fever  HENT: Negative for congestion and rhinorrhea  Eyes: Negative for visual disturbance  Respiratory: Negative for cough, chest tightness, shortness of breath and wheezing  Cardiovascular: Negative for chest pain and palpitations  Gastrointestinal: Negative for abdominal pain, diarrhea, nausea and vomiting  Genitourinary: Negative for dysuria and hematuria  Musculoskeletal: Positive for back pain  Negative for myalgias  Skin: Negative for pallor and rash  Neurological: Negative for dizziness, syncope, light-headedness and headaches  Psychiatric/Behavioral: Negative for confusion  The patient is not nervous/anxious          Physical Exam  ED Triage Vitals [21 1403]   Temperature Pulse Respirations Blood Pressure SpO2   98 9 °F (37 2 °C) 62 19 151/79 96 %      Temp Source Heart Rate Source Patient Position - Orthostatic VS BP Location FiO2 (%)   Oral Monitor Sitting Left arm --      Pain Score       6             Orthostatic Vital Signs  Vitals:    04/26/21 1403   BP: 151/79   Pulse: 62   Patient Position - Orthostatic VS: Sitting       Physical Exam  Vitals signs and nursing note reviewed  Constitutional:       General: He is not in acute distress  Appearance: Normal appearance  He is well-developed  He is not diaphoretic  HENT:      Head: Normocephalic and atraumatic  Right Ear: External ear normal       Left Ear: External ear normal       Nose: Nose normal       Mouth/Throat:      Pharynx: No oropharyngeal exudate  Eyes:      General:         Right eye: No discharge  Left eye: No discharge  Conjunctiva/sclera: Conjunctivae normal    Neck:      Musculoskeletal: Normal range of motion  Cardiovascular:      Rate and Rhythm: Normal rate and regular rhythm  Heart sounds: No murmur  No friction rub  Pulmonary:      Effort: Pulmonary effort is normal  No respiratory distress  Breath sounds: Normal breath sounds  No wheezing  Abdominal:      General: Abdomen is flat  Bowel sounds are normal  There is no distension  Palpations: Abdomen is soft  Tenderness: There is no abdominal tenderness  Musculoskeletal: Normal range of motion  General: No deformity  Skin:     General: Skin is warm and dry  Coloration: Skin is not pale  Findings: No rash  Neurological:      General: No focal deficit present  Mental Status: He is alert     Psychiatric:         Mood and Affect: Mood normal          ED Medications  Medications   acetaminophen (TYLENOL) tablet 975 mg (975 mg Oral Given 4/26/21 1658)       Diagnostic Studies  Results Reviewed     Procedure Component Value Units Date/Time    Fingerstick Glucose (POCT) [[id number]]  (Normal) Collected: 04/26/21 1647    Lab Status: Final result Updated: 04/26/21 1648     POC Glucose 90 mg/dl     Troponin I [[id number]] (Normal) Collected: 04/26/21 1449    Lab Status: Final result Specimen: Blood from Arm, Right Updated: 04/26/21 1531     Troponin I <0 02 ng/mL     Comprehensive metabolic panel [[id number]]  (Abnormal) Collected: 04/26/21 1449    Lab Status: Final result Specimen: Blood from Arm, Right Updated: 04/26/21 1527     Sodium 139 mmol/L      Potassium 4 0 mmol/L      Chloride 110 mmol/L      CO2 27 mmol/L      ANION GAP 2 mmol/L      BUN 12 mg/dL      Creatinine 1 15 mg/dL      Glucose 83 mg/dL      Calcium 8 6 mg/dL      Corrected Calcium 9 4 mg/dL      AST 21 U/L      ALT 33 U/L      Alkaline Phosphatase 134 U/L      Total Protein 7 7 g/dL      Albumin 3 0 g/dL      Total Bilirubin 0 45 mg/dL      eGFR 61 ml/min/1 73sq m     Narrative:      [place] guidelines for Chronic Kidney Disease (CKD):     Stage 1 with normal or high GFR (GFR > 90 mL/min/1 73 square meters)    Stage 2 Mild CKD (GFR = 60-89 mL/min/1 73 square meters)    Stage 3A Moderate CKD (GFR = 45-59 mL/min/1 73 square meters)    Stage 3B Moderate CKD (GFR = 30-44 mL/min/1 73 square meters)    Stage 4 Severe CKD (GFR = 15-29 mL/min/1 73 square meters)    Stage 5 End Stage CKD (GFR <15 mL/min/1 73 square meters)  Note: GFR calculation is accurate only with a steady state creatinine    CBC and differential [[id number]]  (Abnormal) Collected: 04/26/21 1449    Lab Status: Final result Specimen: Blood from Arm, Right Updated: 04/26/21 1505     WBC 6 41 Thousand/uL      RBC 3 80 Million/uL      Hemoglobin 12 5 g/dL      Hematocrit 38 0 %       fL      MCH 32 9 pg      MCHC 32 9 g/dL      RDW 13 9 %      MPV 10 0 fL      Platelets 794 Thousands/uL      nRBC 0 /100 WBCs      Neutrophils Relative 62 %      Immat GRANS % 1 %      Lymphocytes Relative 21 %      Monocytes Relative 13 %      Eosinophils Relative 2 %      Basophils Relative 1 %      Neutrophils Absolute 4 08 Thousands/µL      Immature Grans Absolute 0 04 Thousand/uL Lymphocytes Absolute 1 33 Thousands/µL      Monocytes Absolute 0 82 Thousand/µL      Eosinophils Absolute 0 10 Thousand/µL      Basophils Absolute 0 04 Thousands/µL                  XR chest 2 views   ED Interpretation by Monika Mark MD (04/26 1610)   No acute CP process, poor inspiration       Final Result by Walker Looney MD (04/26 1622)      Large heart shadow which may indicate the presence of a pericardial effusion  Small amount of left pleural effusion                  Workstation performed: [id number]               Procedures  ECG 12 Lead Documentation Only    Date/Time: 4/26/2021 5:37 PM  Performed by: Monika Mark MD  Authorized by: Monika Mark MD     Indications / Diagnosis:  H/o pericarditis   Patient location:  ED  Previous ECG:     Previous ECG:  Compared to current    Similarity:  No change  Interpretation:     Interpretation: normal    Rate:     ECG rate assessment: normal    Rhythm:     Rhythm: sinus rhythm    Ectopy:     Ectopy: none    QRS:     QRS axis:  Normal  Conduction:     Conduction: normal    ST segments:     ST segments:  Normal  T waves:     T waves comment:  Diffuse flattening vs low voltage  Other findings:     Other findings: poor R wave progression    Comments:      NSR, no ST elevations, no other significant abnormalities or changes     POC Cardiac US    Date/Time: 4/26/2021 3:39 PM  Performed by: Monika Mark MD  Authorized by: Monika Mark MD     Patient location:  ED  Other Assisting Provider: Yes (comment) (Dr Yris Snell, Dr Adina Nevarez)    Procedure details:     Exam Type:  Diagnostic    Indications: chest pain      Indications comment:  H/o pericarditis    Assessment / Evaluation for: pericardial effusion      Exam Type: initial exam      Image quality: diagnostic      Image availability:  Images available in PACS  Patient Details:     Cardiac Rhythm:  Regular  Cardiac findings:     Echo technique: limited 2D      Views obtained: parasternal long axis and subcostal      Pericardial effusion: absent      Tamponade physiology: absent      Wall motion: normal      LV systolic function comment:  Appears grossly normal  Interpretation:      No pericardial effusion           ED Course             HEART Risk Score      Most Recent Value   Heart Score Risk Calculator   History  1 Filed at: 04/26/2021 7046   ECG  --   Age  --   Risk Factors  --   Troponin  --   HEART Score  --        Identification of Seniors at Risk      Most Recent Value   (ISAR) Identification of Seniors at Risk   Before the illness or injury that brought you to the Emergency, did you need someone to help you on a regular basis? 0 Filed at: 04/26/2021 1457   In the last 24 hours, have you needed more help than usual?  0 Filed at: 04/26/2021 1457   Have you been hospitalized for one or more nights during the past 6 months?  --   In general, do you see well?  0 Filed at: 04/26/2021 1457   In general, do you have serious problems with your memory? 0 Filed at: 04/26/2021 1457   Do you take more than three different medications every day?  --   ISAR Score  0 Filed at: 04/26/2021 1457                              MDM  Number of Diagnoses or Management Options  History of pericarditis:   Upper back pain:   Diagnosis management comments: 68 yo M presenting for evaluation of left upper back pain with concern of possible recurrence of pericarditis  Patient was hospitalized for pericarditis in January  Normal VS  ECG does not suggest pericarditis  Bedside US does not show evidence of pericarditis  Troponin negative  Suspect musculoskeletal back pain  Given referral for [facility], lidocaine patch  Discussed return precautions  Discharge         Disposition  Final diagnoses:   Upper back pain   History of pericarditis     Time reflects when diagnosis was documented in both MDM as applicable and the Disposition within this note     Time User Action Codes Description Comment    4/26/2021  4:06 PM Arabella Bar Add [M54 9] Upper back pain     4/26/2021  4:10 PM Bijan Ann Add [Z86 79] History of pericarditis       ED Disposition     ED Disposition Condition Date/Time Comment    Discharge Good Mon Apr 26, 2021  4:05 PM Hector Fishman discharge to home/self care  Follow-up Information     Follow up With Specialties Details Why Contact Info Additional Information    Jose Mae MD Internal Medicine Schedule an appointment as soon as possible for a visit in 1 week For reevaluation as we discussed   [address]  [address] Floor, [address] [place] [address] Emergency Department Emergency Medicine Go to  As needed [address]  [address] [facility] Emergency Department, [address] [place] [address], [id number] [facility] [id number] Program Physical Therapy Schedule an appointment as soon as possible for a visit  As needed           Discharge Medication List as of 4/26/2021  4:39 PM      START taking these medications    Details   lidocaine (LIDODERM) 5 % Apply 1 patch topically daily Remove & Discard patch within 12 hours or as directed by MD, Starting Mon 4/26/2021, Until Wed 5/26/2021, Normal         CONTINUE these medications which have NOT CHANGED    Details   amLODIPine (NORVASC) 10 mg tablet Take 1 tablet (10 mg total) by mouth daily, Starting Thu 12/31/2020, Normal      amoxicillin (AMOXIL) 500 mg capsule TAKE ONE CAPSULE BY MOUTH EVERY 6 HOURS TILL GONE, Historical Med      aspirin (ECOTRIN LOW STRENGTH) 81 mg EC tablet Take 1 tablet (81 mg total) by mouth daily, Starting Wed 1/13/2021, Normal      citalopram (CeleXA) 10 mg tablet Take 1 tablet (10 mg total) by mouth daily, Starting Wed 8/19/2020, Normal      colchicine (COLCRYS) 0 6 mg tablet Take 1 tablet (0 6 mg total) by mouth 2 (two) times a day, Starting Tue 3/2/2021, Normal      glimepiride (AMARYL) 1 mg tablet Take 3 tablets (3 mg total) by mouth daily with breakfast, Starting Wed 7/29/2020, Normal      losartan (COZAAR) 100 MG tablet Take 1 tablet (100 mg total) by mouth daily, Starting Tue 3/2/2021, Normal      metoprolol succinate (TOPROL-XL) 100 mg 24 hr tablet Take 1 tablet (100 mg total) by mouth daily, Starting Tue 3/2/2021, Normal      mupirocin (BACTROBAN) 2 % ointment Apply topically once daily to the nose , Normal      omeprazole (PriLOSEC) 20 mg delayed release capsule Take 1 capsule (20 mg total) by mouth daily, Starting Fri 8/21/2020, Normal      potassium chloride (Klor-Con) 10 mEq tablet Take 1 tablet (10 mEq total) by mouth 2 (two) times a day, Starting Mon 4/5/2021, Normal      rosuvastatin (CRESTOR) 20 MG tablet Take 1 tablet (20 mg total) by mouth daily, Starting Tue 3/2/2021, Normal      tamsulosin (FLOMAX) 0 4 mg Take 1 capsule (0 4 mg total) by mouth daily with dinner, Starting Thu 3/4/2021, Normal      timolol (TIMOPTIC) 0 5 % ophthalmic solution Starting Tue 3/17/2020, Historical Med      trospium chloride (SANCTURA) 20 mg tablet Take 1 tablet (20 mg total) by mouth 2 (two) times a day, Starting Fri 11/6/2020, Print           No discharge procedures on file  PDMP Review     None           ED Provider  Attending physically available and evaluated Gia West I managed the patient along with the ED Attending      Electronically Signed by         Feng Koenig MD  04/29/21 4355

## 2021-04-27 ENCOUNTER — TELEPHONE (OUTPATIENT)
Dept: INTERNAL MEDICINE CLINIC | Facility: CLINIC | Age: 76
End: 2021-04-27

## 2021-04-27 NOTE — TELEPHONE ENCOUNTER
Patient went to the ER yesterday for a lot of pain and is feeling better today and would like advice  Please call patient back at [phone]   Thank you

## 2021-04-27 NOTE — PROGRESS NOTES
Patient in the emergency room yesterday with severe chest pain workup reviewed including chest x-ray and echo cardiogram   No evidence of pericardial effusion  Pain is similar to what he had several months ago with acute pericarditis is cardiologist has been gradually tapering his colchicine period he went to 0 6 mg every other day this week I suspect he had of flare-up of his pericarditis I have asked him to go back on the colchicine 0 6 mg daily and check in with his cardiologist to make sure that is okay with them as well

## 2021-04-28 ENCOUNTER — TELEPHONE (OUTPATIENT)
Dept: CARDIOLOGY CLINIC | Facility: CLINIC | Age: 76
End: 2021-04-28

## 2021-04-28 NOTE — TELEPHONE ENCOUNTER
P/c'd states he was weaning down the colchicine and he got to QOD  He had a set back a couple of days ago and is feeling the back pain again  His pcp told him to call and discuss if he should increase again        Pleas advise

## 2021-04-29 ENCOUNTER — TELEPHONE (OUTPATIENT)
Dept: INTERNAL MEDICINE CLINIC | Facility: CLINIC | Age: 76
End: 2021-04-29

## 2021-04-29 NOTE — TELEPHONE ENCOUNTER
FYI- Pt wanted Dr Michel to be aware that he spoke with Bruna Tobias at [facility] on 8th  In regards to the Colchicine 0 6 mg tablet, Dr Jules would like him to start to take it twice a day for the next week  If there are any questions, pt can be reached back at [phone]      Thank you

## 2021-05-03 ENCOUNTER — TELEPHONE (OUTPATIENT)
Dept: CARDIOLOGY CLINIC | Facility: CLINIC | Age: 76
End: 2021-05-03

## 2021-05-03 NOTE — TELEPHONE ENCOUNTER
Daniel Hale called because Brendon Boo has such terrible back pain  Last Wed he called and enedina/Jemima and Thurs he called his family doctor about it  He had weaned himself down to one pill EVERY other day  He was instructed to take 1 pill twice daily for a week  They are calling to confirm  He still has back pain  He will call on Thursday when Dr Gina Menjivar is here (and it's been 1 week back on Colcrys bid)

## 2021-05-04 DIAGNOSIS — E87.6 HYPOKALEMIA: ICD-10-CM

## 2021-05-04 RX ORDER — POTASSIUM CHLORIDE 750 MG/1
10 TABLET, FILM COATED, EXTENDED RELEASE ORAL 2 TIMES DAILY
Qty: 60 TABLET | Refills: 10 | Status: SHIPPED | OUTPATIENT
Start: 2021-05-04 | End: 2021-08-16 | Stop reason: ALTCHOICE

## 2021-05-05 ENCOUNTER — OFFICE VISIT (OUTPATIENT)
Dept: PAIN MEDICINE | Facility: MEDICAL CENTER | Age: 76
End: 2021-05-05
Payer: MEDICARE

## 2021-05-05 VITALS
DIASTOLIC BLOOD PRESSURE: 71 MMHG | HEIGHT: 67 IN | TEMPERATURE: 97.2 F | WEIGHT: 292 LBS | SYSTOLIC BLOOD PRESSURE: 116 MMHG | BODY MASS INDEX: 45.83 KG/M2 | HEART RATE: 61 BPM

## 2021-05-05 DIAGNOSIS — M46.1 SACROILIITIS (HCC): Primary | ICD-10-CM

## 2021-05-05 DIAGNOSIS — M47.817 FACET ARTHROPATHY, LUMBOSACRAL: ICD-10-CM

## 2021-05-05 DIAGNOSIS — M47.816 LUMBAR SPONDYLOSIS: ICD-10-CM

## 2021-05-05 PROCEDURE — 99213 OFFICE O/P EST LOW 20 MIN: CPT | Performed by: NURSE PRACTITIONER

## 2021-05-05 NOTE — PROGRESS NOTES
Assessment  1  Sacroiliitis ([facility] [address] )    2  Lumbar spondylosis    3  Facet arthropathy, lumbosacral        Plan  The patient is here for follow-up after his right sacroiliac joint injection on 03/22/2021  Initially the patient did not feel like the injection helped because he still has a pain of 6/10  However after the physical exam, his sacroiliac pain had resolved  Clinical findings showed positive right facet loading with tender to palpation lumbar paraspinals  Patient states that he no longer has the tenderness in his right upper buttock and that his pain is located in his lower back  I did have a complete discussion with him about medial branch blocks leading to radiofrequency ablation and the patient decided he would take literature home and read about it and then make a decision  At this time he will follow-up as needed if he does need to move forward with the medial branch blocks  Complete risks and benefits including bleeding, infection, tissue reaction, nerve injury and allergic reaction were discussed  The approach was demonstrated using models and literature was provided  Verbal and written consent was obtained  My impressions and treatment recommendations were discussed in detail with the patient who verbalized understanding and had no further questions  Discharge instructions were provided  I personally saw and examined the patient and I agree with the above discussed plan of care  No orders of the defined types were placed in this encounter  No orders of the defined types were placed in this encounter  History of Present Illness    Candy Tejeda is a 67 y o  male Who presents to the office today with a pain score of 6/10 that is occasional with a quality described as sharp and shooting  It is in his right low back  He currently takes 600 mg of ibuprofen a day for another issue and states this helps with his back pain as well      I have personally reviewed and/or updated the patient's past medical history, past surgical history, family history, social history, current medications, allergies, and vital signs today  Review of Systems   Respiratory: Negative for shortness of breath  Cardiovascular: Negative for chest pain  Gastrointestinal: Negative for constipation, diarrhea, nausea and vomiting  Musculoskeletal: Positive for arthralgias, back pain and gait problem  Negative for joint swelling and myalgias  Skin: Negative for rash  Neurological: Negative for dizziness, seizures and weakness  All other systems reviewed and are negative        Patient Active Problem List   Diagnosis    Chronic right-sided low back pain without sciatica    Type 2 diabetes mellitus with complication ([facility])    Essential hypertension    Peripheral neuropathy    Isolated proteinuria with morphologic lesion    Obstructive sleep apnea syndrome    Morbid obesity with BMI of 40 0-44 9, adult ([facility])    Mood disturbance    Gastroesophageal reflux disease    Palpitations    Palpitation    Hypokalemia    Mood disorder ([facility])    Urgency incontinence    Onychomycosis    BPH with urinary obstruction    Fecal incontinence    Hyperlipidemia    Chest pain    Acute idiopathic pericarditis    PND (post-nasal drip)    TIA (transient ischemic attack)    Adenomatous polyp of transverse colon    Chronic pain syndrome    Sacroiliitis ([facility])    Lumbar spondylosis    Facet arthropathy, lumbosacral       Past Medical History:   Diagnosis Date    Anxiety     Claustrophobia     Colon polyps     Depression     Diabetes mellitus ([address] )     Dyslipidemia     Esophageal polyp     GERD (gastroesophageal reflux disease)     Glaucoma     Headache     Hypertension     Low back pain     Neck pain     Numbness and tingling in left arm     resolved 2/20/17 / numbness and tingling left side last assessed 3/23/16    OAB (overactive bladder)     Paroxysmal supraventricular tachycardia ([address] )     Sleep apnea     wears cpap     Squamous cell skin cancer 10/19/2020    Right Nasal Ala, Dr Turcios       Past Surgical History:   Procedure Laterality Date    ANOSCOPY      for polyp removal    BASAL CELL CARCINOMA EXCISION      right cheek    CATARACT EXTRACTION Bilateral     KNEE ARTHROSCOPY Bilateral     LUMBAR SPINE SURGERY      30years ago    MOHS SURGERY  2020    [facility] Right Nasal Ala, Dr Angel Woodruff      tooth extraction    ORTHOPEDIC SURGERY      UT COLONOSCOPY FLX DX W/COLLJ SPEC WHEN PFRMD N/A 2019    Procedure: COLONOSCOPY;  Surgeon: Toby Black MD;  Location: [facility]; Service: Gastroenterology    UT ESOPHAGOGASTRODUODENOSCOPY TRANSORAL DIAGNOSTIC N/A 2019    Procedure: ESOPHAGOGASTRODUODENOSCOPY (EGD); Surgeon: Toby Black MD;  Location: [facility]; Service: Gastroenterology    RECTAL BIOPSY      REPLACEMENT TOTAL KNEE Right 2017    REPLACEMENT TOTAL KNEE Left 2013    TOTAL KNEE ARTHROPLASTY Left        Family History   Problem Relation Age of Onset    Alzheimer's disease Mother     Diabetes Mother    Melford Maxim Hypertension Father     Diabetes Paternal [de-identified]     Esophageal cancer Paternal Grandfather        Social History     Occupational History    Occupation: retired   Tobacco Use    Smoking status: Former Smoker     Quit date: 1983     Years since quittin 3    Smokeless tobacco: Former User   Substance and Sexual Activity    Alcohol use:  Yes     Alcohol/week: 14 0 standard drinks     Types: 14 Shots of liquor per week     Frequency: 4 or more times a week     Drinks per session: 1 or 2     Binge frequency: Never     Comment: "couple of drinks each night"    Drug use: No    Sexual activity: Not Currently     Partners: Female       Current Outpatient Medications on File Prior to Visit   Medication Sig    amLODIPine (NORVASC) 10 mg tablet Take 1 tablet (10 mg total) by mouth daily    aspirin (ECOTRIN LOW STRENGTH) 81 mg EC tablet Take 1 tablet (81 mg total) by mouth daily    citalopram (CeleXA) 10 mg tablet Take 1 tablet (10 mg total) by mouth daily    colchicine (COLCRYS) 0 6 mg tablet Take 1 tablet (0 6 mg total) by mouth 2 (two) times a day    glimepiride (AMARYL) 1 mg tablet Take 3 tablets (3 mg total) by mouth daily with breakfast    lidocaine (LIDODERM) 5 % Apply 1 patch topically daily Remove & Discard patch within 12 hours or as directed by MD    losartan (COZAAR) 100 MG tablet Take 1 tablet (100 mg total) by mouth daily    metoprolol succinate (TOPROL-XL) 100 mg 24 hr tablet Take 1 tablet (100 mg total) by mouth daily    omeprazole (PriLOSEC) 20 mg delayed release capsule Take 1 capsule (20 mg total) by mouth daily    potassium chloride (Klor-Con) 10 mEq tablet Take 1 tablet (10 mEq total) by mouth 2 (two) times a day    rosuvastatin (CRESTOR) 20 MG tablet Take 1 tablet (20 mg total) by mouth daily    tamsulosin (FLOMAX) 0 4 mg Take 1 capsule (0 4 mg total) by mouth daily with dinner    timolol (TIMOPTIC) 0 5 % ophthalmic solution     trospium chloride (SANCTURA) 20 mg tablet Take 1 tablet (20 mg total) by mouth 2 (two) times a day    amoxicillin (AMOXIL) 500 mg capsule TAKE ONE CAPSULE BY MOUTH EVERY 6 HOURS TILL GONE    mupirocin (BACTROBAN) 2 % ointment Apply topically once daily to the nose  (Patient not taking: Reported on 3/22/2021)     No current facility-administered medications on file prior to visit  Allergies   Allergen Reactions    Codeine Hives     Pt does not remember if it was codeine or if the reaction was hives      Sulfa Antibiotics Other (See Comments)     Nausea         Physical Exam    /71   Pulse 61   Temp (!) 97 2 °F (36 2 °C)   Ht 5' 7" (1 702 m)   Wt 132 kg (292 lb)   BMI 45 73 kg/m²     Constitutional: obese  Eyes: anicteric  HEENT: grossly intact  Neck: supple, symmetric, trachea midline and no masses   Pulmonary:even and unlabored  Cardiovascular:No edema or pitting edema present  Skin:Normal without rashes or lesions and well hydrated  Psychiatric:Mood and affect appropriate  Neurologic:Cranial Nerves II-XII grossly intact  Musculoskeletal:antalgic   Lumbar Spine Exam    Appearance:  Exaggerated lordosis  Palpation/Tenderness:  left lumbar paraspinal tenderness  right lumbar paraspinal tenderness  Special Tests:  Left Isaiah's Maneuver:  negative  Right Isaiah's Maneuver:  negative  Left Pelvic Distraction Test:  negative  Right Pelvic Distraction Test:  negative   Positive for right sided facet loading      Imaging

## 2021-05-05 NOTE — PATIENT INSTRUCTIONS
Hip Bursitis Exercises   AMBULATORY CARE:   Hip bursitis exercises  help strengthen the muscles in your hip and keep the joint flexible  Strong muscles can help reduce pain, prevent injury, and keep the joint stable  The exercises can also help increase the range of motion in your hip joint  What you need to know about exercise safety:   · Move slowly and smoothly  Avoid fast or jerky motions  This will help prevent an injury  · Breathe normally  Do not hold your breath  It is important to breathe in and out so you do not tense up during exercise  Tension could prevent you from moving your joint in a full range of motion  · Do the exercises and stretches on both legs  Do this so both hips remain strong and flexible  · Stop if you feel sharp pain or an increase in pain  Contact your healthcare provider or physical therapist  It is normal to feel some discomfort during exercise  Regular exercise will help decrease your discomfort over time  · Warm up before you stretch and exercise  Walk or ride a stationary bike for 5 to 10 minutes  How to do hip stretches: Your healthcare provider or physical therapist will tell you how many times to do each stretch  Do the stretch on both sides before you move to the next stretch  · Standing iliotibial band stretch:  Stand with the leg on your injured side behind your other leg  Bend sideways toward the side that is not injured  Stop when you feel a stretch in your outer hip  Hold for 5 to 10 seconds  Then return to the starting position  · Lying iliotibial band stretch:  Lie on your back  Bend the knee on your injured side toward your chest  Place your hands on the outside of your knee and thigh  Slowly pull the knee across your body  Stop when you feel a stretch in your hip and outer thigh  Hold for 5 to 10 seconds  Return your leg to the starting position  · Hip stretch:  Lie on your back with both legs straight and on the [facility] the knee on your injured side toward your chest until you can reach your lower leg  Place both hands on your shin and pull your knee toward your chest  Hold for 5 to 10 seconds  Return your leg to the starting position  · Knee to chest:  Lie on your back with both knees bent and feet flat on the floor  Bend the knee on your injured side toward your chest until you can reach your lower leg  Place both hands on your shin and pull your knee toward your chest  Hold for 5 to 10 seconds  Return your leg to the starting position  · Internal hip rotator stretch:  You will do this exercise on a table  Lie on your side with the injured hip on top  You may be told to keep a pillow between your thighs  Move the top leg so the foot hangs below the edge of the table  Rotate your hip to raise your foot in the opposite direction of the bottom shoulder  Raise your foot as high as you can so you feel a stretch in the back of your thigh  Hold for 5 seconds  Then slowly lower your foot to the starting position  · External hip rotator stretch:  You will do this exercise on a table  Lie on your side with the injured hip on the bottom  You do not need a pillow between your thighs for this exercise  Move the bottom leg so the foot is off the edge of the table  Rotate your hip to lift the foot in the opposite direction of the bottom shoulder  Raise your foot as high as you can so you feel a stretch in your buttock  Hold for 5 seconds  Then slowly lower your foot to the starting position  · Kneeling hip flexor stretch:  Kneel on your knee on the injured side  Place the foot of your other leg on the floor so the knee is bent  Put both hands on top of your thigh  Keep your back straight and abdominal muscles tight  Lean forward until you feel a stretch in your other thigh  Hold the stretch for 10 seconds  Return to the starting position  How to do hip strengthening exercises:   Your healthcare provider or physical therapist will tell you how many times to do each exercise  Do the exercise on both sides before you move to the next exercise  · Straight leg lift to the side: This may also be called hip abduction  Lie on your side with straight legs, with the injured hip on top  Slowly raise your top leg toward the ceiling as high as you can  Keep your foot pointed  Hold for 5 seconds  Then slowly lower your leg to the starting position  · Inner thigh lift: This may also be called hip adduction  Lie on your side with straight legs, with the injured hip on the bottom  Cross your top leg over your bottom leg  Put the foot of your top leg on the floor in front of you  Raise your bottom leg until it touches the top leg  Hold for 5 seconds  Then slowly lower the leg to the floor  · Clam exercise:  Lie on your side so your injured side is on top  Bend your knees  Keep your heels together during this exercise  Slowly raise your top knee toward the ceiling  Then lower your leg so your knees are together  Call your doctor if:   · You have sharp pain during exercise or at rest     · You have questions or concerns about the stretches or exercises  © Copyright [address] Information is for End User's use only and may not be sold, redistributed or otherwise used for commercial purposes  All illustrations and images included in CareNotes® are the copyrighted property of A D A M , [employer]  or [address] [place] [address]  The above information is an  only  It is not intended as medical advice for individual conditions or treatments  Talk to your doctor, nurse or pharmacist before following any medical regimen to see if it is safe and effective for you  Core Strengthening Exercises   WHAT YOU NEED TO KNOW:   Your core includes the muscles of your lower back, hip, pelvis, and abdomen  Core strengthening exercises help heal and strengthen these muscles   This helps prevent another injury, and keeps your pelvis, spine, and hips in the correct position  DISCHARGE INSTRUCTIONS:   Contact your healthcare provider if:   · You have sharp or worsening pain during exercise or at rest     · You have questions or concerns about your shoulder exercises  Safety tips:  Talk to your healthcare provider before you start an exercise program  A physical therapist can teach you how to do core strengthening exercises safely  · Do the exercises on a mat or firm surface  A firm surface will support your spine and prevent low back pain  Do not do these exercises on a bed  · Move slowly and smoothly  Avoid fast or jerky motions  · Stop if you feel pain  Core exercises should not be painful  Stop if you feel pain  · Breathe normally during core exercises  Do not hold your breath  This may cause an increase in blood pressure and prevent muscle strengthening  Your healthcare provider will tell you when to inhale and exhale during the exercise  · Begin all of your exercises with abdominal bracing  Abdominal bracing helps warm up your core muscles  You can also practice abdominal bracing throughout the day  Lie on your back with your knees bent and feet flat on the floor  Place your arms in a relaxed position beside your body  Tighten your abdominal muscles  Pull your belly button in and up toward your spine  Hold for 5 seconds  Relax your muscles  Repeat 10 times  Core strengthening exercises: Your healthcare provider will tell you how often to do these exercises  The provider will also tell you how many repetitions of each exercise you should do  Hold each exercise for 5 seconds or as directed  As you get stronger, increase your hold to 10 to 15 seconds  You can do some of these exercises on a stability ball, or with a weight  Ask your healthcare provider how to use a stability ball or weight for these exercises:  · Bridging:  Lie on your back with your knees bent and feet flat on the floor   Rest your arms at your side  Tighten your buttocks, and then lift your hips 1 inch off the floor  Hold for 5 seconds  When you can do this exercise without pain for 10 seconds, increase the distance you lift your hips  A good goal is to be able to lift your hips so that your shoulders, hips, and knees are in a straight line  · Dead bug:  Lie on your back with your knees bent and feet flat on the floor  Place your arms in a relaxed position beside your body  Begin with abdominal bracing  Next, raise one leg, keeping your knee bent  Hold for 5 seconds  Repeat with the other leg  When you can do this exercise without pain for 10 to 15 seconds, you may raise one straight leg and hold  Repeat with the other leg  · Quadruped:  Place your hands and knees on the floor  Keep your wrists directly below your shoulders and your knees directly below your hips  Pull your belly button in toward your spine  Do not flatten or arch your back  Tighten your abdominal muscles below your belly button  Hold for 5 seconds  When you can do this exercise without pain for 10 to 15 seconds, you may extend one arm and hold  Repeat on the other side  · Side bridge exercises:      ? Standing side bridge:  Stand next to a wall and extend one arm toward the wall  Place your palm flat on the wall with your fingers pointing upward  Begin with abdominal bracing  Next, without moving your feet, slowly bend your arm to 90 degrees  Hold for 5 seconds  Repeat on the other side  When you can do this exercise without pain for 10 to 15 seconds, you may do the bent leg side bridge on the floor  ? Bent leg side bridge:  Lie on one side with your legs, hips, and shoulders in a straight line  Prop yourself up onto your forearm so your elbow is directly below your shoulder  Bend your knees back to 90 degrees  Begin with abdominal bracing  Next, lift your hips and balance yourself on your forearm and knees  Hold for 5 seconds   Repeat on the other side  When you can do this exercise without pain for 10 to 15 seconds, you may do the straight leg side bridge on the floor  ? Straight leg side bridge:  Lie on one side with your legs, hips, and shoulders in a straight line  Prop yourself up onto your forearm so your elbow is directly below your shoulder  Begin with abdominal bracing  Lift your hips off the floor and balance yourself on your forearm and the outside of your flexed foot  Do not let your ankle bend sideways  Hold for 5 seconds  Repeat on the other side  When you can do this exercise without pain for 10 to 15 seconds, ask your healthcare provider for more advanced exercises  · Superman:  Lie on your stomach  Extend your arms forward on the floor  Tighten your abdominal muscles and lift your right hand and left leg off the floor  Hold this position  Slowly return to the starting position  Tighten your abdominal muscles and lift your left hand and right leg off the floor  Hold this position  Slowly return to the starting position  · Clam:  Lie on your side with your knees bent  Put your bottom arm under your head to keep your neck in line  Put your top hand on your hip to keep your pelvis from moving  Put your heels together, and keep them together during this exercise  Slowly raise your top knee toward the ceiling  Then lower your leg so your knees are together  Repeat this exercise 10 times  Then switch sides and do the exercise 10 times with the other leg  · Curl up:  Lie on your back with your knees bent and feet flat on the floor  Place your hands, palms down, underneath your lower back  Next, with your elbows on the floor, lift your shoulders and chest 2 to 3 inches off the floor  Keep your head in line with your shoulders  Hold this position  Slowly return to the starting position  · Straight leg raises:  Lie on your back with one leg straight  Bend the other knee and place your foot flat on the floor   Tighten your abdominal muscles  Keep your leg straight and slowly lift it straight up 6 to 12 inches off the floor  Hold this position  Lower your leg slowly  Do as many repetitions as directed on this side  Repeat with the other leg  · Plank:  Lie on your stomach  Bend your elbows and place your forearms flat on the floor  Lift your chest, stomach, and knees off the floor  Make sure your elbows are below your shoulders  Your body should be in a straight line  Do not let your hips or lower back sink to the ground  Squeeze your abdominal muscles together and hold for 15 seconds  To make this exercise harder, hold for 30 seconds or lift 1 leg at a time  · Bicycles:  Lie on your back  Bend both knees and bring them toward your chest  Your calves should be parallel to the floor  Place the palms of your hands on the back of your head  Straighten your right leg and keep it lifted 2 inches off the floor  Raise your head and shoulders off the floor and twist towards your left  Keep your head and shoulders lifted  Bend your right knee while you straighten your left leg  Keep your left leg 2 inches off the floor  Twist your head and chest towards the left leg  Continue to straighten 1 leg at a time and twist        Follow up with your healthcare provider as directed:  Write down your questions so you remember to ask them during your visits  © Copyright [address] Information is for End User's use only and may not be sold, redistributed or otherwise used for commercial purposes  All illustrations and images included in CareNotes® are the copyrighted property of [employer] M , Inc  or [address] [place], [place] [address]   The above information is an  only  It is not intended as medical advice for individual conditions or treatments  Talk to your doctor, nurse or pharmacist before following any medical regimen to see if it is safe and effective for you    Lower Back Exercises   AMBULATORY CARE:   Lower back exercises help heal and strengthen your back muscles to prevent another injury  Ask your healthcare provider if you need to see a physical therapist for more advanced exercises  Seek care immediately if:   · You have severe pain that prevents you from moving  Contact your healthcare provider if:   · Your pain becomes worse  · You have new pain  · You have questions or concerns about your condition or care  Do lower back exercises safely:   · Do the exercises on a mat or firm surface  (not on a bed) to support your spine and prevent low back pain  · Move slowly and smoothly  Avoid fast or jerky motions  · Breathe normally  Do not hold your breath  · Stop if you feel pain  It is normal to feel some discomfort at first  Regular exercise will help decrease your discomfort over time  Lower back exercises: Your healthcare provider may recommend that you do back exercises 10 to 30 minutes each day  He may also recommend that you do exercises 1 to 3 times each day  Ask your healthcare provider which exercises are best for you and how often to do them  · Ankle pumps:  Lie on your back  Move your foot up (with your toes pointing toward your head)  Then, move your foot down (with your toes pointing away from you)  Repeat this exercise 10 times on each side  · Heel slides:  Lie on your back  Slowly bend one leg and then straighten it  Next, bend the other leg and then straighten it  Repeat 10 times on each side  · Pelvic tilt:  Lie on your back with your knees bent and feet flat on the floor  Place your arms in a relaxed position beside your body  Tighten the muscles of your abdomen and flatten your back against the floor  Hold for 5 seconds  Repeat 5 times  · Back stretch:  Lie on your back with your hands behind your head  Bend your knees and turn the lower half of your body to one side  Hold this position for 10 seconds  Repeat 3 times on each side           · Straight leg raises:  Lie on your back with one leg straight  Bend the other knee  Tighten your abdomen and then slowly lift the straight leg up about 6 to 12 inches off the floor  Hold for 1 to 5 seconds  Lower your leg slowly  Repeat 10 times on each leg  · Knee-to-chest:  Lie on your back with your knees bent and feet flat on the floor  Pull one of your knees toward your chest and hold it there for 5 seconds  Return your leg to the starting position  Lift the other knee toward your chest and hold for 5 seconds  Do this 5 times on each side  · Cat and camel:  Place your hands and knees on the floor  Arch your back upward toward the ceiling and lower your head  Round out your spine as much as you can  Hold for 5 seconds  Lift your head upward and push your chest downward toward the floor  Hold for 5 seconds  Do 3 sets or as directed  · Wall squats:  Stand with your back against a wall  Tighten the muscles of your abdomen  Slowly lower your body until your knees are bent at a 45 degree angle  Hold this position for 5 seconds  Slowly move back up to a standing position  Repeat 10 times  · Curl up:  Lie on your back with your knees bent and feet flat on the floor  Place your hands, palms down, underneath the curve in your lower back  Next, with your elbows on the floor, lift your shoulders and chest 2 to 3 inches  Keep your head in line with your shoulders  Hold this position for 5 seconds  When you can do this exercise without pain for 10 to 15 seconds, you may add a rotation  While your shoulders and chest are lifted off the ground, turn slightly to the left and hold  Repeat on the other side  · Bird dog:  Place your hands and knees on the floor  Keep your wrists directly below your shoulders and your knees directly below your hips  Pull your belly button in toward your spine  Do not flatten or arch your back  Tighten your abdominal muscles   Raise one arm straight out so that it is aligned with your head  Next, raise the leg opposite your arm  Hold this position for 15 seconds  Lower your arm and leg slowly and change sides  Do 5 sets  © Copyright [address] Information is for End User's use only and may not be sold, redistributed or otherwise used for commercial purposes  All illustrations and images included in CareNotes® are the copyrighted property of [employer] A M , Inc  or [address] [place] [address]  The above information is an  only  It is not intended as medical advice for individual conditions or treatments  Talk to your doctor, nurse or pharmacist before following any medical regimen to see if it is safe and effective for you

## 2021-05-10 ENCOUNTER — OFFICE VISIT (OUTPATIENT)
Dept: PODIATRY | Facility: CLINIC | Age: 76
End: 2021-05-10
Payer: MEDICARE

## 2021-05-10 VITALS
WEIGHT: 296 LBS | HEART RATE: 60 BPM | HEIGHT: 67 IN | BODY MASS INDEX: 46.46 KG/M2 | DIASTOLIC BLOOD PRESSURE: 72 MMHG | SYSTOLIC BLOOD PRESSURE: 144 MMHG

## 2021-05-10 DIAGNOSIS — E11.42 DIABETIC POLYNEUROPATHY ASSOCIATED WITH TYPE 2 DIABETES MELLITUS (HCC): Primary | ICD-10-CM

## 2021-05-10 DIAGNOSIS — B35.1 ONYCHOMYCOSIS: ICD-10-CM

## 2021-05-10 PROCEDURE — 11721 DEBRIDE NAIL 6 OR MORE: CPT | Performed by: PODIATRIST

## 2021-05-10 NOTE — PROGRESS NOTES
Hector Fishman  1945  AT RISK FOOT CARE    1  Diabetic polyneuropathy associated with type 2 [facility] mellitus [facility]     2  Onychomycosis         Patient presents for at-risk foot care  Patient has no acute concerns today  Patient has significant lower extremity risk due to neuropathy, parasthesia, edema, and trophic skin changes to the lower extremity  On exam patient has thickened, hypertrophic, discolored, brittle toenails with subungual debris and tenderness x10   Callus: none  Patient has lower extremity edema  PAtients skin is atrophic, thickened nails, and decreased pedal hair  Patient has decreased pinprick and vibratory sensation to his feet and parasthesia    Today's treatment includes:  Debridement of toenails  Using nail nipper, krystle, and curette, nails were sharply debrided, reduced in thickness and length  Devitalized nail tissue and fungal debris excised and removed  Patient tolerated well  Discussed proper shoe gear, daily inspections of feet, and general foot health with patient  Patient has Q9  findings and is recommended for at risk foot care every 9-10 weeks      Patients most recent complete clinical foot exam was on: 7/6/2020

## 2021-06-21 ENCOUNTER — OFFICE VISIT (OUTPATIENT)
Dept: SLEEP CENTER | Facility: CLINIC | Age: 76
End: 2021-06-21
Payer: MEDICARE

## 2021-06-21 ENCOUNTER — CONSULT (OUTPATIENT)
Dept: NEUROLOGY | Facility: CLINIC | Age: 76
End: 2021-06-21
Payer: MEDICARE

## 2021-06-21 VITALS
WEIGHT: 301.2 LBS | HEART RATE: 60 BPM | HEIGHT: 67 IN | DIASTOLIC BLOOD PRESSURE: 60 MMHG | SYSTOLIC BLOOD PRESSURE: 118 MMHG | BODY MASS INDEX: 47.27 KG/M2

## 2021-06-21 VITALS
DIASTOLIC BLOOD PRESSURE: 70 MMHG | HEIGHT: 67 IN | BODY MASS INDEX: 47.09 KG/M2 | WEIGHT: 300 LBS | SYSTOLIC BLOOD PRESSURE: 120 MMHG

## 2021-06-21 DIAGNOSIS — M54.50 CHRONIC RIGHT-SIDED LOW BACK PAIN WITHOUT SCIATICA: ICD-10-CM

## 2021-06-21 DIAGNOSIS — E66.01 MORBID OBESITY WITH BMI OF 40.0-44.9, ADULT (HCC): ICD-10-CM

## 2021-06-21 DIAGNOSIS — G45.9 TIA (TRANSIENT ISCHEMIC ATTACK): ICD-10-CM

## 2021-06-21 DIAGNOSIS — E11.8 TYPE 2 DIABETES MELLITUS WITH COMPLICATION (HCC): ICD-10-CM

## 2021-06-21 DIAGNOSIS — R45.86 MOOD DISTURBANCE: ICD-10-CM

## 2021-06-21 DIAGNOSIS — R68.2 DRY MOUTH: ICD-10-CM

## 2021-06-21 DIAGNOSIS — R56.9 PARTIAL SEIZURE (HCC): ICD-10-CM

## 2021-06-21 DIAGNOSIS — G89.29 CHRONIC RIGHT-SIDED LOW BACK PAIN WITHOUT SCIATICA: ICD-10-CM

## 2021-06-21 DIAGNOSIS — I10 ESSENTIAL HYPERTENSION: ICD-10-CM

## 2021-06-21 DIAGNOSIS — G56.20 ULNAR NERVE ENTRAPMENT, UNSPECIFIED LATERALITY: Primary | ICD-10-CM

## 2021-06-21 DIAGNOSIS — G47.33 OSA (OBSTRUCTIVE SLEEP APNEA): Primary | ICD-10-CM

## 2021-06-21 PROBLEM — M62.838 MUSCLE SPASM: Status: ACTIVE | Noted: 2021-02-15

## 2021-06-21 PROCEDURE — 99214 OFFICE O/P EST MOD 30 MIN: CPT | Performed by: INTERNAL MEDICINE

## 2021-06-21 PROCEDURE — 99204 OFFICE O/P NEW MOD 45 MIN: CPT | Performed by: PSYCHIATRY & NEUROLOGY

## 2021-06-21 RX ORDER — LORAZEPAM 0.5 MG/1
TABLET ORAL
Qty: 3 TABLET | Refills: 0 | Status: SHIPPED | OUTPATIENT
Start: 2021-06-21 | End: 2021-08-16 | Stop reason: ALTCHOICE

## 2021-06-21 NOTE — PATIENT INSTRUCTIONS

## 2021-06-21 NOTE — PATIENT INSTRUCTIONS
Partial seizure with preserved awareness:  Prince Turk presents for an initial consultation with regard to 2 episodes wherein he experienced the loss of motor control of the right hand followed by an abnormal sensation with potential loss of control in the right maxilla  He describes this as an increase in tone with muscle spasm rather than muscle weakness  He had 2 separate episodes which sound stereotyped  This is more consistent with seizure than with transient ischemic attack  He does not have significant seizure risk factors in his background  - I will request an EEG to be performed  -we will request an MRI of the brain without contrast with seizure protocol   -for the time being he does not have a driving restriction of any kind  -we did talk about certain measures to help decrease his risk for seizure like avoiding excessive alcohol intake and ensuring that he gets adequate sleep at night  -he should observe typical seizure precautions    I will be in touch with him with regard to the results of his studies as soon as they are available and will plan for him to come back and see us in 3 months time  If he does have episodes passing out, losing time, or any seizure-like events including recurrent episodes of the loss of control in his hand and face I would like him to contact our office immediately or to proceed to the nearest emergency room

## 2021-06-21 NOTE — PROGRESS NOTES
Follow-Up Note - Sleep [facility]   Hector BEAN Conorthea  67 y o  male  :1945  CWY:[id number]    CC: I saw this patient for follow-up in clinic today for Sleep disordered breathing, Coexisting Sleep and Medical Problems  PFSH, Problem List, Medications & Allergies were reviewed in EMR  Interval changes: none reported  He  has a past medical history of Anxiety, Claustrophobia, Colon polyps, Depression, Diabetes mellitus ([facility] [address] ), Dyslipidemia, Esophageal polyp, GERD (gastroesophageal reflux disease), Glaucoma, Headache, Hypertension, Low back pain, Neck pain, Numbness and tingling in left arm, OAB (overactive bladder), Paroxysmal supraventricular tachycardia ([facility] [address] ), Sleep apnea, and Squamous cell skin cancer (10/19/2020)  He has a current medication list which includes the following prescription(s): amlodipine, aspirin, citalopram, glimepiride, lorazepam, losartan, metoprolol succinate, omeprazole, potassium chloride, rosuvastatin, tamsulosin, timolol, amoxicillin, colchicine, lidocaine, mupirocin, and trospium chloride  PHYSIOLOGICAL DATA REVIEW AND INTERPRETATION:   using PAP > 4 hours/night 94%  Estimated LILO 1 7/hour with pressure of 16cm H2O @90th percentile  Compliance: excellent; Sleep disordered breathing:stable & within target range  He has not been using So Clean to sanitize the machine  SUBJECTIVE: Regarding use of PAP, Hector reports:   · He is experiencing some adverse effects: dry mouth/throat and dry nose  · He is benefiting from use: sleeping better and unable to sleep without PAP   Sleep Routine: Maryee Mealing reports getting 8 hrs sleep  ; he has no difficulty initiating or maintaining sleep   He arises spontaneously and feels refreshed  Connee Mealing denies Excessive Daytime Sleepiness  he   and rated himself at Total score: 2 /24 on the [place] Sleepiness Scale  Habits: reports that he quit smoking about 38 years ago   He has quit using smokeless tobacco ,  reports current alcohol use of about 14 0 standard drinks of alcohol per week  ,  reports no history of drug use , Caffeine use: limited , Exercise routine: none   ROS: as attached  Significant for weight has been stable  He reported no nasal symptoms  He has some shortness of breath and wheezing  He has occasional palpitations  Mood is stable on current medication  EXAM: /70   Ht 5' 7" (1 702 m)   Wt 136 kg (300 lb)   BMI 46 99 kg/m²     Patient is well groomed; well appearing  H&N: EOMI; NC/AT:no facial pressure marks, no rashes  Skin/Extrem: col & hydration normal; no edema  Psych: cooperativeand in no distress  Mental state:appears normal   Resp: Respiratory effort is normal  CNS: Alert, orientated, clear & coherent speech  Physical findings otherwise essentially unchanged from previous  IMPRESSION: Problem List Items & Comorbidities Addressed this Visit    1  ANALY (obstructive sleep apnea)  PAP DME Resupply/Reorder   2  Dry mouth     3  Chronic right-sided low back pain without sciatica     4  Mood disturbance     5  Essential hypertension     6  Morbid obesity with BMI of 40 0-44 9, adult ([facility] [address] )     7  Type 2 diabetes mellitus with complication (HCC)         PLAN:  I reviewed results of prior studies and physiologic data with the patient  Notified regarding recall of [employer] devices and the recommendation to discontinue use pending resolution of degradation of the foam by replacing it  I discussed treatment options with risks and benefits  Patient understands risks of discontinuing PAP vs continuing use while awaiting resolution of the new issue  Instructed patient to register  machine with [employer] and to follow progress on their website  Including web sites of DME provider, [facility] and [facility]  looking out for notifications  Treatment is medically necessary and patient will revert to using his old machine while awaiting remediation of the recall issue      Care of equipment, methods to improve comfort using PAP and importance of compliance with therapy were discussed  He was instructed not to use So Clean or other cleaning devices unless approved by  a and FDA  Pressure settin-18 cmH2O  Rx provided to replace the machine, supplies and Care coordinated with DME provider  Discussed strategies for weight reduction  Follow-up is advised in 1 year or sooner if needed to monitor progress, compliance and to adjust therapy  Thank you for allowing me to participate in the care of this patient      Sincerely,    Authenticated electronically by Jacklyn Coburn MD on    Board Certified Specialist

## 2021-06-21 NOTE — PROGRESS NOTES
Review of Systems      Genitourinary need to urinate more than twice a night and difficulty with erection   Cardiology palpitations/fluttering feeling in the chest and ankle/leg swelling   Gastrointestinal none   Neurology forgetfulness, poor concentration or confusion,  and difficulty with memory   Constitutional fatigue   Integumentary none   Psychiatry none   Musculoskeletal back pain   Pulmonary shortness of breath with activity and wheezing   ENT ringing in ears   Endocrine frequent urination   Hematological none

## 2021-06-21 NOTE — PROGRESS NOTES
Patient ID: Jr Deutsch is a 67 y o  male  Assessment/Plan:   Patient Instructions   Partial seizure with preserved awareness:  Justin Vides presents for an initial consultation with regard to 2 episodes wherein he experienced the loss of motor control of the right hand followed by an abnormal sensation with potential loss of control in the right maxilla  He describes this as an increase in tone with muscle spasm rather than muscle weakness  He had 2 separate episodes which sound stereotyped  This is more consistent with seizure than with transient ischemic attack  He does not have significant seizure risk factors in his background  - I will request an EEG to be performed  -we will request an MRI of the brain without contrast with seizure protocol   -for the time being he does not have a driving restriction of any kind  -we did talk about certain measures to help decrease his risk for seizure like avoiding excessive alcohol intake and ensuring that he gets adequate sleep at night  -he should observe typical seizure precautions    I will be in touch with him with regard to the results of his studies as soon as they are available and will plan for him to come back and see us in 3 months time  If he does have episodes passing out, losing time, or any seizure-like events including recurrent episodes of the loss of control in his hand and face I would like him to contact our office immediately or to proceed to the nearest emergency room  No problem-specific Assessment & Plan notes found for this encounter  Diagnoses and all orders for this visit:    TIA (transient ischemic attack)  -     Ambulatory referral to Neurology    Partial seizure ([facility] )  -     EEG awake or drowsy routine; Future  -     MRI brain seizure wo contrast; Future  -     LORazepam (ATIVAN) 0 5 mg tablet; 2 tabs one hour before MRI, can take 1 additional IF needed at time of MRI, NO DRIVING until following day  Subjective:    MONICA    Hector  Presents for an initial consultation with regard to 2 episodes of abnormal loss of control  The 1st occurred while he was driving in February of this year  He suddenly lost control of his right hand and the hand stiffened and assumed a claw-like posture  He is uncertain but thinks he was able to move his wrist   The episode was not painful  It resolved quickly on its own, in just a few minutes or less, and may have resolved gradually  It was followed a very short time later by an abnormal sensation of increased tone/loss of control potentially in the right face in the maxillary region  This was also short-lived and self-resolved  There was absolutely no loss of consciousness or awareness of any kind  Was actually on his way to the doctor's office at the time  He subsequently had a 2nd episode a week or 2 later that was quite similar beginning in the right hand and then affecting the right face which also resolved  He has never had anything similar prior to or since that time and reports that this was a normal day for him without any thing that stands out is unusual   He denies any prior history of meningitis or encephalitis  He denies any prior history of seizure  He does report 1 prior concussion as a child potentially and previously was quite an athlete  He reports that he does use his  CPAP every night  Drinks approximately 2 alcoholic beverages per night, and some nights he may skip that  Ultimately his description of events is not consistent with transient ischemic attack and is more likely to represent partial seizures with preserved awareness  We had a discussion with regard to the physiology seizure, stroke, and transient ischemic attack  he also reports intermittent numbness of the 4th and 5th digits in the left greater than right upper extremity which affects him most consistently at night but otherwise is quite variable    This is most consistent with ulnar nerve entrapment, 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  We will discuss this further at our next visit  Objective:    /60 (BP Location: Left arm, Patient Position: Sitting, Cuff Size: Standard)   Pulse 60   Ht 5' 7" (1 702 m)   Wt (!) 137 kg (301 lb 3 2 oz)   BMI 47 17 kg/m²       Physical Exam    Neurological Exam      At the time of my evaluation he was awake, alert, and in no distress  There is no dysarthria or aphasia  He has reasonable historian  Cranial nerves 2-12 were symmetrically intact bilaterally   Although his right pupil reacted somewhat less than the left  Motor testing reveals 5/5 strength throughout the bilateral upper lower extremities  Sensation was intact to temperature and vibration throughout the bilateral upper lower extremities  Deep tendon reflexes were absent at the biceps and suppressed at the level of the brachialis and patella bilaterally  There was no drift, tremor, or ataxia  He was able to rise from a seated position and his gait was stable although he did have significant dyspnea on exertion  There were no carotid bruits on auscultation  ROS:    Review of Systems   Constitutional: Negative  Negative for appetite change and fever  HENT: Negative  Negative for hearing loss, tinnitus, trouble swallowing and voice change  Eyes: Negative  Negative for photophobia and pain  Respiratory: Negative  Negative for shortness of breath  Cardiovascular: Negative  Negative for palpitations  Gastrointestinal: Negative  Negative for nausea and vomiting  Endocrine: Negative  Negative for cold intolerance  Genitourinary: Negative  Negative for dysuria, frequency and urgency  Musculoskeletal: Negative  Negative for myalgias and neck pain  Skin: Negative  Negative for rash  Allergic/Immunologic: Negative  Neurological: Positive for numbness (Hands)   Negative for dizziness, tremors, seizures, syncope, facial asymmetry, speech difficulty, weakness, light-headedness and headaches  Hematological: Negative  Does not bruise/bleed easily  Psychiatric/Behavioral: Negative  Negative for confusion, hallucinations and sleep disturbance  All other systems reviewed and are negative  Reviewed ROS as entered by medical assistant  Past Medical History:   Diagnosis Date    Anxiety     Claustrophobia     Colon polyps     Depression     Diabetes mellitus (HCC)     Dyslipidemia     Esophageal polyp     GERD (gastroesophageal reflux disease)     Glaucoma     Headache     Hypertension     Low back pain     Neck pain     Numbness and tingling in left arm     resolved 17 / numbness and tingling left side last assessed 3/23/16    OAB (overactive bladder)     Paroxysmal supraventricular tachycardia (HCC)     Sleep apnea     wears cpap     Squamous cell skin cancer 10/19/2020    Right Nasal Ala, Dr Turcios       Social History     Socioeconomic History    Marital status: /Civil Union     Spouse name: None    Number of children: None    Years of education: None    Highest education level: None   Occupational History    Occupation: retired   Tobacco Use    Smoking status: Former Smoker     Quit date: 1983     Years since quittin 4    Smokeless tobacco: Former User   Vaping Use    Vaping Use: Never used   Substance and Sexual Activity    Alcohol use: Yes     Alcohol/week: 14 0 standard drinks     Types: 14 Shots of liquor per week     Comment: "couple of drinks each night"    Drug use: No    Sexual activity: Not Currently     Partners: Female   Other Topics Concern    None   Social History Narrative    None     Social Determinants of Health     Financial Resource Strain:     Difficulty of Paying Living Expenses:    Food Insecurity:     Worried About Running Out of Food in the Last Year:     Ran Out of Food in the Last Year:    Transportation Needs:     Lack of Transportation (Medical):      Lack of Transportation (Non-Medical):    Physical Activity:     Days of Exercise per Week:     Minutes of Exercise per Session:    Stress:     Feeling of Stress :    Social Connections:     Frequency of Communication with Friends and Family:     Frequency of Social Gatherings with Friends and Family:     Attends Faith Services:     Active Member of Clubs or Organizations:     Attends Club or Organization Meetings:     Marital Status:    Intimate Partner Violence:     Fear of Current or Ex-Partner:     Emotionally Abused:     Physically Abused:     Sexually Abused:          Current Outpatient Medications:     amLODIPine (NORVASC) 10 mg tablet, Take 1 tablet (10 mg total) by mouth daily, Disp: 90 tablet, Rfl: 3    aspirin (ECOTRIN LOW STRENGTH) 81 mg EC tablet, Take 1 tablet (81 mg total) by mouth daily, Disp: 30 tablet, Rfl: 0    glimepiride (AMARYL) 1 mg tablet, Take 3 tablets (3 mg total) by mouth daily with breakfast, Disp: 270 tablet, Rfl: 3    losartan (COZAAR) 100 MG tablet, Take 1 tablet (100 mg total) by mouth daily, Disp: 90 tablet, Rfl: 3    metoprolol succinate (TOPROL-XL) 100 mg 24 hr tablet, Take 1 tablet (100 mg total) by mouth daily, Disp: 90 tablet, Rfl: 3    omeprazole (PriLOSEC) 20 mg delayed release capsule, Take 1 capsule (20 mg total) by mouth daily, Disp: 90 capsule, Rfl: 3    potassium chloride (Klor-Con) 10 mEq tablet, Take 1 tablet (10 mEq total) by mouth 2 (two) times a day, Disp: 60 tablet, Rfl: 10    rosuvastatin (CRESTOR) 20 MG tablet, Take 1 tablet (20 mg total) by mouth daily, Disp: 90 tablet, Rfl: 3    tamsulosin (FLOMAX) 0 4 mg, Take 1 capsule (0 4 mg total) by mouth daily with dinner, Disp: 90 capsule, Rfl: 2    timolol (TIMOPTIC) 0 5 % ophthalmic solution, , Disp: , Rfl:     amoxicillin (AMOXIL) 500 mg capsule, TAKE ONE CAPSULE BY MOUTH EVERY 6 HOURS TILL GONE (Patient not taking: Reported on 6/21/2021), Disp: , Rfl:     citalopram (CeleXA) 10 mg tablet, Take 1 tablet (10 mg total) by mouth daily, Disp: 90 tablet, Rfl: 3    colchicine (COLCRYS) 0 6 mg tablet, Take 1 tablet (0 6 mg total) by mouth 2 (two) times a day (Patient not taking: Reported on 6/21/2021), Disp: 180 tablet, Rfl: 3    lidocaine (LIDODERM) 5 %, Apply 1 patch topically daily Remove & Discard patch within 12 hours or as directed by MD, Disp: 30 patch, Rfl: 0    mupirocin (BACTROBAN) 2 % ointment, Apply topically once daily to the nose  (Patient not taking: Reported on 3/22/2021), Disp: 22 g, Rfl: 2    trospium chloride (SANCTURA) 20 mg tablet, Take 1 tablet (20 mg total) by mouth 2 (two) times a day (Patient not taking: Reported on 6/21/2021), Disp: 180 tablet, Rfl: 3    Allergies   Allergen Reactions    Codeine Hives     Pt does not remember if it was codeine or if the reaction was hives      Sulfa Antibiotics Other (See Comments)     Nausea

## 2021-06-22 ENCOUNTER — TELEPHONE (OUTPATIENT)
Dept: SLEEP CENTER | Facility: CLINIC | Age: 76
End: 2021-06-22

## 2021-07-06 ENCOUNTER — TELEPHONE (OUTPATIENT)
Dept: PAIN MEDICINE | Facility: MEDICAL CENTER | Age: 76
End: 2021-07-06

## 2021-07-06 NOTE — TELEPHONE ENCOUNTER
See below  Pt last seen in May for f/u after right SIJ injection  Pt states pain returned in right LB shortly after this visit  States return may have coincided with him stopping Ibuprofen which he was taking for Pericarditis    States pain is sharp in nature and shooting at times  Would like to repeat injection  Please advise

## 2021-07-06 NOTE — TELEPHONE ENCOUNTER
Please let patient know that he should be seen for evaluation  If he is having pain in his low back, that is different than his sacroiliac joint  My last office note states that we discussed medial branch blocks possibly moving forward to radiofrequency ablation if they are successful  I would need to do a physical exam to see where his pain is being generated from

## 2021-07-06 NOTE — TELEPHONE ENCOUNTER
Patient calling to make physician aware pain has increased 10/10 requesting another procedure  When he saw CRNP in May he was on Ibuprofen       Please advise    Thank you     [phone]

## 2021-07-19 ENCOUNTER — HOSPITAL ENCOUNTER (OUTPATIENT)
Dept: RADIOLOGY | Age: 76
Discharge: HOME | End: 2021-07-19
Payer: MEDICARE

## 2021-07-19 ENCOUNTER — OFFICE VISIT (OUTPATIENT)
Dept: PODIATRY | Facility: CLINIC | Age: 76
End: 2021-07-19
Payer: MEDICARE

## 2021-07-19 VITALS
DIASTOLIC BLOOD PRESSURE: 64 MMHG | SYSTOLIC BLOOD PRESSURE: 123 MMHG | HEIGHT: 67 IN | WEIGHT: 303 LBS | BODY MASS INDEX: 47.56 KG/M2 | HEART RATE: 59 BPM

## 2021-07-19 DIAGNOSIS — R56.9 PARTIAL SEIZURE (HCC): ICD-10-CM

## 2021-07-19 DIAGNOSIS — E66.01 MORBID OBESITY WITH BMI OF 40.0-44.9, ADULT (HCC): ICD-10-CM

## 2021-07-19 DIAGNOSIS — B35.1 ONYCHOMYCOSIS: ICD-10-CM

## 2021-07-19 DIAGNOSIS — E11.42 DIABETIC POLYNEUROPATHY ASSOCIATED WITH TYPE 2 DIABETES MELLITUS (HCC): Primary | ICD-10-CM

## 2021-07-19 PROCEDURE — 99214 OFFICE O/P EST MOD 30 MIN: CPT | Performed by: PODIATRIST

## 2021-07-19 PROCEDURE — 70551 MRI BRAIN STEM W/O DYE: CPT

## 2021-07-19 NOTE — PATIENT INSTRUCTIONS
Foot Care for People with Diabetes   WHAT YOU NEED TO KNOW:   · Foot care helps protect your feet and prevent foot ulcers or sores  Long-term high blood sugar levels can damage the blood vessels and nerves in your legs and feet  This damage makes it hard to feel pressure, pain, temperature, and touch  You may not be able to feel a cut or sore, or shoes that are too tight  Foot care is needed to prevent serious problems, such as an infection or amputation  · Diabetes may cause your toes to become crooked or curved under  These changes may affect the way you walk and can lead to increased pressure on your foot  The pressure can decrease blood flow to your feet  Lack of blood flow increases your risk for a foot ulcer  Do not ignore small problems, such as dry skin or small wounds  These can become life-threatening over time without proper care  DISCHARGE INSTRUCTIONS:   Call your care team provider if:   · Your feet become numb, weak, or hard to move  · You have pus draining from a sore on your foot  · You have a wound on your foot that gets bigger, deeper, or does not heal      · You see blisters, cuts, scratches, calluses, or sores on your foot  · You have a fever, and your feet become red, warm, and swollen  · Your toenails become thick, curled, or yellow  · You find it hard to check your feet because your vision is poor  · You have questions or concerns about your condition or care  Foot care:   · Check your feet each day  Look at your whole foot, including the bottom, and between and under your toes  Check for wounds, corns, and calluses  Use a mirror to see the bottom of your feet  The skin on your feet may be shiny, tight, or darker than normal  Your feet may also be cold and pale  Feel your feet by running your hands along the tops, bottoms, sides, and between your toes  Redness, swelling, and warmth are signs of blood flow problems that can lead to a foot ulcer   Do not try to remove corns or calluses yourself  · Wash your feet each day with soap and warm water  Do not use hot water, because this can injure your foot  Dry your feet gently with a towel after you wash them  Dry between and under your toes  · Apply lotion or a moisturizer on your dry feet  Ask your care team provider what lotions are best to use  Do not put lotion or moisturizer between your toes  Moisture between your toes could lead to skin breakdown  · Cut your toenails correctly  File or cut your toenails straight across  Use a soft brush to clean around your toenails  If your toenails are very thick, you may need to have a care team provider or specialist cut them  · Protect your feet  Do not walk barefoot or wear your shoes without socks  Check your shoes for rocks or other objects that can hurt your feet  Wear cotton socks to help keep your feet dry  Wear socks without toe seams, or wear them with the seams inside out  Change your socks each day  Do not wear socks that are dirty or damp  · Wear shoes that fit well  Wear shoes that do not rub against any area of your feet  Your shoes should be ½ to ¾ inch (1 to 2 centimeters) longer than your feet  Your shoes should also have extra space around the widest part of your feet  Walking or athletic shoes with laces or straps that adjust are best  Ask your care team provider for help to choose shoes that fit you best  Ask him or her if you need to wear an insert, orthotic, or bandage on your feet  · Do not smoke  Smoking can damage your blood vessels and put you at increased risk for foot ulcers  Ask your care team provider for information if you currently smoke and need help to quit  E-cigarettes or smokeless tobacco still contain nicotine  Talk to your care team provider before you use these products  Follow up with your diabetes care team provider or foot specialist as directed:   You will need to have your feet checked at least once a tucker  Citlalli Tomlinson may need a foot exam more often if you have nerve damage, foot deformities, or ulcers  Write down your questions so you remember to ask them during your visits  © Copyright [address] Information is for End User's use only and may not be sold, redistributed or otherwise used for commercial purposes  All illustrations and images included in CareNotes® are the copyrighted property of [employer]  or [facility] Leonel Lindsay   The above information is an  only  It is not intended as medical advice for individual conditions or treatments  Talk to your doctor, nurse or pharmacist before following any medical regimen to see if it is safe and effective for you

## 2021-07-19 NOTE — PROGRESS NOTES
Assessment/Plan:         Diagnoses and all orders for this visit:    Diabetic polyneuropathy associated with type 2 diabetes mellitus ([facility] )    Morbid obesity with BMI of 40 0-44 9, adult ([facility] )    Onychomycosis      -Educated on DM risk to lower extremities, proper shoe gear, and daily monitoring of feet    -Educated on A1C and the risks of poorly controlled Diabetes   -Discussed weight loss and suitable exercise regiment  -Diabetic foot risk category 2 due to neuropathy, edema, trophic changes to skin, paresthesia  Recommend at-risk foot care continue every 9-12 weeks  Proper shoe gear discussed  No acute findings today  (Q9)    Data Reviewed:    Labs: Hemoglobin A1C  Order: [id number]  Status:  Final result   Visible to patient:  Yes ([facility] MyChart)   Next appt: Today at 04:45 PM in Radiology ([facility])   Dx:  Type 2 diabetes mellitus with complic      0 Result Notes   Ref Range & Units 4/12/21  9:41 AM   Hemoglobin A1C Normal 3 8-5 6%; PreDiabetic 5 7-6 4%; Diabetic >=6 5%; Glycemic control for adults with diabetes <7 0% % 5 8High     EAG mg/dl 120          Specimen Collected: 04/12/21  9:41 AM   Last Resulted: 04/12/21  2:23 PM             Other provider documentation: PCP 6/99/88    Risk of Complication:  Moderate (DM neuropathy (Q9)  Subjective:      Patient ID: Yuriy Chu is a 67 y o  male  PAtient arrives for annual DM foot exam  His last A1C was under 6  His medications "finally work"  He states he does not exercise much but did try to change his diet  He does not smoke  He has neuropathy but no acute foot or ankle concerns         The following portions of the patient's history were reviewed and updated as appropriate:   He  has a past medical history of Anxiety, Claustrophobia, Colon polyps, Depression, Diabetes mellitus ([facility] ), Dyslipidemia, Esophageal polyp, GERD (gastroesophageal reflux disease), Glaucoma, Headache, Hypertension, Low back pain, Neck pain, Numbness and tingling in left arm, OAB (overactive bladder), Paroxysmal supraventricular tachycardia ([facility] ), Sleep apnea, and Squamous cell skin cancer (10/19/2020)  He   Patient Active Problem List    Diagnosis Date Noted    Ulnar nerve entrapment 06/21/2021    Facet arthropathy, lumbosacral 05/05/2021    Chronic pain syndrome 03/15/2021    [facility]iliitis ([facility] ) 03/15/2021    Lumbar spondylosis 03/15/2021    Adenomatous polyp of transverse colon 02/23/2021    Muscle spasm 02/15/2021    PND (post-nasal drip) 01/24/2021    Acute idiopathic pericarditis 01/22/2021    Hyperlipidemia 01/10/2021    Chest pain 01/10/2021    Fecal incontinence 03/25/2020    BPH with urinary obstruction 01/23/2020    Onychomycosis 11/25/2019    Urgency incontinence 11/20/2019    Mood disorder ([facility] ) 08/14/2019    Hypokalemia 07/24/2019    Palpitation 02/07/2019    Palpitations 09/17/2018    Obstructive sleep apnea syndrome 06/04/2018    Morbid obesity with BMI of 40 0-44 9, adult ([facility] ) 06/04/2018    Mood disturbance 06/04/2018    Gastroesophageal reflux disease 06/04/2018    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    Type 2 diabetes mellitus with complication ([facility] ) 21/74/1798    Essential hypertension 01/29/2018    Peripheral neuropathy 01/29/2018    Chronic right-sided low back pain without sciatica      He  has a past surgical history that includes Cataract extraction (Bilateral); Knee arthroscopy (Bilateral); Lumbar spine surgery; Excision basal cell carcinoma; Total knee arthroplasty (Left); Replacement total knee (Right, 04/2017); Replacement total knee (Left, 03/2013); Anoscopy; Rectal biopsy; Mouth surgery; pr esophagogastroduodenoscopy transoral diagnostic (N/A, 2/13/2019); pr colonoscopy flx dx w/collj spec when pfrmd (N/A, 2/13/2019); Mohs surgery (11/11/2020); and orthopedic surgery    His family history includes Alzheimer's disease in his mother; Diabetes in his mother and paternal grandmother; Esophageal cancer in his paternal grandfather; Pauly Pixonic Hypertension in his father  He  reports that he quit smoking about 38 years ago  He has quit using smokeless tobacco  He reports current alcohol use of about 14 0 standard drinks of alcohol per week  He reports that he does not use drugs  Current Outpatient Medications   Medication Sig Dispense Refill    amLODIPine (NORVASC) 10 mg tablet Take 1 tablet (10 mg total) by mouth daily 90 tablet 3    amoxicillin (AMOXIL) 500 mg capsule TAKE ONE CAPSULE BY MOUTH EVERY 6 HOURS TILL GONE      aspirin (ECOTRIN LOW STRENGTH) 81 mg EC tablet Take 1 tablet (81 mg total) by mouth daily 30 tablet 0    citalopram (CeleXA) 10 mg tablet Take 1 tablet (10 mg total) by mouth daily 90 tablet 3    colchicine (COLCRYS) 0 6 mg tablet Take 1 tablet (0 6 mg total) by mouth 2 (two) times a day 180 tablet 3    glimepiride (AMARYL) 1 mg tablet Take 3 tablets (3 mg total) by mouth daily with breakfast 270 tablet 3    LORazepam (ATIVAN) 0 5 mg tablet 2 tabs one hour before MRI, can take 1 additional IF needed at time of MRI, NO DRIVING until following day  3 tablet 0    losartan (COZAAR) 100 MG tablet Take 1 tablet (100 mg total) by mouth daily 90 tablet 3    metoprolol succinate (TOPROL-XL) 100 mg 24 hr tablet Take 1 tablet (100 mg total) by mouth daily 90 tablet 3    mupirocin (BACTROBAN) 2 % ointment Apply topically once daily to the nose   22 g 2    omeprazole (PriLOSEC) 20 mg delayed release capsule Take 1 capsule (20 mg total) by mouth daily 90 capsule 3    potassium chloride (Klor-Con) 10 mEq tablet Take 1 tablet (10 mEq total) by mouth 2 (two) times a day 60 tablet 10    rosuvastatin (CRESTOR) 20 MG tablet Take 1 tablet (20 mg total) by mouth daily 90 tablet 3    tamsulosin (FLOMAX) 0 4 mg Take 1 capsule (0 4 mg total) by mouth daily with dinner 90 capsule 2    timolol (TIMOPTIC) 0 5 % ophthalmic solution       trospium chloride (SANCTURA) 20 mg tablet Take 1 tablet (20 mg total) by mouth 2 (two) times a day 180 tablet 3    lidocaine (LIDODERM) 5 % Apply 1 patch topically daily Remove & Discard patch within 12 hours or as directed by MD 30 patch 0     No current facility-administered medications for this visit  Current Outpatient Medications on File Prior to Visit   Medication Sig    amLODIPine (NORVASC) 10 mg tablet Take 1 tablet (10 mg total) by mouth daily    amoxicillin (AMOXIL) 500 mg capsule TAKE ONE CAPSULE BY MOUTH EVERY 6 HOURS TILL GONE    aspirin (ECOTRIN LOW STRENGTH) 81 mg EC tablet Take 1 tablet (81 mg total) by mouth daily    citalopram (CeleXA) 10 mg tablet Take 1 tablet (10 mg total) by mouth daily    colchicine (COLCRYS) 0 6 mg tablet Take 1 tablet (0 6 mg total) by mouth 2 (two) times a day    glimepiride (AMARYL) 1 mg tablet Take 3 tablets (3 mg total) by mouth daily with breakfast    LORazepam (ATIVAN) 0 5 mg tablet 2 tabs one hour before MRI, can take 1 additional IF needed at time of MRI, NO DRIVING until following day   losartan (COZAAR) 100 MG tablet Take 1 tablet (100 mg total) by mouth daily    metoprolol succinate (TOPROL-XL) 100 mg 24 hr tablet Take 1 tablet (100 mg total) by mouth daily    mupirocin (BACTROBAN) 2 % ointment Apply topically once daily to the nose      omeprazole (PriLOSEC) 20 mg delayed release capsule Take 1 capsule (20 mg total) by mouth daily    potassium chloride (Klor-Con) 10 mEq tablet Take 1 tablet (10 mEq total) by mouth 2 (two) times a day    rosuvastatin (CRESTOR) 20 MG tablet Take 1 tablet (20 mg total) by mouth daily    tamsulosin (FLOMAX) 0 4 mg Take 1 capsule (0 4 mg total) by mouth daily with dinner    timolol (TIMOPTIC) 0 5 % ophthalmic solution     trospium chloride (SANCTURA) 20 mg tablet Take 1 tablet (20 mg total) by mouth 2 (two) times a day    lidocaine (LIDODERM) 5 % Apply 1 patch topically daily Remove & Discard patch within 12 hours or as directed by MD     No current facility-administered medications on file prior to visit  He is allergic to codeine and sulfa antibiotics       Review of Systems   Constitutional: Negative  HENT: Negative for sinus pressure and sinus pain  Respiratory: Negative for cough and shortness of breath  Cardiovascular: Positive for leg swelling  Negative for chest pain  Gastrointestinal: Negative for diarrhea, nausea and vomiting  Musculoskeletal: Negative for arthralgias, gait problem, joint swelling, myalgias and neck pain  Skin: Negative for color change, rash and wound  Neurological: Positive for numbness  Negative for weakness  Psychiatric/Behavioral: The patient is not nervous/anxious  Objective:      /64   Pulse 59   Ht 5' 7" (1 702 m) Comment: verbal  Wt (!) 137 kg (303 lb)   BMI 47 46 kg/m²     Hemoglobin A1C  Order: [id number]  Status:  Final result   Visible to patient:  Yes ([facility])   Next appt: Today at 04:45 PM in Radiology ([facility])   Dx:  Type 2 diabetes mellitus with complic      0 Result Notes   Ref Range & Units 4/12/21  9:41 AM   Hemoglobin A1C Normal 3 8-5 6%; PreDiabetic 5 7-6 4%; Diabetic >=6 5%; Glycemic control for adults with diabetes <7 0% % 5 8High     EAG mg/dl 120          Specimen Collected: 04/12/21  9:41 AM   Last Resulted: 04/12/21  2:23 PM                Physical Exam  Vitals reviewed  Constitutional:       Appearance: He is obese  He is not ill-appearing or diaphoretic  HENT:      Nose: No congestion or rhinorrhea  Cardiovascular:      Rate and Rhythm: Normal rate  Pulses: Normal pulses  no weak pulses          Dorsalis pedis pulses are 2+ on the right side and 2+ on the left side  Posterior tibial pulses are 2+ on the right side and 2+ on the left side  Pulmonary:      Effort: Pulmonary effort is normal  No respiratory distress  Musculoskeletal:      Right lower leg: Edema present  Left lower leg: Edema present  Right foot: Decreased range of motion  Deformity (hammertoe 4,5 b/l) present  No bunion, Charcot foot, foot drop or prominent metatarsal heads  Left foot: Decreased range of motion  Deformity present  No bunion, Charcot foot, foot drop or prominent metatarsal heads  Feet:      Right foot:      Protective Sensation: 10 sites tested  7 sites sensed  Skin integrity: Dry skin present  No ulcer, blister, skin breakdown, erythema, warmth, callus or fissure  Toenail Condition: Right toenails are abnormally thick  Fungal disease present  Left foot:      Protective Sensation: 10 sites tested  7 sites sensed  Skin integrity: Dry skin present  No ulcer, blister, skin breakdown, erythema, warmth, callus or fissure  Toenail Condition: Left toenails are abnormally thick  Fungal disease present  Skin:     Capillary Refill: Capillary refill takes less than 2 seconds  Findings: No bruising, erythema or rash  Neurological:      Mental Status: He is alert and oriented to person, place, and time  Sensory: Sensory deficit present  Motor: No weakness  Gait: Gait normal    Psychiatric:         Mood and Affect: Mood normal        Diabetic Foot Exam    Right Foot/Ankle   Right Foot Inspection  Skin Exam: skin intact and dry skin no warmth, no blister, no callus, no erythema, no maceration, no ulcer and no callus                            Sensory   Vibration: diminished  Proprioception: diminished   Monofilament testing: diminished  Vascular  Capillary refills: < 3 seconds  The right DP pulse is 2+  The right PT pulse is 2+     Right Toe  - Comprehensive Exam  Arch: pes planus  Hammertoes: fifth toe and fourth toe  Hallux valgus: no    Left Foot/Ankle  Left Foot Inspection  Skin Exam: skin intact and dry skinno warmth, no erythema, no maceration, no ulcer and no callus                                         Sensory   Vibration: diminished  Proprioception: diminished  Monofilament: diminished  Vascular  Capillary refills: < 3 seconds  The left DP pulse is 2+  The left PT pulse is 2+  Left Toe  - Comprehensive Exam  Arch: pes planus  Hammertoes: fourth toe and fifth toe  Hallux valgus: no  Assign Risk Category:  Deformity present; Loss of protective sensation;  No weak pulses       Risk: 2

## 2021-07-28 ENCOUNTER — OFFICE VISIT (OUTPATIENT)
Dept: PAIN MEDICINE | Facility: MEDICAL CENTER | Age: 76
End: 2021-07-28
Payer: MEDICARE

## 2021-07-28 VITALS
DIASTOLIC BLOOD PRESSURE: 73 MMHG | BODY MASS INDEX: 47.4 KG/M2 | HEART RATE: 61 BPM | HEIGHT: 67 IN | WEIGHT: 302 LBS | SYSTOLIC BLOOD PRESSURE: 143 MMHG

## 2021-07-28 DIAGNOSIS — G89.4 CHRONIC PAIN SYNDROME: Primary | ICD-10-CM

## 2021-07-28 DIAGNOSIS — M46.1 SACROILIITIS (HCC): ICD-10-CM

## 2021-07-28 PROCEDURE — 99214 OFFICE O/P EST MOD 30 MIN: CPT | Performed by: NURSE PRACTITIONER

## 2021-07-28 NOTE — PROGRESS NOTES
Assessment  1  Chronic pain syndrome    2  Sacroiliitis ([facility] )        Plan  The patient was seen in the office today for follow-up of returning sacroiliac joint pain  He had a right sacroiliac joint injection on 03/22/2021 that resolved his pain, however now his pain has returned and it is also on his left side  Physical exam also still shows positive facet loading on both sides, with the worst being on the right side  Patient continues to not want to pursue medial branch blocks at this time  Therefore I feel the following plan is appropriate based on physical findings:     1  Schedule bilateral sacroiliac joint injections under fluoroscopy  2  Follow-up in office 4 weeks after injection or sooner if warranted  Complete risks and benefits including bleeding, infection, tissue reaction, nerve injury and allergic reaction were discussed  The approach was demonstrated using models and literature was provided  Verbal and written consent was obtained  My impressions and treatment recommendations were discussed in detail with the patient who verbalized understanding and had no further questions  Discharge instructions were provided  I personally saw and examined the patient and I agree with the above discussed plan of care  Orders Placed This Encounter   Procedures    FL spine and pain procedure     Dr Elidia Sterling     Standing Status:   Future     Standing Expiration Date:   7/28/2025     Order Specific Question:   Reason for Exam:     Answer:   B/L SIJ injection under fluoroscopy     Order Specific Question:   Anticoagulant hold needed? Answer:   No     No orders of the defined types were placed in this encounter  History of Present Illness    Lauren Quiroga is a 67 y o  male presents to the office with a pain score of 8/10 today he states that his pain is intermittent and worse in the evening  He describes the quality as sharp and shooting    He states that the pain is in his low back and upper buttock area  It does not radiate down his legs  He is requesting sacroiliac joint injections on both sides this time instead of just his right  He states that he has difficulty walking long distances due to pain becoming so severe he gets short of breath  He also cannot stand for long periods of time without severe pain  I have personally reviewed and/or updated the patient's past medical history, past surgical history, family history, social history, current medications, allergies, and vital signs today  Review of Systems   Respiratory: Positive for shortness of breath  Cardiovascular: Negative for chest pain  Gastrointestinal: Negative for constipation, diarrhea, nausea and vomiting  Musculoskeletal: Positive for back pain and gait problem  Negative for arthralgias, joint swelling and myalgias  Skin: Negative for rash  Neurological: Negative for dizziness, seizures and weakness  Psychiatric/Behavioral: Positive for confusion  All other systems reviewed and are negative        Patient Active Problem List   Diagnosis    Chronic right-sided low back pain without sciatica    [facility] mellitus [facility] complication ([facility]    Essential hypertension    Peripheral neuropathy    Isolated proteinuria with morphologic lesion    Obstructive sleep apnea syndrome    Morbid obesity with BMI of 40 0-44 9, adult ([facility])    Mood disturbance    Gastroesophageal reflux disease    Palpitations    Palpitation    Hypokalemia    Mood disorder ([facility])    Urgency incontinence    Onychomycosis    BPH with urinary obstruction    Fecal incontinence    Hyperlipidemia    Chest pain    Acute idiopathic pericarditis    PND (post-nasal drip)    Muscle spasm    Adenomatous polyp of transverse colon    Chronic pain syndrome    Sacroiliitis ([facility]    Lumbar spondylosis    Facet arthropathy, lumbosacral    Ulnar nerve entrapment       Past Medical History:   Diagnosis Date    Anxiety     Claustrophobia     Colon polyps     Depression     Diabetes mellitus (HCC)     Dyslipidemia     Esophageal polyp     GERD (gastroesophageal reflux disease)     Glaucoma     Headache     Hypertension     Low back pain     Neck pain     Numbness and tingling in left arm     resolved 17 / numbness and tingling left side last assessed 3/23/16    OAB (overactive bladder)     Paroxysmal supraventricular tachycardia (HCC)     Sleep apnea     wears cpap     Squamous cell skin cancer 10/19/2020    Right Nasal Ala, Dr Turcios       Past Surgical History:   Procedure Laterality Date    ANOSCOPY      for polyp removal    BASAL CELL CARCINOMA EXCISION      right cheek    CATARACT EXTRACTION Bilateral     KNEE ARTHROSCOPY Bilateral     LUMBAR SPINE SURGERY      30years ago    MOHS SURGERY  2020    [facility] Right Nasal Ala, Dr Dilip Nicholson      tooth extraction    ORTHOPEDIC SURGERY      MT COLONOSCOPY FLX DX W/COLLJ SPEC WHEN PFRMD N/A 2019    Procedure: COLONOSCOPY;  Surgeon: Faina Patel MD;  [facility]; Service: Gastroenterology    MT ESOPHAGOGASTRODUODENOSCOPY TRANSORAL DIAGNOSTIC N/A 2019    Procedure: ESOPHAGOGASTRODUODENOSCOPY (EGD); Surgeon: Faina Patel MD;  [facility]: [facility]; Service: Gastroenterology    RECTAL BIOPSY      REPLACEMENT TOTAL KNEE Right 2017    REPLACEMENT TOTAL KNEE Left 2013    TOTAL KNEE ARTHROPLASTY Left        Family History   Problem Relation Age of Onset    Alzheimer's disease Mother     Diabetes Mother    [place] Hypertension Father     Diabetes Paternal [de-identified]     Esophageal cancer Paternal Grandfather        Social History     Occupational History    Occupation: retired   Tobacco Use    Smoking status: Former Smoker     Quit date: 1983     Years since quittin 5    Smokeless tobacco: Former User   Vaping Use    Vaping Use: Never used   Substance and Sexual Activity    Alcohol use:  Yes Alcohol/week: 14 0 standard drinks     Types: 14 Shots of liquor per week     Comment: "couple of drinks each night"    Drug use: No    Sexual activity: Not Currently     Partners: Female       Current Outpatient Medications on File Prior to Visit   Medication Sig    amLODIPine (NORVASC) 10 mg tablet Take 1 tablet (10 mg total) by mouth daily    amoxicillin (AMOXIL) 500 mg capsule TAKE ONE CAPSULE BY MOUTH EVERY 6 HOURS TILL GONE    aspirin (ECOTRIN LOW STRENGTH) 81 mg EC tablet Take 1 tablet (81 mg total) by mouth daily    citalopram (CeleXA) 10 mg tablet Take 1 tablet (10 mg total) by mouth daily    colchicine (COLCRYS) 0 6 mg tablet Take 1 tablet (0 6 mg total) by mouth 2 (two) times a day    glimepiride (AMARYL) 1 mg tablet Take 3 tablets (3 mg total) by mouth daily with breakfast    LORazepam (ATIVAN) 0 5 mg tablet 2 tabs one hour before MRI, can take 1 additional IF needed at time of MRI, NO DRIVING until following day   losartan (COZAAR) 100 MG tablet Take 1 tablet (100 mg total) by mouth daily    metoprolol succinate (TOPROL-XL) 100 mg 24 hr tablet Take 1 tablet (100 mg total) by mouth daily    mupirocin (BACTROBAN) 2 % ointment Apply topically once daily to the nose   omeprazole (PriLOSEC) 20 mg delayed release capsule Take 1 capsule (20 mg total) by mouth daily    potassium chloride (Klor-Con) 10 mEq tablet Take 1 tablet (10 mEq total) by mouth 2 (two) times a day    rosuvastatin (CRESTOR) 20 MG tablet Take 1 tablet (20 mg total) by mouth daily    tamsulosin (FLOMAX) 0 4 mg Take 1 capsule (0 4 mg total) by mouth daily with dinner    timolol (TIMOPTIC) 0 5 % ophthalmic solution     trospium chloride (SANCTURA) 20 mg tablet Take 1 tablet (20 mg total) by mouth 2 (two) times a day    lidocaine (LIDODERM) 5 % Apply 1 patch topically daily Remove & Discard patch within 12 hours or as directed by MD     No current facility-administered medications on file prior to visit         Allergies Allergen Reactions    Codeine Hives     Pt does not remember if it was codeine or if the reaction was hives   Sulfa Antibiotics Other (See Comments)     Nausea         Physical Exam    /73   Pulse 61   Ht 5' 7" (1 702 m)   Wt (!) 137 kg (302 lb)   BMI 47 30 kg/m²     Constitutional: obese  Eyes: anicteric  HEENT: grossly intact  Neck: supple, symmetric, trachea midline and no masses   Pulmonary:even and unlabored  Cardiovascular:No edema or pitting edema present  Skin:Normal without rashes or lesions and well hydrated  Psychiatric:Mood and affect appropriate  Neurologic:Cranial Nerves II-XII grossly intact  Musculoskeletal:antalgic   Lumbar Spine Exam    Appearance:  Normal lordosis  Palpation/Tenderness:  left lumbar paraspinal tenderness  right lumbar paraspinal tenderness  left sacroiliac joint tenderness  right sacroiliac joint tenderness  Sensory:  no sensory deficits noted  Range of Motion:  Flexion:   Moderately limited  with pain  Extension:  Moderately limited  with pain  Rotation - Left:  Moderately limited  with pain  Rotation - Right:  Severely limited  with pain  Special Tests:  Left Isaiah's Maneuver:  positive  Right Isaiah's Maneuver:  positive  Left Gaenslen's Test:  positive  Left Pelvic Distraction Test:  positive  Right Pelvic Distraction Test:  positive      Imaging

## 2021-08-04 ENCOUNTER — HOSPITAL ENCOUNTER (OUTPATIENT)
Dept: NEUROLOGY | Facility: CLINIC | Age: 76
Discharge: HOME | End: 2021-08-04
Payer: MEDICARE

## 2021-08-04 DIAGNOSIS — R56.9 PARTIAL SEIZURE (HCC): ICD-10-CM

## 2021-08-04 PROCEDURE — 95816 EEG AWAKE AND DROWSY: CPT | Performed by: STUDENT IN AN ORGANIZED HEALTH CARE EDUCATION/TRAINING PROGRAM

## 2021-08-04 PROCEDURE — 95816 EEG AWAKE AND DROWSY: CPT

## 2021-08-13 ENCOUNTER — APPOINTMENT (OUTPATIENT)
Dept: LAB | Age: 76
End: 2021-08-13
Payer: MEDICARE

## 2021-08-13 DIAGNOSIS — E11.8 TYPE 2 DIABETES MELLITUS WITH COMPLICATION (HCC): ICD-10-CM

## 2021-08-13 LAB
ALBUMIN SERPL BCP-MCNC: 3.2 G/DL (ref 3.5–5)
ALP SERPL-CCNC: 151 U/L (ref 46–116)
ALT SERPL W P-5'-P-CCNC: 27 U/L (ref 12–78)
ANION GAP SERPL CALCULATED.3IONS-SCNC: 2 MMOL/L (ref 4–13)
AST SERPL W P-5'-P-CCNC: 18 U/L (ref 5–45)
BILIRUB SERPL-MCNC: 0.31 MG/DL (ref 0.2–1)
BUN SERPL-MCNC: 10 MG/DL (ref 5–25)
CALCIUM ALBUM COR SERPL-MCNC: 9.5 MG/DL (ref 8.3–10.1)
CALCIUM SERPL-MCNC: 8.9 MG/DL (ref 8.3–10.1)
CHLORIDE SERPL-SCNC: 108 MMOL/L (ref 100–108)
CO2 SERPL-SCNC: 28 MMOL/L (ref 21–32)
CREAT SERPL-MCNC: 1.16 MG/DL (ref 0.6–1.3)
EST. AVERAGE GLUCOSE BLD GHB EST-MCNC: 143 MG/DL
GFR SERPL CREATININE-BSD FRML MDRD: 60 ML/MIN/1.73SQ M
GLUCOSE P FAST SERPL-MCNC: 119 MG/DL (ref 65–99)
HBA1C MFR BLD: 6.6 %
POTASSIUM SERPL-SCNC: 4.7 MMOL/L (ref 3.5–5.3)
PROT SERPL-MCNC: 7.9 G/DL (ref 6.4–8.2)
SODIUM SERPL-SCNC: 138 MMOL/L (ref 136–145)

## 2021-08-13 PROCEDURE — 36415 COLL VENOUS BLD VENIPUNCTURE: CPT

## 2021-08-13 PROCEDURE — 80053 COMPREHEN METABOLIC PANEL: CPT

## 2021-08-13 PROCEDURE — 83036 HEMOGLOBIN GLYCOSYLATED A1C: CPT

## 2021-08-16 ENCOUNTER — OFFICE VISIT (OUTPATIENT)
Dept: INTERNAL MEDICINE CLINIC | Facility: CLINIC | Age: 76
End: 2021-08-16
Payer: MEDICARE

## 2021-08-16 VITALS
SYSTOLIC BLOOD PRESSURE: 134 MMHG | BODY MASS INDEX: 47.68 KG/M2 | HEIGHT: 67 IN | DIASTOLIC BLOOD PRESSURE: 78 MMHG | HEART RATE: 63 BPM | OXYGEN SATURATION: 97 % | WEIGHT: 303.8 LBS | TEMPERATURE: 97.4 F

## 2021-08-16 DIAGNOSIS — R06.02 SOB (SHORTNESS OF BREATH): Primary | ICD-10-CM

## 2021-08-16 DIAGNOSIS — D64.9 ANEMIA, UNSPECIFIED TYPE: ICD-10-CM

## 2021-08-16 DIAGNOSIS — G89.4 CHRONIC PAIN SYNDROME: ICD-10-CM

## 2021-08-16 DIAGNOSIS — G47.33 OBSTRUCTIVE SLEEP APNEA SYNDROME: ICD-10-CM

## 2021-08-16 DIAGNOSIS — E11.8 TYPE 2 DIABETES MELLITUS WITH COMPLICATION (HCC): ICD-10-CM

## 2021-08-16 DIAGNOSIS — I51.89 DIASTOLIC DYSFUNCTION: ICD-10-CM

## 2021-08-16 DIAGNOSIS — I10 ESSENTIAL HYPERTENSION: ICD-10-CM

## 2021-08-16 PROBLEM — R09.82 PND (POST-NASAL DRIP): Status: RESOLVED | Noted: 2021-01-24 | Resolved: 2021-08-16

## 2021-08-16 PROBLEM — M62.838 MUSCLE SPASM: Status: RESOLVED | Noted: 2021-02-15 | Resolved: 2021-08-16

## 2021-08-16 PROBLEM — R00.2 PALPITATIONS: Status: RESOLVED | Noted: 2018-09-17 | Resolved: 2021-08-16

## 2021-08-16 PROBLEM — R00.2 PALPITATION: Status: RESOLVED | Noted: 2019-02-07 | Resolved: 2021-08-16

## 2021-08-16 PROCEDURE — 99215 OFFICE O/P EST HI 40 MIN: CPT | Performed by: INTERNAL MEDICINE

## 2021-08-16 NOTE — ASSESSMENT & PLAN NOTE
Mild depression and mood disorder symptoms reviewed with the patient they remain well controlled on Celexa 10 mg daily continue present therapy is recommended

## 2021-08-16 NOTE — ASSESSMENT & PLAN NOTE
Hypertension in this gentleman remains under adequate control blood pressure today is 134/78 recommend the continuation of his Cozaar at 100 mg daily and metoprolol succinate at 100 mg daily and 10 mg of amlodipine daily reports no signs or symptoms of uncontrolled hypertension or hypotension

## 2021-08-16 NOTE — PROGRESS NOTES
Assessment/Plan:    Type 2 diabetes mellitus with complication ([facility] [address] )    I reviewed the patient's type 2 diabetes with him today shows a hemoglobin A1c of 6 6% and his fasting blood sugar is noted to be in a acceptable range with a reading of 119  He has had no hypoglycemic episodes recommend continuation of glimepiride 3 mg daily with breakfast follow-up testing should be performed in 4 months  Lab Results   Component Value Date    HGBA1C 6 6 (H) 08/13/2021       Obstructive sleep apnea syndrome    We reviewed the patient's diagnosis of sleep apnea and the recall of his develops based sleep apnea pump  Fortunately he has an older machine that he still has at home and can continue to utilize this machine while awaiting replacement for his [employer] machine  Essential hypertension    Hypertension in this gentleman remains under adequate control blood pressure today is 134/78 recommend the continuation of his Cozaar at 100 mg daily and metoprolol succinate at 100 mg daily and 10 mg of amlodipine daily reports no signs or symptoms of uncontrolled hypertension or hypotension  SOB (shortness of breath)    Shortness of breath reported by the patient today on minimal exertion not accompanied by any chest discomfort  Previous cardiac catheterization reviewed no evidence of critical blockages of the coronary arteries several months ago  He does have a history of acute pericarditis earlier this year this was idiopathic  I have ordered an echocardiogram to update cardiac performance also requested a CBC to evaluate for anemia and a chest x-ray to evaluate for pulmonary causes  His oxygen saturation on room air is 97%  It is possible that his shortness of breath may be simply exercise intolerance due to his obesity  I have scheduled a follow-up visit with the patient after his studies are performed  Chronic pain syndrome    He continues under the care of Pain Management for management of his chronic low back pain  He will have a sacral iliac injection on both sides in the near future  Recommend continued management of chronic pain through pain management  Mood disturbance (HCC)    Mild depression and mood disorder symptoms reviewed with the patient they remain well controlled on Celexa 10 mg daily continue present therapy is recommended       Diagnoses and all orders for this visit:    SOB (shortness of breath)  -     XR chest pa & lateral; Future    Diastolic dysfunction  -     Echo complete with contrast if indicated; Future    Anemia, unspecified type  -     CBC and differential; Future    Type 2 diabetes mellitus with complication (HCC)    Obstructive sleep apnea syndrome    Essential hypertension    Chronic pain syndrome        Subjective:      Patient ID: Shalom Oneal is a 67 y o  male  This 27-year-old gentleman presents today in the company of his son for a evaluation of his persistent shortness of breath  He has very little physical  Activity tolerance before he becomes short of breath  He has had no chest pains associated with the shortness of breath in fact he had a cardiac catheterization several months ago which did not reveal any significant coronary artery obstructions  He does have a history of pericarditis of which was treated during hospitalization earlier this year  Have any of the similar symptoms that he experienced with the pericarditis event  Indicated to the patient that shortness of breath can be associated with several issues such as underlying lung disease,  heart disease, and anemia  He did have a echocardiogram performed during the course of management of his pericarditis which did show a grade 1 diastolic dysfunction  The patient has a CPAP machine that has been recalled  He has an old machine that he is currently using  He understands the importance of continue with CPAP treatment      He has a history of chronic low back pain he is currently under the care of pain management  He will be receiving  Sacral iliac injections in the near future  There was also discussion regarding possible trial injections in the spine in which if they are successful of blocking pain consideration for radiofrequency ablation of nerves that are causing his discomfort  He continues on pain medication prescribed by his pain management physician  The following portions of the patient's history were reviewed and updated as appropriate:   He  has a past medical history of Anxiety, Claustrophobia, Colon polyps, Depression, Diabetes mellitus ([facility] [address] ), Dyslipidemia, Esophageal polyp, GERD (gastroesophageal reflux disease), Glaucoma, Headache, Hypertension, Low back pain, Neck pain, Numbness and tingling in left arm, OAB (overactive bladder), Paroxysmal supraventricular tachycardia ([address] ), Sleep apnea, and Squamous cell skin cancer (10/19/2020)    He   Patient Active Problem List    Diagnosis Date Noted    SOB (shortness of breath) 08/16/2021    Partial seizure ([facility] [address] )     Ulnar nerve entrapment 06/21/2021    Facet arthropathy, lumbosacral 05/05/2021    Chronic pain syndrome 03/15/2021    Sacroiliitis ([facility] [address] ) 03/15/2021    Lumbar spondylosis 03/15/2021    Adenomatous polyp of transverse colon 02/23/2021    Acute idiopathic pericarditis 01/22/2021    Hyperlipidemia 01/10/2021    Chest pain 01/10/2021    Fecal incontinence 03/25/2020    BPH with urinary obstruction 01/23/2020    Onychomycosis 11/25/2019    Urgency incontinence 11/20/2019    Mood disorder ([facility] [address] ) 08/14/2019    Hypokalemia 07/24/2019    Obstructive sleep apnea syndrome 06/04/2018    Morbid obesity with BMI of 40 0-44 9, adult ([address] ) 06/04/2018    Mood disturbance 06/04/2018    Gastroesophageal reflux disease 06/04/2018    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    Type 2 diabetes mellitus with complication ([address] ) 70/85/5650    Essential hypertension 01/29/2018    Peripheral neuropathy 01/29/2018    Chronic right-sided low back pain without sciatica      He  has a past surgical history that includes Cataract extraction (Bilateral); Knee arthroscopy (Bilateral); Lumbar spine surgery; Excision basal cell carcinoma; Total knee arthroplasty (Left); Replacement total knee (Right, 04/2017); Replacement total knee (Left, 03/2013); Anoscopy; Rectal biopsy; Mouth surgery; pr esophagogastroduodenoscopy transoral diagnostic (N/A, 2/13/2019); pr colonoscopy flx dx w/collj spec when pfrmd (N/A, 2/13/2019); Mohs surgery (11/11/2020); and orthopedic surgery  His family history includes Alzheimer's disease in his mother; Diabetes in his mother and paternal grandmother; Esophageal cancer in his paternal grandfather; Gwenette Kasey Hypertension in his father  He  reports that he quit smoking about 38 years ago  He has quit using smokeless tobacco  He reports current alcohol use of about 14 0 standard drinks of alcohol per week  He reports that he does not use drugs  Current Outpatient Medications   Medication Sig Dispense Refill    amLODIPine (NORVASC) 10 mg tablet Take 1 tablet (10 mg total) by mouth daily 90 tablet 3    amoxicillin (AMOXIL) 500 mg capsule TAKE ONE CAPSULE BY MOUTH EVERY 6 HOURS TILL GONE      aspirin (ECOTRIN LOW STRENGTH) 81 mg EC tablet Take 1 tablet (81 mg total) by mouth daily 30 tablet 0    citalopram (CeleXA) 10 mg tablet Take 1 tablet (10 mg total) by mouth daily 90 tablet 3    glimepiride (AMARYL) 1 mg tablet Take 3 tablets (3 mg total) by mouth daily with breakfast 270 tablet 3    losartan (COZAAR) 100 MG tablet Take 1 tablet (100 mg total) by mouth daily 90 tablet 3    metoprolol succinate (TOPROL-XL) 100 mg 24 hr tablet Take 1 tablet (100 mg total) by mouth daily 90 tablet 3    mupirocin (BACTROBAN) 2 % ointment Apply topically once daily to the nose   22 g 2    omeprazole (PriLOSEC) 20 mg delayed release capsule Take 1 capsule (20 mg total) by mouth daily 90 capsule 3    rosuvastatin (CRESTOR) 20 MG tablet Take 1 tablet (20 mg total) by mouth daily 90 tablet 3    tamsulosin (FLOMAX) 0 4 mg Take 1 capsule (0 4 mg total) by mouth daily with dinner 90 capsule 2    timolol (TIMOPTIC) 0 5 % ophthalmic solution       trospium chloride (SANCTURA) 20 mg tablet Take 1 tablet (20 mg total) by mouth 2 (two) times a day 180 tablet 3    lidocaine (LIDODERM) 5 % Apply 1 patch topically daily Remove & Discard patch within 12 hours or as directed by MD 30 patch 0     No current facility-administered medications for this visit       Review of Systems   Constitutional: Positive for fatigue  Respiratory: Positive for shortness of breath  Musculoskeletal: Positive for back pain and gait problem  All other systems reviewed and are negative  Objective:      /78   Pulse 63   Temp (!) 97 4 °F (36 3 °C) (Skin)   Ht 5' 7" (1 702 m)   Wt (!) 138 kg (303 lb 12 8 oz)   SpO2 97%   BMI 47 58 kg/m²          Physical Exam  Constitutional:       Appearance: He is well-developed  He is obese  HENT:      Head: Normocephalic  Right Ear: Hearing and external ear normal       Left Ear: Hearing and external ear normal       Nose: Nose normal    Eyes:      Conjunctiva/sclera: Conjunctivae normal       Pupils: Pupils are equal, round, and reactive to light  Neck:      Thyroid: No thyromegaly  Cardiovascular:      Rate and Rhythm: Normal rate and regular rhythm  Heart sounds: Normal heart sounds, S1 normal and S2 normal  No murmur heard  Pulmonary:      Effort: Pulmonary effort is normal  No respiratory distress  Breath sounds: Normal breath sounds  No wheezing, rhonchi or rales  Abdominal:      General: Bowel sounds are normal       Palpations: Abdomen is soft  Tenderness: There is no abdominal tenderness  Musculoskeletal:         General: Normal range of motion  Right lower leg: Edema present  Left lower leg: Edema present        Comments: Trace bilateral edema Lymphadenopathy:      Cervical: No cervical adenopathy  Skin:     General: Skin is warm and dry  Coloration: Skin is not jaundiced or pale  Neurological:      Mental Status: He is alert and oriented to person, place, and time  Mental status is at baseline  Deep Tendon Reflexes: Reflexes are normal and symmetric  Reflexes normal    Psychiatric:         Mood and Affect: Mood normal          Behavior: Behavior normal          Thought Content: Thought content normal          Judgment: Judgment normal        BMI Counseling: Body mass index is 47 58 kg/m²  The BMI is above normal  Nutrition recommendations include reducing portion sizes, decreasing overall calorie intake, moderation in carbohydrate intake and reducing intake of saturated fat and trans fat

## 2021-08-16 NOTE — ASSESSMENT & PLAN NOTE
I reviewed the patient's type 2 diabetes with him today shows a hemoglobin A1c of 6 6% and his fasting blood sugar is noted to be in a acceptable range with a reading of 119  He has had no hypoglycemic episodes recommend continuation of glimepiride 3 mg daily with breakfast follow-up testing should be performed in 4 months    Lab Results   Component Value Date    HGBA1C 6 6 (H) 08/13/2021

## 2021-08-16 NOTE — ASSESSMENT & PLAN NOTE
Eliane approved Celexa 07/31/19 - 10/28/2020.   We reviewed the patient's diagnosis of sleep apnea and the recall of his develops based sleep apnea pump  Fortunately he has an older machine that he still has at home and can continue to utilize this machine while awaiting replacement for his [place] machine

## 2021-08-16 NOTE — ASSESSMENT & PLAN NOTE
Shortness of breath reported by the patient today on minimal exertion not accompanied by any chest discomfort  Previous cardiac catheterization reviewed no evidence of critical blockages of the coronary arteries several months ago  He does have a history of acute pericarditis earlier this year this was idiopathic  I have ordered an echocardiogram to update cardiac performance also requested a CBC to evaluate for anemia and a chest x-ray to evaluate for pulmonary causes  His oxygen saturation on room air is 97%  It is possible that his shortness of breath may be simply exercise intolerance due to his obesity  I have scheduled a follow-up visit with the patient after his studies are performed

## 2021-08-16 NOTE — ASSESSMENT & PLAN NOTE
He continues under the care of Pain Management for management of his chronic low back pain  He will have a sacral iliac injection on both sides in the near future  Recommend continued management of chronic pain through pain management

## 2021-08-17 ENCOUNTER — RA CDI HCC (OUTPATIENT)
Dept: OTHER | Facility: HOSPITAL | Age: 76
End: 2021-08-17

## 2021-08-17 ENCOUNTER — TELEPHONE (OUTPATIENT)
Dept: INTERNAL MEDICINE CLINIC | Facility: CLINIC | Age: 76
End: 2021-08-17

## 2021-08-17 DIAGNOSIS — F32.A DEPRESSION, UNSPECIFIED DEPRESSION TYPE: ICD-10-CM

## 2021-08-17 RX ORDER — CITALOPRAM 10 MG/1
TABLET ORAL
Qty: 90 TABLET | Refills: 3 | Status: SHIPPED | OUTPATIENT
Start: 2021-08-17

## 2021-08-17 NOTE — PROGRESS NOTES
[facility] [address]  coding opportunities          Number of diagnosis code(s) already on the problem list added to FYI fla                  Number of suggestions NOT actually used: 1     Patients insurance company: Medicare     Visit status: Patient arrived for their scheduled appointment        [facility]  coding opportunities          Number of diagnosis code(s) already on the problem list added to FYI fla      DX: F39 Unspecified mood [affective] disorder             Patients insurance company: [facility]

## 2021-08-17 NOTE — TELEPHONE ENCOUNTER
Patient called and asked should he get his CBC before next week appointment or is this for his AWV in April 2022  He can be reached at [phone]

## 2021-08-18 ENCOUNTER — APPOINTMENT (OUTPATIENT)
Dept: LAB | Age: 76
End: 2021-08-18
Payer: MEDICARE

## 2021-08-18 DIAGNOSIS — D64.9 ANEMIA, UNSPECIFIED TYPE: ICD-10-CM

## 2021-08-18 LAB
BASOPHILS # BLD AUTO: 0.05 THOUSANDS/ΜL (ref 0–0.1)
BASOPHILS NFR BLD AUTO: 1 % (ref 0–1)
EOSINOPHIL # BLD AUTO: 0.12 THOUSAND/ΜL (ref 0–0.61)
EOSINOPHIL NFR BLD AUTO: 2 % (ref 0–6)
ERYTHROCYTE [DISTWIDTH] IN BLOOD BY AUTOMATED COUNT: 13.5 % (ref 11.6–15.1)
HCT VFR BLD AUTO: 35.1 % (ref 36.5–49.3)
HGB BLD-MCNC: 11 G/DL (ref 12–17)
IMM GRANULOCYTES # BLD AUTO: 0.06 THOUSAND/UL (ref 0–0.2)
IMM GRANULOCYTES NFR BLD AUTO: 1 % (ref 0–2)
LYMPHOCYTES # BLD AUTO: 1.36 THOUSANDS/ΜL (ref 0.6–4.47)
LYMPHOCYTES NFR BLD AUTO: 19 % (ref 14–44)
MCH RBC QN AUTO: 32.1 PG (ref 26.8–34.3)
MCHC RBC AUTO-ENTMCNC: 31.3 G/DL (ref 31.4–37.4)
MCV RBC AUTO: 102 FL (ref 82–98)
MONOCYTES # BLD AUTO: 0.78 THOUSAND/ΜL (ref 0.17–1.22)
MONOCYTES NFR BLD AUTO: 11 % (ref 4–12)
NEUTROPHILS # BLD AUTO: 5 THOUSANDS/ΜL (ref 1.85–7.62)
NEUTS SEG NFR BLD AUTO: 66 % (ref 43–75)
NRBC BLD AUTO-RTO: 0 /100 WBCS
PLATELET # BLD AUTO: 251 THOUSANDS/UL (ref 149–390)
PMV BLD AUTO: 10 FL (ref 8.9–12.7)
RBC # BLD AUTO: 3.43 MILLION/UL (ref 3.88–5.62)
WBC # BLD AUTO: 7.37 THOUSAND/UL (ref 4.31–10.16)

## 2021-08-18 PROCEDURE — 36415 COLL VENOUS BLD VENIPUNCTURE: CPT

## 2021-08-18 PROCEDURE — 85025 COMPLETE CBC W/AUTO DIFF WBC: CPT

## 2021-08-19 ENCOUNTER — HOSPITAL ENCOUNTER (OUTPATIENT)
Dept: NON INVASIVE DIAGNOSTICS | Facility: CLINIC | Age: 76
Discharge: HOME | End: 2021-08-19
Payer: MEDICARE

## 2021-08-19 ENCOUNTER — HOSPITAL ENCOUNTER (OUTPATIENT)
Dept: RADIOLOGY | Facility: HOSPITAL | Age: 76
Discharge: HOME | End: 2021-08-19
Attending: INTERNAL MEDICINE
Payer: MEDICARE

## 2021-08-19 DIAGNOSIS — R06.02 SOB (SHORTNESS OF BREATH): ICD-10-CM

## 2021-08-19 DIAGNOSIS — I51.89 DIASTOLIC DYSFUNCTION: ICD-10-CM

## 2021-08-19 PROCEDURE — 93306 TTE W/DOPPLER COMPLETE: CPT

## 2021-08-19 PROCEDURE — 93306 TTE W/DOPPLER COMPLETE: CPT | Performed by: INTERNAL MEDICINE

## 2021-08-19 PROCEDURE — 71046 X-RAY EXAM CHEST 2 VIEWS: CPT

## 2021-08-23 ENCOUNTER — OFFICE VISIT (OUTPATIENT)
Dept: INTERNAL MEDICINE CLINIC | Facility: CLINIC | Age: 76
End: 2021-08-23
Payer: MEDICARE

## 2021-08-23 VITALS
HEART RATE: 72 BPM | DIASTOLIC BLOOD PRESSURE: 80 MMHG | WEIGHT: 303.8 LBS | HEIGHT: 67 IN | SYSTOLIC BLOOD PRESSURE: 132 MMHG | OXYGEN SATURATION: 94 % | TEMPERATURE: 97.6 F | BODY MASS INDEX: 47.68 KG/M2

## 2021-08-23 DIAGNOSIS — D64.9 ANEMIA, UNSPECIFIED TYPE: ICD-10-CM

## 2021-08-23 DIAGNOSIS — I10 ESSENTIAL HYPERTENSION: ICD-10-CM

## 2021-08-23 DIAGNOSIS — E53.8 FOLATE DEFICIENCY: ICD-10-CM

## 2021-08-23 DIAGNOSIS — R00.2 PALPITATIONS: ICD-10-CM

## 2021-08-23 DIAGNOSIS — R06.02 SOB (SHORTNESS OF BREATH): Primary | ICD-10-CM

## 2021-08-23 DIAGNOSIS — E53.8 VITAMIN B 12 DEFICIENCY: ICD-10-CM

## 2021-08-23 PROCEDURE — 99214 OFFICE O/P EST MOD 30 MIN: CPT | Performed by: INTERNAL MEDICINE

## 2021-08-23 NOTE — PROGRESS NOTES
Assessment/Plan:    Obstructive sleep apnea syndrome    Patient is encouraged to continue to use his old CPAP machine until the new machine has been involved in recall has been replaced  Essential hypertension    Blood pressure assessment today indicates a reading of 132/80 continue current therapy    SOB (shortness of breath)   Workup for the patient's shortness of breath and fatigue continues of anemia will be followed up with blood work to evaluate for iron A20 and folic acid levels and stool testing for possible GI loss  Chest x-ray is clear of patient will have pulmonary function testing done to evaluate for possible underlying asthma or emphysema  Echocardiogram does not show any significant pathology to explain his shortness of breath  The patient's obesity is certainly a factor in his fatigue and shortness of breath  Palpitations    Patient has morning palpitations symptoms continues on metoprolol succinate 100 mg daily most likely morning symptoms represent breakthrough from previous days dosing of metoprolol  Will continue to monitor  He does have a history of sleep apnea and continues to use his CPAP device  Anemia    Mild anemia detected on recent blood work workup underway including serum iron I46 and folic acid as well as stool for fit testing       Diagnoses and all orders for this visit:    Anemia, unspecified type  -     Occult Blood, Fecal Immunochemical; Future  -     Iron; Future    Vitamin B 12 deficiency  -     Vitamin B12; Future    Folate deficiency  -     Folate; Future    SOB (shortness of breath)  -     Complete PFT with post bronchodilator; Future    Essential hypertension    Palpitations        Subjective:      Patient ID: Cristian Lindo is a 67 y o  male  This 69-year-old gentleman returns today to follow-up recent studies ordered in response to his symptoms of shortness of breath    He finds that he has very limited exercise tolerance due to fatigue and shortness of breath  The symptoms are not associated with chest tightness  In response to the symptoms he underwent a chest x-ray echocardiogram and CBC testing  Results of the studies were all reviewed with the patient today  We find that he has a mild anemia and further testing has been requested to define the cause of his anemia  Testing includes a stool fit test as well as serum iron A39 and folic acid levels  He also is noted to have a clear chest x-ray  Of echocardiogram shows a 60% ejection fraction of the left ventricle  He has mild aortic valve stenosis and mild mitral valve regurgitation  Both upper atria are slightly enlarged  I have asked the patient to have further testing including pulmonary function studies to 0 evaluate for possible asthmatic or emphysematous condition  The patient also indicates today that he has morning palpitations when he 1st awakens in the morning he seems to feel palpitations in his chest   Of these are brief and self-limiting  He does not feel them typically through the day  The following portions of the patient's history were reviewed and updated as appropriate:   He  has a past medical history of Anxiety, Claustrophobia, Colon polyps, Depression, Diabetes mellitus ([address] ), Dyslipidemia, Esophageal polyp, GERD (gastroesophageal reflux disease), Glaucoma, Headache, Hypertension, Low back pain, Neck pain, Numbness and tingling in left arm, OAB (overactive bladder), Paroxysmal supraventricular tachycardia ([address] ), Sleep apnea, and Squamous cell skin cancer (10/19/2020)    He   Patient Active Problem List    Diagnosis Date Noted    Anemia 08/23/2021    SOB (shortness of breath) 08/16/2021    Partial seizure (HCC)     Ulnar nerve entrapment 06/21/2021    Facet arthropathy, lumbosacral 05/05/2021    Chronic pain syndrome 03/15/2021    Sacroiliitis ([address] ) 03/15/2021    Lumbar spondylosis 03/15/2021    Adenomatous polyp of transverse colon 02/23/2021    Acute idiopathic pericarditis 01/22/2021    Hyperlipidemia 01/10/2021    Chest pain 01/10/2021    Fecal incontinence 03/25/2020    BPH with urinary obstruction 01/23/2020    Onychomycosis 11/25/2019    Urgency incontinence 11/20/2019    Mood disorder ([place] [address] ) 08/14/2019    Hypokalemia 07/24/2019    Palpitations 09/17/2018    Obstructive sleep apnea syndrome 06/04/2018    Morbid obesity with BMI of 40 0-44 9, adult ([place] [address] ) 06/04/2018    Mood disturbance 06/04/2018    Gastroesophageal reflux disease 06/04/2018    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    Type 2 diabetes mellitus with complication ([place] [address] ) 23/45/4451    Essential hypertension 01/29/2018    Peripheral neuropathy 01/29/2018    Chronic right-sided low back pain without sciatica      He  has a past surgical history that includes Cataract extraction (Bilateral); Knee arthroscopy (Bilateral); Lumbar spine surgery; Excision basal cell carcinoma; Total knee arthroplasty (Left); Replacement total knee (Right, 04/2017); Replacement total knee (Left, 03/2013); Anoscopy; Rectal biopsy; Mouth surgery; pr esophagogastroduodenoscopy transoral diagnostic (N/A, 2/13/2019); pr colonoscopy flx dx w/collj spec when pfrmd (N/A, 2/13/2019); Mohs surgery (11/11/2020); and orthopedic surgery  His family history includes Alzheimer's disease in his mother; Diabetes in his mother and paternal grandmother; Esophageal cancer in his paternal grandfather; Eusebio Born Hypertension in his father  He  reports that he quit smoking about 38 years ago  He has quit using smokeless tobacco  He reports current alcohol use of about 14 0 standard drinks of alcohol per week  He reports that he does not use drugs    Current Outpatient Medications   Medication Sig Dispense Refill    amLODIPine (NORVASC) 10 mg tablet Take 1 tablet (10 mg total) by mouth daily 90 tablet 3    amoxicillin (AMOXIL) 500 mg capsule TAKE ONE CAPSULE BY MOUTH EVERY 6 HOURS TILL GONE      aspirin (ECOTRIN LOW STRENGTH) 81 mg EC tablet Take 1 tablet (81 mg total) by mouth daily 30 tablet 0    citalopram (CeleXA) 10 mg tablet TAKE 1 TABLET BY MOUTH EVERY DAY 90 tablet 3    glimepiride (AMARYL) 1 mg tablet Take 3 tablets (3 mg total) by mouth daily with breakfast 270 tablet 3    losartan (COZAAR) 100 MG tablet Take 1 tablet (100 mg total) by mouth daily 90 tablet 3    metoprolol succinate (TOPROL-XL) 100 mg 24 hr tablet Take 1 tablet (100 mg total) by mouth daily 90 tablet 3    mupirocin (BACTROBAN) 2 % ointment Apply topically once daily to the nose  22 g 2    omeprazole (PriLOSEC) 20 mg delayed release capsule Take 1 capsule (20 mg total) by mouth daily 90 capsule 3    rosuvastatin (CRESTOR) 20 MG tablet Take 1 tablet (20 mg total) by mouth daily 90 tablet 3    tamsulosin (FLOMAX) 0 4 mg Take 1 capsule (0 4 mg total) by mouth daily with dinner 90 capsule 2    timolol (TIMOPTIC) 0 5 % ophthalmic solution       trospium chloride (SANCTURA) 20 mg tablet Take 1 tablet (20 mg total) by mouth 2 (two) times a day 180 tablet 3    lidocaine (LIDODERM) 5 % Apply 1 patch topically daily Remove & Discard patch within 12 hours or as directed by MD 30 patch 0     No current facility-administered medications for this visit       Review of Systems   Constitutional: Positive for fatigue  Respiratory: Positive for shortness of breath  Cardiovascular: Positive for palpitations  All other systems reviewed and are negative  Objective:      /80   Pulse 72   Temp 97 6 °F (36 4 °C) (Skin)   Ht 5' 7" (1 702 m)   Wt (!) 138 kg (303 lb 12 8 oz)   SpO2 94%   BMI 47 58 kg/m²          Physical Exam  Constitutional:       General: He is not in acute distress  Appearance: He is well-developed  He is not ill-appearing     HENT:      Right Ear: Hearing and external ear normal       Left Ear: Hearing and external ear normal       Nose: Nose normal    Eyes:      Conjunctiva/sclera: Conjunctivae normal  Pupils: Pupils are equal, round, and reactive to light  Neck:      Thyroid: No thyromegaly  Cardiovascular:      Rate and Rhythm: Normal rate and regular rhythm  Heart sounds: Normal heart sounds, S1 normal and S2 normal  No murmur heard  Pulmonary:      Effort: Pulmonary effort is normal  No respiratory distress  Breath sounds: Normal breath sounds  No wheezing, rhonchi or rales  Abdominal:      General: Bowel sounds are normal       Palpations: Abdomen is soft  Musculoskeletal:         General: Normal range of motion  Right lower leg: No edema  Left lower leg: No edema  Lymphadenopathy:      Cervical: No cervical adenopathy  Skin:     General: Skin is warm and dry  Coloration: Skin is not jaundiced or pale  Neurological:      General: No focal deficit present  Mental Status: He is alert and oriented to person, place, and time  Mental status is at baseline  Deep Tendon Reflexes: Reflexes are normal and symmetric  Psychiatric:         Mood and Affect: Mood normal          Behavior: Behavior normal          Thought Content:  Thought content normal          Judgment: Judgment normal

## 2021-08-23 NOTE — ASSESSMENT & PLAN NOTE
Patient is encouraged to continue to use his old CPAP machine until the new machine has been involved in recall has been replaced

## 2021-08-23 NOTE — ASSESSMENT & PLAN NOTE
Patient has morning palpitations symptoms continues on metoprolol succinate 100 mg daily most likely morning symptoms represent breakthrough from previous days dosing of metoprolol  Will continue to monitor  He does have a history of sleep apnea and continues to use his CPAP device

## 2021-08-23 NOTE — ASSESSMENT & PLAN NOTE
Mild anemia detected on recent blood work workup underway including serum iron N96 and folic acid as well as stool for fit testing

## 2021-08-23 NOTE — ASSESSMENT & PLAN NOTE
Workup for the patient's shortness of breath and fatigue continues of anemia will be followed up with blood work to evaluate for iron U83 and folic acid levels and stool testing for possible GI loss  Chest x-ray is clear of patient will have pulmonary function testing done to evaluate for possible underlying asthma or emphysema  Echocardiogram does not show any significant pathology to explain his shortness of breath  The patient's obesity is certainly a factor in his fatigue and shortness of breath

## 2021-08-24 DIAGNOSIS — K21.9 GASTROESOPHAGEAL REFLUX DISEASE WITHOUT ESOPHAGITIS: ICD-10-CM

## 2021-08-24 RX ORDER — OMEPRAZOLE 20 MG/1
CAPSULE, DELAYED RELEASE ORAL
Qty: 90 CAPSULE | Refills: 3 | Status: SHIPPED | OUTPATIENT
Start: 2021-08-24 | End: 2022-06-28

## 2021-08-26 ENCOUNTER — HOSPITAL ENCOUNTER (OUTPATIENT)
Dept: RADIOLOGY | Facility: MEDICAL CENTER | Age: 76
Discharge: HOME | End: 2021-08-26
Attending: PHYSICAL MEDICINE & REHABILITATION | Admitting: PHYSICAL MEDICINE & REHABILITATION
Payer: MEDICARE

## 2021-08-26 VITALS
TEMPERATURE: 97.8 F | RESPIRATION RATE: 20 BRPM | OXYGEN SATURATION: 97 % | SYSTOLIC BLOOD PRESSURE: 119 MMHG | HEART RATE: 60 BPM | DIASTOLIC BLOOD PRESSURE: 66 MMHG

## 2021-08-26 DIAGNOSIS — M46.1 SACROILIITIS (HCC): ICD-10-CM

## 2021-08-26 DIAGNOSIS — G89.4 CHRONIC PAIN SYNDROME: ICD-10-CM

## 2021-08-26 PROCEDURE — 27096 INJECT SACROILIAC JOINT: CPT | Performed by: PHYSICAL MEDICINE & REHABILITATION

## 2021-08-26 RX ORDER — LIDOCAINE HYDROCHLORIDE 10 MG/ML
5 INJECTION, SOLUTION EPIDURAL; INFILTRATION; INTRACAUDAL; PERINEURAL ONCE
Status: COMPLETED | OUTPATIENT
Start: 2021-08-26 | End: 2021-08-26

## 2021-08-26 RX ORDER — METHYLPREDNISOLONE ACETATE 80 MG/ML
80 INJECTION, SUSPENSION INTRA-ARTICULAR; INTRALESIONAL; INTRAMUSCULAR; PARENTERAL; SOFT TISSUE ONCE
Status: COMPLETED | OUTPATIENT
Start: 2021-08-26 | End: 2021-08-26

## 2021-08-26 RX ORDER — BUPIVACAINE HCL/PF 2.5 MG/ML
10 VIAL (ML) INJECTION ONCE
Status: COMPLETED | OUTPATIENT
Start: 2021-08-26 | End: 2021-08-26

## 2021-08-26 RX ADMIN — Medication 6 ML: at 13:16

## 2021-08-26 RX ADMIN — IOHEXOL 2 ML: 300 INJECTION, SOLUTION INTRAVENOUS at 13:14

## 2021-08-26 RX ADMIN — LIDOCAINE HYDROCHLORIDE 4 ML: 10 INJECTION, SOLUTION EPIDURAL; INFILTRATION; INTRACAUDAL; PERINEURAL at 13:12

## 2021-08-26 RX ADMIN — METHYLPREDNISOLONE ACETATE 80 MG: 80 INJECTION, SUSPENSION INTRA-ARTICULAR; INTRALESIONAL; INTRAMUSCULAR; PARENTERAL; SOFT TISSUE at 13:16

## 2021-08-26 NOTE — H&P
History of Present Illness:  The patient is a 67 y o  male who presents with complaints of low back and buttock pain    Patient Active Problem List   Diagnosis    Chronic right-sided low back pain without sciatica    Type 2 diabetes mellitus with complication (HCC)    Essential hypertension    Peripheral neuropathy    Isolated proteinuria with morphologic lesion    Obstructive sleep apnea syndrome    Morbid obesity with BMI of 40 0-44 9, adult (HCC)    Mood disturbance    Gastroesophageal reflux disease    Palpitations    Hypokalemia    Mood disorder (HCC)    Urgency incontinence    Onychomycosis    BPH with urinary obstruction    Fecal incontinence    Hyperlipidemia    Chest pain    Acute idiopathic pericarditis    Adenomatous polyp of transverse colon    Chronic pain syndrome    Sacroiliitis (HCC)    Lumbar spondylosis    Facet arthropathy, lumbosacral    Ulnar nerve entrapment    Partial seizure (HCC)    SOB (shortness of breath)    Anemia       Past Medical History:   Diagnosis Date    Anxiety     Claustrophobia     Colon polyps     Depression     Diabetes mellitus (HCC)     Dyslipidemia     Esophageal polyp     GERD (gastroesophageal reflux disease)     Glaucoma     Headache     Hypertension     Low back pain     Neck pain     Numbness and tingling in left arm     resolved 2/20/17 / numbness and tingling left side last assessed 3/23/16    OAB (overactive bladder)     Paroxysmal supraventricular tachycardia (HCC)     Sleep apnea     wears cpap     Squamous cell skin cancer 10/19/2020    Right Nasal Dr Tam Herrera       Past Surgical History:   Procedure Laterality Date    ANOSCOPY      for polyp removal    BASAL CELL CARCINOMA EXCISION      right cheek    CATARACT EXTRACTION Bilateral     KNEE ARTHROSCOPY Bilateral     LUMBAR SPINE SURGERY      30years ago    MOHS SURGERY  11/11/2020    [facility] Right Nasal Dr Frederic Herrera      tooth extraction    ORTHOPEDIC SURGERY      NV COLONOSCOPY FLX DX W/COLLJ SPEC WHEN PFRMD N/A 2/13/2019    Procedure: COLONOSCOPY;  Surgeon: Andrade Brito MD;  Location: [facility]; Service: Gastroenterology    NV ESOPHAGOGASTRODUODENOSCOPY TRANSORAL DIAGNOSTIC N/A 2/13/2019    Procedure: ESOPHAGOGASTRODUODENOSCOPY (EGD); Surgeon: Andrade Brito MD;  Location: [facility];   Service: Gastroenterology    RECTAL BIOPSY      REPLACEMENT TOTAL KNEE Right 04/2017    REPLACEMENT TOTAL KNEE Left 03/2013    TOTAL KNEE ARTHROPLASTY Left          Current Outpatient Medications:     amLODIPine (NORVASC) 10 mg tablet, Take 1 tablet (10 mg total) by mouth daily, Disp: 90 tablet, Rfl: 3    aspirin (ECOTRIN LOW STRENGTH) 81 mg EC tablet, Take 1 tablet (81 mg total) by mouth daily, Disp: 30 tablet, Rfl: 0    citalopram (CeleXA) 10 mg tablet, TAKE 1 TABLET BY MOUTH EVERY DAY, Disp: 90 tablet, Rfl: 3    glimepiride (AMARYL) 1 mg tablet, Take 3 tablets (3 mg total) by mouth daily with breakfast, Disp: 270 tablet, Rfl: 3    lidocaine (LIDODERM) 5 %, Apply 1 patch topically daily Remove & Discard patch within 12 hours or as directed by MD, Disp: 30 patch, Rfl: 0    losartan (COZAAR) 100 MG tablet, Take 1 tablet (100 mg total) by mouth daily, Disp: 90 tablet, Rfl: 3    metoprolol succinate (TOPROL-XL) 100 mg 24 hr tablet, Take 1 tablet (100 mg total) by mouth daily, Disp: 90 tablet, Rfl: 3    mupirocin (BACTROBAN) 2 % ointment, Apply topically once daily to the nose , Disp: 22 g, Rfl: 2    omeprazole (PriLOSEC) 20 mg delayed release capsule, TAKE 1 CAPSULE BY MOUTH EVERY DAY, Disp: 90 capsule, Rfl: 3    rosuvastatin (CRESTOR) 20 MG tablet, Take 1 tablet (20 mg total) by mouth daily, Disp: 90 tablet, Rfl: 3    tamsulosin (FLOMAX) 0 4 mg, Take 1 capsule (0 4 mg total) by mouth daily with dinner, Disp: 90 capsule, Rfl: 2    timolol (TIMOPTIC) 0 5 % ophthalmic solution, , Disp: , Rfl:     trospium chloride (SANCTURA) 20 mg tablet, Take 1 tablet (20 mg total) by mouth 2 (two) times a day, Disp: 180 tablet, Rfl: 3    Current Facility-Administered Medications:     bupivacaine (PF) (MARCAINE) 0 25 % injection 10 mL, 10 mL, Intra-articular, Once, Pao Venturabury,     iohexol (OMNIPAQUE) 300 mg/mL injection 50 mL, 50 mL, Intra-articular, Once, Pao Venturabury, DO    lidocaine (PF) (XYLOCAINE-MPF) 1 % injection 5 mL, 5 mL, Infiltration, Once, Pao Venturabury, DO    methylPREDNISolone acetate (DEPO-MEDROL) injection 80 mg, 80 mg, Intra-articular, Once, Pao Venturabury, DO    Allergies   Allergen Reactions    Codeine Hives     Pt does not remember if it was codeine or if the reaction was hives   Sulfa Antibiotics Other (See Comments)     Nausea         Physical Exam:   Vitals:    08/26/21 1300   BP: 129/74   Pulse: 59   Resp: 20   Temp: 97 8 °F (36 6 °C)   SpO2: 97%     General: Awake, Alert, Oriented x 3, Mood and affect appropriate  Respiratory: Respirations even and unlabored  Cardiovascular: Peripheral pulses intact; no edema  Musculoskeletal Exam:  Tenderness to palpation bilateral lumbar paraspinals and distal to PSIS    ASA Score: 2  Patient has been made explicitly aware that the injection will consist of steroid which may cause a temporary suppression in immune system activity  This, in turn, may increase the patient's risk of afshin corona virus  He was advised to continue with social distancing and self quarantine  Patient wishes to proceed with the injection    Patient/Chart Verification  Patient ID Verified: Verbal  Consents Confirmed: Procedural, To be obtained in the Pre-Procedure area  H&P( within 30 days) Verified: To be obtained in the Pre-Procedure area  Allergies Reviewed: Yes  Anticoag/NSAID held?: NA  Currently on antibiotics?: No  Pregnancy denied?: NA    Assessment:   1  Sacroiliitis ([facility] [address] )    2   Chronic pain syndrome        Plan: B/L SIJ injection under fluoroscopy

## 2021-08-26 NOTE — DISCHARGE INSTRUCTIONS

## 2021-09-02 ENCOUNTER — TELEPHONE (OUTPATIENT)
Dept: PAIN MEDICINE | Facility: CLINIC | Age: 76
End: 2021-09-02

## 2021-09-02 NOTE — TELEPHONE ENCOUNTER
Pt reports 50% improvement post inj   Pt currently has no pain   Pt aware we will call next week for an update

## 2021-09-14 ENCOUNTER — HOSPITAL ENCOUNTER (OUTPATIENT)
Dept: PULMONOLOGY | Facility: HOSPITAL | Age: 76
Discharge: HOME | End: 2021-09-14
Attending: INTERNAL MEDICINE
Payer: MEDICARE

## 2021-09-14 DIAGNOSIS — R06.02 SOB (SHORTNESS OF BREATH): ICD-10-CM

## 2021-09-14 DIAGNOSIS — E11.8 TYPE 2 DIABETES MELLITUS WITH COMPLICATION, WITHOUT LONG-TERM CURRENT USE OF INSULIN (HCC): ICD-10-CM

## 2021-09-14 PROCEDURE — 94726 PLETHYSMOGRAPHY LUNG VOLUMES: CPT

## 2021-09-14 PROCEDURE — 94729 DIFFUSING CAPACITY: CPT

## 2021-09-14 PROCEDURE — 94729 DIFFUSING CAPACITY: CPT | Performed by: INTERNAL MEDICINE

## 2021-09-14 PROCEDURE — 94726 PLETHYSMOGRAPHY LUNG VOLUMES: CPT | Performed by: INTERNAL MEDICINE

## 2021-09-14 PROCEDURE — 94060 EVALUATION OF WHEEZING: CPT

## 2021-09-14 PROCEDURE — 94760 N-INVAS EAR/PLS OXIMETRY 1: CPT

## 2021-09-14 PROCEDURE — 94060 EVALUATION OF WHEEZING: CPT | Performed by: INTERNAL MEDICINE

## 2021-09-14 RX ORDER — ALBUTEROL SULFATE 2.5 MG/3ML
2.5 SOLUTION RESPIRATORY (INHALATION) ONCE
Status: COMPLETED | OUTPATIENT
Start: 2021-09-14 | End: 2021-09-14

## 2021-09-14 RX ORDER — GLIMEPIRIDE 1 MG/1
TABLET ORAL
Qty: 270 TABLET | Refills: 3 | Status: SHIPPED | OUTPATIENT
Start: 2021-09-14 | End: 2022-06-28

## 2021-09-14 RX ADMIN — ALBUTEROL SULFATE 2.5 MG: 2.5 SOLUTION RESPIRATORY (INHALATION) at 14:13

## 2021-09-20 ENCOUNTER — APPOINTMENT (OUTPATIENT)
Dept: LAB | Age: 76
End: 2021-09-20
Payer: MEDICARE

## 2021-09-20 ENCOUNTER — TELEPHONE (OUTPATIENT)
Dept: NEUROLOGY | Facility: CLINIC | Age: 76
End: 2021-09-20

## 2021-09-20 ENCOUNTER — RA CDI HCC (OUTPATIENT)
Dept: OTHER | Facility: HOSPITAL | Age: 76
End: 2021-09-20

## 2021-09-20 DIAGNOSIS — D64.9 ANEMIA, UNSPECIFIED TYPE: ICD-10-CM

## 2021-09-20 LAB — HEMOCCULT STL QL IA: NEGATIVE

## 2021-09-20 PROCEDURE — G0328 FECAL BLOOD SCRN IMMUNOASSAY: HCPCS

## 2021-09-20 NOTE — TELEPHONE ENCOUNTER
Left message offering tomorrow at 9:30am in the [facility] office with Dr Wandy Kingston

## 2021-09-20 NOTE — PROGRESS NOTES
[place] [address]  coding opportunities          Number of diagnosis code(s) already on the problem list added to FYI fla                  Number of suggestions NOT actually used: 1     Patients insurance company: Medicare     Visit status: Patient arrived for their scheduled appointment        [place] [address]  coding opportunities      DX: F39 Mood disorder     Number of diagnosis code(s) already on the problem list added to FYI fla                     Patients insurance company: [employer]

## 2021-09-22 ENCOUNTER — OFFICE VISIT (OUTPATIENT)
Dept: PAIN MEDICINE | Facility: MEDICAL CENTER | Age: 76
End: 2021-09-22
Payer: MEDICARE

## 2021-09-22 VITALS
WEIGHT: 303 LBS | BODY MASS INDEX: 47.56 KG/M2 | HEIGHT: 67 IN | DIASTOLIC BLOOD PRESSURE: 63 MMHG | HEART RATE: 67 BPM | SYSTOLIC BLOOD PRESSURE: 123 MMHG

## 2021-09-22 DIAGNOSIS — M46.1 SACROILIITIS (HCC): ICD-10-CM

## 2021-09-22 DIAGNOSIS — M47.816 LUMBAR SPONDYLOSIS: ICD-10-CM

## 2021-09-22 DIAGNOSIS — G89.29 CHRONIC RIGHT-SIDED LOW BACK PAIN WITHOUT SCIATICA: ICD-10-CM

## 2021-09-22 DIAGNOSIS — M54.50 CHRONIC RIGHT-SIDED LOW BACK PAIN WITHOUT SCIATICA: ICD-10-CM

## 2021-09-22 DIAGNOSIS — G89.4 CHRONIC PAIN SYNDROME: Primary | ICD-10-CM

## 2021-09-22 PROCEDURE — 99213 OFFICE O/P EST LOW 20 MIN: CPT | Performed by: NURSE PRACTITIONER

## 2021-09-22 NOTE — PROGRESS NOTES
Assessment  1  Chronic pain syndrome    2  Sacroiliitis ([address] )    3  Chronic right-sided low back pain without sciatica    4  Lumbar spondylosis        Plan  The patient was seen in the office today for follow-up after his bilateral sacroiliac joint injections on 08/26/2021  Prior to the injections, his pain score was an 8/10 and now his pain is a 2/10 today  He states that the injections helped his pain level significantly and provided him with 60-70% relief  At this time he is left with some axial low back pain that worsens when he stands over the kitchen sink to do dishes  He states that it does not radiate down his legs and he does have positive right-sided facet loading on exam   I did have a discussion with him about medial branch blocks/radiofrequency ablation however, he states he does not think he is bad enough to move forward with that at this point  He also states that he does not do any form of exercise nor does he wish to be provided with any home exercises to help improve his back and core strength at this time  He will follow-up with us on an as-needed basis    My impressions and treatment recommendations were discussed in detail with the patient who verbalized understanding and had no further questions  Discharge instructions were provided  I personally saw and examined the patient and I agree with the above discussed plan of care  No orders of the defined types were placed in this encounter  No orders of the defined types were placed in this encounter  History of Present Illness    Win Gonsalez is a 67 y o  male who presents to the office with a pain score of 2/10 today that is occasional   He describes the quality as sharp and shooting when it is there in his low back and upper buttock area  He does not take any medication for his pain and does not wish to start at this time      I have personally reviewed and/or updated the patient's past medical history, past surgical history, family history, social history, current medications, allergies, and vital signs today  Review of Systems   Respiratory: Positive for shortness of breath  Cardiovascular: Negative for chest pain  Gastrointestinal: Negative for constipation, diarrhea, nausea and vomiting  Musculoskeletal: Positive for arthralgias, back pain and gait problem  Negative for joint swelling and myalgias  Skin: Negative for rash  Neurological: Negative for dizziness, seizures and weakness  All other systems reviewed and are negative        Patient Active Problem List   Diagnosis    Chronic right-sided low back pain without sciatica    Type 2 diabetes mellitus with complication (HCC)    Essential hypertension    Peripheral neuropathy    Isolated proteinuria with morphologic lesion    Obstructive sleep apnea syndrome    Morbid obesity with BMI of 40 0-44 9, adult ([facility])    Mood disturbance    Gastroesophageal reflux disease    Palpitations    Hypokalemia    Mood disorder ([facility])    Urgency incontinence    Onychomycosis    BPH with urinary obstruction    Fecal incontinence    Hyperlipidemia    Chest pain    Acute idiopathic pericarditis    Adenomatous polyp of transverse colon    Chronic pain syndrome    Sacroiliitis (HCC)    Lumbar spondylosis    Facet arthropathy, lumbosacral    Ulnar nerve entrapment    Partial seizure (HCC)    SOB (shortness of breath)    Anemia       Past Medical History:   Diagnosis Date    Anxiety     Claustrophobia     Colon polyps     Depression     Diabetes mellitus ([facility] [address]     Dyslipidemia     Esophageal polyp     GERD (gastroesophageal reflux disease)     Glaucoma     Headache     Hypertension     Low back pain     Neck pain     Numbness and tingling in left arm     resolved 2/20/17 / numbness and tingling left side last assessed 3/23/16    OAB (overactive bladder)     Paroxysmal supraventricular tachycardia (HCC)     Sleep apnea     wears cpap     Squamous cell skin cancer 10/19/2020    Right Nasal Ala, Dr Turcios       Past Surgical History:   Procedure Laterality Date    ANOSCOPY      for polyp removal    BASAL CELL CARCINOMA EXCISION      right cheek    CATARACT EXTRACTION Bilateral     KNEE ARTHROSCOPY Bilateral     LUMBAR SPINE SURGERY      30years ago    MOHS SURGERY  2020    [facility] Right Nasal [place], Dr Robert Maddox      tooth extraction    ORTHOPEDIC SURGERY      LA COLONOSCOPY FLX DX W/COLLJ SPEC WHEN PFRMD N/A 2019    Procedure: COLONOSCOPY;  Surgeon: Chika Greene MD;  [facility]; Service: Gastroenterology    LA ESOPHAGOGASTRODUODENOSCOPY TRANSORAL DIAGNOSTIC N/A 2019    Procedure: ESOPHAGOGASTRODUODENOSCOPY (EGD); Surgeon: Chika Greene MD;  [facility]; Service: Gastroenterology    RECTAL BIOPSY      REPLACEMENT TOTAL KNEE Right 2017    REPLACEMENT TOTAL KNEE Left 2013    TOTAL KNEE ARTHROPLASTY Left        Family History   Problem Relation Age of Onset    Alzheimer's disease Mother     Diabetes Mother    Duanne Breeze Hypertension Father     Diabetes Paternal [de-identified]     Esophageal cancer Paternal Grandfather        Social History     Occupational History    Occupation: retired   Tobacco Use    Smoking status: Former Smoker     Quit date: 1983     Years since quittin 6    Smokeless tobacco: Former User   Vaping Use    Vaping Use: Never used   Substance and Sexual Activity    Alcohol use:  Yes     Alcohol/week: 14 0 standard drinks     Types: 14 Shots of liquor per week     Comment: "couple of drinks each night"    Drug use: No    Sexual activity: Not Currently     Partners: Female       Current Outpatient Medications on File Prior to Visit   Medication Sig    amLODIPine (NORVASC) 10 mg tablet Take 1 tablet (10 mg total) by mouth daily    aspirin (ECOTRIN LOW STRENGTH) 81 mg EC tablet Take 1 tablet (81 mg total) by mouth daily    citalopram (CeleXA) 10 mg tablet TAKE 1 TABLET BY MOUTH EVERY DAY    glimepiride (AMARYL) 1 mg tablet TAKE 3 TABLETS BY MOUTH EVERY DAY WITH BREAKFAST    losartan (COZAAR) 100 MG tablet Take 1 tablet (100 mg total) by mouth daily    metoprolol succinate (TOPROL-XL) 100 mg 24 hr tablet Take 1 tablet (100 mg total) by mouth daily    mupirocin (BACTROBAN) 2 % ointment Apply topically once daily to the nose   omeprazole (PriLOSEC) 20 mg delayed release capsule TAKE 1 CAPSULE BY MOUTH EVERY DAY    rosuvastatin (CRESTOR) 20 MG tablet Take 1 tablet (20 mg total) by mouth daily    tamsulosin (FLOMAX) 0 4 mg Take 1 capsule (0 4 mg total) by mouth daily with dinner    timolol (TIMOPTIC) 0 5 % ophthalmic solution     trospium chloride (SANCTURA) 20 mg tablet Take 1 tablet (20 mg total) by mouth 2 (two) times a day    lidocaine (LIDODERM) 5 % Apply 1 patch topically daily Remove & Discard patch within 12 hours or as directed by MD     No current facility-administered medications on file prior to visit  Allergies   Allergen Reactions    Codeine Hives     Pt does not remember if it was codeine or if the reaction was hives      Sulfa Antibiotics Other (See Comments)     Nausea         Physical Exam    /63   Pulse 67   Ht 5' 7" (1 702 m)   Wt (!) 137 kg (303 lb)   BMI 47 46 kg/m²     Constitutional: obese  Eyes: anicteric  HEENT: grossly intact  Neck: supple, symmetric, trachea midline and no masses   Pulmonary:even and unlabored  Cardiovascular:No edema or pitting edema present  Skin:Normal without rashes or lesions and well hydrated  Psychiatric:Mood and affect appropriate  Neurologic:Cranial Nerves II-XII grossly intact  Musculoskeletal:  Positive right-sided facet loading    Imaging

## 2021-09-24 ENCOUNTER — OFFICE VISIT (OUTPATIENT)
Dept: INTERNAL MEDICINE CLINIC | Facility: CLINIC | Age: 76
End: 2021-09-24
Payer: MEDICARE

## 2021-09-24 VITALS
DIASTOLIC BLOOD PRESSURE: 80 MMHG | HEIGHT: 67 IN | TEMPERATURE: 97.7 F | HEART RATE: 68 BPM | WEIGHT: 305.8 LBS | OXYGEN SATURATION: 98 % | BODY MASS INDEX: 48 KG/M2 | SYSTOLIC BLOOD PRESSURE: 136 MMHG

## 2021-09-24 DIAGNOSIS — G56.03 BILATERAL CARPAL TUNNEL SYNDROME: ICD-10-CM

## 2021-09-24 DIAGNOSIS — Z11.59 NEED FOR HEPATITIS C SCREENING TEST: ICD-10-CM

## 2021-09-24 DIAGNOSIS — D64.9 ANEMIA, UNSPECIFIED TYPE: ICD-10-CM

## 2021-09-24 DIAGNOSIS — R06.02 SOB (SHORTNESS OF BREATH): Primary | ICD-10-CM

## 2021-09-24 DIAGNOSIS — I10 ESSENTIAL HYPERTENSION: ICD-10-CM

## 2021-09-24 DIAGNOSIS — Z23 ENCOUNTER FOR IMMUNIZATION: ICD-10-CM

## 2021-09-24 DIAGNOSIS — E66.01 MORBID OBESITY WITH BMI OF 40.0-44.9, ADULT (HCC): ICD-10-CM

## 2021-09-24 PROCEDURE — 99214 OFFICE O/P EST MOD 30 MIN: CPT | Performed by: INTERNAL MEDICINE

## 2021-09-24 PROCEDURE — 90662 IIV NO PRSV INCREASED AG IM: CPT | Performed by: INTERNAL MEDICINE

## 2021-09-24 PROCEDURE — G0008 ADMIN INFLUENZA VIRUS VAC: HCPCS | Performed by: INTERNAL MEDICINE

## 2021-09-24 RX ORDER — CARTEOLOL HYDROCHLORIDE 10 MG/ML
0.5 SOLUTION OPHTHALMIC 2 TIMES DAILY
COMMUNITY
Start: 2021-09-01

## 2021-09-24 NOTE — ASSESSMENT & PLAN NOTE
Blood pressure assessment today shows a reading of 136/80 patient is asymptomatic recommend continuation of losartan at 100 mg daily and metoprolol succinate 100 mg daily

## 2021-09-24 NOTE — ASSESSMENT & PLAN NOTE
Discussion with the patient today how his morbid obesity relates to his shortness of breath symptoms in other issues such as blood sugar and hypertension control  Recommend that he continue to work on reducing calories  Given his current physical state of deconditioning physical exercise does not seem to be reasonably possible    Of recommend continued decrease in calorie consumption in an effort to reduce weight

## 2021-09-24 NOTE — PROGRESS NOTES
Assessment/Plan:    Essential hypertension    Blood pressure assessment today shows a reading of 136/80 patient is asymptomatic recommend continuation of losartan at 100 mg daily and metoprolol succinate 100 mg daily    Bilateral carpal tunnel syndrome   Patient describes today symptoms are that are consistent with the of mild carpal tunnel syndrome in both hands  Symptoms occur in the 4th and 5th fingers which go numb at nighttime when he is sleeping  When he wakes up in shakes his hands out briefly they wake up and he has no symptoms during the day  Recommend continued observation at this time if symptoms worsen consideration for a carpal tunnel brace for both wrists    SOB (shortness of breath)   Shortness of breath workup reviewed with the patient today chest x-ray shows no evidence of any infiltrate congestion  No evidence of hyperinflation  Pulmonary function studies show a mild to moderate decrease in diffusing capacity of reviewed this with the patient possible indication of interstitial lung disease  His symptoms are not that severe at this time and he does not wish to pursue any further evaluation of this potential condition  Of there is no evidence of significant reversible airway disease  Suspect the bulk of his shortness of breath comes from a lack of physical conditioning and his morbid obesity  Morbid obesity with BMI of 40 0-44 9, adult [facility])    Discussion with the patient today how his morbid obesity relates to his shortness of breath symptoms in other issues such as blood sugar and hypertension control  Recommend that he continue to work on reducing calories  Given his current physical state of deconditioning physical exercise does not seem to be reasonably possible  Of recommend continued decrease in calorie consumption in an effort to reduce weight    Anemia   Of anemia noted on recent CBC is mild    He did not have iron U71 and folic acid levels done I provided him with another request for the studies to evaluate the mild anemia further  Hemoccult study was performed it is negative does not seem to be any evidence to support significant GI loss of blood will review vitamin levels when the patient has an opportunity to perform the test at the lab  Diagnoses and all orders for this visit:    Need for hepatitis C screening test  -     Hepatitis C Antibody ([facility]); Future    Encounter for immunization  -     influenza vaccine, high-dose, PF 0 7 mL (FLUZONE HIGH-DOSE)    Essential hypertension    SOB (shortness of breath)    Morbid obesity with BMI of 40 0-44 9, adult (HCC)    Anemia, unspecified type    Other orders  -     carteolol (OCUPRESS) 1 % ophthalmic solution; INSTILL 1 DROP IN [facility] EYE TWO TIMES DAILY        Subjective:      Patient ID: Helder Abdullahi is a 67 y o  male  This pleasant 25-year-old gentleman returns today for follow-up assessment of his shortness of breath  Following his symptoms reported last visit of shortness of breath on exertion we asked him to undergo a combination of chest x-ray along with pulmonary function testing  I reviewed the results of both of the studies with the patient today  His chest x-ray is clear with no evidence of congestion in the lung fields  His pulmonary function study shows mild restrictive disease with no significant improvement with Pulmonary administration of bronchodilator  He has a mild to moderate reduction in diffusing capacity  These findings were reviewed with the patient  After review of these findings the patient indicates that most of his shortness of breath seems to be associated with exertion a believe that his morbid obesity is a major factor in this shortness of breath  He recovers rather quickly with rest and I do not think any further evaluation is necessary at this time  I did indicate that the diffusing capacity    Aspect of his pulmonary function study is abnormal and if shortness of breath increases consideration for evaluation of interstitial lung disease of could be entertained  The patient has symptoms of numbness that occurs in the  4th and 5th fingers at nighttime  These symptoms resolved within several minutes of shaking his hands out  Of believe that this represents a carpal tunnel syndrome in both hands  The patient has a recent CBC indicating anemia  We had requested that he have a iron W40 and folic acid level drawn the studies are still pending  He was provided with a request for the studies again will review the results with the patient when they are reuse altered  The following portions of the patient's history were reviewed and updated as appropriate:   He  has a past medical history of Anxiety, Claustrophobia, Colon polyps, Depression, Diabetes mellitus ([address] ), Dyslipidemia, Esophageal polyp, GERD (gastroesophageal reflux disease), Glaucoma, Headache, Hypertension, Low back pain, Neck pain, Numbness and tingling in left arm, OAB (overactive bladder), Paroxysmal supraventricular tachycardia ([address] ), Sleep apnea, and Squamous cell skin cancer (10/19/2020)    He   Patient Active Problem List    Diagnosis Date Noted    Bilateral carpal tunnel syndrome 09/24/2021    Anemia 08/23/2021    SOB (shortness of breath) 08/16/2021    Partial seizure (HCC)     Ulnar nerve entrapment 06/21/2021    Facet arthropathy, lumbosacral 05/05/2021    Chronic pain syndrome 03/15/2021    Sacroiliitis ([address] ) 03/15/2021    Lumbar spondylosis 03/15/2021    Adenomatous polyp of transverse colon 02/23/2021    Acute idiopathic pericarditis 01/22/2021    Hyperlipidemia 01/10/2021    Chest pain 01/10/2021    Fecal incontinence 03/25/2020    BPH with urinary obstruction 01/23/2020    Onychomycosis 11/25/2019    Urgency incontinence 11/20/2019    Mood disorder ([address] ) 08/14/2019    Hypokalemia 07/24/2019    Palpitations 09/17/2018    Obstructive sleep apnea syndrome 06/04/2018    Morbid obesity with BMI of 40 0-44 9, adult ([facility] ) 06/04/2018    Mood disturbance 06/04/2018    Gastroesophageal reflux disease 06/04/2018    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    Type 2 diabetes mellitus with complication ([place] [address] ) 12/52/6793    Essential hypertension 01/29/2018    Peripheral neuropathy 01/29/2018    Chronic right-sided low back pain without sciatica      He  has a past surgical history that includes Cataract extraction (Bilateral); Knee arthroscopy (Bilateral); Lumbar spine surgery; Excision basal cell carcinoma; Total knee arthroplasty (Left); Replacement total knee (Right, 04/2017); Replacement total knee (Left, 03/2013); Anoscopy; Rectal biopsy; Mouth surgery; pr esophagogastroduodenoscopy transoral diagnostic (N/A, 2/13/2019); pr colonoscopy flx dx w/collj spec when pfrmd (N/A, 2/13/2019); Mohs surgery (11/11/2020); and orthopedic surgery  His family history includes Alzheimer's disease in his mother; Diabetes in his mother and paternal grandmother; Esophageal cancer in his paternal grandfather; [place] Hypertension in his father  He  reports that he quit smoking about 38 years ago  He has quit using smokeless tobacco  He reports current alcohol use of about 14 0 standard drinks of alcohol per week  He reports that he does not use drugs    Current Outpatient Medications   Medication Sig Dispense Refill    amLODIPine (NORVASC) 10 mg tablet Take 1 tablet (10 mg total) by mouth daily 90 tablet 3    aspirin (ECOTRIN LOW STRENGTH) 81 mg EC tablet Take 1 tablet (81 mg total) by mouth daily 30 tablet 0    carteolol (OCUPRESS) 1 % ophthalmic solution INSTILL 1 DROP IN EACH EYE TWO TIMES DAILY      citalopram (CeleXA) 10 mg tablet TAKE 1 TABLET BY MOUTH EVERY DAY 90 tablet 3    glimepiride (AMARYL) 1 mg tablet TAKE 3 TABLETS BY MOUTH EVERY DAY WITH BREAKFAST 270 tablet 3    losartan (COZAAR) 100 MG tablet Take 1 tablet (100 mg total) by mouth daily 90 tablet 3    metoprolol succinate (TOPROL-XL) 100 mg 24 hr tablet Take 1 tablet (100 mg total) by mouth daily 90 tablet 3    mupirocin (BACTROBAN) 2 % ointment Apply topically once daily to the nose  22 g 2    omeprazole (PriLOSEC) 20 mg delayed release capsule TAKE 1 CAPSULE BY MOUTH EVERY DAY 90 capsule 3    rosuvastatin (CRESTOR) 20 MG tablet Take 1 tablet (20 mg total) by mouth daily 90 tablet 3    tamsulosin (FLOMAX) 0 4 mg Take 1 capsule (0 4 mg total) by mouth daily with dinner 90 capsule 2    timolol (TIMOPTIC) 0 5 % ophthalmic solution       trospium chloride (SANCTURA) 20 mg tablet Take 1 tablet (20 mg total) by mouth 2 (two) times a day 180 tablet 3    lidocaine (LIDODERM) 5 % Apply 1 patch topically daily Remove & Discard patch within 12 hours or as directed by MD 30 patch 0     No current facility-administered medications for this visit       Review of Systems   Constitutional: Positive for fatigue  Respiratory: Positive for shortness of breath  Neurological:        Intermittent numbness of the 4th and 5th fingers both hands at nighttime when the patient sleeps and arises  All other systems reviewed and are negative  Objective:      /80   Pulse 68   Temp 97 7 °F (36 5 °C) (Skin)   Ht 5' 7" (1 702 m)   Wt (!) 139 kg (305 lb 12 8 oz)   SpO2 98%   BMI 47 90 kg/m²          Physical Exam  Constitutional:       General: He is not in acute distress  Appearance: He is well-developed  He is not ill-appearing  HENT:      Head: Normocephalic  Right Ear: Hearing and external ear normal       Left Ear: Hearing and external ear normal       Nose: Nose normal    Eyes:      Conjunctiva/sclera: Conjunctivae normal       Pupils: Pupils are equal, round, and reactive to light  Neck:      Thyroid: No thyromegaly  Cardiovascular:      Rate and Rhythm: Normal rate and regular rhythm  Heart sounds: Normal heart sounds, S1 normal and S2 normal  No murmur heard       Pulmonary:      Effort: Pulmonary effort is normal  No respiratory distress  Breath sounds: Normal breath sounds  No wheezing, rhonchi or rales  Abdominal:      General: Bowel sounds are normal       Palpations: Abdomen is soft  Tenderness: There is no abdominal tenderness  Musculoskeletal:         General: Normal range of motion  Lymphadenopathy:      Cervical: No cervical adenopathy  Skin:     General: Skin is warm and dry  Neurological:      Mental Status: He is alert and oriented to person, place, and time  Mental status is at baseline  Deep Tendon Reflexes: Reflexes are normal and symmetric  Psychiatric:         Behavior: Behavior normal          Thought Content:  Thought content normal          Judgment: Judgment normal

## 2021-09-24 NOTE — ASSESSMENT & PLAN NOTE
Of anemia noted on recent CBC is mild  He did not have iron Z89 and folic acid levels done I provided him with another request for the studies to evaluate the mild anemia further  Hemoccult study was performed it is negative does not seem to be any evidence to support significant GI loss of blood will review vitamin levels when the patient has an opportunity to perform the test at the lab

## 2021-09-24 NOTE — ASSESSMENT & PLAN NOTE
Shortness of breath workup reviewed with the patient today chest x-ray shows no evidence of any infiltrate congestion  No evidence of hyperinflation  Pulmonary function studies show a mild to moderate decrease in diffusing capacity of reviewed this with the patient possible indication of interstitial lung disease  His symptoms are not that severe at this time and he does not wish to pursue any further evaluation of this potential condition  Of there is no evidence of significant reversible airway disease  Suspect the bulk of his shortness of breath comes from a lack of physical conditioning and his morbid obesity

## 2021-09-24 NOTE — ASSESSMENT & PLAN NOTE
Patient describes today symptoms are that are consistent with the of mild carpal tunnel syndrome in both hands  Symptoms occur in the 4th and 5th fingers which go numb at nighttime when he is sleeping  When he wakes up in shakes his hands out briefly they wake up and he has no symptoms during the day    Recommend continued observation at this time if symptoms worsen consideration for a carpal tunnel brace for both wrists

## 2021-09-27 ENCOUNTER — APPOINTMENT (OUTPATIENT)
Dept: LAB | Age: 76
End: 2021-09-27
Payer: MEDICARE

## 2021-09-27 DIAGNOSIS — D64.9 ANEMIA, UNSPECIFIED TYPE: ICD-10-CM

## 2021-09-27 DIAGNOSIS — Z11.59 NEED FOR HEPATITIS C SCREENING TEST: ICD-10-CM

## 2021-09-27 DIAGNOSIS — E53.8 FOLATE DEFICIENCY: ICD-10-CM

## 2021-09-27 DIAGNOSIS — E53.8 VITAMIN B 12 DEFICIENCY: ICD-10-CM

## 2021-09-27 LAB
FOLATE SERPL-MCNC: 19.2 NG/ML (ref 3.1–17.5)
IRON SERPL-MCNC: 60 UG/DL (ref 65–175)
VIT B12 SERPL-MCNC: 233 PG/ML (ref 100–900)

## 2021-09-27 PROCEDURE — 86803 HEPATITIS C AB TEST: CPT

## 2021-09-27 PROCEDURE — 82746 ASSAY OF FOLIC ACID SERUM: CPT

## 2021-09-27 PROCEDURE — 36415 COLL VENOUS BLD VENIPUNCTURE: CPT

## 2021-09-27 PROCEDURE — 83540 ASSAY OF IRON: CPT

## 2021-09-27 PROCEDURE — 82607 VITAMIN B-12: CPT

## 2021-09-28 LAB — HCV AB SER QL: NORMAL

## 2021-09-29 ENCOUNTER — OFFICE VISIT (OUTPATIENT)
Dept: PODIATRY | Facility: CLINIC | Age: 76
End: 2021-09-29
Payer: MEDICARE

## 2021-09-29 VITALS
SYSTOLIC BLOOD PRESSURE: 140 MMHG | HEART RATE: 70 BPM | HEIGHT: 67 IN | BODY MASS INDEX: 48.44 KG/M2 | WEIGHT: 308.6 LBS | DIASTOLIC BLOOD PRESSURE: 84 MMHG

## 2021-09-29 DIAGNOSIS — B35.1 ONYCHOMYCOSIS: ICD-10-CM

## 2021-09-29 DIAGNOSIS — E11.42 DIABETIC POLYNEUROPATHY ASSOCIATED WITH TYPE 2 DIABETES MELLITUS (HCC): Primary | ICD-10-CM

## 2021-09-29 PROCEDURE — 11721 DEBRIDE NAIL 6 OR MORE: CPT | Performed by: PODIATRIST

## 2021-09-29 NOTE — PROGRESS NOTES
Hector Fishman  1945  AT RISK FOOT CARE    1  Diabetic polyneuropathy associated with type 2 diabetes mellitus ([address] )     2  Onychomycosis         Patient presents for at-risk foot care  Patient has no acute concerns today  Patient has significant lower extremity risk due to neuropathy, parasthesia, edema, and trophic skin changes to the lower extremity  On exam patient has thickened, hypertrophic, discolored, brittle toenails with subungual debris and tenderness x10   Callus: none  Patient has lower extremity edema  PAtients skin is atrophic, thickened nails, and decreased pedal hair  Patient has decreased pinprick and vibratory sensation to his feet and parasthesia    Today's treatment includes:  Debridement of toenails  Using nail nipper, krystle, and curette, nails were sharply debrided, reduced in thickness and length  Devitalized nail tissue and fungal debris excised and removed  Patient tolerated well  Discussed proper shoe gear, daily inspections of feet, and general foot health with patient  Patient has Q9  findings and is recommended for at risk foot care every 9-10 weeks      Patients most recent complete clinical foot exam was on: 7/19/21

## 2021-10-05 ENCOUNTER — TELEPHONE (OUTPATIENT)
Dept: CARDIOLOGY CLINIC | Facility: CLINIC | Age: 76
End: 2021-10-05

## 2021-10-14 ENCOUNTER — OFFICE VISIT (OUTPATIENT)
Dept: CARDIOLOGY CLINIC | Facility: CLINIC | Age: 76
End: 2021-10-14
Payer: MEDICARE

## 2021-10-14 VITALS
BODY MASS INDEX: 47.71 KG/M2 | DIASTOLIC BLOOD PRESSURE: 60 MMHG | HEART RATE: 72 BPM | HEIGHT: 67 IN | WEIGHT: 304 LBS | SYSTOLIC BLOOD PRESSURE: 118 MMHG

## 2021-10-14 DIAGNOSIS — G47.33 OBSTRUCTIVE SLEEP APNEA SYNDROME: ICD-10-CM

## 2021-10-14 DIAGNOSIS — R00.2 PALPITATIONS: Primary | ICD-10-CM

## 2021-10-14 PROCEDURE — 99214 OFFICE O/P EST MOD 30 MIN: CPT | Performed by: INTERNAL MEDICINE

## 2021-10-19 ENCOUNTER — OFFICE VISIT (OUTPATIENT)
Dept: NEUROLOGY | Facility: CLINIC | Age: 76
End: 2021-10-19
Payer: MEDICARE

## 2021-10-19 VITALS
SYSTOLIC BLOOD PRESSURE: 116 MMHG | WEIGHT: 306 LBS | TEMPERATURE: 98.7 F | HEIGHT: 67 IN | BODY MASS INDEX: 48.03 KG/M2 | HEART RATE: 61 BPM | DIASTOLIC BLOOD PRESSURE: 56 MMHG

## 2021-10-19 DIAGNOSIS — R00.2 PALPITATIONS: ICD-10-CM

## 2021-10-19 DIAGNOSIS — R56.9 PARTIAL SEIZURE (HCC): Primary | ICD-10-CM

## 2021-10-19 PROCEDURE — 99214 OFFICE O/P EST MOD 30 MIN: CPT | Performed by: PSYCHIATRY & NEUROLOGY

## 2021-10-21 ENCOUNTER — IMMUNIZATIONS (OUTPATIENT)
Dept: FAMILY MEDICINE CLINIC | Facility: HOSPITAL | Age: 76
End: 2021-10-21

## 2021-10-21 DIAGNOSIS — Z23 ENCOUNTER FOR IMMUNIZATION: Primary | ICD-10-CM

## 2021-10-21 PROCEDURE — 91300 COVID-19 PFIZER VACC 0.3 ML: CPT

## 2021-10-21 PROCEDURE — 0001A COVID-19 PFIZER VACC 0.3 ML: CPT

## 2021-10-25 ENCOUNTER — TELEPHONE (OUTPATIENT)
Dept: NEUROLOGY | Facility: CLINIC | Age: 76
End: 2021-10-25

## 2021-10-25 ENCOUNTER — TELEPHONE (OUTPATIENT)
Dept: CARDIOLOGY CLINIC | Facility: CLINIC | Age: 76
End: 2021-10-25

## 2021-11-02 ENCOUNTER — TELEPHONE (OUTPATIENT)
Dept: CARDIOLOGY CLINIC | Facility: CLINIC | Age: 76
End: 2021-11-02

## 2021-11-05 ENCOUNTER — CLINICAL SUPPORT (OUTPATIENT)
Dept: CARDIOLOGY CLINIC | Facility: CLINIC | Age: 76
End: 2021-11-05
Payer: MEDICARE

## 2021-11-05 DIAGNOSIS — G47.33 OBSTRUCTIVE SLEEP APNEA SYNDROME: ICD-10-CM

## 2021-11-05 DIAGNOSIS — R00.2 PALPITATIONS: ICD-10-CM

## 2021-11-05 PROCEDURE — 93244 EXT ECG>48HR<7D REV&INTERPJ: CPT | Performed by: INTERNAL MEDICINE

## 2021-11-08 ENCOUNTER — APPOINTMENT (OUTPATIENT)
Dept: LAB | Age: 76
End: 2021-11-08
Payer: MEDICARE

## 2021-11-08 DIAGNOSIS — D64.9 ANEMIA, UNSPECIFIED TYPE: ICD-10-CM

## 2021-11-08 DIAGNOSIS — I10 ESSENTIAL HYPERTENSION: ICD-10-CM

## 2021-11-08 LAB
ALBUMIN SERPL BCP-MCNC: 2.9 G/DL (ref 3.5–5)
ALP SERPL-CCNC: 124 U/L (ref 46–116)
ALT SERPL W P-5'-P-CCNC: 25 U/L (ref 12–78)
ANION GAP SERPL CALCULATED.3IONS-SCNC: 5 MMOL/L (ref 4–13)
AST SERPL W P-5'-P-CCNC: 14 U/L (ref 5–45)
BASOPHILS # BLD AUTO: 0.07 THOUSANDS/ΜL (ref 0–0.1)
BASOPHILS NFR BLD AUTO: 1 % (ref 0–1)
BILIRUB SERPL-MCNC: 0.42 MG/DL (ref 0.2–1)
BUN SERPL-MCNC: 12 MG/DL (ref 5–25)
CALCIUM ALBUM COR SERPL-MCNC: 9.6 MG/DL (ref 8.3–10.1)
CALCIUM SERPL-MCNC: 8.7 MG/DL (ref 8.3–10.1)
CHLORIDE SERPL-SCNC: 106 MMOL/L (ref 100–108)
CO2 SERPL-SCNC: 25 MMOL/L (ref 21–32)
CREAT SERPL-MCNC: 1.21 MG/DL (ref 0.6–1.3)
EOSINOPHIL # BLD AUTO: 0.25 THOUSAND/ΜL (ref 0–0.61)
EOSINOPHIL NFR BLD AUTO: 3 % (ref 0–6)
ERYTHROCYTE [DISTWIDTH] IN BLOOD BY AUTOMATED COUNT: 14.7 % (ref 11.6–15.1)
GFR SERPL CREATININE-BSD FRML MDRD: 57 ML/MIN/1.73SQ M
GLUCOSE P FAST SERPL-MCNC: 101 MG/DL (ref 65–99)
HCT VFR BLD AUTO: 37 % (ref 36.5–49.3)
HGB BLD-MCNC: 11.8 G/DL (ref 12–17)
IMM GRANULOCYTES # BLD AUTO: 0.14 THOUSAND/UL (ref 0–0.2)
IMM GRANULOCYTES NFR BLD AUTO: 2 % (ref 0–2)
LYMPHOCYTES # BLD AUTO: 1.06 THOUSANDS/ΜL (ref 0.6–4.47)
LYMPHOCYTES NFR BLD AUTO: 13 % (ref 14–44)
MCH RBC QN AUTO: 31.6 PG (ref 26.8–34.3)
MCHC RBC AUTO-ENTMCNC: 31.9 G/DL (ref 31.4–37.4)
MCV RBC AUTO: 99 FL (ref 82–98)
MONOCYTES # BLD AUTO: 1.15 THOUSAND/ΜL (ref 0.17–1.22)
MONOCYTES NFR BLD AUTO: 14 % (ref 4–12)
NEUTROPHILS # BLD AUTO: 5.84 THOUSANDS/ΜL (ref 1.85–7.62)
NEUTS SEG NFR BLD AUTO: 67 % (ref 43–75)
NRBC BLD AUTO-RTO: 0 /100 WBCS
PLATELET # BLD AUTO: 228 THOUSANDS/UL (ref 149–390)
PMV BLD AUTO: 10 FL (ref 8.9–12.7)
POTASSIUM SERPL-SCNC: 4.4 MMOL/L (ref 3.5–5.3)
PROT SERPL-MCNC: 7.6 G/DL (ref 6.4–8.2)
RBC # BLD AUTO: 3.74 MILLION/UL (ref 3.88–5.62)
SODIUM SERPL-SCNC: 136 MMOL/L (ref 136–145)
WBC # BLD AUTO: 8.51 THOUSAND/UL (ref 4.31–10.16)

## 2021-11-08 PROCEDURE — 80053 COMPREHEN METABOLIC PANEL: CPT

## 2021-11-08 PROCEDURE — 36415 COLL VENOUS BLD VENIPUNCTURE: CPT

## 2021-11-08 PROCEDURE — 85025 COMPLETE CBC W/AUTO DIFF WBC: CPT

## 2021-11-09 ENCOUNTER — TELEPHONE (OUTPATIENT)
Dept: CARDIOLOGY CLINIC | Facility: CLINIC | Age: 76
End: 2021-11-09

## 2021-11-10 ENCOUNTER — CLINICAL SUPPORT (OUTPATIENT)
Dept: CARDIOLOGY CLINIC | Facility: CLINIC | Age: 76
End: 2021-11-10
Payer: MEDICARE

## 2021-11-10 ENCOUNTER — APPOINTMENT (OUTPATIENT)
Dept: LAB | Age: 76
End: 2021-11-10
Payer: MEDICARE

## 2021-11-10 ENCOUNTER — OFFICE VISIT (OUTPATIENT)
Dept: INTERNAL MEDICINE CLINIC | Facility: CLINIC | Age: 76
End: 2021-11-10
Payer: MEDICARE

## 2021-11-10 VITALS
TEMPERATURE: 98.1 F | HEART RATE: 73 BPM | DIASTOLIC BLOOD PRESSURE: 74 MMHG | OXYGEN SATURATION: 91 % | BODY MASS INDEX: 47.74 KG/M2 | SYSTOLIC BLOOD PRESSURE: 122 MMHG | WEIGHT: 304.2 LBS | HEIGHT: 67 IN

## 2021-11-10 DIAGNOSIS — F03.90 DEMENTIA WITHOUT BEHAVIORAL DISTURBANCE, UNSPECIFIED DEMENTIA TYPE (HCC): Primary | ICD-10-CM

## 2021-11-10 DIAGNOSIS — G47.33 OBSTRUCTIVE SLEEP APNEA SYNDROME: ICD-10-CM

## 2021-11-10 DIAGNOSIS — E11.8 TYPE 2 DIABETES MELLITUS WITH COMPLICATION (HCC): ICD-10-CM

## 2021-11-10 DIAGNOSIS — I10 ESSENTIAL HYPERTENSION: ICD-10-CM

## 2021-11-10 DIAGNOSIS — R10.9 FLANK PAIN: ICD-10-CM

## 2021-11-10 DIAGNOSIS — H93.11 TINNITUS OF RIGHT EAR: ICD-10-CM

## 2021-11-10 DIAGNOSIS — R00.2 PALPITATIONS: Primary | ICD-10-CM

## 2021-11-10 LAB
BACTERIA UR QL AUTO: NORMAL /HPF
BILIRUB UR QL STRIP: NEGATIVE
CLARITY UR: CLEAR
COLOR UR: YELLOW
GLUCOSE UR STRIP-MCNC: NEGATIVE MG/DL
HGB UR QL STRIP.AUTO: NEGATIVE
HYALINE CASTS #/AREA URNS LPF: NORMAL /LPF
KETONES UR STRIP-MCNC: NEGATIVE MG/DL
LEUKOCYTE ESTERASE UR QL STRIP: NEGATIVE
NITRITE UR QL STRIP: NEGATIVE
NON-SQ EPI CELLS URNS QL MICRO: NORMAL /HPF
PH UR STRIP.AUTO: 6 [PH]
PROT UR STRIP-MCNC: ABNORMAL MG/DL
RBC #/AREA URNS AUTO: NORMAL /HPF
SP GR UR STRIP.AUTO: 1.02 (ref 1–1.03)
UROBILINOGEN UR QL STRIP.AUTO: 0.2 E.U./DL
WBC #/AREA URNS AUTO: NORMAL /HPF

## 2021-11-10 PROCEDURE — 81001 URINALYSIS AUTO W/SCOPE: CPT

## 2021-11-10 PROCEDURE — 99215 OFFICE O/P EST HI 40 MIN: CPT | Performed by: INTERNAL MEDICINE

## 2021-11-10 PROCEDURE — 93242 EXT ECG>48HR<7D RECORDING: CPT | Performed by: INTERNAL MEDICINE

## 2021-11-12 ENCOUNTER — TELEPHONE (OUTPATIENT)
Dept: INTERNAL MEDICINE CLINIC | Facility: CLINIC | Age: 76
End: 2021-11-12

## 2021-11-15 ENCOUNTER — TELEPHONE (OUTPATIENT)
Dept: INTERNAL MEDICINE CLINIC | Facility: CLINIC | Age: 76
End: 2021-11-15

## 2021-11-16 ENCOUNTER — TELEPHONE (OUTPATIENT)
Dept: INTERNAL MEDICINE CLINIC | Facility: CLINIC | Age: 76
End: 2021-11-16

## 2021-11-22 ENCOUNTER — OFFICE VISIT (OUTPATIENT)
Dept: UROLOGY | Facility: MEDICAL CENTER | Age: 76
End: 2021-11-22
Payer: MEDICARE

## 2021-11-22 VITALS
DIASTOLIC BLOOD PRESSURE: 72 MMHG | HEIGHT: 67 IN | BODY MASS INDEX: 46.3 KG/M2 | SYSTOLIC BLOOD PRESSURE: 128 MMHG | WEIGHT: 295 LBS

## 2021-11-22 DIAGNOSIS — R39.15 URGENCY OF URINATION: ICD-10-CM

## 2021-11-22 DIAGNOSIS — N13.8 BPH WITH URINARY OBSTRUCTION: ICD-10-CM

## 2021-11-22 DIAGNOSIS — M46.1 SACROILIITIS (HCC): Primary | ICD-10-CM

## 2021-11-22 DIAGNOSIS — N40.1 BPH WITH URINARY OBSTRUCTION: ICD-10-CM

## 2021-11-22 PROCEDURE — 99214 OFFICE O/P EST MOD 30 MIN: CPT | Performed by: UROLOGY

## 2021-11-22 RX ORDER — DICLOFENAC SODIUM 75 MG/1
75 TABLET, DELAYED RELEASE ORAL 2 TIMES DAILY
Qty: 60 TABLET | Refills: 0 | Status: SHIPPED | OUTPATIENT
Start: 2021-11-22 | End: 2022-03-02 | Stop reason: SDUPTHER

## 2021-11-22 RX ORDER — TAMSULOSIN HYDROCHLORIDE 0.4 MG/1
0.4 CAPSULE ORAL
Qty: 90 CAPSULE | Refills: 3 | Status: SHIPPED | OUTPATIENT
Start: 2021-11-22

## 2021-11-22 RX ORDER — TROSPIUM CHLORIDE 20 MG/1
20 TABLET, FILM COATED ORAL 2 TIMES DAILY
Qty: 180 TABLET | Refills: 3 | Status: SHIPPED | OUTPATIENT
Start: 2021-11-22

## 2021-11-30 ENCOUNTER — CONSULT (OUTPATIENT)
Dept: PULMONOLOGY | Facility: CLINIC | Age: 76
End: 2021-11-30
Payer: MEDICARE

## 2021-11-30 VITALS
OXYGEN SATURATION: 93 % | HEIGHT: 67 IN | HEART RATE: 68 BPM | RESPIRATION RATE: 18 BRPM | DIASTOLIC BLOOD PRESSURE: 66 MMHG | BODY MASS INDEX: 47.4 KG/M2 | SYSTOLIC BLOOD PRESSURE: 130 MMHG | WEIGHT: 302 LBS | TEMPERATURE: 98 F

## 2021-11-30 DIAGNOSIS — I50.33 ACUTE ON CHRONIC DIASTOLIC HEART FAILURE (HCC): ICD-10-CM

## 2021-11-30 DIAGNOSIS — G47.33 OBSTRUCTIVE SLEEP APNEA SYNDROME: ICD-10-CM

## 2021-11-30 DIAGNOSIS — R94.2 DECREASED DIFFUSION CAPACITY: Primary | ICD-10-CM

## 2021-11-30 PROCEDURE — 99204 OFFICE O/P NEW MOD 45 MIN: CPT | Performed by: INTERNAL MEDICINE

## 2021-11-30 PROCEDURE — 94618 PULMONARY STRESS TESTING: CPT | Performed by: INTERNAL MEDICINE

## 2021-12-01 PROBLEM — R94.2 DECREASED DIFFUSION CAPACITY: Status: ACTIVE | Noted: 2021-12-01

## 2021-12-02 ENCOUNTER — TELEPHONE (OUTPATIENT)
Dept: PULMONOLOGY | Facility: CLINIC | Age: 76
End: 2021-12-02

## 2021-12-02 RX ORDER — FUROSEMIDE 40 MG/1
40 TABLET ORAL DAILY
Qty: 14 TABLET | Refills: 0 | Status: SHIPPED | OUTPATIENT
Start: 2021-12-02 | End: 2022-01-06

## 2021-12-03 ENCOUNTER — TELEPHONE (OUTPATIENT)
Dept: OTHER | Facility: OTHER | Age: 76
End: 2021-12-03

## 2021-12-08 ENCOUNTER — OFFICE VISIT (OUTPATIENT)
Dept: PODIATRY | Facility: CLINIC | Age: 76
End: 2021-12-08
Payer: MEDICARE

## 2021-12-08 VITALS
HEART RATE: 72 BPM | DIASTOLIC BLOOD PRESSURE: 68 MMHG | HEIGHT: 67 IN | WEIGHT: 304.6 LBS | SYSTOLIC BLOOD PRESSURE: 134 MMHG | BODY MASS INDEX: 47.81 KG/M2

## 2021-12-08 DIAGNOSIS — E11.42 DIABETIC POLYNEUROPATHY ASSOCIATED WITH TYPE 2 DIABETES MELLITUS (HCC): Primary | ICD-10-CM

## 2021-12-08 DIAGNOSIS — B35.1 ONYCHOMYCOSIS: ICD-10-CM

## 2021-12-08 PROCEDURE — 11721 DEBRIDE NAIL 6 OR MORE: CPT | Performed by: PODIATRIST

## 2021-12-10 ENCOUNTER — TELEPHONE (OUTPATIENT)
Dept: INTERNAL MEDICINE CLINIC | Facility: CLINIC | Age: 76
End: 2021-12-10

## 2021-12-28 DIAGNOSIS — I10 ESSENTIAL HYPERTENSION: ICD-10-CM

## 2021-12-28 DIAGNOSIS — I50.33 ACUTE ON CHRONIC DIASTOLIC HEART FAILURE (HCC): ICD-10-CM

## 2021-12-28 RX ORDER — AMLODIPINE BESYLATE 10 MG/1
TABLET ORAL
Qty: 90 TABLET | Refills: 3 | Status: SHIPPED | OUTPATIENT
Start: 2021-12-28

## 2022-01-06 ENCOUNTER — TELEPHONE (OUTPATIENT)
Dept: PULMONOLOGY | Facility: CLINIC | Age: 77
End: 2022-01-06

## 2022-01-06 RX ORDER — FUROSEMIDE 40 MG/1
TABLET ORAL
Qty: 14 TABLET | Refills: 0 | Status: SHIPPED | OUTPATIENT
Start: 2022-01-06 | End: 2022-02-03 | Stop reason: SDUPTHER

## 2022-01-06 NOTE — TELEPHONE ENCOUNTER
He was supposed to see his Cardiologist after seeing me - when I spoke to his Cardiologist, he was going to arrange a follow up visit for shortly after when we talked  He needs to see them soon but I can re-order for now

## 2022-01-06 NOTE — TELEPHONE ENCOUNTER
Patient called, he needed more refills on Furesomide  Dr Jessica Worthington ordered it on 12/2/2021 #14 tabs with no refills  Patient is calling to refill it but wants to know if the dr would like him to continue it  He said he had called our office multiple times since then and has had no response  I didn't see anything in his chart but I told him we will look into it and call him back

## 2022-01-07 ENCOUNTER — TELEPHONE (OUTPATIENT)
Dept: INTERNAL MEDICINE CLINIC | Facility: CLINIC | Age: 77
End: 2022-01-07

## 2022-01-10 ENCOUNTER — OFFICE VISIT (OUTPATIENT)
Dept: INTERNAL MEDICINE CLINIC | Facility: CLINIC | Age: 77
End: 2022-01-10
Payer: MEDICARE

## 2022-01-10 VITALS
TEMPERATURE: 97.8 F | HEART RATE: 63 BPM | BODY MASS INDEX: 47.65 KG/M2 | WEIGHT: 303.6 LBS | DIASTOLIC BLOOD PRESSURE: 70 MMHG | HEIGHT: 67 IN | SYSTOLIC BLOOD PRESSURE: 130 MMHG | RESPIRATION RATE: 16 BRPM | OXYGEN SATURATION: 98 %

## 2022-01-10 DIAGNOSIS — F03.90 DEMENTIA WITHOUT BEHAVIORAL DISTURBANCE, UNSPECIFIED DEMENTIA TYPE (HCC): ICD-10-CM

## 2022-01-10 DIAGNOSIS — Z01.818 PRE-OP EXAMINATION: ICD-10-CM

## 2022-01-10 DIAGNOSIS — E11.8 TYPE 2 DIABETES MELLITUS WITH COMPLICATION (HCC): Primary | ICD-10-CM

## 2022-01-10 DIAGNOSIS — I10 ESSENTIAL HYPERTENSION: ICD-10-CM

## 2022-01-10 DIAGNOSIS — G47.33 OBSTRUCTIVE SLEEP APNEA SYNDROME: ICD-10-CM

## 2022-01-10 DIAGNOSIS — R56.9 PARTIAL SEIZURE (HCC): ICD-10-CM

## 2022-01-10 LAB — SL AMB POCT HEMOGLOBIN AIC: 6.2 (ref ?–6.5)

## 2022-01-10 PROCEDURE — 99214 OFFICE O/P EST MOD 30 MIN: CPT | Performed by: NURSE PRACTITIONER

## 2022-01-10 PROCEDURE — 83036 HEMOGLOBIN GLYCOSYLATED A1C: CPT | Performed by: NURSE PRACTITIONER

## 2022-01-10 RX ORDER — OXYMETAZOLINE HYDROCHLORIDE 0.05 G/100ML
2 SPRAY NASAL EVERY 12 HOURS PRN
COMMUNITY
End: 2022-06-29

## 2022-01-10 NOTE — PROGRESS NOTES
Assessment/Plan:    Obstructive sleep apnea syndrome  Compliant with sleep apnea treatment, ongoing use of cpap    Essential hypertension  BP is under good control, continue on amlodipine 10 mg, losartan 100 mg, metoprolol 100 mg, furosemide 40 mg  Dementia ([facility] )  Alert and appropriate today with poor recall of medication names and allergies  Continue with aspirin, statin tx  Partial seizure ([facility] )  Previously worked up for possible seizure activity, no correlation on EEG with actual seizure activity  No subsequent episodes noted  Pre-op examination  Chronic conditions are optimized on current tx  Cleared for low risk procedure  Type 2 diabetes mellitus with complication (HCC)  Diabetes is well controlled on glimepiride daily  Continue on statin, arb  Continue to encourage healthy diet, weight loss  F/u as scheduled  Lab Results   Component Value Date    HGBA1C 6 2 01/10/2022        Diagnoses and all orders for this visit:    Type 2 diabetes mellitus with complication ([facility] )  -     POCT hemoglobin A1c    Pre-op examination    Obstructive sleep apnea syndrome    Essential hypertension    Dementia without behavioral disturbance, unspecified dementia type (HCC)    Partial seizure (HCC)          Subjective:      Patient ID: Corinna Murguia is a 67 y o  male  Pt is a 66 yo male here today for MRI clearance  He is scheduled for MRI of lumbar spine on 1/12 that is to be done under anesthesia  PMH includes colon polyp, GERD, DM II, ANALY, HTN, CTS, dementia, partial seizure, peripheral neuropathy, ulnar nerve entrapment, facet arthopathy, lumbar spondylosis, sacroiliitis, BPH, anemia, chronic pain, fecal incontinence, HLD, mood disorder, tinnitus  Pt is compliant with medications, med list reviewed, denies hx of complications with anesthesia  Pt denies chest pain, palpitations, shortness of breath, headache, dizziness, numbness, tingling  Appetite is normal, no N/V/D          The following portions of the patient's history were reviewed and updated as appropriate: allergies, current medications, past family history, past medical history, past social history, past surgical history and problem list     Review of Systems   Constitutional: Negative for activity change, appetite change, chills, fatigue, fever and unexpected weight change  HENT: Positive for congestion  Negative for hearing loss  Eyes: Negative for visual disturbance  Respiratory: Negative for cough, chest tightness and shortness of breath  Cardiovascular: Negative for chest pain, palpitations and leg swelling  Gastrointestinal: Negative for constipation, diarrhea, nausea and vomiting  Genitourinary: Negative for dysuria and frequency  Musculoskeletal: Negative for arthralgias and myalgias  Neurological: Negative for dizziness, syncope, weakness, light-headedness and numbness  Psychiatric/Behavioral: Negative for sleep disturbance  Objective:      /70 (BP Location: Left arm, Patient Position: Sitting, Cuff Size: Large)   Pulse 63   Temp 97 8 °F (36 6 °C)   Resp 16   Ht 5' 7" (1 702 m)   Wt (!) 138 kg (303 lb 9 6 oz)   SpO2 98%   BMI 47 55 kg/m²          Physical Exam  Vitals reviewed  Constitutional:       General: He is awake  He is not in acute distress  Appearance: Normal appearance  He is well-developed and well-groomed  He is morbidly obese  He is not ill-appearing  HENT:      Right Ear: Hearing and external ear normal       Left Ear: Hearing and external ear normal    Eyes:      General: Lids are normal       Conjunctiva/sclera: Conjunctivae normal       Pupils: Pupils are equal, round, and reactive to light  Neck:      Vascular: Normal carotid pulses  No carotid bruit or JVD  Cardiovascular:      Rate and Rhythm: Normal rate and regular rhythm  Pulses: Normal pulses  Heart sounds: Normal heart sounds, S1 normal and S2 normal  No murmur heard        Pulmonary:      Effort: Pulmonary effort is normal       Breath sounds: Normal breath sounds  Abdominal:      General: Bowel sounds are normal       Palpations: Abdomen is soft  Tenderness: There is no abdominal tenderness  Musculoskeletal:         General: Normal range of motion  Cervical back: Normal range of motion  Right lower le+ Edema present  Left lower le+ Edema present  Skin:     General: Skin is warm and dry  Neurological:      Mental Status: He is alert and oriented to person, place, and time  Psychiatric:         Attention and Perception: Attention normal          Mood and Affect: Mood normal          Speech: Speech normal          Behavior: Behavior normal  Behavior is cooperative  Thought Content:  Thought content normal          Cognition and Memory: Cognition normal          Judgment: Judgment normal

## 2022-01-10 NOTE — ASSESSMENT & PLAN NOTE
Alert and appropriate today with poor recall of medication names and allergies  Continue with aspirin, statin tx

## 2022-01-10 NOTE — PRE-PROCEDURE INSTRUCTIONS
Pre-Surgery Instructions:   Medication Instructions    amLODIPine (NORVASC) 10 mg tablet Instructed patient per Anesthesia Guidelines   aspirin (ECOTRIN LOW STRENGTH) 81 mg EC tablet Instructed patient per Anesthesia Guidelines   carteolol (OCUPRESS) 1 % ophthalmic solution Instructed patient per Anesthesia Guidelines   citalopram (CeleXA) 10 mg tablet Instructed patient per Anesthesia Guidelines   diclofenac (VOLTAREN) 75 mg EC tablet Instructed patient per Anesthesia Guidelines   furosemide (LASIX) 40 mg tablet Instructed patient per Anesthesia Guidelines   glimepiride (AMARYL) 1 mg tablet Instructed patient per Anesthesia Guidelines   losartan (COZAAR) 100 MG tablet Instructed patient per Anesthesia Guidelines   metoprolol succinate (TOPROL-XL) 100 mg 24 hr tablet Instructed patient per Anesthesia Guidelines   omeprazole (PriLOSEC) 20 mg delayed release capsule Instructed patient per Anesthesia Guidelines   oxymetazoline (AFRIN) 0 05 % nasal spray Instructed patient per Anesthesia Guidelines   rosuvastatin (CRESTOR) 20 MG tablet Instructed patient per Anesthesia Guidelines   tamsulosin (FLOMAX) 0 4 mg Instructed patient per Anesthesia Guidelines   trospium chloride (SANCTURA) 20 mg tablet Instructed patient per Anesthesia Guidelines  Pre MRI instructions given  Pt NPO at 12 mn on 1/11/22  Pt to have 6 oz of water at least 2 hours prior to arrival time  Pt to take am meds-minus the glimepiride  No metal, lotions, powders  Wear a mask  Come to the main entrance of [address] [facility] and proceed to the [facility] unit 2nd floor

## 2022-01-10 NOTE — ASSESSMENT & PLAN NOTE
Previously worked up for possible seizure activity, no correlation on EEG with actual seizure activity  No subsequent episodes noted

## 2022-01-10 NOTE — ASSESSMENT & PLAN NOTE
BP is under good control, continue on amlodipine 10 mg, losartan 100 mg, metoprolol 100 mg, furosemide 40 mg

## 2022-01-10 NOTE — ASSESSMENT & PLAN NOTE
Diabetes is well controlled on glimepiride daily  Continue on statin, arb  Continue to encourage healthy diet, weight loss  F/u as scheduled    Lab Results   Component Value Date    HGBA1C 6 2 01/10/2022

## 2022-01-12 ENCOUNTER — ANESTHESIA EVENT (OUTPATIENT)
Dept: RADIOLOGY | Facility: HOSPITAL | Age: 77
End: 2022-01-12

## 2022-01-12 ENCOUNTER — ANESTHESIA (OUTPATIENT)
Dept: RADIOLOGY | Facility: HOSPITAL | Age: 77
End: 2022-01-12

## 2022-01-12 ENCOUNTER — HOSPITAL ENCOUNTER (OUTPATIENT)
Dept: RADIOLOGY | Facility: HOSPITAL | Age: 77
Discharge: HOME | End: 2022-01-12
Payer: MEDICARE

## 2022-01-12 VITALS
HEIGHT: 67 IN | OXYGEN SATURATION: 96 % | TEMPERATURE: 97.3 F | BODY MASS INDEX: 47.68 KG/M2 | HEART RATE: 58 BPM | RESPIRATION RATE: 16 BRPM | WEIGHT: 303.8 LBS | SYSTOLIC BLOOD PRESSURE: 121 MMHG | DIASTOLIC BLOOD PRESSURE: 60 MMHG

## 2022-01-12 DIAGNOSIS — M54.42 CHRONIC MIDLINE LOW BACK PAIN WITH BILATERAL SCIATICA: ICD-10-CM

## 2022-01-12 DIAGNOSIS — G89.29 CHRONIC MIDLINE LOW BACK PAIN WITH BILATERAL SCIATICA: ICD-10-CM

## 2022-01-12 DIAGNOSIS — M54.41 CHRONIC MIDLINE LOW BACK PAIN WITH BILATERAL SCIATICA: ICD-10-CM

## 2022-01-12 LAB — GLUCOSE SERPL-MCNC: 113 MG/DL (ref 65–140)

## 2022-01-12 PROCEDURE — 72148 MRI LUMBAR SPINE W/O DYE: CPT

## 2022-01-12 PROCEDURE — 82948 REAGENT STRIP/BLOOD GLUCOSE: CPT

## 2022-01-12 PROCEDURE — G1004 CDSM NDSC: HCPCS

## 2022-01-12 RX ORDER — MIDAZOLAM HYDROCHLORIDE 2 MG/2ML
INJECTION, SOLUTION INTRAMUSCULAR; INTRAVENOUS AS NEEDED
Status: DISCONTINUED | OUTPATIENT
Start: 2022-01-12 | End: 2022-01-12

## 2022-01-12 RX ORDER — SODIUM CHLORIDE, SODIUM LACTATE, POTASSIUM CHLORIDE, CALCIUM CHLORIDE 600; 310; 30; 20 MG/100ML; MG/100ML; MG/100ML; MG/100ML
INJECTION, SOLUTION INTRAVENOUS CONTINUOUS PRN
Status: DISCONTINUED | OUTPATIENT
Start: 2022-01-12 | End: 2022-01-12

## 2022-01-12 RX ORDER — LIDOCAINE HYDROCHLORIDE 10 MG/ML
INJECTION, SOLUTION EPIDURAL; INFILTRATION; INTRACAUDAL; PERINEURAL AS NEEDED
Status: DISCONTINUED | OUTPATIENT
Start: 2022-01-12 | End: 2022-01-12

## 2022-01-12 RX ORDER — PROPOFOL 10 MG/ML
INJECTION, EMULSION INTRAVENOUS AS NEEDED
Status: DISCONTINUED | OUTPATIENT
Start: 2022-01-12 | End: 2022-01-12

## 2022-01-12 RX ORDER — PROPOFOL 10 MG/ML
INJECTION, EMULSION INTRAVENOUS CONTINUOUS PRN
Status: DISCONTINUED | OUTPATIENT
Start: 2022-01-12 | End: 2022-01-12

## 2022-01-12 RX ADMIN — SODIUM CHLORIDE, SODIUM LACTATE, POTASSIUM CHLORIDE, AND CALCIUM CHLORIDE: .6; .31; .03; .02 INJECTION, SOLUTION INTRAVENOUS at 12:47

## 2022-01-12 RX ADMIN — PROPOFOL 40 MG: 10 INJECTION, EMULSION INTRAVENOUS at 12:59

## 2022-01-12 RX ADMIN — LIDOCAINE HYDROCHLORIDE 5 MG: 10 INJECTION, SOLUTION EPIDURAL; INFILTRATION; INTRACAUDAL; PERINEURAL at 12:59

## 2022-01-12 RX ADMIN — PROPOFOL 80 MCG/KG/MIN: 10 INJECTION, EMULSION INTRAVENOUS at 12:59

## 2022-01-12 RX ADMIN — MIDAZOLAM 2 MG: 1 INJECTION INTRAMUSCULAR; INTRAVENOUS at 12:57

## 2022-01-12 NOTE — PERIOPERATIVE NURSING NOTE
Received patient after MRI , patient is alert and awake, VSS, denies pain, no distress noted, d Patient is tolerating PO fluids, call bell placed in reach, will continue to monitor patient until d/c criteria met

## 2022-01-12 NOTE — DISCHARGE INSTRUCTIONS
Procedural Sedation   WHAT YOU NEED TO KNOW:   Procedural sedation is medicine used during procedures to help you feel relaxed and calm  You will remember little to none of the procedure  After sedation you may feel tired, weak, or unsteady on your feet  You may also have trouble concentrating or short-term memory loss  These symptoms should go away in 24 hours or less  DISCHARGE INSTRUCTIONS:   Call 911 or have someone else call for any of the following:   · You have sudden trouble breathing  · You cannot be woken  Return to the emergency department if:   · You have a severe headache or dizziness  · Your heart is beating faster than usual     Contact your healthcare provider if:   · You have a fever or chills  · Your skin is itchy, swollen, or you have a rash  · You have nausea or are vomiting for more than 8 hours after the procedure  · You have questions or concerns about your condition or care  Self-care:   · Have someone stay with you for 24 hours  This person can drive you to errands and help you do things around the house  This person can also watch for problems  · Rest and do quiet activities for 24 hours  Do not exercise, ride a bike, or play sports  Stand up slowly to prevent dizziness and falls  Take short walks around the house with another person  Slowly return to your usual activities the next day  · Do not drive or use dangerous machines or tools for 24 hours  You may injure yourself or others  Examples include a lawnmower, saw, or drill  Do not return to work for 24 hours if you use dangerous machines or tools for work  · Do not make important decisions for 24 hours  For example, do not sign important papers or invest money  · Drink liquids as directed  Liquids help flush the sedation medicine out of your body  Ask how much liquid to drink each day and which liquids are best for you  · Eat small, frequent meals to prevent nausea and vomiting  Start with clear liquids such as juice or broth  If you do not vomit after clear liquids, you can eat your usual foods  · Do not drink alcohol or take medicines that make you drowsy  This includes medicines that help you sleep and anxiety medicines  Ask your healthcare provider if it is safe for you to take pain medicine  Follow up with your healthcare provider as directed:  Write down your questions so you remember to ask them during your visits  © Copyright [employer] 2021 Information is for End User's use only and may not be sold, redistributed or otherwise used for commercial purposes  All illustrations and images included in CareNotes® are the copyrighted property of A D A M , Inc  or [facility] Leonel Lindsay   The above information is an  only  It is not intended as medical advice for individual conditions or treatments  Talk to your doctor, nurse or pharmacist before following any medical regimen to see if it is safe and effective for you

## 2022-01-12 NOTE — PERIOPERATIVE NURSING NOTE
Pt d/c to home at this time w/wife,  Via w/c  Pt left with all belongings  Iv was D/C intact with dry sterile dressing, surgical dressing clean dry and intact  Encouraged to keep follow up appointments, Verbalized understanding  D/C instructions reviewed and explained with patient and family member  Verbalized understanding

## 2022-01-12 NOTE — ANESTHESIA PREPROCEDURE EVALUATION
Procedure:  MRI LUMBAR SPINE WO CONTRAST    Relevant Problems   CARDIO   (+) Chest pain   (+) Essential hypertension   (+) Hyperlipidemia      ENDO   (+) Type 2 diabetes mellitus with complication (HCC)      GI/HEPATIC   (+) Gastroesophageal reflux disease      /RENAL   (+) BPH with urinary obstruction      HEMATOLOGY   (+) Anemia      MUSCULOSKELETAL   (+) Chronic right-sided low back pain without sciatica   (+) Lumbar spondylosis   (+) Sacroiliitis (HCC)      NEURO/PSYCH   (+) Chronic pain syndrome   (+) Dementia (HCC)   (+) Partial seizure (HCC)      PULMONARY   (+) Obstructive sleep apnea syndrome   (+) SOB (shortness of breath)        Physical Exam    Airway    Mallampati score: IV  TM Distance: <3 FB  Neck ROM: limited     Dental       Cardiovascular      Pulmonary  Decreased breath sounds,     Other Findings  Nasal congestion      Anesthesia Plan  ASA Score- 3     Anesthesia Type- IV sedation with anesthesia with ASA Monitors  Additional Monitors:   Airway Plan:           Plan Factors-Exercise tolerance (METS): <4 METS  Chart reviewed  Existing labs reviewed  Patient summary reviewed  Patient is not a current smoker  Induction- intravenous  Postoperative Plan- Plan for postoperative opioid use  Informed Consent- Anesthetic plan and risks discussed with patient  I personally reviewed this patient with the CRNA  Discussed and agreed on the Anesthesia Plan with the CRNA  Ezra Barillas

## 2022-01-12 NOTE — ANESTHESIA POSTPROCEDURE EVALUATION
Post-Op Assessment Note    CV Status:  Stable  Pain Score: 0    Pain management: adequate     Mental Status:  Alert   Hydration Status:  Stable   PONV Controlled:  None   Airway Patency:  Patent   Two or more mitigation strategies used for obstructive sleep apnea   Post Op Vitals Reviewed: Yes      Staff: CRNA         No complications documented      BP   126/61   Temp 97   Pulse 65   Resp 15   SpO2 97

## 2022-01-19 ENCOUNTER — HOSPITAL ENCOUNTER (OUTPATIENT)
Dept: CT IMAGING | Facility: HOSPITAL | Age: 77
Discharge: HOME | End: 2022-01-19
Attending: INTERNAL MEDICINE
Payer: MEDICARE

## 2022-01-19 DIAGNOSIS — R94.2 DECREASED DIFFUSION CAPACITY: ICD-10-CM

## 2022-01-19 PROCEDURE — 71250 CT THORAX DX C-: CPT

## 2022-01-19 PROCEDURE — G1004 CDSM NDSC: HCPCS

## 2022-01-21 ENCOUNTER — TELEPHONE (OUTPATIENT)
Dept: PULMONOLOGY | Facility: CLINIC | Age: 77
End: 2022-01-21

## 2022-01-21 NOTE — TELEPHONE ENCOUNTER
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

## 2022-01-24 ENCOUNTER — OFFICE VISIT (OUTPATIENT)
Dept: NEUROSURGERY | Facility: CLINIC | Age: 77
End: 2022-01-24
Payer: MEDICARE

## 2022-01-24 VITALS
HEART RATE: 65 BPM | SYSTOLIC BLOOD PRESSURE: 122 MMHG | RESPIRATION RATE: 18 BRPM | HEIGHT: 67 IN | WEIGHT: 299 LBS | TEMPERATURE: 98.9 F | BODY MASS INDEX: 46.93 KG/M2 | DIASTOLIC BLOOD PRESSURE: 76 MMHG

## 2022-01-24 DIAGNOSIS — G89.29 CHRONIC BACK PAIN, UNSPECIFIED BACK LOCATION, UNSPECIFIED BACK PAIN LATERALITY: ICD-10-CM

## 2022-01-24 DIAGNOSIS — M79.18 BILATERAL MYOFASCIAL PAIN: ICD-10-CM

## 2022-01-24 DIAGNOSIS — G89.4 CHRONIC PAIN SYNDROME: ICD-10-CM

## 2022-01-24 DIAGNOSIS — M25.512 SHOULDER PAIN, BILATERAL: Primary | ICD-10-CM

## 2022-01-24 DIAGNOSIS — M54.9 CHRONIC BACK PAIN, UNSPECIFIED BACK LOCATION, UNSPECIFIED BACK PAIN LATERALITY: ICD-10-CM

## 2022-01-24 DIAGNOSIS — M25.511 SHOULDER PAIN, BILATERAL: Primary | ICD-10-CM

## 2022-01-24 PROCEDURE — 99204 OFFICE O/P NEW MOD 45 MIN: CPT | Performed by: STUDENT IN AN ORGANIZED HEALTH CARE EDUCATION/TRAINING PROGRAM

## 2022-01-24 NOTE — PROGRESS NOTES
Assessment/Plan:    Chronic pain syndrome  · As addressed in HPI  · Bilateral thoracic pain , scapular and shoulders   · Myofascial back and scapular and top of shoulder pain  · Bilateral SI joint pain   Shoulder pain, bilateral  · As addressed in HPI  · As addressed in chronc pain syndrome   · Muscular tenderness upper back scapula and shoulders  Bilateral myofascial pain  · Tenderness palpation upper back scapular and top of shoulders , paracervical area       PLAN  · Pain management assessment for TPI     Chronic back pain  · As addressed in hPI  · Bilateral low back with distribution up into thoracic area , bilateral scapula and shoulders  · Tenderness to palpation thoracic , bilateral scapular and top of shoulders, paracervical area  · Has documented diagnosis of diabetic peripheral neuropathy ---denies symptoms no numbness , tingling or paresthesia sensation , denies ever  undergoing EMG of lower upper extremities  · Has significant dyspnea during appointments     IMAGINING  · MRI Lumbar spine Postoperative changes of left laminectomy at L4-5 without abnormal soft tissue in the postsurgical defect  Small right paramedian/foraminal protrusion at L4-5 results in mild right foraminal narrowing  No significant central or foraminal narrowing at remaining levels  · Discussed in collaboration with dr Bell Mail no surgical pathology appreciated   · CT Chest high resolution ---Osseous structures DISH      PLAN  · Return to pain management --- optimize multimodal treatments , TPI, TFESI Facet joint injections, medications  · Xray cervical and thoracic--ordered  · Referral to Physical therapy DISH on imagining  --Back , scapular and shoulder pain, stiffness    · Weight loss to BMI 35-40    · RTO prn       Chronic back pain, unspecified back location, unspecified back pain laterality  -     Ambulatory referral to Neurosurgery        Subjective: Consultation visit referred for PCP    Patient ID: Daisha Moncada Rafael Sheets is a 67 y o  male     HPI   The patient is a 63 y o  female who presents for consultation in regards to back pain  n      ONSET: Symptoms have been present since approximately 1983   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  while squatting with arm stretched forward to scoop up a baseball has pain across low back    Pain progressively worsened over the years  He underwent surgeyr  In approximately 1983  L4 L5 lumbar laminectomy and discectomy  LOCATION:---points to the areas of dimples in my back on each side  , "that is my SI joint area"    Over the years pain progressively worsened  With distribution up the entire back thoracic  Area into scapula and bilateral upper shoulders,      Severity 10/10 on numeric scale  Symptoms are characterized as aching across low back  Up the back pain is shooting  and stabbing  into bilateral scapula and upper shoulders  Aggravating factors  include standing, walking, lifting  Positionchange from sit to stand  Pain is worse in the evening  Relieving factors: sitting and lying supine  , goes to bed early to lie down and ease the pain  Temporal Factors : Does not use heat, cold, or OTC lidocaine products   Multimodal treatments ;  YUMIKO --bilateral SI joint injections 3/2021 and 9/2021-- 100% , pain started again a few weeks ago  No recent physical therapy for current episode of illness, juan carlos documented PT 2019   Diclofenac  Bid 75 mg -helped the stabbing pain starting in low back going into scapula and bilateral shoulders  Citalopram ---denies knowledge of why treating with mediation  Co Morbid conditions w/ Risks-  Morbid obesity BMI  46/83  DM2 controlled HgA1C 1/10/22  6 2  Onset off dementia  NSTEMI  2021, SVT   ANALY -CPAP  Diastolic HF EF 60 %,   Pericarditis ---reports has COVID 9 vaccine all 3 doses     Squamous cell skin cancer-   HO TIA     I have spent 60 minutes with patient today in which greater than 50% of this time was spent in assessment, examination, impressions, reviewing imagining and recommendations for care  All questions were answered to his/her satisfaction, and contact information provided in the event additional questions arise  Patient acknowledged an understanding and agreement with plan           REVIEW OF SYSTEMS  Review of Systems   Constitutional: Negative  HENT: Positive for congestion and hearing loss  Dry mouth   Eyes: Negative  Respiratory: Positive for shortness of breath  Cardiovascular: Positive for palpitations (some at night)  Pericarditis 1/2021   Gastrointestinal: Positive for constipation  Endocrine: Negative  Genitourinary: Positive for frequency and urgency  Some incontinence, leakage   Musculoskeletal: Positive for back pain (with walking/standing, radiates up spine and across shoulders), gait problem (painful to walk far), joint swelling (hands), neck pain (with occasional numbness sides of neck) and neck stiffness  Negative for myalgias  Skin: Negative  Allergic/Immunologic: Negative  Neurological: Negative for weakness and numbness  Hematological: Negative  Psychiatric/Behavioral: Positive for sleep disturbance  The patient is nervous/anxious  Meds/Allergies     Current Outpatient Medications   Medication Sig Dispense Refill    amLODIPine (NORVASC) 10 mg tablet TAKE 1 TABLET BY MOUTH EVERY DAY (Patient taking differently: 10 mg every morning  ) 90 tablet 3    aspirin (ECOTRIN LOW STRENGTH) 81 mg EC tablet Take 1 tablet (81 mg total) by mouth daily 30 tablet 0    carteolol (OCUPRESS) 1 % ophthalmic solution INSTILL 1 DROP IN EACH EYE TWO TIMES DAILY      citalopram (CeleXA) 10 mg tablet TAKE 1 TABLET BY MOUTH EVERY DAY 90 tablet 3    diclofenac (VOLTAREN) 75 mg EC tablet Take 1 tablet (75 mg total) by mouth 2 (two) times a day As needed   Take with food 60 tablet 0    glimepiride (AMARYL) 1 mg tablet TAKE 3 TABLETS BY MOUTH EVERY DAY WITH BREAKFAST 270 tablet 3    losartan (COZAAR) 100 MG tablet Take 1 tablet (100 mg total) by mouth daily (Patient taking differently: Take 100 mg by mouth every morning  ) 90 tablet 3    metoprolol succinate (TOPROL-XL) 100 mg 24 hr tablet Take 1 tablet (100 mg total) by mouth daily (Patient taking differently: Take 100 mg by mouth every morning  ) 90 tablet 3    omeprazole (PriLOSEC) 20 mg delayed release capsule TAKE 1 CAPSULE BY MOUTH EVERY DAY 90 capsule 3    oxymetazoline (AFRIN) 0 05 % nasal spray 2 sprays by Each Nare route every 12 (twelve) hours as needed for congestion      rosuvastatin (CRESTOR) 20 MG tablet Take 1 tablet (20 mg total) by mouth daily 90 tablet 3    tamsulosin (FLOMAX) 0 4 mg Take 1 capsule (0 4 mg total) by mouth daily with dinner 90 capsule 3    trospium chloride (SANCTURA) 20 mg tablet Take 1 tablet (20 mg total) by mouth 2 (two) times a day 180 tablet 3    furosemide (LASIX) 40 mg tablet TAKE 1 TABLET BY MOUTH EVERY DAY (Patient not taking: Reported on 1/24/2022) 14 tablet 0     No current facility-administered medications for this visit  Allergies   Allergen Reactions    Codeine Hives     Pt does not remember if it was codeine or if the reaction was hives      Sulfa Antibiotics Other (See Comments)     Nausea         PAST HISTORY    Past Medical History:   Diagnosis Date    Anxiety     Broken teeth     [facility]     basal cell-right cheek,left breast, right side of nose, left forehead    Chipped tooth     upper front-right    Chronic pain disorder     pain with standing-herniated disc lower back, upper back pain    Claustrophobia     Colon polyps     CPAP (continuous positive airway pressure) dependence     Depression     Diabetes mellitus ([facility] )     Dyslipidemia     Esophageal polyp     GERD (gastroesophageal reflux disease)     Glaucoma     Headache     Hyperlipidemia     Hypertension     Low back pain     Morbid obesity with BMI of 45 0-49 9, adult ([facility] )     Neck pain     Numbness and tingling in left arm     resolved 17 / numbness and tingling left side last assessed 3/23/16    OAB (overactive bladder)     Paroxysmal supraventricular tachycardia (HCC)     Pericarditis 2021    Shortness of breath     with exertion since     Sleep apnea     wears cpap     Squamous cell skin cancer 10/19/2020    Right Nasal Ala, Dr Moralesft    Tinnitus        Past Surgical History:   Procedure Laterality Date    ANOSCOPY      for polyp removal    BACK SURGERY      disc surgery lower back    BASAL CELL CARCINOMA EXCISION      right cheek    CATARACT EXTRACTION Bilateral     COLONOSCOPY      CYSTOSCOPY      EGD      JOINT REPLACEMENT      bilateral knees    KNEE ARTHROSCOPY Bilateral     MOHS SURGERY  2020    [facility] Right Nasal Ala, Dr Landa Gentle      tooth extraction    MN COLONOSCOPY FLX DX W/COLLJ SPEC WHEN PFRMD N/A 2019    Procedure: COLONOSCOPY;  Surgeon: Tahmina Mark MD;  Location: [facility]; Service: Gastroenterology    MN ESOPHAGOGASTRODUODENOSCOPY TRANSORAL DIAGNOSTIC N/A 2019    Procedure: ESOPHAGOGASTRODUODENOSCOPY (EGD); Surgeon: Tahmina Mark MD;  Location: [facility]; Service: Gastroenterology    RECTAL BIOPSY      SPINE SURGERY         Social History     Tobacco Use    Smoking status: Former Smoker     Quit date: 1983     Years since quittin 0    Smokeless tobacco: Never Used   Vaping Use    Vaping Use: Never used   Substance Use Topics    Alcohol use:  Yes     Alcohol/week: 14 0 standard drinks     Types: 14 Shots of liquor per week     Comment: "couple of drinks each night"    Drug use: No       Family History   Problem Relation Age of Onset    Alzheimer's disease Mother     Diabetes Mother     Dementia Mother    Roselyn Buchanan Hypertension Father     Hypertension Father     Diabetes Paternal Grandmother     Esophageal cancer Paternal Grandfather        The following portions of the patient's history were reviewed and updated as appropriate: allergies, current medications, past family history, past medical history, past social history, past surgical history and problem list       EXAM    Vitals:Blood pressure 122/76, pulse 65, temperature 98 9 °F (37 2 °C), temperature source Tympanic, resp  rate 18, height 5' 7" (1 702 m), weight 136 kg (299 lb)  ,Body mass index is 46 83 kg/m²  Physical Exam  Constitutional:       Appearance: Normal appearance  Comments: Morbid obesity BMI 46 83   HENT:      Head:      Comments: [place]  Eyes:      General: No scleral icterus  Right eye: No discharge  Left eye: No discharge  Pulmonary:      Effort: Respiratory distress (dyspnic during visit , admits to being SOB  ) present  Musculoskeletal:         General: Swelling (bilateral hands ) and tenderness (upper back , sdapular , cervical para spinal , shoulders  ) present  Cervical back: Rigidity and tenderness present  Comments: Stiff,appears in pain when rising for sitting to standing position  Skin:     General: Skin is warm and dry  Neurological:      General: No focal deficit present  Mental Status: He is alert and oriented to person, place, and time  Deep Tendon Reflexes:      Reflex Scores:       Patellar reflexes are 2+ on the right side and 2+ on the left side  Achilles reflexes are 1+ on the left side  Psychiatric:         Mood and Affect: Mood normal          Behavior: Behavior normal          Neurologic Exam     Mental Status   Oriented to person, place, and time  Level of consciousness: alert  Appears forgetful, denies knowledge of diagnosis questioned about , does not know why taking certain medications , denies use  Poor recall will ask a question then no recall        Motor Exam     Strength   Right iliopsoas: 5/5  Left iliopsoas: 5/5  Right quadriceps: 5/5  Left quadriceps: 5/5  Right hamstrin/5  Left hamstrin/5  Right anterior tibial: 5/5  Left anterior tibial: 5/5  Right gastroc: 5/5    Sensory Exam   Right arm light touch: normal  Left arm light touch: normal  Right leg light touch: normal    Gait, Coordination, and Reflexes     Gait  Gait: (stiff, slow )    Reflexes   Right patellar: 2+  Left patellar: 2+  Left achilles: 1+      Imaging Studies  MRI lumbar spine wo contrast  Result Date: 1/12/2022  Narrative: MRI LUMBAR SPINE WITHOUT CONTRAST INDICATION: M54 41: Lumbago with sciatica, right side M54 42: Lumbago with sciatica, left side G89 29: Other chronic pain  COMPARISON:  Prior MRI of February 19, 2020 TECHNIQUE:  Sagittal T1, sagittal T2, sagittal inversion recovery, axial T1 and axial T2, coronal T2   IMAGE QUALITY:  Diagnostic FINDINGS: VERTEBRAL BODIES:  There are 5 lumbar type vertebral bodies  Normal alignment of the lumbar spine  No spondylolysis or spondylolisthesis  No scoliosis  No compression fracture  Normal marrow signal is identified within the visualized bony structures  No discrete marrow lesion  SACRUM:  Normal signal within the sacrum  No evidence of insufficiency or stress fracture  DISTAL CORD AND CONUS:  Normal size and signal within the distal cord and conus  PARASPINAL SOFT TISSUES:  Paraspinal soft tissues are unremarkable  LOWER THORACIC DISC SPACES:  Normal disc height and signal   No disc herniation, canal stenosis or foraminal narrowing  LUMBAR DISC SPACES: L1-L2:  No significant central or foraminal narrowing  L2-L3:  No significant central or foraminal narrowing  Small marginal osteophytes are noted  No significant central or foraminal narrowing  L3-L4:  Mild facet hypertrophy  Small marginal osteophytes are noted  Minimal left foraminal narrowing  Central canal patent  L4-L5:  Left laminectomy defect with no abnormal soft tissue in the postsurgical defect  There is a small right paramedian/foraminal protrusion results in mild right foraminal narrowing  L5-S1:  Moderate facet hypertrophy  No significant central or foraminal narrowing  Impression: Postoperative changes of left laminectomy at L4-5 without abnormal soft tissue in the postsurgical defect  Small right paramedian/foraminal protrusion at L4-5 results in mild right foraminal narrowing  No significant central or foraminal narrowing at remaining levels  Workstation performed: [id number]     CT chest high resolution    Result Date: 1/20/2022  Narrative: CT CHEST INCLUDING HIGH RESOLUTION SLICES - WITHOUT CONTRAST INDICATION:   R94 2: Abnormal results of pulmonary function studies  Reduction diffusion capacity and restrictive lung disease  COMPARISON:  CTA chest abdomen pelvis 01/10/2021 TECHNIQUE: CT examination of the chest was performed without intravenous contrast   Contiguous 1 25 mm transverse images were obtained along with 1 x 10 mm transverse images with the patient prone  Additional thin section images were performed at 10 mm intervals in supine and prone positioning in inspiration as well as supine in expiration  Reformatted images were created in sagittal, and coronal planes  MIP reconstructions were created in the transverse and coronal planes along with minIP reconstructions in the coronal plane  Sagittal reformatted images were also created  Radiation dose length product (DLP) for this visit:  1818 mGy-cm   This examination, like all CT scans performed in the [facility], was performed utilizing techniques to minimize radiation dose exposure, including the use of iterative reconstruction and automated exposure control  FINDINGS: LUNGS: Mild apical paraseptal emphysema  Mild bibasilar subpleural reticulation  No bronchial wall thickening, bronchiectasis, diffuse nodularity, ground glass opacities or honeycombing   No significant air trapping with expiration    4 mm right perifissural nodule 3/128   2 mm right perifissural nodule 3/136   2 mm nodule right middle lobe 3/142  right major perifissural nodules 3/146 -151 measuring up to 7 mm  Right lower lobe calcified granuloma  5 mm groundglass nodule left upper lobe 3/77, stable  Punctate left lower lobe nodule 3/2059   7 mm left perifissural nodule 3/130  Minimal frothy secretions in the trachea PLEURA:  Unremarkable  HEART/GREAT VESSELS: Heart is unremarkable for patient's age  No thoracic aortic aneurysm  MEDIASTINUM AND HARPER:  Unremarkable  CHEST WALL AND LOWER NECK:   Unremarkable  VISUALIZED STRUCTURES IN THE UPPER ABDOMEN:  Hepatic steatosis  Subcentimeter hypodensity in the right lobe, too small to characterize  Gallstone without evidence for cholecystitis  Left renal cyst   Small hiatal hernia  OSSEOUS STRUCTURES:  No acute fracture or destructive osseous lesion  DISH     Impression: Mild bibasilar subpleural reticulation  No bronchiectasis, diffuse ground glass opacities or honeycombing  Mild apical paraseptal emphysema  Bilateral pulmonary nodules as described, stable from 01/10/2021  Additional findings as above  Workstation performed: [id number]       I have personally reviewed pertinent reports     and I have personally reviewed pertinent films in PACS

## 2022-01-25 NOTE — ASSESSMENT & PLAN NOTE
· As addressed in HPI  · Bilateral thoracic pain , scapular and shoulders   · Myofascial back and scapular and top of shoulder pain  · Bilateral SI joint pain

## 2022-01-25 NOTE — ASSESSMENT & PLAN NOTE
· As addressed in HPI  · As addressed in chronc pain syndrome   · Muscular tenderness upper back scapula and shoulders

## 2022-01-25 NOTE — ASSESSMENT & PLAN NOTE
· As addressed in hPI  · Bilateral low back with distribution up into thoracic area , bilateral scapula and shoulders  · Tenderness to palpation thoracic , bilateral scapular and top of shoulders, paracervical area  · Has documented diagnosis of diabetic peripheral neuropathy ---denies symptoms no numbness , tingling or paresthesia sensation , denies ever  undergoing EMG of lower upper extremities  · Has significant dyspnea during appointments     IMAGINING  · MRI Lumbar spine Postoperative changes of left laminectomy at L4-5 without abnormal soft tissue in the postsurgical defect  Small right paramedian/foraminal protrusion at L4-5 results in mild right foraminal narrowing  No significant central or foraminal narrowing at remaining levels  · Discussed in collaboration with dr Ailin Waite no surgical pathology appreciated   · CT Chest high resolution ---Osseous structures DISH      PLAN  · Return to pain management --- optimize multimodal treatments , TPI, TFESI Facet joint injections, medications  · Xray cervical and thoracic--ordered  · Referral to Physical therapy DISH on imagining  --Back , scapular and shoulder pain, stiffness    · Weight loss to BMI 35-40    · RTO prn

## 2022-01-25 NOTE — ASSESSMENT & PLAN NOTE
· Tenderness palpation upper back scapular and top of shoulders , paracervical area       PLAN  · Pain management assessment for TPI

## 2022-02-01 ENCOUNTER — OFFICE VISIT (OUTPATIENT)
Dept: INTERNAL MEDICINE CLINIC | Facility: CLINIC | Age: 77
End: 2022-02-01
Payer: MEDICARE

## 2022-02-01 VITALS
DIASTOLIC BLOOD PRESSURE: 64 MMHG | OXYGEN SATURATION: 94 % | SYSTOLIC BLOOD PRESSURE: 130 MMHG | RESPIRATION RATE: 18 BRPM | TEMPERATURE: 97.6 F | HEIGHT: 67 IN | BODY MASS INDEX: 47.15 KG/M2 | WEIGHT: 300.4 LBS | HEART RATE: 68 BPM

## 2022-02-01 DIAGNOSIS — R09.81 NASAL CONGESTION: Primary | ICD-10-CM

## 2022-02-01 PROCEDURE — 99213 OFFICE O/P EST LOW 20 MIN: CPT | Performed by: NURSE PRACTITIONER

## 2022-02-01 NOTE — PROGRESS NOTES
Assessment/Plan:    Nasal congestion  Ongoing congestion for last several weeks with daily use of afrin  Educated patient Afrin is not to be used more than 72 hours consecutively because it is known to cause severe rebound congestion  He does have significant congestion and swelling in the nasal passages on exam    Recommended d/c afrin, start Flonase daily, ok to use bid if needed  Can also try Mucinex prn if beneficial call for reassessment if sx worsen or persist        Diagnoses and all orders for this visit:    Nasal congestion          Subjective:      Patient ID: Sommer Mejia is a 67 y o  male  66 y/o male presents to office today for nasal congestion for the last couple of weeks  PMH includes DM, ANALY, HTN, dementia, seizures, sacroiliitis, obesity  Patient stated that he has been using Afrin daily and the spray seems to help briefly but then effect wears off  He uses a CPAP machine at night and has been having increased problems sleeping due to not being able to breathe out of his nose  He also reports that he has a loss of taste  Denies having covid, however ever tested  He is fully vaccinated and booster  Denies CP, dizziness, headaches, body aches, fever, chills, or N/V/D  The following portions of the patient's history were reviewed and updated as appropriate: allergies, current medications, past family history, past medical history, past social history, past surgical history and problem list     Review of Systems   Constitutional: Negative for appetite change, chills and fever  HENT: Positive for congestion and postnasal drip  Negative for rhinorrhea, sinus pressure, sinus pain, sore throat and tinnitus  Respiratory: Positive for shortness of breath (baseline)  Negative for cough  Cardiovascular: Negative for chest pain  Gastrointestinal: Negative for abdominal pain  Musculoskeletal: Negative for arthralgias     Neurological: Negative for dizziness, light-headedness and headaches  Psychiatric/Behavioral: Positive for sleep disturbance (2/2 congestion)  Objective:      /64   Pulse 68   Temp 97 6 °F (36 4 °C)   Resp 18   Ht 5' 7" (1 702 m)   Wt (!) 136 kg (300 lb 6 4 oz)   SpO2 94%   BMI 47 05 kg/m²          Physical Exam  Vitals reviewed  Constitutional:       General: He is awake  He is not in acute distress  HENT:      Head: Normocephalic  Right Ear: Hearing, tympanic membrane, ear canal and external ear normal       Left Ear: Hearing, tympanic membrane, ear canal and external ear normal       Nose: Mucosal edema and congestion present  Right Nostril: Occlusion present  Right Turbinates: Enlarged  Left Turbinates: Enlarged  Mouth/Throat:      Lips: Pink  Mouth: Mucous membranes are moist       Pharynx: No oropharyngeal exudate (post nasal drainage noted)  Comments: Erythema of b/l posterior tonsil pillars   Eyes:      Pupils: Pupils are equal, round, and reactive to light  Cardiovascular:      Rate and Rhythm: Normal rate  Pulses: Normal pulses  Heart sounds: No murmur heard  Pulmonary:      Effort: Pulmonary effort is normal  No respiratory distress  Breath sounds: Normal breath sounds  Abdominal:      Palpations: Abdomen is soft  Musculoskeletal:         General: Normal range of motion  Cervical back: Normal range of motion  Skin:     General: Skin is warm  Capillary Refill: Capillary refill takes less than 2 seconds  Neurological:      Mental Status: He is alert and oriented to person, place, and time  Psychiatric:         Attention and Perception: Attention normal          Mood and Affect: Mood normal          Speech: Speech normal          Behavior: Behavior is cooperative  Thought Content:  Thought content normal          Cognition and Memory: Cognition normal          Judgment: Judgment normal

## 2022-02-01 NOTE — ASSESSMENT & PLAN NOTE
Ongoing congestion for last several weeks with daily use of afrin  Educated patient Afrin is not to be used more than 72 hours consecutively because it is known to cause severe rebound congestion  He does have significant congestion and swelling in the nasal passages on exam    Recommended d/c afrin, start Flonase daily, ok to use bid if needed    Can also try Mucinex prn if beneficial call for reassessment if sx worsen or persist

## 2022-02-02 ENCOUNTER — TELEPHONE (OUTPATIENT)
Dept: NEUROLOGY | Facility: CLINIC | Age: 77
End: 2022-02-02

## 2022-02-02 NOTE — TELEPHONE ENCOUNTER
LMOM for the patient to call us back to reschedule his 3/3/22 appt with Dr Manju Gautam as he will not be in the office that day  If patient calls back please assist with rescheduling he can see anyone who follows up for seizures

## 2022-02-03 ENCOUNTER — OFFICE VISIT (OUTPATIENT)
Dept: CARDIOLOGY CLINIC | Facility: CLINIC | Age: 77
End: 2022-02-03
Payer: MEDICARE

## 2022-02-03 VITALS
DIASTOLIC BLOOD PRESSURE: 60 MMHG | HEART RATE: 61 BPM | SYSTOLIC BLOOD PRESSURE: 120 MMHG | OXYGEN SATURATION: 94 % | WEIGHT: 303 LBS | HEIGHT: 67 IN | BODY MASS INDEX: 47.56 KG/M2

## 2022-02-03 DIAGNOSIS — I77.9 BILATERAL CAROTID ARTERY DISEASE, UNSPECIFIED TYPE (HCC): ICD-10-CM

## 2022-02-03 DIAGNOSIS — N18.30 STAGE 3 CHRONIC KIDNEY DISEASE, UNSPECIFIED WHETHER STAGE 3A OR 3B CKD (HCC): ICD-10-CM

## 2022-02-03 DIAGNOSIS — I50.33 ACUTE ON CHRONIC DIASTOLIC HEART FAILURE (HCC): ICD-10-CM

## 2022-02-03 DIAGNOSIS — R00.2 PALPITATIONS: ICD-10-CM

## 2022-02-03 DIAGNOSIS — E66.01 MORBID OBESITY WITH BMI OF 40.0-44.9, ADULT (HCC): ICD-10-CM

## 2022-02-03 PROCEDURE — 93000 ELECTROCARDIOGRAM COMPLETE: CPT | Performed by: INTERNAL MEDICINE

## 2022-02-03 PROCEDURE — 99214 OFFICE O/P EST MOD 30 MIN: CPT | Performed by: INTERNAL MEDICINE

## 2022-02-03 RX ORDER — FUROSEMIDE 40 MG/1
40 TABLET ORAL DAILY
Qty: 30 TABLET | Refills: 3 | Status: SHIPPED | OUTPATIENT
Start: 2022-02-03 | End: 2022-02-04 | Stop reason: SDUPTHER

## 2022-02-03 RX ORDER — FLUTICASONE PROPIONATE 50 MCG
1 SPRAY, SUSPENSION (ML) NASAL DAILY
COMMUNITY
End: 2022-04-27 | Stop reason: SDUPTHER

## 2022-02-03 RX ORDER — GUAIFENESIN 600 MG
1200 TABLET, EXTENDED RELEASE 12 HR ORAL EVERY 12 HOURS SCHEDULED
COMMUNITY
End: 2022-06-28

## 2022-02-03 NOTE — PROGRESS NOTES
General Cardiology - Outpatient Progress Note   Galo Quinn 67 y o  male   MRN: [id number]  @ Encounter: [id number]    Grabiel Romero MD  Consults      Risk factors:  -HTN   -HLD  -DM2  -previous tobacco use  -class 3 obesity      Interval History:   Since last encounter, patient endorses persisting shortness of breath  He is able to walk short distances however going up and down the stairs is quite challenging  He denies any orthopnea, PND or worsening lower extremity edema  His legs have trace to 1+ edema constantly per the patient  He does sleep with a CPAP which may help with positive pressure ventilation thus reducing incidences of PND  He has no chest pain or palpitations  He does not recall if he felt any different on oral diuretic  Cardiac History Summary:   Galo Quinn is a 67y o  year old male, now retired but formally employed as  for [employer] 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, who was initially evaluated at [facility] for pleuritic chest discomfort with associated back pain       He has a history of  Myopericarditis, metabolic syndrome (HTN, HLD, DM2), class 3 obesity, previous tobacco use disorder and obstructive sleep apnea on CPAP      On our initial encounter inpatient during the month of January 2021, patient was diagnosed with myopericarditis with troponin peaking at 0 72  Transthoracic echocardiogram was grossly unremarkable but for biventricular mild hypertrophy and grade 1 diastolic dysfunction  Cardiac catheterization revealed grossly normal coronary circulation with minor luminal irregularities in LAD and RCA territories   Patient was discharged on colchicine and ibuprofen slow taper which he completed over 3 months  Follow-up echo in August 2021 revealed similar findings with evidence of aortic sclerosis and increased velocities due to flow        Objective:  Review of Systems  Review of system was conducted and was negative except for as stated in the interval history      Physical Exam:  Vitals: Blood pressure 120/60, pulse 61, height 5' 7" (1 702 m), weight (!) 137 kg (303 lb), SpO2 94 %  , Body mass index is 47 46 kg/m²      GEN: Jeannie Nichols appears well, alert and oriented x 3, pleasant and cooperative   NECK: No JVD or carotid bruits   HEART: regular rhythm,  normal rate, normal S1 and S2, no murmurs, clicks, gallops or rubs   LUNGS:  distant breath sounds; no rales; respiration nonlabored at rest on room air  ABDOMEN:  Normoactive bowel sounds, soft, no tenderness, no distention  EXTREMITIES: radial pulses 2+ palpable; 1+ lower extremity edema to the mid shins  NEURO: no gross focal findings; cranial nerves grossly intact   SKIN:  Dry, intact, warm to touch    Home Medications: (Not in a hospital admission)      Current Outpatient Medications:     amLODIPine (NORVASC) 10 mg tablet, TAKE 1 TABLET BY MOUTH EVERY DAY (Patient taking differently: 10 mg every morning  ), Disp: 90 tablet, Rfl: 3    aspirin (ECOTRIN LOW STRENGTH) 81 mg EC tablet, Take 1 tablet (81 mg total) by mouth daily, Disp: 30 tablet, Rfl: 0    carteolol (OCUPRESS) 1 % ophthalmic solution, INSTILL 1 DROP IN [facility] EYE TWO TIMES DAILY, Disp: , Rfl:     citalopram (CeleXA) 10 mg tablet, TAKE 1 TABLET BY MOUTH EVERY DAY, Disp: 90 tablet, Rfl: 3    fluticasone (FLONASE) 50 mcg/act nasal spray, 1 spray into each nostril daily, Disp: , Rfl:     glimepiride (AMARYL) 1 mg tablet, TAKE 3 TABLETS BY MOUTH EVERY DAY WITH BREAKFAST, Disp: 270 tablet, Rfl: 3    guaiFENesin (MUCINEX) 600 mg 12 hr tablet, Take 1,200 mg by mouth every 12 (twelve) hours, Disp: , Rfl:     losartan (COZAAR) 100 MG tablet, Take 1 tablet (100 mg total) by mouth daily (Patient taking differently: Take 100 mg by mouth every morning  ), Disp: 90 tablet, Rfl: 3    metoprolol succinate (TOPROL-XL) 100 mg 24 hr tablet, Take 1 tablet (100 mg total) by mouth daily (Patient taking differently: Take 100 mg by mouth every morning  ), Disp: 90 tablet, Rfl: 3    omeprazole (PriLOSEC) 20 mg delayed release capsule, TAKE 1 CAPSULE BY MOUTH EVERY DAY, Disp: 90 capsule, Rfl: 3    rosuvastatin (CRESTOR) 20 MG tablet, Take 1 tablet (20 mg total) by mouth daily, Disp: 90 tablet, Rfl: 3    tamsulosin (FLOMAX) 0 4 mg, Take 1 capsule (0 4 mg total) by mouth daily with dinner, Disp: 90 capsule, Rfl: 3    trospium chloride (SANCTURA) 20 mg tablet, Take 1 tablet (20 mg total) by mouth 2 (two) times a day, Disp: 180 tablet, Rfl: 3    diclofenac (VOLTAREN) 75 mg EC tablet, Take 1 tablet (75 mg total) by mouth 2 (two) times a day As needed   Take with food, Disp: 60 tablet, Rfl: 0    furosemide (LASIX) 40 mg tablet, Take 1 tablet (40 mg total) by mouth daily, Disp: 30 tablet, Rfl: 3    oxymetazoline (AFRIN) 0 05 % nasal spray, 2 sprays by Each Nare route every 12 (twelve) hours as needed for congestion (Patient not taking: Reported on 2/3/2022 ), Disp: , Rfl:     Labs & Results:  Lab Results   Component Value Date    TROPONINI <0 02 04/26/2021    TROPONINI 0 36 (H) 01/11/2021    TROPONINI 0 72 (H) 01/10/2021     Lab Results   Component Value Date    CHOL 162 09/10/2015    CHOL 175 05/06/2015    TRIG 87 04/12/2021    TRIG 115 12/01/2020    HDL 40 04/12/2021    HDL 37 (L) 12/01/2020    LDLCALC 77 04/12/2021    LDLCALC 91 12/01/2020    HGBA1C 6 2 01/10/2022    HGBA1C 6 6 (H) 08/13/2021     Lab Results   Component Value Date     01/19/2015    K 4 4 11/08/2021    CO2 25 11/08/2021     11/08/2021    BUN 12 11/08/2021    CREATININE 1 21 11/08/2021    ALT 25 11/08/2021    AST 14 11/08/2021     Lab Results   Component Value Date    WBC 8 51 11/08/2021    HGB 11 8 (L) 11/08/2021    HCT 37 0 11/08/2021    MCV 99 (H) 11/08/2021     11/08/2021     Lab Results   Component Value Date    INR 0 98 01/10/2021       EKG:  Date: 02/03/22  Interpretation:  Sinus rhythm, low voltage, nonspecific ST/T-wave changes, HR 61, QTC 408      Device Interrogation:   No results found for this or any previous visit  HOLTER  AMB extended Holter monitor  11/05/2021    Preliminary findings:  Patient had a min HR of 49 bpm, max HR of 156 bpm, and avg HR of 58 bpm  Predominant underlying rhythm was Sinus Rhythm  6 Supraventricular Tachycardia runs occurred, the run with the fastest interval lasting 5 beats with a max rate of 156 bpm, the longest lasting 7 beats with an avg rate of 100 bpm  Isolated SVEs were rare (<1 0%), SVE Couplets were rare (<1 0%), and SVE Triplets were rare (<1 0%)  Isolated VEs were rare (<1 0%), and no VE Couplets or VE Triplets were present      IMPRESSION:  - shortened recording due to ZIO patch constantly falling off per patient  - 6 SVT events consistent with atrial tachycardia  - patient triggers/diary entries correlated with sinus rhythm     PLAN:  - no changes in medication  - no plan for further monitoring at this point      Imaging: I have personally reviewed pertinent reports  and I have personally reviewed pertinent films in PACS   CT chest high-resolution  01/19/2022      IMPRESSION:  Mild bibasilar subpleural reticulation  No bronchiectasis, diffuse ground glass opacities or honeycombing  Mild apical paraseptal emphysema  Bilateral pulmonary nodules as described, stable from 01/10/2021  Previous STRESS TEST:  No results found for this or any previous visit  Previous Cath/PCI:  Results for orders placed during the hospital encounter of 01/10/21    Cardiac catheterization    Narrative  [facility]  Invasive Cardiovascular Lab Complete Report  Study date: 01/11/2021    Diagnostic Cardiologist:  Sally Lakhani DO  Primary Physician:  Hamzah Shell MD    SUMMARY    CORONARY CIRCULATION:  There was no angiographic evidence for occlusive coronary artery disease    Left main: Normal   Circumflex: Normal     INDICATIONS:  --  Possible CAD: unstable angina  IMPRESSIONS:  Symptoms and small troponin elevation, not from epicardial CAD  Mild luminal irregularities    RECOMMENDATIONS  GDMT CAD, weight loss    DISPOSITION:  The patient left the catheterization laboratory in stable condition  Prepared and signed by  Anabela Hawkins DO  Signed 01/11/2021 12:57:15        ECHO:  Results for orders placed during the hospital encounter of 08/19/21    Echo complete with contrast if indicated    [facility]  Transthoracic Echocardiogram 2D, M-mode, Doppler, and Color Doppler  Study date:  19-Aug-2021    Indications: Diastolic dysfunction    Sonographer:  CATRACHITA Cox Che  Primary Physician:  Bee De Leon MD  Referring Physician:  Bee De Leon MD  Group:  [facility]  Interpreting Physician:  Anette Dawn MD    SUMMARY    LEFT VENTRICLE:  Systolic function was normal  Ejection fraction was estimated to be 60 %  Global longitudinal strain was normal at -19 7%  There were no regional wall motion abnormalities  There was mild concentric hypertrophy  LEFT ATRIUM:  The atrium was dilated  RIGHT ATRIUM:  The atrium was dilated  MITRAL VALVE:  There was mild regurgitation  AORTIC VALVE:  There was very mild stenosis  Valve mean gradient was 10 mmHg  HISTORY: PRIOR HISTORY: GERD; DM2; ANALY; HTN; HLD; Obesity    LEFT VENTRICLE: Size was normal  Systolic function was normal  Ejection fraction was estimated to be 60 %  Global longitudinal strain was normal at -19 7%  There were no regional wall motion abnormalities  Wall thickness was mildly increased  There was mild concentric hypertrophy  DOPPLER: The transmitral flow pattern was normal  Left ventricular diastolic function parameters were normal for the patient's age  RIGHT VENTRICLE: The size was normal  Systolic function was normal  Wall thickness was normal     LEFT ATRIUM: The atrium was dilated      RIGHT ATRIUM: The atrium was dilated  MITRAL VALVE: Valve structure was normal  There was normal leaflet separation  DOPPLER: The transmitral velocity was within the normal range  There was no evidence for stenosis  There was mild regurgitation  AORTIC VALVE: The valve was trileaflet  Leaflets exhibited normal thickness, mild calcification, and lower normal cuspal separation  DOPPLER: Transaortic velocity was minimally increased  There was very mild stenosis  There was no significant regurgitation  TRICUSPID VALVE: The valve structure was normal  There was normal leaflet separation  DOPPLER: The transtricuspid velocity was within the normal range  There was no evidence for stenosis  There was trace regurgitation  PULMONIC VALVE: Leaflets exhibited normal thickness, no calcification, and normal cuspal separation  DOPPLER: The transpulmonic velocity was within the normal range  There was trace regurgitation  PERICARDIUM: There was no pericardial effusion  The pericardium was normal in appearance  AORTA: The root exhibited normal size  SYSTEMIC VEINS: IVC: The inferior vena cava was normal in size  Respirophasic changes were normal     MEASUREMENT TABLES      [facility]    Prepared and electronically signed by  Rosita Crocker MD  Signed 19-Aug-2021 14:05:46      DIPAK:  No results found for this or any previous visit  CMR:  No results found for this or any previous visit  Assessment/Plan     Assessment:    1   Dyspnea  -previous myopericarditis peak troponin 0 72; colchicine + ibuprofen taper since completed  -01/2021 TTE:  LVEF 60%, mild cLVH, no RWMA, grade 1 DD, normal RV size/function with mild RVH, no significant valvulopathy  -08/2021 TTE: LVEF 60%, mild LVH, no RWMA, GLS -19 7%, normal diastology for age, normal RV but for mild RVH, AV sclerosis  -08/2021 complete PFT:  FEV1/FVC ratio 79%, mild restrictive pattern, DLCO 56% for moderately reduced diffusion  -11/2021 6 min WT:  158 m in 3 minute but with significant dyspnea, 94% -> 90%     2  Minimal coronary artery disease  - risk factors HTN, HLD, DM2, previous tobacco use  - 1/11 LHC:  Minor luminal irregularities in LAD and RCA; normal epicardial vessels elsewhere  - 04/2021 lipids:  Total 134, TG 87, HDL 40, calculated LDL 77; A1c 6 6%  - aspirin, rosuvastatin, metoprolol succinate, losartan  - in office BP today 118/60      Plan:  1  His dyspnea is likely multifactorial between class 3 obesity, protuberant abdomen, emphysema and possibly underlying HFpEF  Transthoracic echocardiogram have not revealed resting elevated filling pressures  However on recent CT scan, the coronary sinus is clearly delineated and visualized which may be indicative of dilation  Initiated on furosemide 40 mg p o  Daily  2  He is to obtain a BMP and an NT-proBNP in a week to assess appropriate response  He will also monitor his weight  3  He is to follow in the office in about a month  If symptoms are not significantly improve, would consider right heart catheterization  Case discussed and reviewed with Dr Tristan Coleman who agrees with my assessment and plan  Jordi Canchola MD  Cardiology Fellow PGY-6    ==========================================================================================    Epic/ Allscripts/Care Everywhere records reviewed:  Yes    ** Please Note: Fluency DirectDictation voice to text software may have been used in the creation of this document   **

## 2022-02-04 DIAGNOSIS — I77.9 BILATERAL CAROTID ARTERY DISEASE, UNSPECIFIED TYPE (HCC): Primary | ICD-10-CM

## 2022-02-04 DIAGNOSIS — I50.33 ACUTE ON CHRONIC DIASTOLIC HEART FAILURE (HCC): ICD-10-CM

## 2022-02-04 RX ORDER — FUROSEMIDE 40 MG/1
40 TABLET ORAL DAILY
Qty: 30 TABLET | Refills: 3 | Status: SHIPPED | OUTPATIENT
Start: 2022-02-04 | End: 2022-03-01 | Stop reason: SDUPTHER

## 2022-02-15 ENCOUNTER — OFFICE VISIT (OUTPATIENT)
Dept: PULMONOLOGY | Facility: CLINIC | Age: 77
End: 2022-02-15
Payer: MEDICARE

## 2022-02-15 VITALS
OXYGEN SATURATION: 98 % | HEIGHT: 67 IN | BODY MASS INDEX: 47.56 KG/M2 | TEMPERATURE: 97.9 F | HEART RATE: 62 BPM | RESPIRATION RATE: 18 BRPM | SYSTOLIC BLOOD PRESSURE: 120 MMHG | DIASTOLIC BLOOD PRESSURE: 64 MMHG | WEIGHT: 303 LBS

## 2022-02-15 DIAGNOSIS — J84.9 INTERSTITIAL LUNG DISEASE (HCC): Primary | ICD-10-CM

## 2022-02-15 DIAGNOSIS — I10 ESSENTIAL HYPERTENSION: ICD-10-CM

## 2022-02-15 PROCEDURE — 99214 OFFICE O/P EST MOD 30 MIN: CPT | Performed by: INTERNAL MEDICINE

## 2022-02-15 RX ORDER — METOPROLOL SUCCINATE 100 MG/1
TABLET, EXTENDED RELEASE ORAL
Qty: 90 TABLET | Refills: 3 | Status: SHIPPED | OUTPATIENT
Start: 2022-02-15 | End: 2022-04-27

## 2022-02-15 NOTE — ASSESSMENT & PLAN NOTE
Toney Ordonez had one small area of some reticular changes which did not dissipate with prone imaging, suggesting some early interstitial lung disease  It is unlikely that this is a major contributor to his overall dyspnea and, just given the small volume of lung parenchyma involved  However, will repeat pulmonary function tests and a CT scan in one year for follow-up

## 2022-02-15 NOTE — PROGRESS NOTES
Pulmonary Follow Up Note   Vee De Paz 67 y o  male MRN: [id number]  2/15/2022    Assessment:    Interstitial lung disease ([facility] )  Ish Mitchell had one small area of some reticular changes which did not dissipate with prone imaging, suggesting some early interstitial lung disease  It is unlikely that this is a major contributor to his overall dyspnea and, just given the small volume of lung parenchyma involved  However, will repeat pulmonary function tests and a CT scan in one year for follow-up  Plan:    Diagnoses and all orders for this visit:    Interstitial lung disease ([facility] )  -     Complete PFT with post bronchodilator; Future  -     CT chest without contrast; Future    No follow-ups on file  History of Present Illness   HPI:  Vee De Paz is a 67 y o  male who presents for routine follow-up  In the interim since his last appointment, he underwent high-resolution CT scan testing with a medial basilar area of fixed interstitial changes which did not resolve with prone positioning  He had one or two tiny areas of emphysema, and no other significant findings  He has been following up with Cardiology who has initiated diuretics for him, which he is unclear if this has significantly contributed to improving breathlessness  He has improved exam findings of peripheral edema today, but otherwise reports feeling essentially no different  As he reports it is difficult to anticipate when he is going to do something that will make him short of breath, he does not feel he would benefit from using rescue inhalers      Answers for HPI/ROS submitted by the patient on 2/8/2022  Do you experience frequent throat clearing?: Yes  Chronicity: recurrent  When did you first notice your symptoms?: more than 1 month ago  How often do your symptoms occur?: 2 to 4 times per day  Since you first noticed this problem, how has it changed?: unchanged  Do you have shortness of breath that occurs with effort or exertion?: Yes  Do you have ear congestion?: Yes  Do you have heartburn?: No  Do you have fatigue?: Yes  Do you have nasal congestion?: Yes  Do you have shortness of breath when lying flat?: No  Do you have shortness of breath when you wake up?: Yes  Do you have sweats?: No  Have you experienced weight loss?: No  Which of the following makes your symptoms worse?: any activity, climbing stairs, exposure to smoke, minimal activity, strenuous activity, URI  Which of the following makes your symptoms better?: OTC cough suppressant, rest    Review of Systems   Constitutional: Negative for appetite change and fever  HENT: Positive for ear pain and postnasal drip  Negative for rhinorrhea, sneezing, sore throat and trouble swallowing  Respiratory: Positive for cough and shortness of breath  Cardiovascular: Negative for chest pain  Musculoskeletal: Positive for myalgias  Neurological: Negative for headaches  All other systems reviewed and are negative      Historical Information   Past Medical History:   Diagnosis Date    Anxiety     Broken teeth     Cancer ([facility] )     basal cell-right cheek,left breast, right side of nose, left forehead    Chipped tooth     upper front-right    Chronic pain disorder     pain with standing-herniated disc lower back, upper back pain    Claustrophobia     Colon polyps     CPAP (continuous positive airway pressure) dependence     Depression     Diabetes mellitus ([facility] )     Dyslipidemia     Esophageal polyp     GERD (gastroesophageal reflux disease)     Glaucoma     Headache     Hyperlipidemia     Hypertension     Low back pain     Morbid obesity with BMI of 45 0-49 9, adult (HCC)     Neck pain     Numbness and tingling in left arm     resolved 2/20/17 / numbness and tingling left side last assessed 3/23/16    OAB (overactive bladder)     Paroxysmal supraventricular tachycardia (HCC)     Pericarditis 01/2021    Shortness of breath     with exertion since 2020    Sleep apnea     wears cpap     Squamous cell skin cancer 10/19/2020    Right Nasal Ala, Dr Turcios    Tinnitus      Past Surgical History:   Procedure Laterality Date    ANOSCOPY      for polyp removal    BACK SURGERY  1983    disc surgery lower back    BASAL CELL CARCINOMA EXCISION      right cheek    CATARACT EXTRACTION Bilateral     COLONOSCOPY      CYSTOSCOPY      EGD      JOINT REPLACEMENT      bilateral knees    KNEE ARTHROSCOPY Bilateral     MOHS SURGERY  11/11/2020    [facility] Right Nasal Ala, Dr Travis Salas      tooth extraction    OK COLONOSCOPY FLX DX W/COLLJ SPEC WHEN PFRMD N/A 2/13/2019    Procedure: COLONOSCOPY;  Surgeon: Ivon Ram MD;  Location: [facility]; Service: Gastroenterology    OK ESOPHAGOGASTRODUODENOSCOPY TRANSORAL DIAGNOSTIC N/A 2/13/2019    Procedure: ESOPHAGOGASTRODUODENOSCOPY (EGD); Surgeon: Ivon Ram MD;  Location: [facility]; Service: Gastroenterology    RECTAL BIOPSY      SPINE SURGERY  1983     Family History   Problem Relation Age of Onset    Alzheimer's disease Mother     Diabetes Mother     Dementia Mother    Barbette Eddie Hypertension Father     Hypertension Father     Diabetes Paternal Grandmother     Esophageal cancer Paternal Grandfather        Meds/Allergies     Current Outpatient Medications:     amLODIPine (NORVASC) 10 mg tablet, TAKE 1 TABLET BY MOUTH EVERY DAY (Patient taking differently: 10 mg every morning  ), Disp: 90 tablet, Rfl: 3    aspirin (ECOTRIN LOW STRENGTH) 81 mg EC tablet, Take 1 tablet (81 mg total) by mouth daily, Disp: 30 tablet, Rfl: 0    carteolol (OCUPRESS) 1 % ophthalmic solution, INSTILL 1 DROP IN [facility] EYE TWO TIMES DAILY, Disp: , Rfl:     citalopram (CeleXA) 10 mg tablet, TAKE 1 TABLET BY MOUTH EVERY DAY, Disp: 90 tablet, Rfl: 3    diclofenac (VOLTAREN) 75 mg EC tablet, Take 1 tablet (75 mg total) by mouth 2 (two) times a day As needed   Take with food, Disp: 60 tablet, Rfl: 0    fluticasone (FLONASE) 50 mcg/act nasal spray, 1 spray into each nostril daily, Disp: , Rfl:     furosemide (LASIX) 40 mg tablet, Take 1 tablet (40 mg total) by mouth daily, Disp: 30 tablet, Rfl: 3    glimepiride (AMARYL) 1 mg tablet, TAKE 3 TABLETS BY MOUTH EVERY DAY WITH BREAKFAST, Disp: 270 tablet, Rfl: 3    guaiFENesin (MUCINEX) 600 mg 12 hr tablet, Take 1,200 mg by mouth every 12 (twelve) hours, Disp: , Rfl:     losartan (COZAAR) 100 MG tablet, Take 1 tablet (100 mg total) by mouth daily (Patient taking differently: Take 100 mg by mouth every morning  ), Disp: 90 tablet, Rfl: 3    metoprolol succinate (TOPROL-XL) 100 mg 24 hr tablet, TAKE 1 TABLET BY MOUTH EVERY DAY, Disp: 90 tablet, Rfl: 3    omeprazole (PriLOSEC) 20 mg delayed release capsule, TAKE 1 CAPSULE BY MOUTH EVERY DAY, Disp: 90 capsule, Rfl: 3    rosuvastatin (CRESTOR) 20 MG tablet, Take 1 tablet (20 mg total) by mouth daily, Disp: 90 tablet, Rfl: 3    tamsulosin (FLOMAX) 0 4 mg, Take 1 capsule (0 4 mg total) by mouth daily with dinner, Disp: 90 capsule, Rfl: 3    trospium chloride (SANCTURA) 20 mg tablet, Take 1 tablet (20 mg total) by mouth 2 (two) times a day, Disp: 180 tablet, Rfl: 3    oxymetazoline (AFRIN) 0 05 % nasal spray, 2 sprays by Each Nare route every 12 (twelve) hours as needed for congestion (Patient not taking: Reported on 2/3/2022 ), Disp: , Rfl:   Allergies   Allergen Reactions    Codeine Hives     Pt does not remember if it was codeine or if the reaction was hives   Sulfa Antibiotics Other (See Comments)     Nausea         Vitals: Blood pressure 120/64, pulse 62, temperature 97 9 °F (36 6 °C), temperature source Tympanic, resp  rate 18, height 5' 7" (1 702 m), weight (!) 137 kg (303 lb), SpO2 98 %  Body mass index is 47 46 kg/m²  Oxygen Therapy  SpO2: 98 %  Oxygen Therapy: None (Room air)    Physical Exam  Physical Exam  Vitals reviewed  Constitutional:       General: He is not in acute distress  Appearance: Normal appearance  He is well-developed  He is not ill-appearing  HENT:      Head: Normocephalic and atraumatic  Eyes:      General: No scleral icterus  Conjunctiva/sclera: Conjunctivae normal    Neck:      Thyroid: No thyromegaly  Vascular: No JVD  Cardiovascular:      Rate and Rhythm: Normal rate and regular rhythm  Heart sounds: Normal heart sounds  No murmur heard  No friction rub  No gallop  Pulmonary:      Effort: Pulmonary effort is normal  No respiratory distress  Breath sounds: Normal breath sounds  No wheezing or rales  Musculoskeletal:      Cervical back: Neck supple  Right lower leg: Edema present  Left lower leg: Edema present  Comments: 1+ edema in lower extremities bilaterally  Skin:     General: Skin is warm and dry  Findings: No rash  Neurological:      General: No focal deficit present  Mental Status: He is alert and oriented to person, place, and time  Mental status is at baseline  Psychiatric:         Mood and Affect: Mood normal          Behavior: Behavior normal      Labs: I have personally reviewed pertinent lab results  Lab Results   Component Value Date    WBC 8 51 11/08/2021    HGB 11 8 (L) 11/08/2021    HCT 37 0 11/08/2021    MCV 99 (H) 11/08/2021     11/08/2021     Lab Results   Component Value Date    GLUCOSE 125 01/19/2015    CALCIUM 8 7 11/08/2021     01/19/2015    K 4 4 11/08/2021    CO2 25 11/08/2021     11/08/2021    BUN 12 11/08/2021    CREATININE 1 21 11/08/2021     No results found for: IGE  Lab Results   Component Value Date    ALT 25 11/08/2021    AST 14 11/08/2021    ALKPHOS 124 (H) 11/08/2021    BILITOT 0 31 01/19/2015     Imaging and other studies: I have personally reviewed pertinent films in PACS    EKG, Pathology, and Other Studies: I have personally reviewed pertinent reports  [facility]

## 2022-02-16 ENCOUNTER — OFFICE VISIT (OUTPATIENT)
Dept: PODIATRY | Facility: CLINIC | Age: 77
End: 2022-02-16
Payer: MEDICARE

## 2022-02-16 VITALS
DIASTOLIC BLOOD PRESSURE: 70 MMHG | SYSTOLIC BLOOD PRESSURE: 123 MMHG | BODY MASS INDEX: 46.93 KG/M2 | HEART RATE: 66 BPM | HEIGHT: 67 IN | WEIGHT: 299 LBS

## 2022-02-16 DIAGNOSIS — B35.1 ONYCHOMYCOSIS: ICD-10-CM

## 2022-02-16 DIAGNOSIS — E11.42 DIABETIC POLYNEUROPATHY ASSOCIATED WITH TYPE 2 DIABETES MELLITUS (HCC): Primary | ICD-10-CM

## 2022-02-16 PROCEDURE — 11721 DEBRIDE NAIL 6 OR MORE: CPT | Performed by: PODIATRIST

## 2022-02-16 NOTE — PROGRESS NOTES
Hector Fishman  1945  AT RISK FOOT CARE    1  Diabetic polyneuropathy associated with type 2 diabetes mellitus ([facility] )     2  Onychomycosis         Patient presents for at-risk foot care  Patient has no acute concerns today  Patient has significant lower extremity risk due to neuropathy, parasthesia, edema, and trophic skin changes to the lower extremity  On exam patient has thickened, hypertrophic, discolored, brittle toenails with subungual debris and tenderness x10   Callus: none  Patient has lower extremity edema  PAtients skin is atrophic, thickened nails, and decreased pedal hair  Patient has decreased pinprick and vibratory sensation to his feet and parasthesia    Today's treatment includes:  Debridement of toenails  Using nail nipper, krystle, and curette, nails were sharply debrided, reduced in thickness and length  Devitalized nail tissue and fungal debris excised and removed  Patient tolerated well  Discussed proper shoe gear, daily inspections of feet, and general foot health with patient  Patient has Q9  findings and is recommended for at risk foot care every 9-10 weeks      Patients most recent complete clinical foot exam was on: 7/19/21

## 2022-02-28 DIAGNOSIS — I10 ESSENTIAL HYPERTENSION: ICD-10-CM

## 2022-02-28 RX ORDER — LOSARTAN POTASSIUM 100 MG/1
TABLET ORAL
Qty: 90 TABLET | Refills: 3 | Status: SHIPPED | OUTPATIENT
Start: 2022-02-28 | End: 2022-06-28

## 2022-03-01 DIAGNOSIS — I77.9 BILATERAL CAROTID ARTERY DISEASE, UNSPECIFIED TYPE (HCC): ICD-10-CM

## 2022-03-01 DIAGNOSIS — I50.33 ACUTE ON CHRONIC DIASTOLIC HEART FAILURE (HCC): ICD-10-CM

## 2022-03-01 RX ORDER — FUROSEMIDE 40 MG/1
40 TABLET ORAL DAILY
Qty: 90 TABLET | Refills: 3 | Status: SHIPPED | OUTPATIENT
Start: 2022-03-01 | End: 2022-06-28

## 2022-03-02 DIAGNOSIS — M46.1 SACROILIITIS (HCC): ICD-10-CM

## 2022-03-02 RX ORDER — DICLOFENAC SODIUM 75 MG/1
75 TABLET, DELAYED RELEASE ORAL 2 TIMES DAILY
Qty: 60 TABLET | Refills: 0 | Status: SHIPPED | OUTPATIENT
Start: 2022-03-02 | End: 2022-04-19 | Stop reason: SDUPTHER

## 2022-03-03 ENCOUNTER — OFFICE VISIT (OUTPATIENT)
Dept: CARDIOLOGY CLINIC | Facility: CLINIC | Age: 77
End: 2022-03-03
Payer: MEDICARE

## 2022-03-03 VITALS
DIASTOLIC BLOOD PRESSURE: 68 MMHG | BODY MASS INDEX: 47.27 KG/M2 | HEART RATE: 63 BPM | WEIGHT: 301.2 LBS | OXYGEN SATURATION: 96 % | HEIGHT: 67 IN | SYSTOLIC BLOOD PRESSURE: 122 MMHG

## 2022-03-03 DIAGNOSIS — I10 ESSENTIAL HYPERTENSION: ICD-10-CM

## 2022-03-03 DIAGNOSIS — E66.01 MORBID OBESITY WITH BMI OF 40.0-44.9, ADULT (HCC): ICD-10-CM

## 2022-03-03 DIAGNOSIS — D64.9 ANEMIA: ICD-10-CM

## 2022-03-03 DIAGNOSIS — I50.30 HEART FAILURE WITH PRESERVED LEFT VENTRICULAR FUNCTION (HFPEF) (HCC): Primary | ICD-10-CM

## 2022-03-03 PROCEDURE — 99214 OFFICE O/P EST MOD 30 MIN: CPT | Performed by: INTERNAL MEDICINE

## 2022-03-03 NOTE — PROGRESS NOTES
General Cardiology - Outpatient Progress Note   Fab Palomares 67 y o  male   MRN: [id number]  @ Encounter: [id number]    Kim Pro MD  Consults      Risk factors:  -HTN   -HLD  -DM2  -previous tobacco use  -class 3 obesity      Interval History:   Since last encounter, patient reports that his breathing has not changed much but he does not report any increase in activity in general   He refrain from exerting himself due to previous episodes of shortness of breath  He spends most of the day in a sedentary fashion  His weight has slightly decreased in the last 6 months  However, his diet is not optimal   It consists primarily of a bowl of cereal in the morning, no lunch, pretzels and a few cocktails for dinner  He does endorse significant increase in urination since initiation of furosemide  His main complaint today is bilateral arm/hand stiffness and paresthesia upon awakening from sleep which seem consistent with restless arm syndrome or apneic myoclonic jerks      Cardiac History Summary:   Fab Palomares is a 67y o  year old male, now retired but formally employed as  for [employer] 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, who was initially evaluated at [facility] for pleuritic chest discomfort with associated back pain       He has a history of  Myopericarditis, metabolic syndrome (HTN, HLD, DM2), class 3 obesity, previous tobacco use disorder and obstructive sleep apnea on CPAP      On our initial encounter inpatient during the month of January 2021, patient was diagnosed with myopericarditis with troponin peaking at 0 72  Transthoracic echocardiogram was grossly unremarkable but for biventricular mild hypertrophy and grade 1 diastolic dysfunction  Cardiac catheterization revealed grossly normal coronary circulation with minor luminal irregularities in LAD and RCA territories   Patient was discharged on colchicine and ibuprofen slow taper which he completed over 3 months  Follow-up echo in August 2021 revealed similar findings with evidence of aortic sclerosis and increased velocities due to flow  Objective:  Review of Systems  Review of system was conducted and was negative except for as stated in the interval history      Physical Exam:  Vitals: Blood pressure 122/68, pulse 63, height 5' 7" (1 702 m), weight (!) 137 kg (301 lb 3 2 oz), SpO2 96 %  , Body mass index is 47 17 kg/m²  GEN: Jose Maria King appears well, alert and oriented x 3, pleasant and cooperative   HEENT:  Normocephalic, atraumatic, anicteric, moist mucous membranes  NECK:  Difficult to assess JVD due to body habitus  HEART:  Regular rhythm, normal rate, normal S1 and S2, no murmurs or friction rubs   LUNGS: Clear to auscultation bilaterally; no wheezes, rales, or rhonchi; respiration nonlabored on room air  ABDOMEN:  Normoactive bowel sounds, soft, no tenderness, no distention  EXTREMITIES: radial pulses 2+ palpable; trivial lower extremity edema  NEURO: no gross focal findings; cranial nerves grossly intact   SKIN:  Dry, intact, warm to touch    Home Medications: (Not in a hospital admission)      Current Outpatient Medications:     amLODIPine (NORVASC) 10 mg tablet, TAKE 1 TABLET BY MOUTH EVERY DAY (Patient taking differently: 10 mg every morning  ), Disp: 90 tablet, Rfl: 3    aspirin (ECOTRIN LOW STRENGTH) 81 mg EC tablet, Take 1 tablet (81 mg total) by mouth daily, Disp: 30 tablet, Rfl: 0    carteolol (OCUPRESS) 1 % ophthalmic solution, INSTILL 1 DROP IN [facility] EYE TWO TIMES DAILY, Disp: , Rfl:     citalopram (CeleXA) 10 mg tablet, TAKE 1 TABLET BY MOUTH EVERY DAY, Disp: 90 tablet, Rfl: 3    diclofenac (VOLTAREN) 75 mg EC tablet, Take 1 tablet (75 mg total) by mouth 2 (two) times a day As needed   Take with food, Disp: 60 tablet, Rfl: 0    fluticasone (FLONASE) 50 mcg/act nasal spray, 1 spray into each nostril daily, Disp: , Rfl:     furosemide (LASIX) 40 mg tablet, Take 1 tablet (40 mg total) by mouth daily, Disp: 90 tablet, Rfl: 3    glimepiride (AMARYL) 1 mg tablet, TAKE 3 TABLETS BY MOUTH EVERY DAY WITH BREAKFAST, Disp: 270 tablet, Rfl: 3    guaiFENesin (MUCINEX) 600 mg 12 hr tablet, Take 1,200 mg by mouth every 12 (twelve) hours, Disp: , Rfl:     losartan (COZAAR) 100 MG tablet, TAKE 1 TABLET BY MOUTH EVERY DAY, Disp: 90 tablet, Rfl: 3    metoprolol succinate (TOPROL-XL) 100 mg 24 hr tablet, TAKE 1 TABLET BY MOUTH EVERY DAY, Disp: 90 tablet, Rfl: 3    omeprazole (PriLOSEC) 20 mg delayed release capsule, TAKE 1 CAPSULE BY MOUTH EVERY DAY, Disp: 90 capsule, Rfl: 3    rosuvastatin (CRESTOR) 20 MG tablet, Take 1 tablet (20 mg total) by mouth daily, Disp: 90 tablet, Rfl: 3    tamsulosin (FLOMAX) 0 4 mg, Take 1 capsule (0 4 mg total) by mouth daily with dinner, Disp: 90 capsule, Rfl: 3    trospium chloride (SANCTURA) 20 mg tablet, Take 1 tablet (20 mg total) by mouth 2 (two) times a day, Disp: 180 tablet, Rfl: 3    oxymetazoline (AFRIN) 0 05 % nasal spray, 2 sprays by Each Nare route every 12 (twelve) hours as needed for congestion (Patient not taking: Reported on 2/3/2022 ), Disp: , Rfl:     Labs & Results:  Lab Results   Component Value Date    TROPONINI <0 02 04/26/2021    TROPONINI 0 36 (H) 01/11/2021    TROPONINI 0 72 (H) 01/10/2021     Lab Results   Component Value Date    CHOL 162 09/10/2015    CHOL 175 05/06/2015    TRIG 87 04/12/2021    TRIG 115 12/01/2020    HDL 40 04/12/2021    HDL 37 (L) 12/01/2020    LDLCALC 77 04/12/2021    LDLCALC 91 12/01/2020    HGBA1C 6 2 01/10/2022    HGBA1C 6 6 (H) 08/13/2021     Lab Results   Component Value Date     01/19/2015    K 4 4 11/08/2021    CO2 25 11/08/2021     11/08/2021    BUN 12 11/08/2021    CREATININE 1 21 11/08/2021    ALT 25 11/08/2021    AST 14 11/08/2021     Lab Results   Component Value Date    WBC 8 51 11/08/2021    HGB 11 8 (L) 11/08/2021    HCT 37 0 11/08/2021    MCV 99 (H) 11/08/2021     11/08/2021     Lab Results   Component Value Date    INR 0 98 01/10/2021       EKG:  Date: 02/03/22  Interpretation:  Sinus rhythm, low voltage, nonspecific ST/T-wave changes, HR 61,          Device Interrogation:   No results found for this or any previous visit  HOLTER  No results found for this or any previous visit  Imaging: I have personally reviewed pertinent reports  and I have personally reviewed pertinent films in PACS      Previous STRESS TEST:  No results found for this or any previous visit  Previous Cath/PCI:  Results for orders placed during the hospital encounter of 01/10/21    Cardiac catheterization    Narrative  [facility]  Invasive Cardiovascular Lab Complete Report  Study date: 01/11/2021    Diagnostic Cardiologist:  Buck Molina DO  Primary Physician:  Jose J Garces MD    SUMMARY    CORONARY VESSELS:     --  The coronary circulation is right dominant  --  There was no angiographic evidence for occlusive coronary artery disease  --  Left main: Normal   --  LAD: Angiography showed minor luminal irregularities  --  Circumflex: Normal   --  RCA: Angiography showed minor luminal irregularities  IMPRESSIONS:  Symptoms and small troponin elevation, not from epicardial CAD  Mild luminal irregularities    RECOMMENDATIONS  GDMT CAD, weight loss    DISPOSITION:  The patient left the catheterization laboratory in stable condition      Prepared and signed by    Buck Molina DO  Signed 01/11/2021 12:57:15      ECHO:  Results for orders placed during the hospital encounter of 08/19/21    Echo complete with contrast if indicated    Narrative  [facility]  Transthoracic Echocardiogram 2D, M-mode, Doppler, and Color Doppler  Study date:  19-Aug-2021    Indications: Diastolic dysfunction    Sonographer:  CATRACHITA Salgado  Primary Physician:  Jose J Garces MD  Referring Physician:  Jose J Garces MD  Group:  [facility]  Interpreting Physician:  Rosita Crocker MD    SUMMARY    LEFT VENTRICLE:  Systolic function was normal  Ejection fraction was estimated to be 60 %  Global longitudinal strain was normal at -19 7%  There were no regional wall motion abnormalities  There was mild concentric hypertrophy  LEFT ATRIUM:  The atrium was dilated  RIGHT ATRIUM:  The atrium was dilated  MITRAL VALVE:  There was mild regurgitation  AORTIC VALVE:  There was very mild stenosis  Valve mean gradient was 10 mmHg  HISTORY: PRIOR HISTORY: GERD; DM2; ANALY; HTN; HLD; Obesity    LEFT VENTRICLE: Size was normal  Systolic function was normal  Ejection fraction was estimated to be 60 %  Global longitudinal strain was normal at -19 7%  There were no regional wall motion abnormalities  Wall thickness was mildly increased  There was mild concentric hypertrophy  DOPPLER: The transmitral flow pattern was normal  Left ventricular diastolic function parameters were normal for the patient's age  RIGHT VENTRICLE: The size was normal  Systolic function was normal  Wall thickness was normal     LEFT ATRIUM: The atrium was dilated  RIGHT ATRIUM: The atrium was dilated  MITRAL VALVE: Valve structure was normal  There was normal leaflet separation  DOPPLER: The transmitral velocity was within the normal range  There was no evidence for stenosis  There was mild regurgitation  AORTIC VALVE: The valve was trileaflet  Leaflets exhibited normal thickness, mild calcification, and lower normal cuspal separation  DOPPLER: Transaortic velocity was minimally increased  There was very mild stenosis  There was no  significant regurgitation  TRICUSPID VALVE: The valve structure was normal  There was normal leaflet separation  DOPPLER: The transtricuspid velocity was within the normal range  There was no evidence for stenosis  There was trace regurgitation      PULMONIC VALVE: Leaflets exhibited normal thickness, no calcification, and normal cuspal separation  DOPPLER: The transpulmonic velocity was within the normal range  There was trace regurgitation  PERICARDIUM: There was no pericardial effusion  The pericardium was normal in appearance  AORTA: The root exhibited normal size  SYSTEMIC VEINS: IVC: The inferior vena cava was normal in size  Respirophasic changes were normal       [facility]    Prepared and electronically signed by    Hortensia Quiñones MD  Signed 19-Aug-2021 14:05:46      DIPAK:  No results found for this or any previous visit  CMR:  No results found for this or any previous visit  Assessment/Plan     Assessment:    1  HFpEF  -previous myopericarditis peak troponin 0 72; colchicine + ibuprofen taper since completed  -01/2021 TTE:  LVEF 60%, mild cLVH, no RWMA, grade 1 DD, normal RV size/function with mild RVH, no significant valvulopathy  -08/2021 TTE: LVEF 60%, mild LVH, no RWMA, GLS -19 7%, normal diastology for age, normal RV but for mild RVH, AV sclerosis  -08/2021 complete PFT:  FEV1/FVC ratio 79%, mild restrictive pattern, DLCO 56% for moderately reduced diffusion  -11/2021 6 min WT:  80 m in 3 minute but with significant dyspnea, 94% -> 90%  -1/2022 CT chest: mild emphysema, dilated coronary sinus  -metoprolol succinate, furosemide     2  Minimal coronary artery disease  - risk factors HTN, HLD, DM2, previous tobacco use  - 1/11 LHC:  Minor luminal irregularities in LAD and RCA; normal epicardial vessels elsewhere  - 04/2021 lipids:  Total 134, TG 87, HDL 40, calculated LDL 77; A1c 6 6%  - aspirin, rosuvastatin, metoprolol succinate, losartan  - in office BP AXOJG 907/85      Plan:  1  Patient's dyspnea is likely multifactorial from significant weight primarily restricted expansion from protuberant abdomen, HFpEF, anemia, mild emphysema in deconditioning  Will continue with current cardiac medication regiment      2  He was encouraged to start a more balanced diet  Concurrently, he is to attempt at increasing his functional capacity with daily walks which can be limited by back pain  He appears amenable  3  His hipopompic arm symptoms may be due to restless arm syndrome or apnea related myoclonic jerk  Will obtain iron panel and if reduced ferritin levels, would treat as such  4  Follow-up with Cardiology in 3 months  Case discussed and reviewed with Dr Nate Guzman who agrees with my assessment and plan  Lizzy Mohr MD  Cardiology Fellow PGY-6    ==========================================================================================    Epic/ Allscripts/Care Everywhere records reviewed:  Yes    ** Please Note: Fluency DirectDictation voice to text software may have been used in the creation of this document   **

## 2022-03-08 ENCOUNTER — APPOINTMENT (OUTPATIENT)
Dept: LAB | Age: 77
End: 2022-03-08
Payer: MEDICARE

## 2022-03-08 DIAGNOSIS — I50.33 ACUTE ON CHRONIC DIASTOLIC HEART FAILURE (HCC): ICD-10-CM

## 2022-03-08 DIAGNOSIS — D64.9 ANEMIA: ICD-10-CM

## 2022-03-08 LAB
ALBUMIN SERPL BCP-MCNC: 3.1 G/DL (ref 3.5–5)
ALP SERPL-CCNC: 138 U/L (ref 46–116)
ALT SERPL W P-5'-P-CCNC: 24 U/L (ref 12–78)
ANION GAP SERPL CALCULATED.3IONS-SCNC: 4 MMOL/L (ref 4–13)
AST SERPL W P-5'-P-CCNC: 17 U/L (ref 5–45)
BILIRUB SERPL-MCNC: 0.38 MG/DL (ref 0.2–1)
BUN SERPL-MCNC: 17 MG/DL (ref 5–25)
CALCIUM ALBUM COR SERPL-MCNC: 9.7 MG/DL (ref 8.3–10.1)
CALCIUM SERPL-MCNC: 9 MG/DL (ref 8.3–10.1)
CHLORIDE SERPL-SCNC: 106 MMOL/L (ref 100–108)
CO2 SERPL-SCNC: 29 MMOL/L (ref 21–32)
CREAT SERPL-MCNC: 1.46 MG/DL (ref 0.6–1.3)
FERRITIN SERPL-MCNC: 43 NG/ML (ref 8–388)
GFR SERPL CREATININE-BSD FRML MDRD: 45 ML/MIN/1.73SQ M
GLUCOSE P FAST SERPL-MCNC: 153 MG/DL (ref 65–99)
IRON SATN MFR SERPL: 21 % (ref 20–50)
IRON SERPL-MCNC: 68 UG/DL (ref 65–175)
NT-PROBNP SERPL-MCNC: 235 PG/ML
POTASSIUM SERPL-SCNC: 4.5 MMOL/L (ref 3.5–5.3)
PROT SERPL-MCNC: 8 G/DL (ref 6.4–8.2)
SODIUM SERPL-SCNC: 139 MMOL/L (ref 136–145)
TIBC SERPL-MCNC: 324 UG/DL (ref 250–450)

## 2022-03-08 PROCEDURE — 83540 ASSAY OF IRON: CPT

## 2022-03-08 PROCEDURE — 80053 COMPREHEN METABOLIC PANEL: CPT

## 2022-03-08 PROCEDURE — 82728 ASSAY OF FERRITIN: CPT

## 2022-03-08 PROCEDURE — 36415 COLL VENOUS BLD VENIPUNCTURE: CPT

## 2022-03-08 PROCEDURE — 83880 ASSAY OF NATRIURETIC PEPTIDE: CPT

## 2022-03-08 PROCEDURE — 83550 IRON BINDING TEST: CPT

## 2022-03-14 DIAGNOSIS — D50.8 OTHER IRON DEFICIENCY ANEMIA: Primary | ICD-10-CM

## 2022-03-14 RX ORDER — FERROUS SULFATE 325(65) MG
325 TABLET ORAL
Qty: 90 TABLET | Refills: 3 | Status: SHIPPED | OUTPATIENT
Start: 2022-03-14

## 2022-03-14 RX ORDER — FERROUS SULFATE 325(65) MG
325 TABLET ORAL
COMMUNITY
End: 2022-03-14 | Stop reason: SDUPTHER

## 2022-03-31 ENCOUNTER — OFFICE VISIT (OUTPATIENT)
Dept: NEUROLOGY | Facility: CLINIC | Age: 77
End: 2022-03-31
Payer: MEDICARE

## 2022-03-31 VITALS
SYSTOLIC BLOOD PRESSURE: 128 MMHG | HEART RATE: 59 BPM | BODY MASS INDEX: 46.71 KG/M2 | WEIGHT: 297.6 LBS | HEIGHT: 67 IN | DIASTOLIC BLOOD PRESSURE: 59 MMHG | TEMPERATURE: 96.9 F

## 2022-03-31 DIAGNOSIS — F03.90 DEMENTIA WITHOUT BEHAVIORAL DISTURBANCE, UNSPECIFIED DEMENTIA TYPE (HCC): Primary | ICD-10-CM

## 2022-03-31 PROCEDURE — 99215 OFFICE O/P EST HI 40 MIN: CPT | Performed by: PHYSICIAN ASSISTANT

## 2022-03-31 NOTE — PROGRESS NOTES
Patient ID: Venkat Lobato is a 67 y o  male       Assessment/Plan:     Mild cognitive decline:  He states his sister who is a physician in [address] [place] [address] believes he has memory issues  He reports his issues are remembering names of people he should be able to recall  Today in the office [address] [place] [address] 23/30  MRI Neuro Quant without contrast ordered with sedation     Sudden loss of muscular control:  He is waking in the night with the sensation of the left hand being swollen and very stiff  This sensation lasts for approximately 30 minutes requiring him to massage his hand  He also will have palpitations on occasion  He denies any urinary incontinence, tongue biting  The event only occurs at night time but does admit to having one episode of facial stiffness in the cheek area  Although it would be low likelihood, possibility could be dystonic movements although I would suspect this to occur during the day as well  History of Present Illness:   Venkat Lobato is a 67 y o  right handed male who presents in outpatient neurology clinic for memory issues and left hand stiffness  Yifan Candelaria  was last seen in clinic on 10/19/21  Since his diagnosis of pericarditis he has not felt well since  He cannot do anything energic, SOB, c/o neck and back pain  He is currently dealing with a sore in his mouth now on ABX  He will wake up with left hand stiffness, very tight  Almost every night  It may happen a few times in a night (up to 2 times)  He does not have any stiffness during the day  He did have one episode of right hand stiffness followed by a little stiffness in his right cheek  He states it takes about half an hour for the hand to improve  He denies any additional events  He has undergone an extensive cardiac and pulmonary workup which has been negative to this date  He underwent a routine EEG without abnormalities  Memory:  Forgetting names of people he should remember or a fact he should know     MOCA today 23/30    Results:     08/04/21 - rEEG  This was a normal awake, drowsy and sleep routine EEG study  No electrographic seizures, interictal epileptiform discharges (seizure tendency) or focal slowing were seen           Past Medical history:     Past Medical History:   Diagnosis Date    Anxiety     Broken teeth     Cancer ([address] )     basal cell-right cheek,left breast, right side of nose, left forehead    Chipped tooth     upper front-right    Chronic pain disorder     pain with standing-herniated disc lower back, upper back pain    Claustrophobia     Colon polyps     CPAP (continuous positive airway pressure) dependence     Depression     Diabetes mellitus ([address] )     Dyslipidemia     Esophageal polyp     GERD (gastroesophageal reflux disease)     Glaucoma     Headache     Hyperlipidemia     Hypertension     Low back pain     Morbid obesity with BMI of 45 0-49 9, adult ([facility])     Neck pain     Numbness and tingling in left arm     resolved 2/20/17 / numbness and tingling left side last assessed 3/23/16    OAB (overactive bladder)     Paroxysmal supraventricular tachycardia ([address] )     Pericarditis 01/2021    Shortness of breath     with exertion since 2020    Sleep apnea     wears cpap     Squamous cell skin cancer 10/19/2020    Right Nasal Ala, Dr Turcios    Tinnitus        Patient Active Problem List   Diagnosis    Chronic right-sided low back pain without sciatica    Type 2 diabetes mellitus with complication ([address] )    Essential hypertension    Peripheral neuropathy    Isolated proteinuria with morphologic lesion    Obstructive sleep apnea syndrome    Morbid obesity with BMI of 40 0-44 9, adult ([facility])    Mood disturbance    Gastroesophageal reflux disease    Palpitations    Hypokalemia    Mood disorder ([address] )    Urgency incontinence    Onychomycosis    BPH with urinary obstruction    Fecal incontinence    Hyperlipidemia    Chest pain    Acute idiopathic pericarditis    Adenomatous polyp of transverse colon    Chronic pain syndrome    Sacroiliitis (HCC)    Lumbar spondylosis    Facet arthropathy, lumbosacral    Ulnar nerve entrapment    Partial seizure ([facility]    SOB (shortness of breath)    Anemia    Bilateral carpal tunnel syndrome    Dementia ([facility])    Flank pain    Tinnitus of right ear    Decreased diffusion capacity    Pre-op examination    Chronic back pain    Shoulder pain, bilateral    Bilateral myofascial pain    Nasal congestion    Bilateral carotid artery disease, unspecified type ([facility])    Stage 3 chronic kidney disease, unspecified whether stage 3a or 3b CKD ([facility])    Interstitial lung disease [facility]    Heart failure with preserved left ventricular function (HFpEF) ([facility])       Medications:      Current Outpatient Medications   Medication Sig Dispense Refill    amLODIPine (NORVASC) 10 mg tablet TAKE 1 TABLET BY MOUTH EVERY DAY (Patient taking differently: 10 mg every morning  ) 90 tablet 3    aspirin (ECOTRIN LOW STRENGTH) 81 mg EC tablet Take 1 tablet (81 mg total) by mouth daily 30 tablet 0    carteolol (OCUPRESS) 1 % ophthalmic solution INSTILL 1 DROP IN EACH EYE TWO TIMES DAILY      citalopram (CeleXA) 10 mg tablet TAKE 1 TABLET BY MOUTH EVERY DAY 90 tablet 3    diclofenac (VOLTAREN) 75 mg EC tablet Take 1 tablet (75 mg total) by mouth 2 (two) times a day As needed   Take with food 60 tablet 0    ferrous sulfate 325 (65 Fe) mg tablet Take 1 tablet (325 mg total) by mouth daily with breakfast 90 tablet 3    fluticasone (FLONASE) 50 mcg/act nasal spray 1 spray into each nostril daily      furosemide (LASIX) 40 mg tablet Take 1 tablet (40 mg total) by mouth daily 90 tablet 3    glimepiride (AMARYL) 1 mg tablet TAKE 3 TABLETS BY MOUTH EVERY DAY WITH BREAKFAST 270 tablet 3    guaiFENesin (MUCINEX) 600 mg 12 hr tablet Take 1,200 mg by mouth every 12 (twelve) hours      losartan (COZAAR) 100 MG tablet TAKE 1 TABLET BY MOUTH EVERY DAY 90 tablet 3  metoprolol succinate (TOPROL-XL) 100 mg 24 hr tablet TAKE 1 TABLET BY MOUTH EVERY DAY 90 tablet 3    omeprazole (PriLOSEC) 20 mg delayed release capsule TAKE 1 CAPSULE BY MOUTH EVERY DAY 90 capsule 3    oxymetazoline (AFRIN) 0 05 % nasal spray 2 sprays by Each Nare route every 12 (twelve) hours as needed for congestion        rosuvastatin (CRESTOR) 20 MG tablet Take 1 tablet (20 mg total) by mouth daily 90 tablet 3    tamsulosin (FLOMAX) 0 4 mg Take 1 capsule (0 4 mg total) by mouth daily with dinner 90 capsule 3    trospium chloride (SANCTURA) 20 mg tablet Take 1 tablet (20 mg total) by mouth 2 (two) times a day 180 tablet 3     No current facility-administered medications for this visit  Allergies: Allergies   Allergen Reactions    Codeine Hives     Pt does not remember if it was codeine or if the reaction was hives   Sulfa Antibiotics Other (See Comments)     Nausea         Family History:     Family History   Problem Relation Age of Onset    Alzheimer's disease Mother     Diabetes Mother     Dementia Mother    Elnoria John Hypertension Father     Hypertension Father     Diabetes Paternal [de-identified]     Esophageal cancer Paternal Grandfather        Social History:     Social History     Socioeconomic History    Marital status: /Civil Union     Spouse name: Not on file    Number of children: Not on file    Years of education: Not on file    Highest education level: Not on file   Occupational History    Occupation: retired   Tobacco Use    Smoking status: Former Smoker     Quit date: 1983     Years since quittin 2    Smokeless tobacco: Never Used   Vaping Use    Vaping Use: Never used   Substance and Sexual Activity    Alcohol use:  Yes     Alcohol/week: 14 0 standard drinks     Types: 14 Shots of liquor per week     Comment: "couple of drinks each night"    Drug use: No    Sexual activity: Not Currently     Partners: Female   Other Topics Concern    Not on file   Social History Narrative    Not on file     Social Determinants of Health     Financial Resource Strain: Not on file   Food Insecurity: Not on file   Transportation Needs: Not on file   Physical Activity: Not on file   Stress: Not on file   Social Connections: Not on file   Intimate Partner Violence: Not on file   Housing Stability: Not on file           The following portions of the patient's history were reviewed and updated as appropriate: allergies, current medications, past family history, past medical history, past social history, past surgical history and problem list    Review of Systems:   Review of Systems  Review of Systems   Constitutional: Negative  Negative for appetite change and fever  HENT: Negative  Negative for hearing loss, tinnitus, trouble swallowing and voice change  Eyes: Negative  Negative for photophobia and pain  Respiratory: Positive for shortness of breath  Cardiovascular: Positive for palpitations  Gastrointestinal: Negative  Negative for nausea and vomiting  Endocrine: Negative  Negative for cold intolerance  Genitourinary: Negative  Negative for dysuria, frequency and urgency  Musculoskeletal: Positive for joint swelling (joint pain/ hand swelling )  Negative for myalgias and neck pain  Skin: Negative  Negative for rash  Neurological: Negative  Negative for dizziness, tremors, seizures, syncope, facial asymmetry, speech difficulty, weakness, light-headedness, numbness and headaches  Hematological: Negative  Does not bruise/bleed easily  Psychiatric/Behavioral: Positive for confusion (forgetting names)  Negative for hallucinations and sleep disturbance  All other systems reviewed and are negative      ROS reviewed personally and edited as needed      Objective:   Physical Exam:  /59 (BP Location: Left arm, Patient Position: Sitting, Cuff Size: Adult)   Pulse 59   Temp (!) 96 9 °F (36 1 °C)   Ht 5' 7" (1 702 m)   Wt 135 kg (297 lb 9 6 oz)   BMI 46 61 kg/m²     Physical Exam  Constitutional:       Appearance: He is obese  HENT:      Head: Normocephalic  Eyes:      Extraocular Movements: EOM normal       Pupils: Pupils are equal, round, and reactive to light  Neurological:      Mental Status: He is alert and oriented to person, place, and time  Coordination: Finger-Nose-Finger Test normal       Gait: Gait is intact  Deep Tendon Reflexes:      Reflex Scores:       Bicep reflexes are 1+ on the right side and 1+ on the left side  Brachioradialis reflexes are 1+ on the right side and 1+ on the left side  Patellar reflexes are 1+ on the right side and 1+ on the left side  Achilles reflexes are 1+ on the right side and 1+ on the left side  Psychiatric:         Speech: Speech normal         Neurologic Exam     Mental Status   Oriented to person, place, and time  Follows 1 step commands  Attention: normal  Concentration: normal    Speech: speech is normal   Level of consciousness: alert  Knowledge: good  Able to perform simple calculations (briskly able to complete serial 7's  )  Able to repeat  Normal comprehension  Cranial Nerves     CN III, IV, VI   Pupils are equal, round, and reactive to light  Extraocular motions are normal    Right pupil: Size: 3 mm  Shape: regular  Reactivity: brisk  Left pupil: Size: 3 mm  Shape: regular  Reactivity: brisk  CN III: no CN III palsy  CN VI: no CN VI palsy  Nystagmus: none   Diplopia: none  Ophthalmoparesis: none  Upgaze: normal  Downgaze: normal  Conjugate gaze: present    CN V   Facial sensation intact  CN VII   Facial expression full, symmetric       CN VIII   Hearing: impaired ([place])    CN IX, X   Palate: symmetric    CN XI   Right trapezius strength: normal  Left trapezius strength: normal    CN XII   Tongue: not atrophic  Fasciculations: absent  Tongue deviation: none    Motor Exam   Right arm pronator drift: absent  Left arm pronator drift: absent    Strength   Strength 5/5 except as noted  BLE KF: 4/5     Sensory Exam   Light touch normal      Gait, Coordination, and Reflexes     Gait  Gait: normal    Coordination   Finger to nose coordination: normal    Reflexes   Right brachioradialis: 1+  Left brachioradialis: 1+  Right biceps: 1+  Left biceps: 1+  Right patellar: 1+  Left patellar: 1+  Right achilles: 1+  Left achilles: 1+  Right ankle clonus: absent  Left ankle clonus: absent           I have spent 45 minutes with Patient  today in which greater than 50% of this time was spent in counseling/coordination of care regarding Diagnostic results, Prognosis, Risks and benefits of tx options, Intructions for management, Patient and family education, Importance of tx compliance, Risk factor reductions, Impressions and Plan of care as above

## 2022-03-31 NOTE — PROGRESS NOTES
Patient ID: Jerica Cazares is a 67 y o  male  Assessment/Plan:    No problem-specific Assessment & Plan notes found for this encounter  {Assess/PlanSmartLinks:38253}       Subjective:    HPI    {[facility] HPI texts:72412}    {Common ambulatory SmartLinks:41744}         Objective: There were no vitals taken for this visit  Physical Exam    Neurological Exam      ROS:    Review of Systems   Constitutional: Negative  Negative for appetite change and fever  HENT: Negative  Negative for hearing loss, tinnitus, trouble swallowing and voice change  Eyes: Negative  Negative for photophobia and pain  Respiratory: Positive for shortness of breath  Cardiovascular: Positive for palpitations  Gastrointestinal: Negative  Negative for nausea and vomiting  Endocrine: Negative  Negative for cold intolerance  Genitourinary: Negative  Negative for dysuria, frequency and urgency  Musculoskeletal: Positive for joint swelling (joint pain/ hand swelling )  Negative for myalgias and neck pain  Skin: Negative  Negative for rash  Neurological: Negative  Negative for dizziness, tremors, seizures, syncope, facial asymmetry, speech difficulty, weakness, light-headedness, numbness and headaches  Hematological: Negative  Does not bruise/bleed easily  Psychiatric/Behavioral: Positive for confusion (forgetting names)  Negative for hallucinations and sleep disturbance  All other systems reviewed and are negative

## 2022-04-15 ENCOUNTER — TELEPHONE (OUTPATIENT)
Dept: SLEEP CENTER | Facility: CLINIC | Age: 77
End: 2022-04-15

## 2022-04-15 NOTE — TELEPHONE ENCOUNTER
Pt  Called and stated he needed filters for new machine & that he did not receive a phone call to service new machine  I stated he needs to set up compliance appt based of of date he started using replacement CPAP  Pt  Does not remember when he received his new machine  I offered pt appt for 4/18   Pt stated he will be out of town    I added pt to [facility] wait list

## 2022-04-18 ENCOUNTER — TELEPHONE (OUTPATIENT)
Dept: PAIN MEDICINE | Facility: MEDICAL CENTER | Age: 77
End: 2022-04-18

## 2022-04-18 NOTE — TELEPHONE ENCOUNTER
Pt contacted Call Center requested refill of their medication  Medication Name:  Diclofenac     Dosage of Med:  75 mg     Frequency of Med:  Twice a day as needed     Remaining Medication:  Pt wife left a  requesting this medication     Pharmacy and Location:  [address] [address]   Requesting a 90 day supply    Pt  Preferred Callback Phone Number:  [phone]    Thank you

## 2022-04-19 DIAGNOSIS — M46.1 SACROILIITIS (HCC): ICD-10-CM

## 2022-04-19 RX ORDER — DICLOFENAC SODIUM 75 MG/1
75 TABLET, DELAYED RELEASE ORAL 2 TIMES DAILY
Qty: 60 TABLET | Refills: 0 | Status: SHIPPED | OUTPATIENT
Start: 2022-04-19 | End: 2022-05-19

## 2022-04-20 ENCOUNTER — APPOINTMENT (OUTPATIENT)
Dept: LAB | Age: 77
End: 2022-04-20
Payer: MEDICARE

## 2022-04-20 DIAGNOSIS — Z12.5 SCREENING FOR PROSTATE CANCER: ICD-10-CM

## 2022-04-20 DIAGNOSIS — Z13.0 SCREENING FOR DEFICIENCY ANEMIA: ICD-10-CM

## 2022-04-20 DIAGNOSIS — E11.8 TYPE 2 DIABETES MELLITUS WITH COMPLICATION (HCC): ICD-10-CM

## 2022-04-20 DIAGNOSIS — E78.2 MIXED HYPERLIPIDEMIA: ICD-10-CM

## 2022-04-20 DIAGNOSIS — I10 ESSENTIAL HYPERTENSION: ICD-10-CM

## 2022-04-20 DIAGNOSIS — Z13.29 SCREENING FOR HYPOTHYROIDISM: ICD-10-CM

## 2022-04-20 LAB
ALBUMIN SERPL BCP-MCNC: 3.1 G/DL (ref 3.5–5)
ALP SERPL-CCNC: 153 U/L (ref 46–116)
ALT SERPL W P-5'-P-CCNC: 27 U/L (ref 12–78)
ANION GAP SERPL CALCULATED.3IONS-SCNC: 1 MMOL/L (ref 4–13)
AST SERPL W P-5'-P-CCNC: 21 U/L (ref 5–45)
BASOPHILS # BLD AUTO: 0.06 THOUSANDS/ΜL (ref 0–0.1)
BASOPHILS NFR BLD AUTO: 1 % (ref 0–1)
BILIRUB SERPL-MCNC: 0.6 MG/DL (ref 0.2–1)
BUN SERPL-MCNC: 15 MG/DL (ref 5–25)
CALCIUM ALBUM COR SERPL-MCNC: 9.8 MG/DL (ref 8.3–10.1)
CALCIUM SERPL-MCNC: 9.1 MG/DL (ref 8.3–10.1)
CHLORIDE SERPL-SCNC: 109 MMOL/L (ref 100–108)
CHOLEST SERPL-MCNC: 108 MG/DL
CO2 SERPL-SCNC: 29 MMOL/L (ref 21–32)
CREAT SERPL-MCNC: 1.22 MG/DL (ref 0.6–1.3)
EOSINOPHIL # BLD AUTO: 0.12 THOUSAND/ΜL (ref 0–0.61)
EOSINOPHIL NFR BLD AUTO: 2 % (ref 0–6)
ERYTHROCYTE [DISTWIDTH] IN BLOOD BY AUTOMATED COUNT: 15.6 % (ref 11.6–15.1)
EST. AVERAGE GLUCOSE BLD GHB EST-MCNC: 146 MG/DL
GFR SERPL CREATININE-BSD FRML MDRD: 56 ML/MIN/1.73SQ M
GLUCOSE P FAST SERPL-MCNC: 150 MG/DL (ref 65–99)
HBA1C MFR BLD: 6.7 %
HCT VFR BLD AUTO: 33.2 % (ref 36.5–49.3)
HDLC SERPL-MCNC: 41 MG/DL
HGB BLD-MCNC: 11 G/DL (ref 12–17)
IMM GRANULOCYTES # BLD AUTO: 0.06 THOUSAND/UL (ref 0–0.2)
IMM GRANULOCYTES NFR BLD AUTO: 1 % (ref 0–2)
LDLC SERPL CALC-MCNC: 45 MG/DL (ref 0–100)
LYMPHOCYTES # BLD AUTO: 0.98 THOUSANDS/ΜL (ref 0.6–4.47)
LYMPHOCYTES NFR BLD AUTO: 16 % (ref 14–44)
MCH RBC QN AUTO: 32.8 PG (ref 26.8–34.3)
MCHC RBC AUTO-ENTMCNC: 33.1 G/DL (ref 31.4–37.4)
MCV RBC AUTO: 99 FL (ref 82–98)
MONOCYTES # BLD AUTO: 0.69 THOUSAND/ΜL (ref 0.17–1.22)
MONOCYTES NFR BLD AUTO: 11 % (ref 4–12)
NEUTROPHILS # BLD AUTO: 4.36 THOUSANDS/ΜL (ref 1.85–7.62)
NEUTS SEG NFR BLD AUTO: 69 % (ref 43–75)
NONHDLC SERPL-MCNC: 67 MG/DL
NRBC BLD AUTO-RTO: 0 /100 WBCS
PLATELET # BLD AUTO: 252 THOUSANDS/UL (ref 149–390)
PMV BLD AUTO: 9.6 FL (ref 8.9–12.7)
POTASSIUM SERPL-SCNC: 4.2 MMOL/L (ref 3.5–5.3)
PROT SERPL-MCNC: 7.2 G/DL (ref 6.4–8.2)
PSA SERPL-MCNC: 0.3 NG/ML (ref 0–4)
RBC # BLD AUTO: 3.35 MILLION/UL (ref 3.88–5.62)
SODIUM SERPL-SCNC: 139 MMOL/L (ref 136–145)
TRIGL SERPL-MCNC: 109 MG/DL
TSH SERPL DL<=0.05 MIU/L-ACNC: 2.03 UIU/ML (ref 0.45–4.5)
WBC # BLD AUTO: 6.27 THOUSAND/UL (ref 4.31–10.16)

## 2022-04-20 PROCEDURE — G0103 PSA SCREENING: HCPCS

## 2022-04-20 PROCEDURE — 36415 COLL VENOUS BLD VENIPUNCTURE: CPT

## 2022-04-20 PROCEDURE — 85025 COMPLETE CBC W/AUTO DIFF WBC: CPT

## 2022-04-20 PROCEDURE — 80053 COMPREHEN METABOLIC PANEL: CPT

## 2022-04-20 PROCEDURE — 84443 ASSAY THYROID STIM HORMONE: CPT

## 2022-04-20 PROCEDURE — 83036 HEMOGLOBIN GLYCOSYLATED A1C: CPT

## 2022-04-20 PROCEDURE — 80061 LIPID PANEL: CPT

## 2022-04-22 DIAGNOSIS — E78.00 PURE HYPERCHOLESTEROLEMIA: ICD-10-CM

## 2022-04-23 RX ORDER — ROSUVASTATIN CALCIUM 20 MG/1
TABLET, COATED ORAL
Qty: 90 TABLET | Refills: 3 | Status: SHIPPED | OUTPATIENT
Start: 2022-04-23

## 2022-04-27 ENCOUNTER — OFFICE VISIT (OUTPATIENT)
Dept: INTERNAL MEDICINE CLINIC | Facility: CLINIC | Age: 77
End: 2022-04-27
Payer: MEDICARE

## 2022-04-27 ENCOUNTER — OFFICE VISIT (OUTPATIENT)
Dept: SLEEP CENTER | Facility: CLINIC | Age: 77
End: 2022-04-27
Payer: MEDICARE

## 2022-04-27 VITALS
SYSTOLIC BLOOD PRESSURE: 138 MMHG | HEIGHT: 67 IN | HEART RATE: 61 BPM | WEIGHT: 299.6 LBS | OXYGEN SATURATION: 96 % | BODY MASS INDEX: 47.02 KG/M2 | DIASTOLIC BLOOD PRESSURE: 60 MMHG

## 2022-04-27 VITALS
HEART RATE: 66 BPM | OXYGEN SATURATION: 93 % | TEMPERATURE: 98.6 F | BODY MASS INDEX: 46.96 KG/M2 | WEIGHT: 299.2 LBS | DIASTOLIC BLOOD PRESSURE: 70 MMHG | HEIGHT: 67 IN | SYSTOLIC BLOOD PRESSURE: 132 MMHG

## 2022-04-27 DIAGNOSIS — D64.9 ANEMIA, UNSPECIFIED TYPE: ICD-10-CM

## 2022-04-27 DIAGNOSIS — F03.90 DEMENTIA WITHOUT BEHAVIORAL DISTURBANCE, UNSPECIFIED DEMENTIA TYPE (HCC): ICD-10-CM

## 2022-04-27 DIAGNOSIS — D50.9 IRON DEFICIENCY ANEMIA, UNSPECIFIED IRON DEFICIENCY ANEMIA TYPE: ICD-10-CM

## 2022-04-27 DIAGNOSIS — G47.33 OSA (OBSTRUCTIVE SLEEP APNEA): Primary | ICD-10-CM

## 2022-04-27 DIAGNOSIS — I10 ESSENTIAL HYPERTENSION: ICD-10-CM

## 2022-04-27 DIAGNOSIS — G89.29 CHRONIC RIGHT-SIDED LOW BACK PAIN WITHOUT SCIATICA: ICD-10-CM

## 2022-04-27 DIAGNOSIS — N40.1 BPH WITH URINARY OBSTRUCTION: ICD-10-CM

## 2022-04-27 DIAGNOSIS — J84.9 INTERSTITIAL LUNG DISEASE (HCC): ICD-10-CM

## 2022-04-27 DIAGNOSIS — R68.2 DRY MOUTH: ICD-10-CM

## 2022-04-27 DIAGNOSIS — E11.8 TYPE 2 DIABETES MELLITUS WITH COMPLICATION (HCC): ICD-10-CM

## 2022-04-27 DIAGNOSIS — N18.30 STAGE 3 CHRONIC KIDNEY DISEASE, UNSPECIFIED WHETHER STAGE 3A OR 3B CKD (HCC): ICD-10-CM

## 2022-04-27 DIAGNOSIS — E66.01 MORBID OBESITY WITH BMI OF 40.0-44.9, ADULT (HCC): ICD-10-CM

## 2022-04-27 DIAGNOSIS — I50.30 HEART FAILURE WITH PRESERVED LEFT VENTRICULAR FUNCTION (HFPEF) (HCC): ICD-10-CM

## 2022-04-27 DIAGNOSIS — T78.40XA ALLERGY, INITIAL ENCOUNTER: ICD-10-CM

## 2022-04-27 DIAGNOSIS — Z00.00 HEALTHCARE MAINTENANCE: ICD-10-CM

## 2022-04-27 DIAGNOSIS — R45.86 MOOD DISTURBANCE: ICD-10-CM

## 2022-04-27 DIAGNOSIS — R06.02 SOB (SHORTNESS OF BREATH): ICD-10-CM

## 2022-04-27 DIAGNOSIS — R00.2 PALPITATIONS: ICD-10-CM

## 2022-04-27 DIAGNOSIS — G47.33 OBSTRUCTIVE SLEEP APNEA SYNDROME: ICD-10-CM

## 2022-04-27 DIAGNOSIS — N13.8 BPH WITH URINARY OBSTRUCTION: ICD-10-CM

## 2022-04-27 DIAGNOSIS — M54.50 CHRONIC RIGHT-SIDED LOW BACK PAIN WITHOUT SCIATICA: ICD-10-CM

## 2022-04-27 DIAGNOSIS — I47.1 SVT (SUPRAVENTRICULAR TACHYCARDIA) (HCC): Primary | ICD-10-CM

## 2022-04-27 PROBLEM — G89.4 CHRONIC PAIN SYNDROME: Status: RESOLVED | Noted: 2021-03-15 | Resolved: 2022-04-27

## 2022-04-27 PROBLEM — Z01.818 PRE-OP EXAMINATION: Status: RESOLVED | Noted: 2022-01-10 | Resolved: 2022-04-27

## 2022-04-27 PROBLEM — H93.11 TINNITUS OF RIGHT EAR: Status: RESOLVED | Noted: 2021-11-10 | Resolved: 2022-04-27

## 2022-04-27 PROBLEM — R07.9 CHEST PAIN: Status: RESOLVED | Noted: 2021-01-10 | Resolved: 2022-04-27

## 2022-04-27 PROCEDURE — 1123F ACP DISCUSS/DSCN MKR DOCD: CPT | Performed by: INTERNAL MEDICINE

## 2022-04-27 PROCEDURE — 99215 OFFICE O/P EST HI 40 MIN: CPT | Performed by: INTERNAL MEDICINE

## 2022-04-27 PROCEDURE — G0439 PPPS, SUBSEQ VISIT: HCPCS | Performed by: INTERNAL MEDICINE

## 2022-04-27 PROCEDURE — 99214 OFFICE O/P EST MOD 30 MIN: CPT | Performed by: INTERNAL MEDICINE

## 2022-04-27 RX ORDER — OXYCODONE HYDROCHLORIDE AND ACETAMINOPHEN 5; 325 MG/1; MG/1
1 TABLET ORAL
COMMUNITY
Start: 2022-04-15 | End: 2022-05-19

## 2022-04-27 RX ORDER — METOPROLOL SUCCINATE 50 MG/1
50 TABLET, EXTENDED RELEASE ORAL EVERY 12 HOURS
Qty: 60 TABLET | Refills: 5 | Status: SHIPPED | OUTPATIENT
Start: 2022-04-27

## 2022-04-27 RX ORDER — CHLORHEXIDINE GLUCONATE 0.12 MG/ML
RINSE ORAL
Status: ON HOLD | COMMUNITY
Start: 2022-04-15 | End: 2022-05-17

## 2022-04-27 RX ORDER — FLUTICASONE PROPIONATE 50 MCG
1 SPRAY, SUSPENSION (ML) NASAL DAILY
Qty: 16 G | Refills: 4 | Status: SHIPPED | OUTPATIENT
Start: 2022-04-27

## 2022-04-27 RX ORDER — AMOXICILLIN 500 MG/1
500 CAPSULE ORAL EVERY 6 HOURS
Status: ON HOLD | COMMUNITY
Start: 2022-03-28 | End: 2022-05-17

## 2022-04-27 NOTE — PROGRESS NOTES
Review of Systems      Genitourinary difficulty with erection   Cardiology palpitations/fluttering feeling in the chest and ankle/leg swelling   Gastrointestinal none   Neurology forgetfulness, poor concentration or confusion,  and difficulty with memory   Constitutional fatigue   Integumentary itching   Psychiatry depression   Musculoskeletal muscle aches and back pain   Pulmonary shortness of breath with activity   ENT throat clearing and ringing in ears   Endocrine excessive thirst and frequent urination   Hematological none

## 2022-04-27 NOTE — ASSESSMENT & PLAN NOTE
Current blood pressure control is good based upon today's assessment recommend the continuation of his current therapy 100 mg of losartan daily and metoprolol succinate 50 mg every 12 hours

## 2022-04-27 NOTE — ASSESSMENT & PLAN NOTE
Mild anemia reviewed with the patient hemoglobin is 8 current iron level has increased with iron supplementation recommend continuation of iron supplement follow-up iron and CBC in 2 months no current evidence of active hemorrhaging

## 2022-04-27 NOTE — ASSESSMENT & PLAN NOTE
Patient's most recent hemoglobin A1c is 6 7% indicating adequate control of his type 2 diabetes is not appear to have any current symptoms of hypoglycemia or hyperglycemia  His diet certainly is not well-balanced pertaining to good diabetic control  He eats breakfast and dinner  Since he gets up late in the morning he has breakfast and then just a snack in the afternoon instead of lunch  His diet seems to have a very high carbohydrate content we discussed this and recommended that he seek attention with the medical weight management program at [address] [place] [address] to assist with weight reduction and balancing his diet  Currently he is not sure he wants to pursue this but will let me know if he would like to go and meet with weight weight management people    He should continue his current Amaryl 3 mg daily with breakfast   Lab Results   Component Value Date    HGBA1C 6 7 (H) 04/20/2022

## 2022-04-27 NOTE — ASSESSMENT & PLAN NOTE
Reviewed with the patient his CT scan of the chest which confirms the presence of some mild interstitial lung disease I indicated to him that this is 1 of the factors that cause some of his mild shortness of breath symptoms  No cure for this condition of reviewed with the patient follow-up CT scan has been requested by Pulmonary services

## 2022-04-27 NOTE — PROGRESS NOTES
Assessment and Plan:     Problem List Items Addressed This Visit     None        BMI Counseling: Body mass index is 46 86 kg/m²  The BMI is above normal  Nutrition recommendations include decreasing portion sizes, decreasing fast food intake, moderation in carbohydrate intake and reducing intake of cholesterol  No pharmacotherapy was ordered  Patient referred to weight management  Rationale for BMI follow-up plan is due to patient being overweight or obese  Depression Screening and Follow-up Plan: Patient's depression screening was positive with a PHQ-2 score of 4  Their PHQ-9 score was 10  Preventive health issues were discussed with patient, and age appropriate screening tests were ordered as noted in patient's After Visit Summary  Personalized health advice and appropriate referrals for health education or preventive services given if needed, as noted in patient's After Visit Summary       History of Present Illness:     Patient presents for Medicare Annual Wellness visit    Patient Care Team:  Cira Mcclain MD as PCP - General  Tricia Purdy MD Kennedy Huddle, MD as Endoscopist  Madeline Wilson MD as Consulting Physician (Pulmonology)     Problem List:     Patient Active Problem List   Diagnosis    Chronic right-sided low back pain without sciatica    Type 2 diabetes mellitus with complication ([facility] )    Essential hypertension    Peripheral neuropathy    Isolated proteinuria with morphologic lesion    Obstructive sleep apnea syndrome    Morbid obesity with BMI of 40 0-44 9, adult ([facility] )    Mood disturbance    Gastroesophageal reflux disease    Palpitations    Hypokalemia    Mood disorder ([facility] )    Urgency incontinence    Onychomycosis    BPH with urinary obstruction    Fecal incontinence    Hyperlipidemia    Chest pain    Acute idiopathic pericarditis    Adenomatous polyp of transverse colon    Chronic pain syndrome    Sacroiliitis (HCC)    Lumbar spondylosis    Facet arthropathy, lumbosacral    Ulnar nerve entrapment    Partial seizure (HCC)    SOB (shortness of breath)    Anemia    Bilateral carpal tunnel syndrome    Dementia (HCC)    Flank pain    Tinnitus of right ear    Decreased diffusion capacity    Pre-op examination    Chronic back pain    Shoulder pain, bilateral    Bilateral myofascial pain    Nasal congestion    Bilateral carotid artery disease, unspecified type (HCC)    Stage 3 chronic kidney disease, unspecified whether stage 3a or 3b CKD (HCC)    Interstitial lung disease ([facility] [facility]    Heart failure with preserved left ventricular function (HFpEF) (HCC)      Past Medical and Surgical History:     Past Medical History:   Diagnosis Date    Anxiety     Broken teeth     Cancer ([facility] )     basal cell-right cheek,left breast, right side of nose, left forehead    Chipped tooth     upper front-right    Chronic pain disorder     pain with standing-herniated disc lower back, upper back pain    Claustrophobia     Colon polyps     CPAP (continuous positive airway pressure) dependence     Depression     Diabetes mellitus (HCC)     Dyslipidemia     Esophageal polyp     GERD (gastroesophageal reflux disease)     Glaucoma     Headache     Hyperlipidemia     Hypertension     Low back pain     Morbid obesity with BMI of 45 0-49 9, adult (HCC)     Neck pain     Numbness and tingling in left arm     resolved 2/20/17 / numbness and tingling left side last assessed 3/23/16    OAB (overactive bladder)     Paroxysmal supraventricular tachycardia (HCC)     Pericarditis 01/2021    Shortness of breath     with exertion since 2020    Sleep apnea     wears cpap     Squamous cell skin cancer 10/19/2020    Right Nasal Ala, Dr Turcios    Tinnitus      Past Surgical History:   Procedure Laterality Date    ANOSCOPY      for polyp removal    BACK SURGERY  1983    disc surgery lower back    BASAL CELL CARCINOMA EXCISION      right cheek    CATARACT EXTRACTION Bilateral     COLONOSCOPY      CYSTOSCOPY      EGD      JOINT REPLACEMENT      bilateral knees    KNEE ARTHROSCOPY Bilateral     MOHS SURGERY  2020    [facility] Right Nasal Ala, Dr Leticia Ying      tooth extraction    WY COLONOSCOPY FLX DX W/COLLJ SPEC WHEN PFRMD N/A 2019    Procedure: COLONOSCOPY;  Surgeon: Sophie Mosher MD;  Location: [facility]; Service: Gastroenterology    WY ESOPHAGOGASTRODUODENOSCOPY TRANSORAL DIAGNOSTIC N/A 2019    Procedure: ESOPHAGOGASTRODUODENOSCOPY (EGD); Surgeon: Sophie Mosher MD;  Location: [facility]; Service: Gastroenterology    RECTAL BIOPSY      SPINE SURGERY        Family History:     Family History   Problem Relation Age of Onset    Alzheimer's disease Mother     Diabetes Mother     Dementia Mother    Kulwant Salvage Hypertension Father     Hypertension Father     Diabetes Paternal [de-identified]     Esophageal cancer Paternal Grandfather       Social History:     Social History     Socioeconomic History    Marital status: /Civil Union     Spouse name: Not on file    Number of children: Not on file    Years of education: Not on file    Highest education level: Not on file   Occupational History    Occupation: retired   Tobacco Use    Smoking status: Former Smoker     Quit date: 1983     Years since quittin 2    Smokeless tobacco: Never Used   Vaping Use    Vaping Use: Never used   Substance and Sexual Activity    Alcohol use:  Yes     Alcohol/week: 14 0 standard drinks     Types: 14 Shots of liquor per week     Comment: "couple of drinks each night"    Drug use: No    Sexual activity: Not Currently     Partners: Female   Other Topics Concern    Not on file   Social History Narrative    Not on file     Social Determinants of Health     Financial Resource Strain: Not on file   Food Insecurity: Not on file   Transportation Needs: Not on file   Physical Activity: Not on file   Stress: Not on file   Social Connections: Not on file   Intimate Partner Violence: Not on file   Housing Stability: Not on file      Medications and Allergies:     Current Outpatient Medications   Medication Sig Dispense Refill    amLODIPine (NORVASC) 10 mg tablet TAKE 1 TABLET BY MOUTH EVERY DAY (Patient taking differently: 10 mg every morning  ) 90 tablet 3    amoxicillin (AMOXIL) 500 mg capsule Take 500 mg by mouth every 6 (six) hours      aspirin (ECOTRIN LOW STRENGTH) 81 mg EC tablet Take 1 tablet (81 mg total) by mouth daily 30 tablet 0    carteolol (OCUPRESS) 1 % ophthalmic solution INSTILL 1 DROP IN EACH EYE TWO TIMES DAILY      chlorhexidine (PERIDEX) 0 12 % solution RINSE MOUTH TWICE DAILY ( AFTER MEALS ) WITH 1/2 OUNCE (15 ML / 1 CAPFUL) FOR 30 SECONDS THEN SPIT OUT      citalopram (CeleXA) 10 mg tablet TAKE 1 TABLET BY MOUTH EVERY DAY 90 tablet 3    diclofenac (VOLTAREN) 75 mg EC tablet Take 1 tablet (75 mg total) by mouth 2 (two) times a day As needed   Take with food 60 tablet 0    ferrous sulfate 325 (65 Fe) mg tablet Take 1 tablet (325 mg total) by mouth daily with breakfast 90 tablet 3    fluticasone (FLONASE) 50 mcg/act nasal spray 1 spray into each nostril daily      furosemide (LASIX) 40 mg tablet Take 1 tablet (40 mg total) by mouth daily 90 tablet 3    glimepiride (AMARYL) 1 mg tablet TAKE 3 TABLETS BY MOUTH EVERY DAY WITH BREAKFAST 270 tablet 3    guaiFENesin (MUCINEX) 600 mg 12 hr tablet Take 1,200 mg by mouth every 12 (twelve) hours      losartan (COZAAR) 100 MG tablet TAKE 1 TABLET BY MOUTH EVERY DAY 90 tablet 3    metoprolol succinate (TOPROL-XL) 100 mg 24 hr tablet TAKE 1 TABLET BY MOUTH EVERY DAY 90 tablet 3    omeprazole (PriLOSEC) 20 mg delayed release capsule TAKE 1 CAPSULE BY MOUTH EVERY DAY 90 capsule 3    oxyCODONE-acetaminophen (PERCOCET) 5-325 mg per tablet Take 1 tablet by mouth every 4 to 6 hours as needed for pain      oxymetazoline (AFRIN) 0 05 % nasal spray 2 sprays by Each Nare route every 12 (twelve) hours as needed for congestion        rosuvastatin (CRESTOR) 20 MG tablet TAKE 1 TABLET BY MOUTH EVERY DAY 90 tablet 3    tamsulosin (FLOMAX) 0 4 mg Take 1 capsule (0 4 mg total) by mouth daily with dinner 90 capsule 3    trospium chloride (SANCTURA) 20 mg tablet Take 1 tablet (20 mg total) by mouth 2 (two) times a day 180 tablet 3     No current facility-administered medications for this visit  Allergies   Allergen Reactions    Codeine Hives     Pt does not remember if it was codeine or if the reaction was hives   Sulfa Antibiotics Other (See Comments)     Nausea        Immunizations:     Immunization History   Administered Date(s) Administered    COVID-19 PFIZER VACCINE 0 3 ML IM 01/20/2021, 02/09/2021, 10/21/2021    INFLUENZA 10/28/2016, 10/17/2017, 10/22/2018    Influenza Split High Dose Preservative Free IM 10/28/2016, 10/17/2017, 10/22/2018    Influenza, high dose seasonal 0 7 mL 09/30/2019, 10/20/2020, 09/24/2021    Pneumococcal Conjugate 13-Valent 11/02/2017    Pneumococcal Polysaccharide PPV23 02/07/2019      Health Maintenance:         Topic Date Due    Hepatitis C Screening  Completed         Topic Date Due    DTaP,Tdap,and Td Vaccines (1 - Tdap) Never done      Medicare Health Risk Assessment:     There were no vitals taken for this visit  Harvinder Odom is here for his Subsequent Wellness visit  Health Risk Assessment:   Patient rates overall health as fair  Patient feels that their physical health rating is slightly worse  Patient is dissatisfied with their life  Eyesight was rated as slightly worse  Hearing was rated as slightly worse  Patient feels that their emotional and mental health rating is slightly worse  Patients states they are sometimes angry  Patient states they are often unusually tired/fatigued  Pain experienced in the last 7 days has been some  Patient's pain rating has been 7/10   Patient states that he has experienced no weight loss or gain in last 6 months  Depression Screening:   PHQ-2 Score: 4  PHQ-9 Score: 10      Fall Risk Screening: In the past year, patient has experienced: no history of falling in past year      Home Safety:  Patient has trouble with stairs inside or outside of their home  Patient has working smoke alarms and has no working carbon monoxide detector  Home safety hazards include: none  Nutrition:   Current diet is Regular  Medications:   Patient is currently taking over-the-counter supplements  OTC medications include: see medication list  Patient is able to manage medications  Activities of Daily Living (ADLs)/Instrumental Activities of Daily Living (IADLs):   Walk and transfer into and out of bed and chair?: Yes  Dress and groom yourself?: Yes    Bathe or shower yourself?: Yes    Feed yourself?  Yes  Do your laundry/housekeeping?: Yes  Manage your money, pay your bills and track your expenses?: Yes  Make your own meals?: Yes    Do your own shopping?: Yes    Previous Hospitalizations:   Any hospitalizations or ED visits within the last 12 months?: No      Advance Care Planning:   Living will: Yes    Durable POA for healthcare: No    Advanced directive: Yes      PREVENTIVE SCREENINGS      Cardiovascular Screening:    General: Screening Not Indicated and History Lipid Disorder      Diabetes Screening:     General: Screening Not Indicated and History Diabetes      Prostate Cancer Screening:    General: Screening Not Indicated      Osteoporosis Screening:    General: Screening Not Indicated      Abdominal Aortic Aneurysm (AAA) Screening:    Risk factors include: tobacco use        Lung Cancer Screening:     General: Screening Not Indicated      Hepatitis C Screening:    General: Screening Current    Screening, Brief Intervention, and Referral to Treatment (SBIRT)    Screening    Typical number of drinks in a week: 14    Single Item Drug Screening:  How often have you used an illegal drug (including marijuana) or a prescription medication for non-medical reasons in the past year? never    Single Item Drug Screen Score: 0  Interpretation: Negative screen for possible drug use disorder      Johana Tellez MD

## 2022-04-27 NOTE — ASSESSMENT & PLAN NOTE
Sleep apnea continues to be treated with CPAP on a nightly basis patient is compliant with this therapy

## 2022-04-27 NOTE — ASSESSMENT & PLAN NOTE
Patient reviews with me his nocturnal palpitations symptoms  I reviewed his of recent loop recorders of they indicate the supraventricular tachycardia at times he does take metoprolol succinate 100 mg in the morning I suspect medication is wearing off later in the day and I have decided to split his dose to 50 mg of metoprolol succinate in the morning and again before bedtime hopefully this will give him better control of his nocturnal tachycardia  Yes

## 2022-04-27 NOTE — PROGRESS NOTES
Follow-Up Note - [facility]   Анна Choi  67 y o  male  :1945  CHRIS:[id number]  DOS:2022    CC: I saw this patient for follow-up in clinic today for Sleep disordered breathing, Coexisting Sleep and Medical Problems  He is using a dream Station version 2 machine that he got as a replacement from [employer] several months ago  He reports overall deterioration in his health since he had pericarditis a few months ago  Results of prior studies in : A diagnostic study demonstrated AHI of 58 per hour  Minimum oxygen saturation of 80% and 66 2% of the study spent with saturations below 90%  A retitration study at that time demonstrated sleep disordered breathing adequately remediated with nasal CPAP at 14 cm H2O    Results of studies in  : A diagnostic study demonstrated an AHI of 75 7 per hour  Minimum oxygen saturation was 73% and 50 2 4% of the study was spent with saturations below 90%  The therapeutic portion of that study was not successful  During a subsequent therapeutic study, his sleep disordered breathing was successfully remediated with nasal CPAP at 16 cm H2O    PFSH, Problem List, Medications & Allergies were reviewed in EMR  Interval changes: none reported  He  has a past medical history of Anxiety, Broken teeth, Cancer ([address] ), Chipped tooth, Chronic pain disorder, Claustrophobia, Colon polyps, CPAP (continuous positive airway pressure) dependence, Depression, Diabetes mellitus ([address] ), Dyslipidemia, Esophageal polyp, GERD (gastroesophageal reflux disease), Glaucoma, Headache, Hyperlipidemia, Hypertension, Low back pain, Morbid obesity with BMI of 45 0-49 9, adult ([address] ), Neck pain, Numbness and tingling in left arm, OAB (overactive bladder), Paroxysmal supraventricular tachycardia ([address] ), Pericarditis (2021), Shortness of breath, Sleep apnea, Squamous cell skin cancer (10/19/2020), and Tinnitus      He has a current medication list which includes the following prescription(s): amlodipine, amoxicillin, aspirin, carteolol, chlorhexidine, citalopram, diclofenac, ferrous sulfate, fluticasone, furosemide, glimepiride, guaifenesin, losartan, metoprolol succinate, omeprazole, oxycodone-acetaminophen, oxymetazoline, rosuvastatin, tamsulosin, and trospium chloride  PHYSIOLOGICAL DATA REVIEW AND INTERPRETATION:    using PAP > 4 hours/night 93%  Estimated LILO 2 6/hour with pressure of 17 5cm H2O @90th percentile]; Patient has not been using ozone based devices to sanitize the machine  SUBJECTIVE: Regarding use of PAP, Hector reports:   · He is experiencing significant adverse effects: dry mouth/throat and dry nose - because he is not using his you med if I; has not noticed any fibres or foreign material in air line  · He is benefiting from use: sleeping better   Sleep Routine: Esteban Santos reports getting 8-10 hrs sleep  ; he has no difficulty initiating or maintaining sleep   He arises spontaneously and feels refreshed  Esteban Santos denies Excessive Daytime Sleepiness,   He rated himself at Total score: 1 /24 on the [place] Sleepiness Scale  Habits: reports that he quit smoking about 39 years ago  He has never used smokeless tobacco ,  reports current alcohol use of about 14 0 standard drinks of alcohol per week  ,  reports no history of drug use , Caffeine use:limited ; Exercise routine: none   ROS: reviewed & as attached  Significant for weight has been stable  He reports some nasal congestion and postnasal drip  He has shortness of breath  He reports episodic palpitations  He has back pain  He reports swelling and paresthesias involving upper extremities that he attributes to his cardiac condition (but also has carpal tunnel syndrome and ulnar nerve entrapment)  He reports ongoing feelings of depression in spite of Celexa  Vicente Hussein      EXAM: /60   Pulse 61   Ht 5' 7" (1 702 m)   Wt 136 kg (299 lb 9 6 oz)   SpO2 96%   BMI 46 92 kg/m²     Patient is well groomed; well appearing  CNS: Alert, orientated, clear & coherent speech  Psych: cooperativeand in no distress  Mental state:  Appears depressed  H&N: EOMI; NC/AT:no facial pressure marks, no rashes  Skin/Extrem: col & hydration normal; no edema  Resp: Respiratory effort is normal  Physical findings otherwise essentially unchanged from previous  IMPRESSION: Problem List Items & Comorbidities Addressed this Visit    1  ANALY (obstructive sleep apnea)  PAP DME Resupply/Reorder   2  Dry mouth     3  Chronic right-sided low back pain without sciatica     4  Essential hypertension     5  Heart failure with preserved left ventricular function (HFpEF) ([facility] [address] )     6  Mood disturbance     7  Dementia without behavioral disturbance, unspecified dementia type ([facility] )     8  Morbid obesity with BMI of 40 0-44 9, adult ([facility] )     9  Type 2 diabetes mellitus with complication (HCC)         PLAN:  1  I reviewed results of prior studies and physiologic data with the patient  2  I discussed treatment options with risks and benefits  3  Treatment with  PAP is medically necessary and Hector is agreable to continue use  4  Care of equipment, methods to improve comfort using PAP and importance of compliance with therapy were discussed  5  Pressure setting: continue 14 - 18 cmH2O     6  Rx provided to replace  supplies and Care coordinated with DME provider  7  Strategies to improve dry mouth were discussed  Specifically, adequate treatment of nasal allergies, adjusting heat and humidity settings on PAP machine, using Biotene mouthwash &/or Xylimelt  8  Discussed strategies for weight reduction  9  He may benefit from reviewing of his antidepressant medication  10  Follow-up is advised in 1 year or sooner if needed to monitor progress, compliance and to adjust therapy  Thank you for allowing me to participate in the care of this patient      Sincerely,    Authenticated electronically by Xena Khan MD on 65/92/17   Board Certified Specialist     Portions of the record may have been created with voice recognition software  Occasional wrong word or "sound a like" substitutions may have occurred due to the inherent limitations of voice recognition software  There may also be notations and random deletions of words or characters from malfunctioning software  Read the chart carefully and recognize, using context, where substitutions/deletions have occurred

## 2022-04-27 NOTE — ASSESSMENT & PLAN NOTE
BPH with you lower urinary tract symptoms  He goes the bathroom finishes voiding and then has a brief delay followed by some additional urine flow    Continue of Flomax at 0 4 mg daily

## 2022-04-27 NOTE — PATIENT INSTRUCTIONS

## 2022-04-27 NOTE — ASSESSMENT & PLAN NOTE
Lab Results   Component Value Date    EGFR 56 04/20/2022    EGFR 45 03/08/2022    EGFR 57 11/08/2021    CREATININE 1 22 04/20/2022    CREATININE 1 46 (H) 03/08/2022    CREATININE 1 21 11/08/2021   I have reviewed the patient's most recent comprehensive metabolic profile which indicates improvement in his kidney performance he is moved from stage 3B to 3 a based upon his most recent testing  Current GFR is 56 cc/minute  Basis of his renal insufficiency is multifactorial including hypertension type 2 diabetes and advancing age recommend continued monitoring with follow-up testing in 2 months

## 2022-04-27 NOTE — ASSESSMENT & PLAN NOTE
Currently patient has signs of early dementia  He has some difficulty focusing and concentrating also some difficulty with his short-term memory current therapy includes low-dose aspirin 81 mg daily and statin therapy  MRI scan of the brain has been ordered by Neurology will await their assessment  Patient revealed today that he has 2 cocktails every day in the afternoon which concerns me regarding his a diagnosis of dementia  I indicated to the patient the adverse effects of alcohol on cognitive function not to mention the adverse effects on his type 2 diabetes and hypertension    I have urged him to stop alcohol consumption

## 2022-04-27 NOTE — ASSESSMENT & PLAN NOTE
Perhaps 1 my greatest concerns about the future well-being of this patient focuses on his current morbid obesity  He is caring 299 lb  He wonders why he has shortness of breath where clearly his current body mass is excessively high limiting his ability to exercise  We discussed and I strongly urged that he have a consultation with Medical weight Management services at [facility]  He is reluctant to proceed with this at the present time  His diet is very poorly balanced he eats a lot of carbohydrates and not a lot of proteins  He only has 2 meals a day with snacks and 2 alcoholic beverages on a daily basis  All these things are adverse to the control of his type 2 diabetes and general well-being as well

## 2022-04-27 NOTE — ASSESSMENT & PLAN NOTE
Discussed with the patient in detail today the multifactorial basis of his and shortness of breath on exertion  First and foremost his is morbid obesity  Recommend working on reduction of weight referred to Medical weight Management but patient is hesitant to go  Second issue is mild anemia which we have him on iron supplementation 4  Third condition is interstitial lung disease and 4th condition is cardiac status

## 2022-04-27 NOTE — PROGRESS NOTES
Assessment/Plan:    Type 2 diabetes mellitus with complication (HCC)  Patient's most recent hemoglobin A1c is 6 7% indicating adequate control of his type 2 diabetes is not appear to have any current symptoms of hypoglycemia or hyperglycemia  His diet certainly is not well-balanced pertaining to good diabetic control  He eats breakfast and dinner  Since he gets up late in the morning he has breakfast and then just a snack in the afternoon instead of lunch  His diet seems to have a very high carbohydrate content we discussed this and recommended that he seek attention with the medical weight management program at [facility] to assist with weight reduction and balancing his diet  Currently he is not sure he wants to pursue this but will let me know if he would like to go and meet with weight weight management people  He should continue his current Amaryl 3 mg daily with breakfast   Lab Results   Component Value Date    HGBA1C 6 7 (H) 04/20/2022       Obstructive sleep apnea syndrome  Sleep apnea continues to be treated with CPAP on a nightly basis patient is compliant with this therapy  Interstitial lung disease ([address] )  Reviewed with the patient his CT scan of the chest which confirms the presence of some mild interstitial lung disease I indicated to him that this is 1 of the factors that cause some of his mild shortness of breath symptoms  No cure for this condition of reviewed with the patient follow-up CT scan has been requested by Pulmonary services  Essential hypertension  Current blood pressure control is good based upon today's assessment recommend the continuation of his current therapy 100 mg of losartan daily and metoprolol succinate 50 mg every 12 hours  Dementia [facility])  Currently patient has signs of early dementia  He has some difficulty focusing and concentrating also some difficulty with his short-term memory current therapy includes low-dose aspirin 81 mg daily and statin therapy    MRI scan of the brain has been ordered by Neurology will await their assessment  Patient revealed today that he has 2 cocktails every day in the afternoon which concerns me regarding his a diagnosis of dementia  I indicated to the patient the adverse effects of alcohol on cognitive function not to mention the adverse effects on his type 2 diabetes and hypertension  I have urged him to stop alcohol consumption    BPH with urinary obstruction  BPH with you lower urinary tract symptoms  He goes the bathroom finishes voiding and then has a brief delay followed by some additional urine flow  Continue of Flomax at 0 4 mg daily    Stage 3 chronic kidney disease, unspecified whether stage 3a or 3b CKD ([place] [address] )  Lab Results   Component Value Date    EGFR 56 04/20/2022    EGFR 45 03/08/2022    EGFR 57 11/08/2021    CREATININE 1 22 04/20/2022    CREATININE 1 46 (H) 03/08/2022    CREATININE 1 21 11/08/2021   I have reviewed the patient's most recent comprehensive metabolic profile which indicates improvement in his kidney performance he is moved from stage 3B to 3 a based upon his most recent testing  Current GFR is 56 cc/minute  Basis of his renal insufficiency is multifactorial including hypertension type 2 diabetes and advancing age recommend continued monitoring with follow-up testing in 2 months  Morbid obesity with BMI of 40 0-44 9, adult ([place] [address] )  Perhaps 1 my greatest concerns about the future well-being of this patient focuses on his current morbid obesity  He is caring 299 lb  He wonders why he has shortness of breath where clearly his current body mass is excessively high limiting his ability to exercise  We discussed and I strongly urged that he have a consultation with Medical weight Management services at [address] [place] [address]  He is reluctant to proceed with this at the present time  His diet is very poorly balanced he eats a lot of carbohydrates and not a lot of proteins    He only has 2 meals a day with snacks and 2 alcoholic beverages on a daily basis  All these things are adverse to the control of his type 2 diabetes and general well-being as well  Palpitations  Patient reviews with me his nocturnal palpitations symptoms  I reviewed his of recent loop recorders of they indicate the supraventricular tachycardia at times he does take metoprolol succinate 100 mg in the morning I suspect medication is wearing off later in the day and I have decided to split his dose to 50 mg of metoprolol succinate in the morning and again before bedtime hopefully this will give him better control of his nocturnal tachycardia  SOB (shortness of breath)  Discussed with the patient in detail today the multifactorial basis of his and shortness of breath on exertion  First and foremost his is morbid obesity  Recommend working on reduction of weight referred to Medical weight Management but patient is hesitant to go  Second issue is mild anemia which we have him on iron supplementation 4  Third condition is interstitial lung disease and 4th condition is cardiac status  Anemia  Mild anemia reviewed with the patient hemoglobin is 8 current iron level has increased with iron supplementation recommend continuation of iron supplement follow-up iron and CBC in 2 months no current evidence of active hemorrhaging       Diagnoses and all orders for this visit:    SVT (supraventricular tachycardia) (HCC)  -     metoprolol succinate (Toprol XL) 50 mg 24 hr tablet; Take 1 tablet (50 mg total) by mouth every 12 (twelve) hours    Anemia, unspecified type  -     Iron; Future    Iron deficiency anemia, unspecified iron deficiency anemia type  -     Iron; Future  -     CBC and differential; Future    Allergy, initial encounter  -     fluticasone (FLONASE) 50 mcg/act nasal spray; 1 spray into each nostril daily    Type 2 diabetes mellitus with complication (HCC)  -     Comprehensive metabolic panel;  Future    Interstitial lung disease ([facility] )    Essential hypertension    Dementia without behavioral disturbance, unspecified dementia type ([address] )    BPH with urinary obstruction    Stage 3 chronic kidney disease, unspecified whether stage 3a or 3b CKD ([address] )    Morbid obesity with BMI of 40 0-44 9, adult ([facility])    SOB (shortness of breath)        Subjective:      Patient ID: Peterson Feng is a 67 y o  male  This 25-year-old gentleman presents today in the company of his wife for his annual physical examination and review of his current blood work  He presents with several concerns of that he presented today  The 1st concern is that of shortness of breath on exertion  He has very limited exercise tolerance before he developed shortness of breath  His shortness of breath does resolve within several minutes of resting  Patient is also concerned and confirmed by his wife that he is showing some early signs of dementia with decreased memory of capabilities and difficulty focusing and concentrating  He also relates that he wakes up many times at nighttime feeling palpitations in his chest   He feels that the palpitations are a rapid heart rate  His left hand then gets tight and swollen after he has these palpitations  Patient is also concerned about dry mouth dry nose and throat type symptoms        The following portions of the patient's history were reviewed and updated as appropriate:   He  has a past medical history of Anxiety, Broken teeth, Cancer ([address] ), Chipped tooth, Chronic pain disorder, Claustrophobia, Colon polyps, CPAP (continuous positive airway pressure) dependence, Depression, Diabetes mellitus ([address] ), Dyslipidemia, Esophageal polyp, GERD (gastroesophageal reflux disease), Glaucoma, Headache, Hyperlipidemia, Hypertension, Low back pain, Morbid obesity with BMI of 45 0-49 9, adult ([address] ), Neck pain, Numbness and tingling in left arm, OAB (overactive bladder), Paroxysmal supraventricular tachycardia ([address] ), Pericarditis (01/2021), Shortness of breath, Sleep apnea, Squamous cell skin cancer (10/19/2020), and Tinnitus  He   Patient Active Problem List    Diagnosis Date Noted    Heart failure with preserved left ventricular function (HFpEF) ([facility] ) 03/03/2022    Interstitial lung disease ([place] [id number] ) 02/15/2022    Bilateral carotid artery disease, unspecified type ([place] [address] ) 02/03/2022    Stage 3 chronic kidney disease, unspecified whether stage 3a or 3b CKD ([place] [id number] ) 02/03/2022    Nasal congestion 02/01/2022    Chronic back pain 01/24/2022    Shoulder pain, bilateral 01/24/2022    Bilateral myofascial pain 01/24/2022    Decreased diffusion capacity 12/01/2021    Dementia ([place] [id number] ) 11/10/2021    Flank pain 11/10/2021    Bilateral carpal tunnel syndrome 09/24/2021    Anemia 08/23/2021    SOB (shortness of breath) 08/16/2021    Partial seizure ([place] [address] )     Ulnar nerve entrapment 06/21/2021    Facet arthropathy, lumbosacral 05/05/2021    Sacroiliitis ([place] [address] ) 03/15/2021    Lumbar spondylosis 03/15/2021    Adenomatous polyp of transverse colon 02/23/2021    Acute idiopathic pericarditis 01/22/2021    Hyperlipidemia 01/10/2021    Fecal incontinence 03/25/2020    BPH with urinary obstruction 01/23/2020    Onychomycosis 11/25/2019    Urgency incontinence 11/20/2019    Mood disorder ([place] [address] ) 08/14/2019    Hypokalemia 07/24/2019    Palpitations 09/17/2018    Obstructive sleep apnea syndrome 06/04/2018    Morbid obesity with BMI of 40 0-44 9, adult ([place] [address] ) 06/04/2018    Mood disturbance 06/04/2018    Gastroesophageal reflux disease 06/04/2018    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    Type 2 diabetes mellitus with complication ([place] [address] ) 96/86/6585    Essential hypertension 01/29/2018    Peripheral neuropathy 01/29/2018    Chronic right-sided low back pain without sciatica      He  has a past surgical history that includes Cataract extraction (Bilateral); Knee arthroscopy (Bilateral); Excision basal cell carcinoma; Anoscopy;  Rectal biopsy; Mouth surgery; pr esophagogastroduodenoscopy transoral diagnostic (N/A, 2/13/2019); pr colonoscopy flx dx w/collj spec when pfrmd (N/A, 2/13/2019); Mohs surgery (11/11/2020); Joint replacement; Back surgery (1983); Colonoscopy; EGD; Cystoscopy; and Spine surgery (1983)  His family history includes Alzheimer's disease in his mother; Dementia in his mother; Diabetes in his mother and paternal grandmother; Esophageal cancer in his paternal grandfather; Hypertension in his father; [place] Hypertension in his father  He  reports that he quit smoking about 39 years ago  He has never used smokeless tobacco  He reports current alcohol use of about 14 0 standard drinks of alcohol per week  He reports that he does not use drugs  Current Outpatient Medications   Medication Sig Dispense Refill    amLODIPine (NORVASC) 10 mg tablet TAKE 1 TABLET BY MOUTH EVERY DAY (Patient taking differently: 10 mg every morning  ) 90 tablet 3    amoxicillin (AMOXIL) 500 mg capsule Take 500 mg by mouth every 6 (six) hours      aspirin (ECOTRIN LOW STRENGTH) 81 mg EC tablet Take 1 tablet (81 mg total) by mouth daily 30 tablet 0    carteolol (OCUPRESS) 1 % ophthalmic solution INSTILL 1 DROP IN EACH EYE TWO TIMES DAILY      chlorhexidine (PERIDEX) 0 12 % solution RINSE MOUTH TWICE DAILY ( AFTER MEALS ) WITH 1/2 OUNCE (15 ML / 1 CAPFUL) FOR 30 SECONDS THEN SPIT OUT      citalopram (CeleXA) 10 mg tablet TAKE 1 TABLET BY MOUTH EVERY DAY 90 tablet 3    diclofenac (VOLTAREN) 75 mg EC tablet Take 1 tablet (75 mg total) by mouth 2 (two) times a day As needed   Take with food 60 tablet 0    ferrous sulfate 325 (65 Fe) mg tablet Take 1 tablet (325 mg total) by mouth daily with breakfast 90 tablet 3    fluticasone (FLONASE) 50 mcg/act nasal spray 1 spray into each nostril daily 16 g 4    furosemide (LASIX) 40 mg tablet Take 1 tablet (40 mg total) by mouth daily 90 tablet 3    glimepiride (AMARYL) 1 mg tablet TAKE 3 TABLETS BY MOUTH EVERY DAY WITH BREAKFAST 270 tablet 3    guaiFENesin (MUCINEX) 600 mg 12 hr tablet Take 1,200 mg by mouth every 12 (twelve) hours      losartan (COZAAR) 100 MG tablet TAKE 1 TABLET BY MOUTH EVERY DAY 90 tablet 3    omeprazole (PriLOSEC) 20 mg delayed release capsule TAKE 1 CAPSULE BY MOUTH EVERY DAY 90 capsule 3    oxyCODONE-acetaminophen (PERCOCET) 5-325 mg per tablet Take 1 tablet by mouth every 4 to 6 hours as needed for pain      oxymetazoline (AFRIN) 0 05 % nasal spray 2 sprays by Each Nare route every 12 (twelve) hours as needed for congestion        rosuvastatin (CRESTOR) 20 MG tablet TAKE 1 TABLET BY MOUTH EVERY DAY 90 tablet 3    tamsulosin (FLOMAX) 0 4 mg Take 1 capsule (0 4 mg total) by mouth daily with dinner 90 capsule 3    trospium chloride (SANCTURA) 20 mg tablet Take 1 tablet (20 mg total) by mouth 2 (two) times a day 180 tablet 3    metoprolol succinate (Toprol XL) 50 mg 24 hr tablet Take 1 tablet (50 mg total) by mouth every 12 (twelve) hours 60 tablet 5     No current facility-administered medications for this visit       Review of Systems   HENT:        Dry mouth throat and no is   Respiratory: Positive for shortness of breath  Cardiovascular: Positive for palpitations  Neurological:        Difficulty with short-term memory difficulty with concentration and focusing thoughts   Psychiatric/Behavioral: Positive for decreased concentration and dysphoric mood  All other systems reviewed and are negative  Objective:      /70   Pulse 66   Temp 98 6 °F (37 °C)   Ht 5' 7" (1 702 m)   Wt 136 kg (299 lb 3 2 oz)   SpO2 93%   BMI 46 86 kg/m²          Physical Exam  Constitutional:       General: He is not in acute distress  Appearance: He is well-developed  He is obese  He is not ill-appearing  HENT:      Head: Normocephalic        Right Ear: Hearing, tympanic membrane, ear canal and external ear normal       Left Ear: Hearing, tympanic membrane, ear canal and external ear normal  Nose: Nose normal       Mouth/Throat:      Mouth: Mucous membranes are moist       Pharynx: Oropharynx is clear  No oropharyngeal exudate or posterior oropharyngeal erythema  Eyes:      General:         Right eye: No discharge  Left eye: No discharge  Extraocular Movements: Extraocular movements intact  Conjunctiva/sclera: Conjunctivae normal       Pupils: Pupils are equal, round, and reactive to light  Neck:      Thyroid: No thyromegaly  Vascular: No carotid bruit  Cardiovascular:      Rate and Rhythm: Normal rate and regular rhythm  Heart sounds: S1 normal and S2 normal  No murmur heard  Pulmonary:      Effort: Pulmonary effort is normal  No respiratory distress  Breath sounds: Normal breath sounds  No wheezing, rhonchi or rales  Abdominal:      General: Bowel sounds are normal  There is no distension  Palpations: Abdomen is soft  There is no mass  Tenderness: There is no abdominal tenderness  There is no guarding  Musculoskeletal:         General: Normal range of motion  Cervical back: No tenderness  Lymphadenopathy:      Cervical: No cervical adenopathy  Skin:     General: Skin is warm and dry  Coloration: Skin is not jaundiced or pale  Neurological:      Mental Status: He is alert and oriented to person, place, and time  Deep Tendon Reflexes: Reflexes are normal and symmetric     Psychiatric:         Behavior: Behavior normal

## 2022-04-27 NOTE — PATIENT INSTRUCTIONS

## 2022-04-28 ENCOUNTER — TELEPHONE (OUTPATIENT)
Dept: ADMINISTRATIVE | Facility: OTHER | Age: 77
End: 2022-04-28

## 2022-04-28 ENCOUNTER — TELEPHONE (OUTPATIENT)
Dept: SLEEP CENTER | Facility: CLINIC | Age: 77
End: 2022-04-28

## 2022-04-28 NOTE — TELEPHONE ENCOUNTER
----- Message from Emperatriz Proctor sent at 4/27/2022  2:03 PM EDT -----  Regarding: care gap request  04/27/22 2:03 PM    Hello, our patient attached above has had Diabetic Foot Exam completed/performed  Please assist in updating the patient chart by pulling a previous Electronic Medical Record (EMR) document  The previous EMR is Dr Eloy Tafoya   The date of service is 02/16/2022    Thank you,  David Holliday MA  [facility] INTERNAL MED

## 2022-04-29 NOTE — TELEPHONE ENCOUNTER
Upon review of the In Basket request we This provider is with [facility] it would have pulled to  if this was a diabetic foot exam    Any additional questions or concerns should be emailed to the Practice Liaisons via [email or web address] email, please do not reply via In Basket      Thank you  Kathryn Khan MA

## 2022-05-04 ENCOUNTER — OFFICE VISIT (OUTPATIENT)
Dept: PODIATRY | Facility: CLINIC | Age: 77
End: 2022-05-04
Payer: MEDICARE

## 2022-05-04 VITALS
BODY MASS INDEX: 47.24 KG/M2 | SYSTOLIC BLOOD PRESSURE: 140 MMHG | HEIGHT: 67 IN | HEART RATE: 72 BPM | WEIGHT: 301 LBS | DIASTOLIC BLOOD PRESSURE: 84 MMHG

## 2022-05-04 DIAGNOSIS — B35.1 ONYCHOMYCOSIS: ICD-10-CM

## 2022-05-04 DIAGNOSIS — E11.42 DIABETIC POLYNEUROPATHY ASSOCIATED WITH TYPE 2 DIABETES MELLITUS (HCC): Primary | ICD-10-CM

## 2022-05-04 DIAGNOSIS — E66.01 MORBID OBESITY WITH BMI OF 40.0-44.9, ADULT (HCC): ICD-10-CM

## 2022-05-04 PROCEDURE — 99214 OFFICE O/P EST MOD 30 MIN: CPT | Performed by: PODIATRIST

## 2022-05-04 NOTE — PATIENT INSTRUCTIONS
Foot Care for People with Diabetes   WHAT YOU NEED TO KNOW:   · Foot care helps protect your feet and prevent foot ulcers or sores  Long-term high blood sugar levels can damage the blood vessels and nerves in your legs and feet  This damage makes it hard to feel pressure, pain, temperature, and touch  You may not be able to feel a cut or sore, or shoes that are too tight  Foot care is needed to prevent serious problems, such as an infection or amputation  · Diabetes may cause your toes to become crooked or curved under  These changes may affect the way you walk and can lead to increased pressure on your foot  The pressure can decrease blood flow to your feet  Lack of blood flow increases your risk for a foot ulcer  Do not ignore small problems, such as dry skin or small wounds  These can become life-threatening over time without proper care  DISCHARGE INSTRUCTIONS:   Call your care team provider if:   · Your feet become numb, weak, or hard to move  · You have pus draining from a sore on your foot  · You have a wound on your foot that gets bigger, deeper, or does not heal      · You see blisters, cuts, scratches, calluses, or sores on your foot  · You have a fever, and your feet become red, warm, and swollen  · Your toenails become thick, curled, or yellow  · You find it hard to check your feet because your vision is poor  · You have questions or concerns about your condition or care  Foot care:   · Check your feet each day  Look at your whole foot, including the bottom, and between and under your toes  Check for wounds, corns, and calluses  Use a mirror to see the bottom of your feet  The skin on your feet may be shiny, tight, or darker than normal  Your feet may also be cold and pale  Feel your feet by running your hands along the tops, bottoms, sides, and between your toes  Redness, swelling, and warmth are signs of blood flow problems that can lead to a foot ulcer   Do not try to remove corns or calluses yourself  · Wash your feet each day with soap and warm water  Do not use hot water, because this can injure your foot  Dry your feet gently with a towel after you wash them  Dry between and under your toes  · Apply lotion or a moisturizer on your dry feet  Ask your care team provider what lotions are best to use  Do not put lotion or moisturizer between your toes  Moisture between your toes could lead to skin breakdown  · Cut your toenails correctly  File or cut your toenails straight across  Use a soft brush to clean around your toenails  If your toenails are very thick, you may need to have a care team provider or specialist cut them  · Protect your feet  Do not walk barefoot or wear your shoes without socks  Check your shoes for rocks or other objects that can hurt your feet  Wear cotton socks to help keep your feet dry  Wear socks without toe seams, or wear them with the seams inside out  Change your socks each day  Do not wear socks that are dirty or damp  · Wear shoes that fit well  Wear shoes that do not rub against any area of your feet  Your shoes should be ½ to ¾ inch (1 to 2 centimeters) longer than your feet  Your shoes should also have extra space around the widest part of your feet  Walking or athletic shoes with laces or straps that adjust are best  Ask your care team provider for help to choose shoes that fit you best  Ask him or her if you need to wear an insert, orthotic, or bandage on your feet  · Do not smoke  Smoking can damage your blood vessels and put you at increased risk for foot ulcers  Ask your care team provider for information if you currently smoke and need help to quit  E-cigarettes or smokeless tobacco still contain nicotine  Talk to your care team provider before you use these products  Follow up with your diabetes care team provider or foot specialist as directed:   You will need to have your feet checked at least once a [place] may need a foot exam more often if you have nerve damage, foot deformities, or ulcers  Write down your questions so you remember to ask them during your visits  © Copyright [employer] 2022 Information is for End User's use only and may not be sold, redistributed or otherwise used for commercial purposes  All illustrations and images included in CareNotes® are the copyrighted property of [employer]  or Cheryl Lindsay   The above information is an  only  It is not intended as medical advice for individual conditions or treatments  Talk to your doctor, nurse or pharmacist before following any medical regimen to see if it is safe and effective for you  Benefits of an Active Lifestyle   WHAT YOU NEED TO KNOW:   An active lifestyle means you do physical activity throughout the day  Any activity that gets you up and moving is part of an active lifestyle  Physical activity includes exercise such as walking or lifting weights  It also includes playing sports  Physical activity is different from other kinds of activity, such as reading a book  This kind of activity is called sedentary  A sedentary lifestyle means you sit or do not move much during the day  An active lifestyle has many benefits, such as helping you prevent or manage health conditions  DISCHARGE INSTRUCTIONS:   Call your doctor if:   · You notice changes in your health, such as new or worsening shortness of breath  · You have questions or concerns about your condition or care  Benefits of an active lifestyle:   · You may be able to do daily activities more easily  Activity helps condition your heart, lungs, and muscles  This can help you get through your daily activities without feeling tired  · You can help control your weight  Activity helps your body use the calories you eat instead of storing them as fat  Your body continues to burn calories at a higher rate after you are active      · Activity can increase your health  Activity helps lower your risk for cancer, heart disease, diabetes, and stroke  Activity can help you control your blood pressure and blood sugar levels, and lower your cholesterol  If you have arthritis, activity can help your joints move more easily and with less pain  · Your bones and muscles will get stronger  This will help prevent osteoporosis and reduce your risk for falls  · Activity can help improve your mood  Activity can reduce or prevent depression and stress  Activity can also help improve your sleep  Risks of a sedentary lifestyle:  A sedentary lifestyle increases your risk for diseases such as diabetes, high blood pressure, and heart disease  Your immune system also becomes weaker  This means it cannot fight infections well  How much activity you need:  Any activity is better than no activity at all  When you go from being mostly inactive to adding some activity, you will see health benefits  The following are general guidelines:  · Do aerobic activity several days each week  Aerobic activity includes walking, bicycling, dancing, swimming, and raking leaves  Aim for 150 to 300 minutes of moderate activity, or 75 to 150 of vigorous activity each week  You can also do a combination of moderate and vigorous activity  · Do strength training at least 2 times each week  Strength training helps you keep the muscles you have and build new muscles  Strength training includes pushups, yoga, alfonso chi, and weightlifting  If you do not have access to weights, you can lift items around your house  Try to work all the major muscle groups, such as your legs, arms, abdomen, and chest  Do 2 or 3 sets on each area  Use a weight that is slightly heavier than you can lift easily  You can work up to [employer]  You can also use resistance bands instead of weights  Steps you can take to become more active:   · Set goals  Set some long-term goals and some short-term goals   For example, you may want to be able to walk for 30 minutes without becoming short of breath  Try not to put time requirements on your goals  For example, do not think you should reach your goal in a month  Set smaller goals, such as walking a little longer each week, or feeling less shortness of breath  · Be active all day  Activity does not have to mean structured exercise each day  You can be more active by making small changes all day  For example, try parking as far from the entrance of buildings as you can when you run errands  If possible, walk or ride a bike instead of driving  Take the stairs instead of the elevator  · Keep a record of your activity and your progress  You can do this by writing down your daily activity  Include the kind of activity and how long you did it  You can also use a program on your phone or other device that will track activity for you  Also record your progress  You may be doing daily activities more easily, sleeping better, or building muscles  · Step counting can help you monitor activity  A general guide is to take 10,000 steps each day  A pedometer is a device you can wear to track your steps  Some phones have programs that will count and record steps  You may need to work up to 10,000 steps  Start by finding out how many steps you usually take in a day  Then try to take more steps each day than you took the day before  Tips to help you stay on track:   · Start slowly and work up  You do not have to do 30 minutes of activity at one time  You can break the activity up and do a few minutes at a time  Remember that some physical activity is better than none  Stand up during the day, even if you cannot walk around  Your body uses more energy when you stand  You may be able to get a desk that allows you to stand while you type or make phone calls for work  Aim for a speed or intensity that is challenging but not too difficult   You should be able to speak a few words at a time but not be able to sing  · Plan activities you enjoy  Do a variety of activities so you do not become bored and you stay challenged  Include activities that strengthen your bones  These activities are called weight-bearing exercises  Examples include tennis, jumping rope, and running  Swimming, riding a bike, and similar exercises keep weight off your bones  They will not help strengthen bones, but they will help your heart and lungs work better  · Ask for support from the people in your life  Go for a walk after dinner with your family  Meet friends at the park  Take a break with a coworker and walk around  Find someone who likes to go to the gym at the same time you do  You may be more likely to go if you know another person is counting on you  Get involved in community events, such as cleaning a community park  Ask someone to help you stay on track  For example, you can tell the person about your daily or weekly activity  · Treat yourself to a reward when you reach a goal   The rewards can be for activity done for a certain amount of time each day or days each week  Rewards can also be for progress you make  Have rewards that are not food, such as a new clothing item or book  What you need to know about nutrition and activity:  Healthy foods will give you the energy you need to be active  Activity and good nutrition work together to help you reach or maintain a healthy weight  Healthy foods include fruits, vegetables, lean meats, fish, cooked beans, whole-grain breads, and low-fat dairy products  Your healthcare provider can help you create a healthy meal plan  He or she can tell you how many calories you need to stay active and still lose weight if needed  Follow up with your doctor as directed:  Write down your questions so you remember to ask them during your visits     © Copyright [employer] 2022 Information is for End User's use only and may not be sold, redistributed or otherwise used for commercial purposes  All illustrations and images included in CareNotes® are the copyrighted property of [employer]  or Cheryl Phillip  The above information is an  only  It is not intended as medical advice for individual conditions or treatments  Talk to your doctor, nurse or pharmacist before following any medical regimen to see if it is safe and effective for you

## 2022-05-04 NOTE — PROGRESS NOTES
Assessment/Plan:         Diagnoses and all orders for this visit:    Diabetic polyneuropathy associated with type 2 diabetes mellitus ([address] )    Morbid obesity with BMI of 40 0-44 9, adult ([address] )    Onychomycosis        Diagnosis and options discussed with patient  Patient agreeable to the plan as stated below    -Educated on DM risk to lower extremities, proper shoe gear, and daily monitoring of feet    -Educated on A1C and the risks of poorly controlled Diabetes  Reviewed recent A1C, 6 7  -Discussed weight loss and suitable exercise regiment  Reviewed most recent PCP visit on 4/27/2022  -PAtient has forefoot neuropathy, trophic  Changes to skin, paraesthesia, edema  Continue at risk foot care every 9-12 weeks (Q9)  No acute findings today      Subjective:      Patient ID: Yamilet Morfin is a 67 y o  male  Patient presents for annual DM foot exam  A1C 6 7  He cochran shave neuropathy in his feet and severe toe deformity  HE has had wounds in the past but not currently  He saw his PCP 4/27/2022  His DM medication has not changed  His nails grow thick and painful  His right great toenail is tender in the corner  The following portions of the patient's history were reviewed and updated as appropriate:   He  has a past medical history of Anxiety, Broken teeth, Cancer ([address] ), Chipped tooth, Chronic pain disorder, Claustrophobia, Colon polyps, CPAP (continuous positive airway pressure) dependence, Depression, Diabetes mellitus ([address] ), Dyslipidemia, Esophageal polyp, GERD (gastroesophageal reflux disease), Glaucoma, Headache, Hyperlipidemia, Hypertension, Low back pain, Morbid obesity with BMI of 45 0-49 9, adult ([address] ), Neck pain, Numbness and tingling in left arm, OAB (overactive bladder), Paroxysmal supraventricular tachycardia ([address] ), Pericarditis (01/2021), Shortness of breath, Sleep apnea, Squamous cell skin cancer (10/19/2020), and Tinnitus    He   Patient Active Problem List    Diagnosis Date Noted    Heart failure with preserved left ventricular function (HFpEF) ([place] [address] ) 03/03/2022    Interstitial lung disease ([place] [address] ) 02/15/2022    Bilateral carotid artery disease, unspecified type ([place] [address] ) 02/03/2022    Stage 3 chronic kidney disease, unspecified whether stage 3a or 3b CKD ([place] [address] ) 02/03/2022    Nasal congestion 02/01/2022    Chronic back pain 01/24/2022    Shoulder pain, bilateral 01/24/2022    Bilateral myofascial pain 01/24/2022    Decreased diffusion capacity 12/01/2021    Dementia ([place] [address] ) 11/10/2021    Flank pain 11/10/2021    Bilateral carpal tunnel syndrome 09/24/2021    Anemia 08/23/2021    SOB (shortness of breath) 08/16/2021    Partial seizure ([place] [address] )     Ulnar nerve entrapment 06/21/2021    Facet arthropathy, lumbosacral 05/05/2021    Sacroiliitis ([place] [address] ) 03/15/2021    Lumbar spondylosis 03/15/2021    Adenomatous polyp of transverse colon 02/23/2021    Acute idiopathic pericarditis 01/22/2021    Hyperlipidemia 01/10/2021    Fecal incontinence 03/25/2020    BPH with urinary obstruction 01/23/2020    Onychomycosis 11/25/2019    Urgency incontinence 11/20/2019    Mood disorder ([place] [address] ) 08/14/2019    Hypokalemia 07/24/2019    Palpitations 09/17/2018    Obstructive sleep apnea syndrome 06/04/2018    Morbid obesity with BMI of 40 0-44 9, adult ([place] [address] ) 06/04/2018    Mood disturbance 06/04/2018    Gastroesophageal reflux disease 06/04/2018    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    Type 2 diabetes mellitus with complication ([place] [address] ) 99/66/7819    Essential hypertension 01/29/2018    Peripheral neuropathy 01/29/2018    Chronic right-sided low back pain without sciatica      He  has a past surgical history that includes Cataract extraction (Bilateral); Knee arthroscopy (Bilateral); Excision basal cell carcinoma; Anoscopy;  Rectal biopsy; Mouth surgery; pr esophagogastroduodenoscopy transoral diagnostic (N/A, 2/13/2019); pr colonoscopy flx dx w/collj spec when pfrmd (N/A, 2/13/2019); Mohs surgery (11/11/2020); Joint replacement; Back surgery (1983); Colonoscopy; EGD; Cystoscopy; and Spine surgery (1983)  His family history includes Alzheimer's disease in his mother; Dementia in his mother; Diabetes in his mother and paternal grandmother; Esophageal cancer in his paternal grandfather; Hypertension in his father; [place] Hypertension in his father  He  reports that he quit smoking about 39 years ago  He has never used smokeless tobacco  He reports current alcohol use of about 14 0 standard drinks of alcohol per week  He reports that he does not use drugs  Current Outpatient Medications   Medication Sig Dispense Refill    amLODIPine (NORVASC) 10 mg tablet TAKE 1 TABLET BY MOUTH EVERY DAY (Patient taking differently: 10 mg every morning  ) 90 tablet 3    aspirin (ECOTRIN LOW STRENGTH) 81 mg EC tablet Take 1 tablet (81 mg total) by mouth daily 30 tablet 0    carteolol (OCUPRESS) 1 % ophthalmic solution INSTILL 1 DROP IN EACH EYE TWO TIMES DAILY      chlorhexidine (PERIDEX) 0 12 % solution RINSE MOUTH TWICE DAILY ( AFTER MEALS ) WITH 1/2 OUNCE (15 ML / 1 CAPFUL) FOR 30 SECONDS THEN SPIT OUT      citalopram (CeleXA) 10 mg tablet TAKE 1 TABLET BY MOUTH EVERY DAY 90 tablet 3    diclofenac (VOLTAREN) 75 mg EC tablet Take 1 tablet (75 mg total) by mouth 2 (two) times a day As needed   Take with food 60 tablet 0    ferrous sulfate 325 (65 Fe) mg tablet Take 1 tablet (325 mg total) by mouth daily with breakfast 90 tablet 3    fluticasone (FLONASE) 50 mcg/act nasal spray 1 spray into each nostril daily 16 g 4    furosemide (LASIX) 40 mg tablet Take 1 tablet (40 mg total) by mouth daily 90 tablet 3    glimepiride (AMARYL) 1 mg tablet TAKE 3 TABLETS BY MOUTH EVERY DAY WITH BREAKFAST 270 tablet 3    guaiFENesin (MUCINEX) 600 mg 12 hr tablet Take 1,200 mg by mouth every 12 (twelve) hours      losartan (COZAAR) 100 MG tablet TAKE 1 TABLET BY MOUTH EVERY DAY 90 tablet 3    metoprolol succinate (Toprol XL) 50 mg 24 hr tablet Take 1 tablet (50 mg total) by mouth every 12 (twelve) hours 60 tablet 5    omeprazole (PriLOSEC) 20 mg delayed release capsule TAKE 1 CAPSULE BY MOUTH EVERY DAY 90 capsule 3    oxymetazoline (AFRIN) 0 05 % nasal spray 2 sprays by Each Nare route every 12 (twelve) hours as needed for congestion        rosuvastatin (CRESTOR) 20 MG tablet TAKE 1 TABLET BY MOUTH EVERY DAY 90 tablet 3    tamsulosin (FLOMAX) 0 4 mg Take 1 capsule (0 4 mg total) by mouth daily with dinner 90 capsule 3    trospium chloride (SANCTURA) 20 mg tablet Take 1 tablet (20 mg total) by mouth 2 (two) times a day 180 tablet 3    amoxicillin (AMOXIL) 500 mg capsule Take 500 mg by mouth every 6 (six) hours      oxyCODONE-acetaminophen (PERCOCET) 5-325 mg per tablet Take 1 tablet by mouth every 4 to 6 hours as needed for pain       No current facility-administered medications for this visit  Current Outpatient Medications on File Prior to Visit   Medication Sig    amLODIPine (NORVASC) 10 mg tablet TAKE 1 TABLET BY MOUTH EVERY DAY (Patient taking differently: 10 mg every morning  )    aspirin (ECOTRIN LOW STRENGTH) 81 mg EC tablet Take 1 tablet (81 mg total) by mouth daily    carteolol (OCUPRESS) 1 % ophthalmic solution INSTILL 1 DROP IN EACH EYE TWO TIMES DAILY    chlorhexidine (PERIDEX) 0 12 % solution RINSE MOUTH TWICE DAILY ( AFTER MEALS ) WITH 1/2 OUNCE (15 ML / 1 CAPFUL) FOR 30 SECONDS THEN SPIT OUT    citalopram (CeleXA) 10 mg tablet TAKE 1 TABLET BY MOUTH EVERY DAY    diclofenac (VOLTAREN) 75 mg EC tablet Take 1 tablet (75 mg total) by mouth 2 (two) times a day As needed   Take with food    ferrous sulfate 325 (65 Fe) mg tablet Take 1 tablet (325 mg total) by mouth daily with breakfast    fluticasone (FLONASE) 50 mcg/act nasal spray 1 spray into each nostril daily    furosemide (LASIX) 40 mg tablet Take 1 tablet (40 mg total) by mouth daily    glimepiride (AMARYL) 1 mg tablet TAKE 3 TABLETS BY MOUTH EVERY DAY WITH BREAKFAST    guaiFENesin (MUCINEX) 600 mg 12 hr tablet Take 1,200 mg by mouth every 12 (twelve) hours    losartan (COZAAR) 100 MG tablet TAKE 1 TABLET BY MOUTH EVERY DAY    metoprolol succinate (Toprol XL) 50 mg 24 hr tablet Take 1 tablet (50 mg total) by mouth every 12 (twelve) hours    omeprazole (PriLOSEC) 20 mg delayed release capsule TAKE 1 CAPSULE BY MOUTH EVERY DAY    oxymetazoline (AFRIN) 0 05 % nasal spray 2 sprays by Each Nare route every 12 (twelve) hours as needed for congestion      rosuvastatin (CRESTOR) 20 MG tablet TAKE 1 TABLET BY MOUTH EVERY DAY    tamsulosin (FLOMAX) 0 4 mg Take 1 capsule (0 4 mg total) by mouth daily with dinner    trospium chloride (SANCTURA) 20 mg tablet Take 1 tablet (20 mg total) by mouth 2 (two) times a day    amoxicillin (AMOXIL) 500 mg capsule Take 500 mg by mouth every 6 (six) hours    oxyCODONE-acetaminophen (PERCOCET) 5-325 mg per tablet Take 1 tablet by mouth every 4 to 6 hours as needed for pain     No current facility-administered medications on file prior to visit  He is allergic to sulfa antibiotics       Review of Systems   Constitutional: Negative  HENT: Negative  Respiratory: Negative  Cardiovascular: Negative  Gastrointestinal: Negative  Musculoskeletal: Negative  Skin: Positive for color change  Negative for wound  Neurological: Positive for numbness  Negative for weakness  Objective:      /84   Pulse 72   Ht 5' 7" (1 702 m) Comment: verbal  Wt (!) 137 kg (301 lb)   BMI 47 14 kg/m²     Hemoglobin A1C  Order: [id number]   Status: Final result     Visible to patient: Yes (seen)     Next appt: 06/22/2022 at 02:45 PM in Internal Medicine Edgar Pate MD)     Dx: Type 2 diabetes mellitus with complic        0 Result Notes     1  Topic    Component Ref Range & Units 4/20/22 11:29 AM 1/10/22  1:29 PM 8/13/21 11:25 AM 4/12/21  9:41 AM 12/1/20 12:01 PM 7/27/20 10:13 AM 3/20/20 10:02 AM Hemoglobin A1C Normal 3 8-5 6%; PreDiabetic 5 7-6 4%; Diabetic >=6 5%; Glycemic control for adults with diabetes <7 0% % 6 7 High   6 2 R  6 6 High   5 8 High   7 0 High   7 5 High   6 8 High     EAG mg/dl 146   143  120                Physical Exam  Vitals reviewed  Constitutional:       Appearance: He is morbidly obese  He is not ill-appearing or diaphoretic  Cardiovascular:      Rate and Rhythm: Normal rate  Pulses: Normal pulses  no weak pulses          Dorsalis pedis pulses are 2+ on the right side and 2+ on the left side  Posterior tibial pulses are 2+ on the right side and 2+ on the left side  Pulmonary:      Effort: Pulmonary effort is normal  No respiratory distress  Musculoskeletal:      Right lower leg: Edema present  Left lower leg: Edema present  Right foot: Normal range of motion  Deformity (hammertoe 4,5 B/L) present  No bunion, Charcot foot, foot drop or prominent metatarsal heads  Left foot: Normal range of motion  Deformity present  No bunion, Charcot foot, foot drop or prominent metatarsal heads  Feet:      Right foot:      Protective Sensation: 10 sites tested  8 sites sensed  Skin integrity: Dry skin present  No ulcer, blister, skin breakdown, erythema, callus or fissure  Toenail Condition: Right toenails are abnormally thick  Fungal disease present  Left foot:      Protective Sensation: 10 sites tested  7 sites sensed  Skin integrity: Dry skin present  No ulcer, blister, skin breakdown, erythema, callus or fissure  Toenail Condition: Left toenails are abnormally thick  Fungal disease present  Skin:     Capillary Refill: Capillary refill takes less than 2 seconds  Findings: No erythema or rash  Neurological:      Mental Status: He is alert and oriented to person, place, and time  Sensory: Sensory deficit present  Motor: No weakness        Gait: Gait normal    Psychiatric:         Mood and Affect: Mood normal  Diabetic Foot Exam    Patient's shoes and socks removed  Right Foot/Ankle   Right Foot Inspection  Skin Exam: skin intact and dry skin  No blister, no callus, no erythema, no maceration, no ulcer and no callus  Toe Exam: right toe deformity  No swelling, no tenderness and erythema    Sensory   Vibration: diminished  Proprioception: intact  Monofilament testing: diminished    Vascular  Capillary refills: < 3 seconds  The right DP pulse is 2+  The right PT pulse is 2+  Right Toe  - Comprehensive Exam  Ecchymosis: none  Arch: pes planus  Hammertoes: fifth toe and fourth toe  Hallux valgus: no  Swelling: dorsum   Tenderness: none         Left Foot/Ankle  Left Foot Inspection  Skin Exam: skin intact and dry skin  No erythema, no maceration, no ulcer and no callus  Toe Exam: left toe deformity  No swelling, no tenderness and no erythema  Sensory   Vibration: diminished  Proprioception: intact  Monofilament testing: diminished    Vascular  Capillary refills: < 3 seconds  The left DP pulse is 2+  The left PT pulse is 2+       Left Toe  - Comprehensive Exam  Ecchymosis: none  Arch: pes planus  Hammertoes: fifth toe and fourth toe  Hallux valgus: no  Swelling: dorsum   Tenderness: none           Assign Risk Category  Deformity present  Loss of protective sensation  No weak pulses  Risk: 2

## 2022-05-17 ENCOUNTER — APPOINTMENT (EMERGENCY)
Dept: RADIOLOGY | Facility: HOSPITAL | Age: 77
DRG: 315 | End: 2022-05-17
Payer: MEDICARE

## 2022-05-17 ENCOUNTER — APPOINTMENT (INPATIENT)
Dept: RADIOLOGY | Facility: HOSPITAL | Age: 77
DRG: 315 | End: 2022-05-17
Payer: MEDICARE

## 2022-05-17 ENCOUNTER — HOSPITAL ENCOUNTER (INPATIENT)
Facility: HOSPITAL | Age: 77
LOS: 2 days | Discharge: HOME | DRG: 315 | End: 2022-05-19
Attending: EMERGENCY MEDICINE | Admitting: FAMILY MEDICINE
Payer: MEDICARE

## 2022-05-17 DIAGNOSIS — R06.00 DYSPNEA: ICD-10-CM

## 2022-05-17 DIAGNOSIS — N18.30 STAGE 3 CHRONIC KIDNEY DISEASE, UNSPECIFIED WHETHER STAGE 3A OR 3B CKD (HCC): ICD-10-CM

## 2022-05-17 DIAGNOSIS — R07.9 ACUTE CHEST PAIN: Primary | ICD-10-CM

## 2022-05-17 DIAGNOSIS — R79.89 ELEVATED SERUM CREATININE: ICD-10-CM

## 2022-05-17 DIAGNOSIS — R03.0 ELEVATED BLOOD PRESSURE READING: ICD-10-CM

## 2022-05-17 DIAGNOSIS — I31.9 PERICARDITIS: ICD-10-CM

## 2022-05-17 DIAGNOSIS — E11.65 HYPERGLYCEMIA DUE TO DIABETES MELLITUS (HCC): ICD-10-CM

## 2022-05-17 DIAGNOSIS — J18.9 ACUTE PNEUMONIA: ICD-10-CM

## 2022-05-17 PROBLEM — Z20.822 CLOSE EXPOSURE TO COVID-19 VIRUS: Status: ACTIVE | Noted: 2022-05-17

## 2022-05-17 LAB
2HR DELTA HS TROPONIN: -1 NG/L
4HR DELTA HS TROPONIN: 0 NG/L
ALBUMIN SERPL BCP-MCNC: 2.9 G/DL (ref 3.5–5)
ALP SERPL-CCNC: 151 U/L (ref 46–116)
ALT SERPL W P-5'-P-CCNC: 40 U/L (ref 12–78)
ANION GAP SERPL CALCULATED.3IONS-SCNC: 1 MMOL/L (ref 4–13)
AST SERPL W P-5'-P-CCNC: 34 U/L (ref 5–45)
ATRIAL RATE: 75 BPM
BASOPHILS # BLD AUTO: 0.06 THOUSANDS/ΜL (ref 0–0.1)
BASOPHILS NFR BLD AUTO: 1 % (ref 0–1)
BILIRUB SERPL-MCNC: 0.26 MG/DL (ref 0.2–1)
BUN SERPL-MCNC: 21 MG/DL (ref 5–25)
CALCIUM ALBUM COR SERPL-MCNC: 9.9 MG/DL (ref 8.3–10.1)
CALCIUM SERPL-MCNC: 9 MG/DL (ref 8.3–10.1)
CARDIAC TROPONIN I PNL SERPL HS: 4 NG/L
CARDIAC TROPONIN I PNL SERPL HS: 5 NG/L
CARDIAC TROPONIN I PNL SERPL HS: 5 NG/L
CHLORIDE SERPL-SCNC: 109 MMOL/L (ref 100–108)
CO2 SERPL-SCNC: 27 MMOL/L (ref 21–32)
CREAT SERPL-MCNC: 1.4 MG/DL (ref 0.6–1.3)
D DIMER PPP FEU-MCNC: 0.29 UG/ML FEU
EOSINOPHIL # BLD AUTO: 0.15 THOUSAND/ΜL (ref 0–0.61)
EOSINOPHIL NFR BLD AUTO: 2 % (ref 0–6)
ERYTHROCYTE [DISTWIDTH] IN BLOOD BY AUTOMATED COUNT: 14.2 % (ref 11.6–15.1)
FLUAV RNA RESP QL NAA+PROBE: NEGATIVE
FLUBV RNA RESP QL NAA+PROBE: NEGATIVE
GFR SERPL CREATININE-BSD FRML MDRD: 47 ML/MIN/1.73SQ M
GLUCOSE SERPL-MCNC: 106 MG/DL (ref 65–140)
GLUCOSE SERPL-MCNC: 178 MG/DL (ref 65–140)
GLUCOSE SERPL-MCNC: 89 MG/DL (ref 65–140)
HCT VFR BLD AUTO: 33.8 % (ref 36.5–49.3)
HGB BLD-MCNC: 11.1 G/DL (ref 12–17)
IMM GRANULOCYTES # BLD AUTO: 0.07 THOUSAND/UL (ref 0–0.2)
IMM GRANULOCYTES NFR BLD AUTO: 1 % (ref 0–2)
L PNEUMO1 AG UR QL IA.RAPID: NEGATIVE
LYMPHOCYTES # BLD AUTO: 1.13 THOUSANDS/ΜL (ref 0.6–4.47)
LYMPHOCYTES NFR BLD AUTO: 12 % (ref 14–44)
MCH RBC QN AUTO: 33.4 PG (ref 26.8–34.3)
MCHC RBC AUTO-ENTMCNC: 32.8 G/DL (ref 31.4–37.4)
MCV RBC AUTO: 102 FL (ref 82–98)
MONOCYTES # BLD AUTO: 0.89 THOUSAND/ΜL (ref 0.17–1.22)
MONOCYTES NFR BLD AUTO: 10 % (ref 4–12)
NEUTROPHILS # BLD AUTO: 6.98 THOUSANDS/ΜL (ref 1.85–7.62)
NEUTS SEG NFR BLD AUTO: 74 % (ref 43–75)
NRBC BLD AUTO-RTO: 0 /100 WBCS
P AXIS: 27 DEGREES
PLATELET # BLD AUTO: 249 THOUSANDS/UL (ref 149–390)
PLATELET # BLD AUTO: 278 THOUSANDS/UL (ref 149–390)
PMV BLD AUTO: 10 FL (ref 8.9–12.7)
PMV BLD AUTO: 9.4 FL (ref 8.9–12.7)
POTASSIUM SERPL-SCNC: 4.1 MMOL/L (ref 3.5–5.3)
PR INTERVAL: 200 MS
PROCALCITONIN SERPL-MCNC: 0.1 NG/ML
PROCALCITONIN SERPL-MCNC: 0.12 NG/ML
PROT SERPL-MCNC: 7.4 G/DL (ref 6.4–8.2)
QRS AXIS: 17 DEGREES
QRSD INTERVAL: 84 MS
QT INTERVAL: 406 MS
QTC INTERVAL: 408 MS
RBC # BLD AUTO: 3.32 MILLION/UL (ref 3.88–5.62)
RSV RNA RESP QL NAA+PROBE: NEGATIVE
S PNEUM AG UR QL: NEGATIVE
SARS-COV-2 RNA RESP QL NAA+PROBE: NEGATIVE
SODIUM SERPL-SCNC: 137 MMOL/L (ref 136–145)
T WAVE AXIS: 28 DEGREES
VENTRICULAR RATE: 61 BPM
WBC # BLD AUTO: 9.28 THOUSAND/UL (ref 4.31–10.16)

## 2022-05-17 PROCEDURE — 94660 CPAP INITIATION&MGMT: CPT

## 2022-05-17 PROCEDURE — 84145 PROCALCITONIN (PCT): CPT | Performed by: EMERGENCY MEDICINE

## 2022-05-17 PROCEDURE — 87040 BLOOD CULTURE FOR BACTERIA: CPT | Performed by: EMERGENCY MEDICINE

## 2022-05-17 PROCEDURE — 85379 FIBRIN DEGRADATION QUANT: CPT | Performed by: EMERGENCY MEDICINE

## 2022-05-17 PROCEDURE — 99223 1ST HOSP IP/OBS HIGH 75: CPT | Performed by: FAMILY MEDICINE

## 2022-05-17 PROCEDURE — 85025 COMPLETE CBC W/AUTO DIFF WBC: CPT | Performed by: EMERGENCY MEDICINE

## 2022-05-17 PROCEDURE — G1004 CDSM NDSC: HCPCS

## 2022-05-17 PROCEDURE — 82948 REAGENT STRIP/BLOOD GLUCOSE: CPT

## 2022-05-17 PROCEDURE — 94760 N-INVAS EAR/PLS OXIMETRY 1: CPT

## 2022-05-17 PROCEDURE — 93005 ELECTROCARDIOGRAM TRACING: CPT

## 2022-05-17 PROCEDURE — 84484 ASSAY OF TROPONIN QUANT: CPT | Performed by: EMERGENCY MEDICINE

## 2022-05-17 PROCEDURE — 71046 X-RAY EXAM CHEST 2 VIEWS: CPT

## 2022-05-17 PROCEDURE — 86140 C-REACTIVE PROTEIN: CPT | Performed by: FAMILY MEDICINE

## 2022-05-17 PROCEDURE — 99285 EMERGENCY DEPT VISIT HI MDM: CPT | Performed by: EMERGENCY MEDICINE

## 2022-05-17 PROCEDURE — 85049 AUTOMATED PLATELET COUNT: CPT | Performed by: FAMILY MEDICINE

## 2022-05-17 PROCEDURE — 84145 PROCALCITONIN (PCT): CPT | Performed by: FAMILY MEDICINE

## 2022-05-17 PROCEDURE — 36415 COLL VENOUS BLD VENIPUNCTURE: CPT

## 2022-05-17 PROCEDURE — 87449 NOS EACH ORGANISM AG IA: CPT | Performed by: FAMILY MEDICINE

## 2022-05-17 PROCEDURE — 99285 EMERGENCY DEPT VISIT HI MDM: CPT

## 2022-05-17 PROCEDURE — 0241U HB NFCT DS VIR RESP RNA 4 TRGT: CPT | Performed by: EMERGENCY MEDICINE

## 2022-05-17 PROCEDURE — 80053 COMPREHEN METABOLIC PANEL: CPT | Performed by: EMERGENCY MEDICINE

## 2022-05-17 PROCEDURE — 71250 CT THORAX DX C-: CPT

## 2022-05-17 PROCEDURE — 93010 ELECTROCARDIOGRAM REPORT: CPT | Performed by: INTERNAL MEDICINE

## 2022-05-17 RX ORDER — ALBUTEROL SULFATE 2.5 MG/3ML
2.5 SOLUTION RESPIRATORY (INHALATION) EVERY 6 HOURS PRN
Status: DISCONTINUED | OUTPATIENT
Start: 2022-05-17 | End: 2022-05-19 | Stop reason: HOSPADM

## 2022-05-17 RX ORDER — TAMSULOSIN HYDROCHLORIDE 0.4 MG/1
0.4 CAPSULE ORAL
Status: DISCONTINUED | OUTPATIENT
Start: 2022-05-17 | End: 2022-05-19 | Stop reason: HOSPADM

## 2022-05-17 RX ORDER — MAGNESIUM HYDROXIDE/ALUMINUM HYDROXICE/SIMETHICONE 120; 1200; 1200 MG/30ML; MG/30ML; MG/30ML
30 SUSPENSION ORAL EVERY 6 HOURS PRN
Status: DISCONTINUED | OUTPATIENT
Start: 2022-05-17 | End: 2022-05-19 | Stop reason: HOSPADM

## 2022-05-17 RX ORDER — HEPARIN SODIUM 5000 [USP'U]/ML
5000 INJECTION, SOLUTION INTRAVENOUS; SUBCUTANEOUS EVERY 8 HOURS SCHEDULED
Status: DISCONTINUED | OUTPATIENT
Start: 2022-05-17 | End: 2022-05-17

## 2022-05-17 RX ORDER — INSULIN LISPRO 100 [IU]/ML
1-5 INJECTION, SOLUTION INTRAVENOUS; SUBCUTANEOUS
Status: DISCONTINUED | OUTPATIENT
Start: 2022-05-17 | End: 2022-05-19 | Stop reason: HOSPADM

## 2022-05-17 RX ORDER — HEPARIN SODIUM 5000 [USP'U]/ML
7500 INJECTION, SOLUTION INTRAVENOUS; SUBCUTANEOUS EVERY 8 HOURS SCHEDULED
Status: DISCONTINUED | OUTPATIENT
Start: 2022-05-17 | End: 2022-05-19 | Stop reason: HOSPADM

## 2022-05-17 RX ORDER — FUROSEMIDE 40 MG/1
40 TABLET ORAL DAILY
Status: DISCONTINUED | OUTPATIENT
Start: 2022-05-17 | End: 2022-05-19 | Stop reason: HOSPADM

## 2022-05-17 RX ORDER — ASPIRIN 81 MG/1
81 TABLET ORAL DAILY
Status: DISCONTINUED | OUTPATIENT
Start: 2022-05-17 | End: 2022-05-19 | Stop reason: HOSPADM

## 2022-05-17 RX ORDER — GUAIFENESIN 600 MG
1200 TABLET, EXTENDED RELEASE 12 HR ORAL EVERY 12 HOURS SCHEDULED
Status: DISCONTINUED | OUTPATIENT
Start: 2022-05-17 | End: 2022-05-19 | Stop reason: HOSPADM

## 2022-05-17 RX ORDER — ONDANSETRON 2 MG/ML
4 INJECTION INTRAMUSCULAR; INTRAVENOUS EVERY 6 HOURS PRN
Status: DISCONTINUED | OUTPATIENT
Start: 2022-05-17 | End: 2022-05-19 | Stop reason: HOSPADM

## 2022-05-17 RX ORDER — ATORVASTATIN CALCIUM 40 MG/1
40 TABLET, FILM COATED ORAL
Refills: 3 | Status: DISCONTINUED | OUTPATIENT
Start: 2022-05-17 | End: 2022-05-19 | Stop reason: HOSPADM

## 2022-05-17 RX ORDER — AZITHROMYCIN 500 MG/1
500 TABLET, FILM COATED ORAL EVERY 24 HOURS
Status: DISCONTINUED | OUTPATIENT
Start: 2022-05-18 | End: 2022-05-18

## 2022-05-17 RX ORDER — METOPROLOL SUCCINATE 50 MG/1
50 TABLET, EXTENDED RELEASE ORAL EVERY 12 HOURS
Status: DISCONTINUED | OUTPATIENT
Start: 2022-05-17 | End: 2022-05-19 | Stop reason: HOSPADM

## 2022-05-17 RX ORDER — CITALOPRAM 10 MG/1
10 TABLET ORAL DAILY
Status: DISCONTINUED | OUTPATIENT
Start: 2022-05-17 | End: 2022-05-19 | Stop reason: HOSPADM

## 2022-05-17 RX ORDER — AMLODIPINE BESYLATE 10 MG/1
10 TABLET ORAL DAILY
Status: DISCONTINUED | OUTPATIENT
Start: 2022-05-17 | End: 2022-05-19 | Stop reason: HOSPADM

## 2022-05-17 RX ORDER — INSULIN LISPRO 100 [IU]/ML
1-6 INJECTION, SOLUTION INTRAVENOUS; SUBCUTANEOUS
Status: DISCONTINUED | OUTPATIENT
Start: 2022-05-17 | End: 2022-05-19 | Stop reason: HOSPADM

## 2022-05-17 RX ORDER — FLUTICASONE PROPIONATE 50 MCG
1 SPRAY, SUSPENSION (ML) NASAL DAILY
Status: DISCONTINUED | OUTPATIENT
Start: 2022-05-17 | End: 2022-05-19 | Stop reason: HOSPADM

## 2022-05-17 RX ORDER — ACETAMINOPHEN 325 MG/1
650 TABLET ORAL EVERY 6 HOURS PRN
Status: DISCONTINUED | OUTPATIENT
Start: 2022-05-17 | End: 2022-05-19 | Stop reason: HOSPADM

## 2022-05-17 RX ORDER — BENZONATATE 100 MG/1
100 CAPSULE ORAL 3 TIMES DAILY PRN
Status: DISCONTINUED | OUTPATIENT
Start: 2022-05-17 | End: 2022-05-19 | Stop reason: HOSPADM

## 2022-05-17 RX ORDER — OXYBUTYNIN CHLORIDE 5 MG/1
2.5 TABLET ORAL 2 TIMES DAILY
Refills: 3 | Status: DISCONTINUED | OUTPATIENT
Start: 2022-05-17 | End: 2022-05-19 | Stop reason: HOSPADM

## 2022-05-17 RX ORDER — DOCUSATE SODIUM 100 MG/1
100 CAPSULE, LIQUID FILLED ORAL 2 TIMES DAILY
Status: DISCONTINUED | OUTPATIENT
Start: 2022-05-17 | End: 2022-05-19 | Stop reason: HOSPADM

## 2022-05-17 RX ORDER — FERROUS SULFATE 325(65) MG
325 TABLET ORAL
Status: DISCONTINUED | OUTPATIENT
Start: 2022-05-18 | End: 2022-05-19 | Stop reason: HOSPADM

## 2022-05-17 RX ORDER — PANTOPRAZOLE SODIUM 20 MG/1
20 TABLET, DELAYED RELEASE ORAL
Refills: 3 | Status: DISCONTINUED | OUTPATIENT
Start: 2022-05-18 | End: 2022-05-19 | Stop reason: HOSPADM

## 2022-05-17 RX ADMIN — GUAIFENESIN 1200 MG: 600 TABLET, EXTENDED RELEASE ORAL at 21:40

## 2022-05-17 RX ADMIN — HEPARIN SODIUM 7500 UNITS: 5000 INJECTION INTRAVENOUS; SUBCUTANEOUS at 16:57

## 2022-05-17 RX ADMIN — DOCUSATE SODIUM 100 MG: 100 CAPSULE, LIQUID FILLED ORAL at 17:09

## 2022-05-17 RX ADMIN — AMLODIPINE BESYLATE 10 MG: 10 TABLET ORAL at 15:02

## 2022-05-17 RX ADMIN — ASPIRIN 81 MG: 81 TABLET, COATED ORAL at 15:02

## 2022-05-17 RX ADMIN — ATORVASTATIN CALCIUM 40 MG: 40 TABLET, FILM COATED ORAL at 16:48

## 2022-05-17 RX ADMIN — METOPROLOL SUCCINATE 50 MG: 50 TABLET, EXTENDED RELEASE ORAL at 15:02

## 2022-05-17 RX ADMIN — CEFTRIAXONE 2000 MG: 2 INJECTION, POWDER, FOR SOLUTION INTRAMUSCULAR; INTRAVENOUS at 12:47

## 2022-05-17 RX ADMIN — HEPARIN SODIUM 7500 UNITS: 5000 INJECTION INTRAVENOUS; SUBCUTANEOUS at 21:40

## 2022-05-17 RX ADMIN — FUROSEMIDE 40 MG: 40 TABLET ORAL at 15:02

## 2022-05-17 RX ADMIN — CITALOPRAM HYDROBROMIDE 10 MG: 10 TABLET ORAL at 15:02

## 2022-05-17 RX ADMIN — TAMSULOSIN HYDROCHLORIDE 0.4 MG: 0.4 CAPSULE ORAL at 16:48

## 2022-05-17 RX ADMIN — AZITHROMYCIN MONOHYDRATE 500 MG: 500 INJECTION, POWDER, LYOPHILIZED, FOR SOLUTION INTRAVENOUS at 13:58

## 2022-05-17 RX ADMIN — FLUTICASONE PROPIONATE 1 SPRAY: 50 SPRAY, METERED NASAL at 16:48

## 2022-05-17 RX ADMIN — OXYBUTYNIN CHLORIDE 2.5 MG: 5 TABLET ORAL at 17:09

## 2022-05-17 NOTE — H&P
[address] [facility]  H&P- [facility] 1945, 65 y o  male MRN: [id number]  Unit/Bed#: [facility] 213-01 Encounter: [id number]  Primary Care Provider: Karen Whyte MD   Date and time admitted to hospital: 5/17/2022  9:11 AM    * Shortness of breath  Assessment & Plan  · Patient came to the hospital with substernal chest pain and shortness of breath  · Patient also with cough with expectoration  · Started on antibiotics in the emergency room for pneumonia  · Chest pain is associated with deep breath  · Chest x-ray reviewed  -will obtain a CT chest to further delineate the reason for the shortness of breath and chest  · D-dimer is negative  · Hold further antibiotics since the procalcitonin is negative-repeat in the morning  · Sputum culture  No fever or leukocytosis    Chest pain  Assessment & Plan  · Patient came to the hospital with substernal chest pain which worsens with deep inspiration  · Patient was admitted to the hospital last year with chest pain the and had cardiac catheterization which was nonobstructive coronary artery disease  Felt to be secondary to acute pericarditis  ·   No troponin elevation this time  · Check echocardiogram    Stage 3 chronic kidney disease, unspecified whether stage 3a or 3b CKD [facility])  Assessment & Plan  Lab Results   Component Value Date    EGFR 47 05/17/2022    EGFR 56 04/20/2022    EGFR 45 03/08/2022    CREATININE 1 40 (H) 05/17/2022    CREATININE 1 22 04/20/2022    CREATININE 1 46 (H) 03/08/2022   Creatinine 1 4  Close to baseline  Monitor for now    Anemia  Assessment & Plan  · Patient with chronic anemia    Continue with iron supplementation  · Hemoglobin at baseline    Hyperlipidemia  Assessment & Plan  · Continue statin    Morbid obesity with BMI of 40 0-44 9, adult (HCC)  Assessment & Plan  · TLC as outpatient    Obstructive sleep apnea syndrome  Assessment & Plan  · Patient uses CPAP at night    Essential hypertension  Assessment & Plan  · Monitor blood pressure  · Continue with outpatient medications    Type 2 diabetes mellitus with complication [facility])  Assessment & Plan  Lab Results   Component Value Date    HGBA1C 6 7 (H) 04/20/2022       Recent Labs     05/17/22  1535   POCGLU 106       Blood Sugar Average: Last 72 hrs:  (P) 106   Patient is on oral hypoglycemic agents as outpatient  Last hemoglobin A1c is 6 7 showing excellent control  Insulin sliding scale for overnight  Monitor blood sugar and adjust insulin dose per blood sugar      VTE Pharmacologic Prophylaxis: VTE Score: 6 High Risk (Score >/= 5) - Pharmacological DVT Prophylaxis Ordered: heparin  Sequential Compression Devices Ordered  Code Status: Level 1 - Full Code   Discussion with family: Updated patient  Anticipated Length of Stay: Patient will be admitted on an inpatient basis with an anticipated length of stay of greater than 2 midnights secondary to Shortness of breath  Total Time for Visit, including Counseling / Coordination of Care: 70 minutes Greater than 50% of this total time spent on direct patient counseling and coordination of care  Chief Complaint:  Chest pain and shortness of breath    History of Present Illness:  Yamilet Morfin is a 65 y o  male with a PMH of morbid obesity type 2 diabetes mellitus /CKD stage 3/ acute pericarditis Nica Spears presents with chest pain and shortness of breath  Patient reported that his symptoms started today morning when he woke up  Patient has substernal chest pain which gets worse with deep inspiration  Patient also complaining of shortness of breath  Patient with cough and mild expectoration  Denies any fever or any chills  In the emergency room patient had an x-ray and was given antibiotics since there is a concern for pneumonia  Patient does not have any leukocytosis or fever    Patient had admission with chest pain in the past at that time he was treated for acute pericarditis    Review of Systems:  Review of Systems   Constitutional: Negative for chills and fever  Respiratory: Positive for cough and shortness of breath  Cardiovascular: Positive for chest pain  Gastrointestinal: Negative  Genitourinary: Negative  Musculoskeletal: Positive for arthralgias  Skin: Negative  Neurological: Negative  Psychiatric/Behavioral: Negative          Past Medical and Surgical History:   Past Medical History:   Diagnosis Date    Anxiety     Broken teeth     Cancer ([facility] )     basal cell-right cheek,left breast, right side of nose, left forehead    Chipped tooth     upper front-right    Chronic pain disorder     pain with standing-herniated disc lower back, upper back pain    Claustrophobia     Colon polyps     CPAP (continuous positive airway pressure) dependence     Depression     Diabetes mellitus ([facility] )     Dyslipidemia     Esophageal polyp     GERD (gastroesophageal reflux disease)     Glaucoma     Headache     Hyperlipidemia     Hypertension     Low back pain     Morbid obesity with BMI of 45 0-49 9, adult (HCC)     Neck pain     Numbness and tingling in left arm     resolved 2/20/17 / numbness and tingling left side last assessed 3/23/16    OAB (overactive bladder)     Paroxysmal supraventricular tachycardia (HCC)     Pericarditis 01/2021    Shortness of breath     with exertion since 2020    Sleep apnea     wears cpap     Squamous cell skin cancer 10/19/2020    Right Nasal Ala, Dr Senft    Tinnitus        Past Surgical History:   Procedure Laterality Date    ANOSCOPY      for polyp removal    BACK SURGERY  1983    disc surgery lower back    BASAL CELL CARCINOMA EXCISION      right cheek    CATARACT EXTRACTION Bilateral     COLONOSCOPY      CYSTOSCOPY      EGD      JOINT REPLACEMENT      bilateral knees    KNEE ARTHROSCOPY Bilateral     MOHS SURGERY  11/11/2020    [facility] Right Nasal Dr Santos Herrera Crutch      tooth extraction    MN COLONOSCOPY FLX DX W/COLLJ SPEC WHEN PFRMD N/A 2/13/2019    Procedure: COLONOSCOPY;  Surgeon: Blanca Albert MD;  Location: [facility]; Service: Gastroenterology    MN ESOPHAGOGASTRODUODENOSCOPY TRANSORAL DIAGNOSTIC N/A 2/13/2019    Procedure: ESOPHAGOGASTRODUODENOSCOPY (EGD); Surgeon: Blanca Albert MD;  Location: [facility]; Service: Gastroenterology    RECTAL BIOPSY      SPINE SURGERY  1983       Meds/Allergies:  Prior to Admission medications    Medication Sig Start Date End Date Taking? Authorizing Provider   amLODIPine (NORVASC) 10 mg tablet TAKE 1 TABLET BY MOUTH EVERY DAY  Patient taking differently: 10 mg every morning   12/28/21   Stevan Oliva MD   aspirin (ECOTRIN LOW STRENGTH) 81 mg EC tablet Take 1 tablet (81 mg total) by mouth daily 1/13/21   Donna Manning PA-C   carteolol (OCUPRESS) 1 % ophthalmic solution INSTILL 1 DROP IN [facility] EYE TWO TIMES DAILY 9/1/21   Historical Provider, MD   citalopram (CeleXA) 10 mg tablet TAKE 1 TABLET BY MOUTH EVERY DAY 8/17/21   Stevan Oliva MD   diclofenac (VOLTAREN) 75 mg EC tablet Take 1 tablet (75 mg total) by mouth 2 (two) times a day As needed   Take with food 4/19/22   UDAY Barclay   ferrous sulfate 325 (65 Fe) mg tablet Take 1 tablet (325 mg total) by mouth daily with breakfast 3/14/22   Pamela Charles MD   fluticasone [facility]) 50 mcg/act nasal spray 1 spray into each nostril daily 4/27/22   Stevan Oliva MD   furosemide (LASIX) 40 mg tablet Take 1 tablet (40 mg total) by mouth daily 3/1/22   Pamela Charles MD   glimepiride (AMARYL) 1 mg tablet TAKE 3 TABLETS BY MOUTH EVERY DAY WITH BREAKFAST 9/14/21   Stevan Oliva MD   guaiFENesin (MUCINEX) 600 mg 12 hr tablet Take 1,200 mg by mouth every 12 (twelve) hours    Historical Provider, MD   losartan (COZAAR) 100 MG tablet TAKE 1 TABLET BY MOUTH EVERY DAY 2/28/22   Stevan Oliva MD   metoprolol succinate (Toprol XL) 50 mg 24 hr tablet Take 1 tablet (50 mg total) by mouth every 12 (twelve) hours 4/27/22   Genie Orozco MD   omeprazole (PriLOSEC) 20 mg delayed release capsule TAKE 1 CAPSULE BY MOUTH EVERY DAY 8/24/21   Genie Orozco MD   oxyCODONE-acetaminophen (PERCOCET) 5-325 mg per tablet Take 1 tablet by mouth every 4 to 6 hours as needed for pain 4/15/22   Historical Provider, MD   oxymetazoline (AFRIN) 0 05 % nasal spray 2 sprays by Each Nare route every 12 (twelve) hours as needed for congestion      Historical Provider, MD   rosuvastatin (CRESTOR) 20 MG tablet TAKE 1 TABLET BY MOUTH EVERY DAY 4/23/22   Genie Orozco MD   tamsulosin [facility]) 0 4 mg Take 1 capsule (0 4 mg total) by mouth daily with dinner 11/22/21   Truong Bryant MD   trospium chloride (SANCTURA) 20 mg tablet Take 1 tablet (20 mg total) by mouth 2 (two) times a day 11/22/21   Truong Bryant MD   amoxicillin (AMOXIL) 500 mg capsule Take 500 mg by mouth every 6 (six) hours 3/28/22 5/17/22  Historical Provider, MD   chlorhexidine (PERIDEX) 0 12 % solution RINSE MOUTH TWICE DAILY ( AFTER MEALS ) WITH 1/2 OUNCE (15 ML / 1 CAPFUL) FOR 30 SECONDS THEN SPIT OUT 4/15/22 5/17/22  Historical Provider, MD     I have reviewed home medications with patient personally  -patient reported that he does not know the name of his medication and reported that the list is accurate    Allergies:    Allergies   Allergen Reactions    Sulfa Antibiotics Other (See Comments)     Nausea         Social History:  Marital Status: /Civil Union   Occupation: retired  Patient Pre-hospital Living Situation: Home  Patient Pre-hospital Level of Mobility: walks  Patient Pre-hospital Diet Restrictions:   Substance Use History:   Social History     Substance and Sexual Activity   Alcohol Use Yes    Alcohol/week: 14 0 standard drinks    Types: 14 Shots of liquor per week    Comment: "couple of drinks each night"     Social History     Tobacco Use   Smoking Status Former Smoker    Quit date: 1983    Years since quittin 3   Smokeless Tobacco Never Used     Social History     Substance and Sexual Activity   Drug Use No       Family History:  Family History   Problem Relation Age of Onset    Alzheimer's disease Mother     Diabetes Mother     Dementia Mother     [address] Hypertension Father     Hypertension Father     Diabetes Paternal Grandmother     Esophageal cancer Paternal Grandfather        Physical Exam:     Vitals:   Blood Pressure: 149/60 (22 1434)  Pulse: 64 (22 1434)  Temperature: 98 9 °F (37 2 °C) (22 1434)  Temp Source: Oral (22 0932)  Respirations: 20 (22 1300)  Height: 5' 7" (170 2 cm) (22 1300)  Weight - Scale: 134 kg (295 lb 12 8 oz) (22 1300)  SpO2: 98 % (22 1434)    Physical Exam  Constitutional:       General: He is not in acute distress  Appearance: He is obese  He is not ill-appearing or diaphoretic  HENT:      Head: Normocephalic  Nose: Nose normal    Eyes:      General: No scleral icterus  Cardiovascular:      Rate and Rhythm: Normal rate and regular rhythm  Pulses: Normal pulses  Heart sounds: Normal heart sounds  Pulmonary:      Comments: Decreased breath sounds bilateral  Chest:      Chest wall: No tenderness  Abdominal:      General: Abdomen is flat  There is no distension  Musculoskeletal:         General: No swelling  Cervical back: Normal range of motion  Skin:     General: Skin is warm  Neurological:      General: No focal deficit present  Mental Status: He is alert            Additional Data:     Lab Results:  Results from last 7 days   Lab Units 22  1739 22  0922   WBC Thousand/uL  --  9 28   HEMOGLOBIN g/dL  --  11 1*   HEMATOCRIT %  --  33 8*   PLATELETS Thousands/uL 278 249   NEUTROS PCT %  --  74   LYMPHS PCT %  --  12*   MONOS PCT %  --  10   EOS PCT %  --  2     Results from last 7 days   Lab Units 22  0922   SODIUM mmol/L 137   POTASSIUM mmol/L 4 1   CHLORIDE mmol/L 109*   CO2 mmol/L 27   BUN mg/dL 21   CREATININE mg/dL 1 40*   ANION GAP mmol/L 1*   CALCIUM mg/dL 9 0   ALBUMIN g/dL 2 9*   TOTAL BILIRUBIN mg/dL 0 26   ALK PHOS U/L 151*   ALT U/L 40   AST U/L 34   GLUCOSE RANDOM mg/dL 178*         Results from last 7 days   Lab Units 05/17/22  1535   POC GLUCOSE mg/dl 106         Results from last 7 days   Lab Units 05/17/22  1520 05/17/22  1232   PROCALCITONIN ng/ml 0 10 0 12       Imaging: Reviewed radiology reports from this admission including: chest xray  XR chest 2 views   ED Interpretation by Kaylene Pabon MD (05/17 1215)   Right lower lobe consolidation      Final Result by Chito Merritt MD (05/17 1301)      No acute cardiopulmonary disease  Workstation performed: [id number]         CT chest wo contrast    (Results Pending)       EKG and Other Studies Reviewed on Admission:   · EKG: NSR  HR 61     ** Please Note: This note has been constructed using a voice recognition system   **

## 2022-05-17 NOTE — ED ATTENDING ATTESTATION
5/17/2022  I, Taras Wing MD, saw and evaluated the patient  I have discussed the patient with the resident/non-physician practitioner and agree with the resident's/non-physician practitioner's findings, Plan of Care, and MDM as documented in the resident's/non-physician practitioner's note, except where noted  All available labs and Radiology studies were reviewed  I was present for key portions of any procedure(s) performed by the resident/non-physician practitioner and I was immediately available to provide assistance  At this point I agree with the current assessment done in the Emergency Department  I have conducted an independent evaluation of this patient a history and physical is as follows:    Patient presents emergency department chief complaint of chest pain  Patient states he was in his normal state of health last evening and slept normally throughout the course the night  Patient awoke at 5:00 a m , which is not unusual for him, and noticed the had a central chest discomfort  The patient describes it as an aching pressure that he also felt slightly in his back  The patient denies any radiation of pain into his arm, jaw, or neck  The patient denied nausea or diaphoresis but reported he was more short of breath than usual   The patient did not exert himself and so is not sure if it was exertional and initially did not complain of any pleuritic exams  The patient denies any lower extremity pain or swelling  The patient denies history of DVT or pulmonary embolism or cancer  The patient denies recent trips or immobilization  The patient called 911 and they gave him aspirin and nitroglycerin at which time his pain improved  When I saw the patient he stated he had no pain at rest but that he would feel discomfort every time he took a deep breath  Physical exam demonstrates a pleasant alert nontoxic male in no acute distress    HEENT exam was normal   Oral mucosa was moist   Lungs are clear with equal breath sounds  The heart had a regular rate rhythm  The chest was nontender  The abdomen is soft and nontender  The extremities are symmetric and nontender  Skin had no rash      ED Course         Critical Care Time  Procedures

## 2022-05-17 NOTE — ASSESSMENT & PLAN NOTE
· Patient came to the hospital with substernal chest pain and shortness of breath  · Patient also with cough with expectoration  · Started on antibiotics in the emergency room for pneumonia  · Chest pain is associated with deep breath  · Chest x-ray reviewed  -will obtain a CT chest to further delineate the reason for the shortness of breath and chest  · D-dimer is negative  · Hold further antibiotics since the procalcitonin is negative-repeat in the morning  · Sputum culture    No fever or leukocytosis

## 2022-05-17 NOTE — ASSESSMENT & PLAN NOTE
Lab Results   Component Value Date    HGBA1C 6 7 (H) 04/20/2022       Recent Labs     05/17/22  1535   POCGLU 106       Blood Sugar Average: Last 72 hrs:  (P) 106   Patient is on oral hypoglycemic agents as outpatient  Last hemoglobin A1c is 6 7 showing excellent control  Insulin sliding scale for overnight    Monitor blood sugar and adjust insulin dose per blood sugar

## 2022-05-17 NOTE — ASSESSMENT & PLAN NOTE
· Patient reported that last night patient had dinner with his friends and 1 of them tested positive for COVID  · Patient currently COVID negative  · Unlikely his symptoms other due to COVID given the short time frame between exposure and onset of symptoms

## 2022-05-17 NOTE — ASSESSMENT & PLAN NOTE
· Patient came to the hospital with substernal chest pain which worsens with deep inspiration  · Patient was admitted to the hospital last year with chest pain the and had cardiac catheterization which was nonobstructive coronary artery disease  Felt to be secondary to acute pericarditis  ·   No troponin elevation this time    · Check echocardiogram

## 2022-05-17 NOTE — ED PROVIDER NOTES
History  Chief Complaint   Patient presents with    Chest Pain     Chest pain starting at 5 am mid sternal rated 7/10  324 aspirin 2 nitros pain now 2/10  Patient is a 76-year-old male, with a history significant for hypertension, hyperlipidemia, diabetes, obesity, former tobacco use, and STEMI in 24', who presented to the ED today, via EMS, with substernal chest and mid back pain  The intensity is mild and the character is pressure  The pain is pleuritic and nonexertional   There is associated shortness of breath  Patient received aspirin, 324 mg, and nitro x2 in the field  This improved patient's symptoms but did not resolve them  Patient states he felt a similar sensation about one week ago and this resolved spontaneously  Patient is uncertain if the onset was sudden as he woke up with the pain  He felt like his normal self prior to going to bed last evening  Breathing exacerbates his symptoms  There is no associated fever, abdominal pain, diaphoresis, weakness, numbness  Patient is without other concerns at this time      - No language barrier    - History obtained from patient  - There are no limitations to the history obtained  - Previous charting was reviewed          Prior to Admission Medications   Prescriptions Last Dose Informant Patient Reported? Taking? amLODIPine (NORVASC) 10 mg tablet  Self No No   Sig: TAKE 1 TABLET BY MOUTH EVERY DAY   Patient taking differently: 10 mg every morning     aspirin (ECOTRIN LOW STRENGTH) 81 mg EC tablet  Self No No   Sig: Take 1 tablet (81 mg total) by mouth daily   carteolol (OCUPRESS) 1 % ophthalmic solution  Self Yes No   Sig: INSTILL 1 DROP IN EACH EYE TWO TIMES DAILY   citalopram (CeleXA) 10 mg tablet  Self No No   Sig: TAKE 1 TABLET BY MOUTH EVERY DAY   diclofenac (VOLTAREN) 75 mg EC tablet   No No   Sig: Take 1 tablet (75 mg total) by mouth 2 (two) times a day As needed   Take with food   ferrous sulfate 325 (65 Fe) mg tablet   No No   Sig: Take 1 tablet (325 mg total) by mouth daily with breakfast   fluticasone (FLONASE) 50 mcg/act nasal spray   No No   Si spray into each nostril daily   furosemide (LASIX) 40 mg tablet  Self No No   Sig: Take 1 tablet (40 mg total) by mouth daily   glimepiride (AMARYL) 1 mg tablet  Self No No   Sig: TAKE 3 TABLETS BY MOUTH EVERY DAY WITH BREAKFAST   guaiFENesin (MUCINEX) 600 mg 12 hr tablet  Self Yes No   Sig: Take 1,200 mg by mouth every 12 (twelve) hours   losartan (COZAAR) 100 MG tablet  Self No No   Sig: TAKE 1 TABLET BY MOUTH EVERY DAY   metoprolol succinate (Toprol XL) 50 mg 24 hr tablet   No No   Sig: Take 1 tablet (50 mg total) by mouth every 12 (twelve) hours   omeprazole (PriLOSEC) 20 mg delayed release capsule  Self No No   Sig: TAKE 1 CAPSULE BY MOUTH EVERY DAY   oxyCODONE-acetaminophen (PERCOCET) 5-325 mg per tablet   Yes No   Sig: Take 1 tablet by mouth every 4 to 6 hours as needed for pain   oxymetazoline (AFRIN) 0 05 % nasal spray  Self Yes No   Si sprays by Each Nare route every 12 (twelve) hours as needed for congestion     rosuvastatin (CRESTOR) 20 MG tablet   No No   Sig: TAKE 1 TABLET BY MOUTH EVERY DAY   tamsulosin (FLOMAX) 0 4 mg  Self No No   Sig: Take 1 capsule (0 4 mg total) by mouth daily with dinner   trospium chloride (SANCTURA) 20 mg tablet  Self No No   Sig: Take 1 tablet (20 mg total) by mouth 2 (two) times a day      Facility-Administered Medications: None       Past Medical History:   Diagnosis Date    Anxiety     Broken teeth     Cancer (HCC)     basal cell-right cheek,left breast, right side of nose, left forehead    Chipped tooth     upper front-right    Chronic pain disorder     pain with standing-herniated disc lower back, upper back pain    Claustrophobia     Colon polyps     CPAP (continuous positive airway pressure) dependence     Depression     Diabetes mellitus (HCC)     Dyslipidemia     Esophageal polyp     GERD (gastroesophageal reflux disease)     Glaucoma     Headache     Hyperlipidemia     Hypertension     Low back pain     Morbid obesity with BMI of 45 0-49 9, adult (HCC)     Neck pain     Numbness and tingling in left arm     resolved 2/20/17 / numbness and tingling left side last assessed 3/23/16    OAB (overactive bladder)     Paroxysmal supraventricular tachycardia (HCC)     Pericarditis 01/2021    Shortness of breath     with exertion since 2020    Sleep apnea     wears cpap     Squamous cell skin cancer 10/19/2020    Right Nasal Ala, Dr Turcios    Tinnitus        Past Surgical History:   Procedure Laterality Date    ANOSCOPY      for polyp removal    BACK SURGERY  1983    disc surgery lower back    BASAL CELL CARCINOMA EXCISION      right cheek    CATARACT EXTRACTION Bilateral     COLONOSCOPY      CYSTOSCOPY      EGD      JOINT REPLACEMENT      bilateral knees    KNEE ARTHROSCOPY Bilateral     MOHS SURGERY  11/11/2020    [facility] Right Nasal Ala, Dr Mona Desai      tooth extraction    IL COLONOSCOPY FLX DX W/COLLJ SPEC WHEN PFRMD N/A 2/13/2019    Procedure: COLONOSCOPY;  Surgeon: Alyssa Muniz MD;  Location: [facility]; Service: Gastroenterology    IL ESOPHAGOGASTRODUODENOSCOPY TRANSORAL DIAGNOSTIC N/A 2/13/2019    Procedure: ESOPHAGOGASTRODUODENOSCOPY (EGD); Surgeon: Alyssa Muniz MD;  Location: [facility]; Service: Gastroenterology    RECTAL BIOPSY      SPINE SURGERY  1983       Family History   Problem Relation Age of Onset    Alzheimer's disease Mother     Diabetes Mother     Dementia Mother    [facility] Hypertension Father     Hypertension Father     Diabetes Paternal Grandmother     Esophageal cancer Paternal Grandfather      I have reviewed and agree with the history as documented      E-Cigarette/Vaping    E-Cigarette Use Never User      E-Cigarette/Vaping Substances    Nicotine No     THC No     CBD No     Flavoring No     Other No     Unknown No      Social History     Tobacco Use    Smoking status: Former Smoker     Quit date: 1983     Years since quittin 3    Smokeless tobacco: Never Used   Vaping Use    Vaping Use: Never used   Substance Use Topics    Alcohol use: Yes     Alcohol/week: 14 0 standard drinks     Types: 14 Shots of liquor per week     Comment: "couple of drinks each night"    Drug use: No        Review of Systems   Constitutional: Negative for diaphoresis and fever  HENT: Negative for trouble swallowing  Eyes: Negative for visual disturbance  Respiratory: Positive for shortness of breath  Cardiovascular: Positive for chest pain  Gastrointestinal: Negative for abdominal pain and vomiting  Endocrine: Negative for polyuria  Genitourinary: Negative for dysuria  Musculoskeletal: Negative for gait problem  Skin: Negative for rash  Allergic/Immunologic: Positive for environmental allergies  Neurological: Negative for weakness and numbness  Hematological: Negative for adenopathy  Psychiatric/Behavioral: Negative for confusion  All other systems reviewed and are negative  Physical Exam  ED Triage Vitals [22 0932]   Temperature Pulse Respirations Blood Pressure SpO2   98 4 °F (36 9 °C) 60 20 154/59 96 %      Temp Source Heart Rate Source Patient Position - Orthostatic VS BP Location FiO2 (%)   Oral Monitor Sitting Right arm --      Pain Score       2             Orthostatic Vital Signs  Vitals:    22 0945 22 1210 22 1300 22 1434   BP: 132/60 141/62 145/64 149/60   Pulse: 58 61 64 64   Patient Position - Orthostatic VS: Lying Lying Lying        Physical Exam  Vitals and nursing note reviewed  Constitutional:       General: He is not in acute distress  Appearance: He is obese  He is not ill-appearing, toxic-appearing or diaphoretic  Comments: Patient appears comfortable during my evaluation    HENT:      Head: Normocephalic and atraumatic        Right Ear: External ear normal       Left Ear: External ear normal       Nose: Nose normal  No rhinorrhea  Mouth/Throat:      Mouth: Mucous membranes are moist       Pharynx: Oropharynx is clear  No oropharyngeal exudate or posterior oropharyngeal erythema  Eyes:      General: No scleral icterus  Right eye: No discharge  Left eye: No discharge  Conjunctiva/sclera: Conjunctivae normal       Pupils: Pupils are equal, round, and reactive to light  Neck:      Vascular: No carotid bruit  Comments: Patient is spontaneously rotating their neck to the left and right during the history and physical exam interaction without difficulty or apparent discomfort    Cardiovascular:      Rate and Rhythm: Normal rate and regular rhythm  Pulses: Normal pulses  Heart sounds: Normal heart sounds  No murmur heard  No friction rub  No gallop  Comments: 2+ Radial, DP, PT bilaterally    LUE   RUE   Pulmonary:      Effort: Pulmonary effort is normal  No respiratory distress  Breath sounds: Normal breath sounds  No stridor  No wheezing, rhonchi or rales  Abdominal:      General: Abdomen is flat  Palpations: Abdomen is soft  Tenderness: There is no abdominal tenderness  There is no right CVA tenderness, left CVA tenderness, guarding or rebound  Musculoskeletal:         General: No tenderness (No calf tenderness bilaterally ) or deformity  Cervical back: Neck supple  No tenderness  No muscular tenderness  Comments: No midline back tenderness   Lymphadenopathy:      Cervical: No cervical adenopathy  Skin:     General: Skin is warm and dry  Capillary Refill: Capillary refill takes less than 2 seconds  Neurological:      Mental Status: He is alert  Comments: Patient is speaking clearly in complete sentences  Patient is answering appropriately and able follow commands  Patient is moving all four extremities spontaneously  No facial droop  Tongue midline         Intact L5 and S1 motor and sensory function bilaterally   No saddle anesthesia    Psychiatric:         Mood and Affect: Mood normal          Behavior: Behavior normal          ED Medications  Medications   aspirin (ECOTRIN LOW STRENGTH) EC tablet 81 mg (81 mg Oral Given 5/17/22 1502)   ferrous sulfate tablet 325 mg (has no administration in time range)   fluticasone (FLONASE) 50 mcg/act nasal spray 1 spray (1 spray Nasal Given 5/17/22 1648)   furosemide (LASIX) tablet 40 mg (40 mg Oral Given 5/17/22 1502)   guaiFENesin (MUCINEX) 12 hr tablet 1,200 mg (has no administration in time range)   metoprolol succinate (TOPROL-XL) 24 hr tablet 50 mg (50 mg Oral Given 5/17/22 1502)   tamsulosin (FLOMAX) capsule 0 4 mg (0 4 mg Oral Given 5/17/22 1648)   oxybutynin (DITROPAN) tablet 2 5 mg (2 5 mg Oral Given 5/17/22 1709)   amLODIPine (NORVASC) tablet 10 mg (10 mg Oral Given 5/17/22 1502)   citalopram (CeleXA) tablet 10 mg (10 mg Oral Given 5/17/22 1502)   pantoprazole (PROTONIX) EC tablet 20 mg (has no administration in time range)   atorvastatin (LIPITOR) tablet 40 mg (40 mg Oral Given 5/17/22 1648)   docusate sodium (COLACE) capsule 100 mg (100 mg Oral Given 5/17/22 1709)   ondansetron (ZOFRAN) injection 4 mg (has no administration in time range)   aluminum-magnesium hydroxide-simethicone (MYLANTA) oral suspension 30 mL (has no administration in time range)   benzonatate (TESSALON PERLES) capsule 100 mg (has no administration in time range)   azithromycin (ZITHROMAX) tablet 500 mg (has no administration in time range)   acetaminophen (TYLENOL) tablet 650 mg (has no administration in time range)   insulin lispro (HumaLOG) 100 units/mL subcutaneous injection 1-6 Units (1 Units Subcutaneous Not Given 5/17/22 1637)   insulin lispro (HumaLOG) 100 units/mL subcutaneous injection 1-5 Units (has no administration in time range)   cefTRIAXone (ROCEPHIN) 2,000 mg in dextrose 5 % 50 mL IVPB (has no administration in time range)   heparin (porcine) subcutaneous injection 7,500 Units (7,500 Units Subcutaneous Given 5/17/22 1657)   albuterol inhalation solution 2 5 mg (has no administration in time range)   cefTRIAXone (ROCEPHIN) 2,000 mg in dextrose 5 % 50 mL IVPB (2,000 mg Intravenous New Bag 5/17/22 1247)   azithromycin (ZITHROMAX) 500 mg in sodium chloride 0 9% 250mL IVPB 500 mg (500 mg Intravenous New Bag 5/17/22 1358)       Diagnostic Studies  Results Reviewed     Procedure Component Value Units Date/Time    Blood culture [[id number]] Collected: 05/17/22 1240    Lab Status: Preliminary result Specimen: Blood from Hand, Right Updated: 05/17/22 1704     Blood Culture Received in Microbiology Lab  Culture in Progress  Blood culture [[id number]] Collected: 05/17/22 1232    Lab Status: Preliminary result Specimen: Blood from Hand, Left Updated: 05/17/22 1704     Blood Culture Received in Microbiology Lab  Culture in Progress  HS Troponin I 4hr [[id number]]  (Normal) Collected: 05/17/22 1321    Lab Status: Final result Specimen: Blood from Arm, Right Updated: 05/17/22 1406     hs TnI 4hr 5 ng/L      Delta 4hr hsTnI 0 ng/L     Procalcitonin [[id number]]  (Normal) Collected: 05/17/22 1232    Lab Status: Final result Specimen: Blood from Arm, Left Updated: 05/17/22 1355     Procalcitonin 0 12 ng/ml     COVID/FLU/RSV [[id number]]  (Normal) Collected: 05/17/22 1225    Lab Status: Final result Specimen: Nares from Nose Updated: 05/17/22 1337     SARS-CoV-2 Negative     INFLUENZA A PCR Negative     INFLUENZA B PCR Negative     RSV PCR Negative    Narrative:      FOR PEDIATRIC PATIENTS - copy/paste COVID Guidelines URL to browser: [email or web address]    SARS-CoV-2 assay is a Nucleic Acid Amplification assay intended for the  qualitative detection of nucleic acid from SARS-CoV-2 in nasopharyngeal  swabs  Results are for the presumptive identification of SARS-CoV-2 RNA      Positive results are indicative of infection with SARS-CoV-2, the virus  causing COVID-19, but do not rule out bacterial infection or co-infection  with other viruses  Laboratories within the [place] and its  territories are required to report all positive results to the appropriate  public health authorities  Negative results do not preclude SARS-CoV-2  infection and should not be used as the sole basis for treatment or other  patient management decisions  Negative results must be combined with  clinical observations, patient history, and epidemiological information  This test has not been FDA cleared or approved  This test has been authorized by FDA under an Emergency Use Authorization  (EUA)  This test is only authorized for the duration of time the  declaration that circumstances exist justifying the authorization of the  emergency use of an in vitro diagnostic tests for detection of SARS-CoV-2  virus and/or diagnosis of COVID-19 infection under section 564(b)(1) of  the Act, 21 U  S C  780DSK-6(P)(8), unless the authorization is terminated  or revoked sooner  The test has been validated but independent review by FDA  and CLIA is pending  Test performed using [employer]: This RT-PCR assay targets N2,  a region unique to SARS-CoV-2  A conserved region in the E-gene was chosen  for pan-Sarbecovirus detection which includes SARS-CoV-2  COVID Rapid Antigen [[id number]] Collected: 05/17/22 1217    Lab Status: No result Specimen: Nares from Nose     HS Troponin I 2hr [[id number]]  (Normal) Collected: 05/17/22 1117    Lab Status: Final result Specimen: Blood from Hand, Right Updated: 05/17/22 1158     hs TnI 2hr 4 ng/L      Delta 2hr hsTnI -1 ng/L     D-dimer, quantitative [[id number]]  (Normal) Collected: 05/17/22 0922    Lab Status: Final result Specimen: Blood from Arm, Right Updated: 05/17/22 1132     D-Dimer, Quant 0 29 ug/ml FEU     Narrative:       In the evaluation for possible pulmonary embolism, in the appropriate (Well's Score of 4 or less) patient, the age adjusted d-dimer cutoff for this patient can be calculated as:    Age x 0 01 (in ug/mL) for Age-adjusted D-dimer exclusion threshold for a patient over 50 years      HS Troponin 0hr (reflex protocol) [[id number]]  (Normal) Collected: 05/17/22 0922    Lab Status: Final result Specimen: Blood from Arm, Right Updated: 05/17/22 1010     hs TnI 0hr 5 ng/L     Comprehensive metabolic panel [[id number]]  (Abnormal) Collected: 05/17/22 0922    Lab Status: Final result Specimen: Blood from Arm, Right Updated: 05/17/22 0953     Sodium 137 mmol/L      Potassium 4 1 mmol/L      Chloride 109 mmol/L      CO2 27 mmol/L      ANION GAP 1 mmol/L      BUN 21 mg/dL      Creatinine 1 40 mg/dL      Glucose 178 mg/dL      Calcium 9 0 mg/dL      Corrected Calcium 9 9 mg/dL      AST 34 U/L      ALT 40 U/L      Alkaline Phosphatase 151 U/L      Total Protein 7 4 g/dL      Albumin 2 9 g/dL      Total Bilirubin 0 26 mg/dL      eGFR 47 ml/min/1 73sq m     Narrative:      [place] guidelines for Chronic Kidney Disease (CKD):     Stage 1 with normal or high GFR (GFR > 90 mL/min/1 73 square meters)    Stage 2 Mild CKD (GFR = 60-89 mL/min/1 73 square meters)    Stage 3A Moderate CKD (GFR = 45-59 mL/min/1 73 square meters)    Stage 3B Moderate CKD (GFR = 30-44 mL/min/1 73 square meters)    Stage 4 Severe CKD (GFR = 15-29 mL/min/1 73 square meters)    Stage 5 End Stage CKD (GFR <15 mL/min/1 73 square meters)  Note: GFR calculation is accurate only with a steady state creatinine    CBC and differential [[id number]]  (Abnormal) Collected: 05/17/22 0922    Lab Status: Final result Specimen: Blood from Arm, Right Updated: 05/17/22 0939     WBC 9 28 Thousand/uL      RBC 3 32 Million/uL      Hemoglobin 11 1 g/dL      Hematocrit 33 8 %       fL      MCH 33 4 pg      MCHC 32 8 g/dL      RDW 14 2 %      MPV 9 4 fL      Platelets 511 Thousands/uL      nRBC 0 /100 WBCs      Neutrophils Relative 74 %      Immat GRANS % 1 % Lymphocytes Relative 12 %      Monocytes Relative 10 %      Eosinophils Relative 2 %      Basophils Relative 1 %      Neutrophils Absolute 6 98 Thousands/µL      Immature Grans Absolute 0 07 Thousand/uL      Lymphocytes Absolute 1 13 Thousands/µL      Monocytes Absolute 0 89 Thousand/µL      Eosinophils Absolute 0 15 Thousand/µL      Basophils Absolute 0 06 Thousands/µL                  XR chest 2 views   ED Interpretation by Chetan Dumont MD (05/17 1215)   Right lower lobe consolidation      Final Result by Claudene Durham, MD (05/17 1301)      No acute cardiopulmonary disease  Workstation performed: [id number]         CT chest wo contrast    (Results Pending)         Procedures  Procedures      ED Course  ED Course as of 05/17/22 1712   Tue May 17, 2022   1031 hs TnI 0hr: 5   1036 ECG: Normal rate, regular rhythm, normal axis, no ectopy, T inversions in the precordial leads, no ST elevations/depressions    1135 D-Dimer, Quant: 0 29   1154 D-Dimer, Quant: 0 29   1215 Results reviewed with patient  He is in agreement with admission             HEART Risk Score    Flowsheet Row Most Recent Value   Heart Score Risk Calculator    History 2 Filed at: 05/17/2022 1035   ECG 1 Filed at: 05/17/2022 1035   Age 2 Filed at: 05/17/2022 1035   Risk Factors 2 Filed at: 05/17/2022 1035   Troponin 0 Filed at: 05/17/2022 1035   HEART Score 7 Filed at: 05/17/2022 1035                      SBIRT 20yo+    Flowsheet Row Most Recent Value   SBIRT (25 yo +)    In order to provide better care to our patients, we are screening all of our patients for alcohol and drug use  Would it be okay to ask you these screening questions?  No Filed at: 05/17/2022 0392                MDM  Number of Diagnoses or Management Options  Acute chest pain  Acute pneumonia  Dyspnea  Elevated blood pressure reading  Elevated serum creatinine  Hyperglycemia due to diabetes mellitus ([facility] [address] )  Diagnosis management comments: Patient is a 76-year-old male, with a history significant for hypertension, hyperlipidemia, diabetes, obesity, former tobacco use, and STEMI in 24', who presented to the ED today, via EMS, with substernal chest and mid back pain  The intensity is mild and the character is pressure  The pain is pleuritic and nonexertional   There is associated shortness of breath  Patient received aspirin, 324 mg, and nitro x2 in the field  The patient is currently afebrile and hemodynamically stable  His physical exam is unremarkable  This presentation is concerning for:  ACS, pneumonia, pneumothorax, PE  Will investigate CBC, CMP, troponin, ECG, CXR  Will manage based upon workup  Disposition  Final diagnoses:   Acute chest pain   Elevated blood pressure reading   Hyperglycemia due to diabetes mellitus (HCC)   Elevated serum creatinine   Acute pneumonia   Dyspnea     Time reflects when diagnosis was documented in both MDM as applicable and the Disposition within this note     Time User Action Codes Description Comment    5/17/2022 10:01 AM Devyn Thomson A Add [R07 9] Acute chest pain     5/17/2022 10:01 AM Walker Barney Distel A Add [R03 0] Elevated blood pressure reading     5/17/2022 10:02 AM Walker Barney Distel A Add [E11 65] Hyperglycemia due to diabetes mellitus ([facility] [address] )     5/17/2022 10:02 AM Devyn Thomson A Add [R79 89] Elevated serum creatinine     5/17/2022 12:20 PM Devyn Thomson A Add [J18 9] Acute pneumonia     5/17/2022 12:45 PM Devyn Thomson A Add [R06 00] Dyspnea       ED Disposition     ED Disposition   Admit    Condition   Stable    Date/Time   Tue May 17, 2022 12:45 PM    Comment   Case was discussed with VALENCIA and the patient's admission status was agreed to be Admission Status: inpatient status to the service of Dr Sarah Nicole              Follow-up Information    None         Current Discharge Medication List      CONTINUE these medications which have NOT CHANGED    Details   amLODIPine (NORVASC) 10 mg tablet TAKE 1 TABLET BY MOUTH EVERY DAY  Qty: 90 tablet, Refills: 3    Associated Diagnoses: Essential hypertension      aspirin (ECOTRIN LOW STRENGTH) 81 mg EC tablet Take 1 tablet (81 mg total) by mouth daily  Qty: 30 tablet, Refills: 0    Associated Diagnoses: Chest pain, unspecified type      carteolol (OCUPRESS) 1 % ophthalmic solution INSTILL 1 DROP IN EACH EYE TWO TIMES DAILY      citalopram (CeleXA) 10 mg tablet TAKE 1 TABLET BY MOUTH EVERY DAY  Qty: 90 tablet, Refills: 3    Associated Diagnoses: Depression, unspecified depression type      diclofenac (VOLTAREN) 75 mg EC tablet Take 1 tablet (75 mg total) by mouth 2 (two) times a day As needed  Take with food  Qty: 60 tablet, Refills: 0    Associated Diagnoses: Sacroiliitis ([facility])      ferrous sulfate 325 (65 Fe) mg tablet Take 1 tablet (325 mg total) by mouth daily with breakfast  Qty: 90 tablet, Refills: 3    Associated Diagnoses: Other iron deficiency anemia      fluticasone (FLONASE) 50 mcg/act nasal spray 1 spray into each nostril daily  Qty: 16 g, Refills: 4    Associated Diagnoses: Allergy, initial encounter      furosemide (LASIX) 40 mg tablet Take 1 tablet (40 mg total) by mouth daily  Qty: 90 tablet, Refills: 3    Associated Diagnoses: Acute on chronic diastolic heart failure ([facility] [address] );  Bilateral carotid artery disease, unspecified type ([facility])      glimepiride (AMARYL) 1 mg tablet TAKE 3 TABLETS BY MOUTH EVERY DAY WITH BREAKFAST  Qty: 270 tablet, Refills: 3    Associated Diagnoses: Type 2 diabetes mellitus with complication, without long-term current use of insulin ([facility])      guaiFENesin (MUCINEX) 600 mg 12 hr tablet Take 1,200 mg by mouth every 12 (twelve) hours      losartan (COZAAR) 100 MG tablet TAKE 1 TABLET BY MOUTH EVERY DAY  Qty: 90 tablet, Refills: 3    Associated Diagnoses: Essential hypertension      metoprolol succinate (Toprol XL) 50 mg 24 hr tablet Take 1 tablet (50 mg total) by mouth every 12 (twelve) hours  Qty: 60 tablet, Refills: 5 Associated Diagnoses: SVT (supraventricular tachycardia) ([facility])      omeprazole (PriLOSEC) 20 mg delayed release capsule TAKE 1 CAPSULE BY MOUTH EVERY DAY  Qty: 90 capsule, Refills: 3    Associated Diagnoses: Gastroesophageal reflux disease without esophagitis      oxyCODONE-acetaminophen (PERCOCET) 5-325 mg per tablet Take 1 tablet by mouth every 4 to 6 hours as needed for pain      oxymetazoline (AFRIN) 0 05 % nasal spray 2 sprays by Each Nare route every 12 (twelve) hours as needed for congestion        rosuvastatin (CRESTOR) 20 MG tablet TAKE 1 TABLET BY MOUTH EVERY DAY  Qty: 90 tablet, Refills: 3    Associated Diagnoses: Pure hypercholesterolemia      tamsulosin (FLOMAX) 0 4 mg Take 1 capsule (0 4 mg total) by mouth daily with dinner  Qty: 90 capsule, Refills: 3    Associated Diagnoses: BPH with urinary obstruction      trospium chloride (SANCTURA) 20 mg tablet Take 1 tablet (20 mg total) by mouth 2 (two) times a day  Qty: 180 tablet, Refills: 3    Associated Diagnoses: Urgency of urination           Outpatient Discharge Orders   Sputum culture and Gram stain   Standing Status: Future Standing Exp  Date: 05/17/23       PDMP Review     None           ED Provider  Attending physically available and evaluated Too Mera I managed the patient along with the ED Attending      Electronically Signed by         Jackie Ivan MD  05/17/22 0166

## 2022-05-17 NOTE — RESPIRATORY THERAPY NOTE
RT Protocol Note  Tip Ricks 65 y o  male MRN: [id number]  Unit/Bed#: CW2 213-01 Encounter: [id number]    Assessment    Active Problems:    * No active hospital problems  *      Home Pulmonary Medications:  None       Past Medical History:   Diagnosis Date    Anxiety     Broken teeth     Cancer ([address] )     basal cell-right cheek,left breast, right side of nose, left forehead    Chipped tooth     upper front-right    Chronic pain disorder     pain with standing-herniated disc lower back, upper back pain    Claustrophobia     Colon polyps     CPAP (continuous positive airway pressure) dependence     Depression     Diabetes mellitus (HCC)     Dyslipidemia     Esophageal polyp     GERD (gastroesophageal reflux disease)     Glaucoma     Headache     Hyperlipidemia     Hypertension     Low back pain     Morbid obesity with BMI of 45 0-49 9, adult (HCC)     Neck pain     Numbness and tingling in left arm     resolved 17 / numbness and tingling left side last assessed 3/23/16    OAB (overactive bladder)     Paroxysmal supraventricular tachycardia (HCC)     Pericarditis 2021    Shortness of breath     with exertion since     Sleep apnea     wears cpap     Squamous cell skin cancer 10/19/2020    Right Nasal Ala, Dr Turcios    Tinnitus      Social History     Socioeconomic History    Marital status: /Civil Union     Spouse name: Not on file    Number of children: Not on file    Years of education: Not on file    Highest education level: Not on file   Occupational History    Occupation: retired   Tobacco Use    Smoking status: Former Smoker     Quit date: 1983     Years since quittin 3    Smokeless tobacco: Never Used   Vaping Use    Vaping Use: Never used   Substance and Sexual Activity    Alcohol use:  Yes     Alcohol/week: 14 0 standard drinks     Types: 14 Shots of liquor per week     Comment: "couple of drinks each night"    Drug use: No    Sexual activity: Not Currently     Partners: Female Other Topics Concern    Not on file   Social History Narrative    Not on file     Social Determinants of Health     Financial Resource Strain: Not on file   Food Insecurity: Not on file   Transportation Needs: Not on file   Physical Activity: Not on file   Stress: Not on file   Social Connections: Not on file   Intimate Partner Violence: Not on file   Housing Stability: Not on file       Subjective         Objective    Physical Exam:   Assessment Type: Assess only  General Appearance: Alert, Awake  Respiratory Pattern: Normal  Chest Assessment: Chest expansion symmetrical  Bilateral Breath Sounds: Clear  Cough: None  O2 Device: ra    Vitals:  Blood pressure 149/60, pulse 64, temperature 98 9 °F (37 2 °C), resp  rate 20, height 5' 7" (1 702 m), weight 134 kg (295 lb 12 8 oz), SpO2 98 %  Imaging and other studies: I have personally reviewed pertinent reports  O2 Device: ra     Plan    Respiratory Plan: No distress/Pulmonary history        Resp Comments: Pt with no pulmonary history, doesnt take breathing medications at home however he is complaing of SOB for the past couple of days  At this time pt is resting, no resp distress and clear BS b/l  I will order albuerol 2 5 udn Q6 prn for SOB

## 2022-05-17 NOTE — ASSESSMENT & PLAN NOTE
Lab Results   Component Value Date    EGFR 47 05/17/2022    EGFR 56 04/20/2022    EGFR 45 03/08/2022    CREATININE 1 40 (H) 05/17/2022    CREATININE 1 22 04/20/2022    CREATININE 1 46 (H) 03/08/2022   Creatinine 1 4  Close to baseline    Monitor for now

## 2022-05-18 ENCOUNTER — APPOINTMENT (INPATIENT)
Dept: NON INVASIVE DIAGNOSTICS | Facility: HOSPITAL | Age: 77
DRG: 315 | End: 2022-05-18
Payer: MEDICARE

## 2022-05-18 LAB
ALBUMIN SERPL BCP-MCNC: 3.1 G/DL (ref 3.5–5)
ALP SERPL-CCNC: 156 U/L (ref 46–116)
ALT SERPL W P-5'-P-CCNC: 36 U/L (ref 12–78)
ANION GAP SERPL CALCULATED.3IONS-SCNC: 6 MMOL/L (ref 4–13)
AORTIC ROOT: 2.8 CM
AORTIC VALVE MEAN VELOCITY: 12.7 M/S
APICAL FOUR CHAMBER EJECTION FRACTION: 71 %
AST SERPL W P-5'-P-CCNC: 25 U/L (ref 5–45)
ATRIAL RATE: 59 BPM
AV AREA BY CONTINUOUS VTI: 2.2 CM2
AV AREA PEAK VELOCITY: 2 CM2
AV LVOT MEAN GRADIENT: 3 MMHG
AV LVOT PEAK GRADIENT: 5 MMHG
AV MEAN GRADIENT: 7 MMHG
AV PEAK GRADIENT: 13 MMHG
AV VALVE AREA: 2.15 CM2
AV VELOCITY RATIO: 0.62
BILIRUB SERPL-MCNC: 0.56 MG/DL (ref 0.2–1)
BUN SERPL-MCNC: 17 MG/DL (ref 5–25)
CALCIUM ALBUM COR SERPL-MCNC: 9.7 MG/DL (ref 8.3–10.1)
CALCIUM SERPL-MCNC: 9 MG/DL (ref 8.3–10.1)
CARDIAC TROPONIN I PNL SERPL HS: 6 NG/L (ref 8–18)
CHLORIDE SERPL-SCNC: 104 MMOL/L (ref 100–108)
CO2 SERPL-SCNC: 27 MMOL/L (ref 21–32)
CREAT SERPL-MCNC: 1.38 MG/DL (ref 0.6–1.3)
CRP SERPL QL: 27.6 MG/L
DOP CALC AO PEAK VEL: 1.78 M/S
DOP CALC AO VTI: 35.96 CM
DOP CALC LVOT AREA: 3.14 CM2
DOP CALC LVOT DIAMETER: 2 CM
DOP CALC LVOT PEAK VEL VTI: 24.67 CM
DOP CALC LVOT PEAK VEL: 1.11 M/S
DOP CALC LVOT STROKE INDEX: 31.8 ML/M2
DOP CALC LVOT STROKE VOLUME: 77.46 CM3
E WAVE DECELERATION TIME: 196 MS
ERYTHROCYTE [DISTWIDTH] IN BLOOD BY AUTOMATED COUNT: 14.2 % (ref 11.6–15.1)
ERYTHROCYTE [SEDIMENTATION RATE] IN BLOOD: 77 MM/HOUR (ref 0–19)
FRACTIONAL SHORTENING: 28 % (ref 28–44)
GFR SERPL CREATININE-BSD FRML MDRD: 48 ML/MIN/1.73SQ M
GLUCOSE SERPL-MCNC: 103 MG/DL (ref 65–140)
GLUCOSE SERPL-MCNC: 134 MG/DL (ref 65–140)
GLUCOSE SERPL-MCNC: 144 MG/DL (ref 65–140)
GLUCOSE SERPL-MCNC: 162 MG/DL (ref 65–140)
GLUCOSE SERPL-MCNC: 188 MG/DL (ref 65–140)
HCT VFR BLD AUTO: 33.8 % (ref 36.5–49.3)
HGB BLD-MCNC: 11 G/DL (ref 12–17)
INTERVENTRICULAR SEPTUM IN DIASTOLE (PARASTERNAL SHORT AXIS VIEW): 1.3 CM
INTERVENTRICULAR SEPTUM: 1.3 CM (ref 0.6–1.1)
LAAS-AP4: 34.4 CM2
LEFT ATRIUM AREA SYSTOLE SINGLE PLANE A4C: 28.3 CM2
LEFT ATRIUM SIZE: 4.1 CM
LEFT INTERNAL DIMENSION IN SYSTOLE: 4.7 CM (ref 2.1–4)
LEFT VENTRICULAR INTERNAL DIMENSION IN DIASTOLE: 6.5 CM (ref 3.5–6)
LEFT VENTRICULAR POSTERIOR WALL IN END DIASTOLE: 1.3 CM
LEFT VENTRICULAR STROKE VOLUME: 114 ML
LVSV (TEICH): 114 ML
MCH RBC QN AUTO: 33.3 PG (ref 26.8–34.3)
MCHC RBC AUTO-ENTMCNC: 32.5 G/DL (ref 31.4–37.4)
MCV RBC AUTO: 102 FL (ref 82–98)
MV E'TISSUE VEL-LAT: 7 CM/S
MV E'TISSUE VEL-SEP: 10 CM/S
MV PEAK A VEL: 0.78 M/S
MV PEAK E VEL: 125 CM/S
MV STENOSIS PRESSURE HALF TIME: 57 MS
MV VALVE AREA P 1/2 METHOD: 3.86 CM2
P AXIS: -26 DEGREES
PLATELET # BLD AUTO: 232 THOUSANDS/UL (ref 149–390)
PMV BLD AUTO: 9.7 FL (ref 8.9–12.7)
POTASSIUM SERPL-SCNC: 3.8 MMOL/L (ref 3.5–5.3)
PR INTERVAL: 172 MS
PROCALCITONIN SERPL-MCNC: 0.11 NG/ML
PROT SERPL-MCNC: 7.4 G/DL (ref 6.4–8.2)
QRS AXIS: -2 DEGREES
QRSD INTERVAL: 86 MS
QT INTERVAL: 432 MS
QTC INTERVAL: 427 MS
RA PRESSURE ESTIMATED: 8 MMHG
RBC # BLD AUTO: 3.3 MILLION/UL (ref 3.88–5.62)
RIGHT ATRIUM AREA SYSTOLE A4C: 27.2 CM2
RIGHT VENTRICLE ID DIMENSION: 4.1 CM
RV PSP: 64 MMHG
SL CV LV EF: 60
SL CV PED ECHO LEFT VENTRICLE DIASTOLIC VOLUME (MOD BIPLANE) 2D: 218 ML
SL CV PED ECHO LEFT VENTRICLE SYSTOLIC VOLUME (MOD BIPLANE) 2D: 103 ML
SODIUM SERPL-SCNC: 137 MMOL/L (ref 136–145)
T WAVE AXIS: 39 DEGREES
TR MAX PG: 56 MMHG
TR PEAK VELOCITY: 3.8 M/S
TRICUSPID VALVE PEAK REGURGITATION VELOCITY: 3.75 M/S
VENTRICULAR RATE: 59 BPM
WBC # BLD AUTO: 6.28 THOUSAND/UL (ref 4.31–10.16)

## 2022-05-18 PROCEDURE — C8929 TTE W OR WO FOL WCON,DOPPLER: HCPCS

## 2022-05-18 PROCEDURE — 94760 N-INVAS EAR/PLS OXIMETRY 1: CPT

## 2022-05-18 PROCEDURE — 84484 ASSAY OF TROPONIN QUANT: CPT | Performed by: PHYSICIAN ASSISTANT

## 2022-05-18 PROCEDURE — 99232 SBSQ HOSP IP/OBS MODERATE 35: CPT | Performed by: PHYSICIAN ASSISTANT

## 2022-05-18 PROCEDURE — 93010 ELECTROCARDIOGRAM REPORT: CPT | Performed by: INTERNAL MEDICINE

## 2022-05-18 PROCEDURE — 99223 1ST HOSP IP/OBS HIGH 75: CPT | Performed by: INTERNAL MEDICINE

## 2022-05-18 PROCEDURE — 85652 RBC SED RATE AUTOMATED: CPT | Performed by: NURSE PRACTITIONER

## 2022-05-18 PROCEDURE — 80053 COMPREHEN METABOLIC PANEL: CPT | Performed by: FAMILY MEDICINE

## 2022-05-18 PROCEDURE — 82948 REAGENT STRIP/BLOOD GLUCOSE: CPT

## 2022-05-18 PROCEDURE — 85027 COMPLETE CBC AUTOMATED: CPT | Performed by: FAMILY MEDICINE

## 2022-05-18 PROCEDURE — 97166 OT EVAL MOD COMPLEX 45 MIN: CPT

## 2022-05-18 PROCEDURE — 93306 TTE W/DOPPLER COMPLETE: CPT | Performed by: INTERNAL MEDICINE

## 2022-05-18 PROCEDURE — 97162 PT EVAL MOD COMPLEX 30 MIN: CPT

## 2022-05-18 PROCEDURE — 84145 PROCALCITONIN (PCT): CPT | Performed by: FAMILY MEDICINE

## 2022-05-18 PROCEDURE — 93005 ELECTROCARDIOGRAM TRACING: CPT

## 2022-05-18 RX ADMIN — INSULIN LISPRO 1 UNITS: 100 INJECTION, SOLUTION INTRAVENOUS; SUBCUTANEOUS at 12:06

## 2022-05-18 RX ADMIN — PANTOPRAZOLE SODIUM 20 MG: 20 TABLET, DELAYED RELEASE ORAL at 06:42

## 2022-05-18 RX ADMIN — GUAIFENESIN 1200 MG: 600 TABLET, EXTENDED RELEASE ORAL at 09:28

## 2022-05-18 RX ADMIN — PERFLUTREN 0.6 ML/MIN: 6.52 INJECTION, SUSPENSION INTRAVENOUS at 14:57

## 2022-05-18 RX ADMIN — GUAIFENESIN 1200 MG: 600 TABLET, EXTENDED RELEASE ORAL at 23:40

## 2022-05-18 RX ADMIN — ATORVASTATIN CALCIUM 40 MG: 40 TABLET, FILM COATED ORAL at 17:09

## 2022-05-18 RX ADMIN — OXYBUTYNIN CHLORIDE 2.5 MG: 5 TABLET ORAL at 09:28

## 2022-05-18 RX ADMIN — METOPROLOL SUCCINATE 50 MG: 50 TABLET, EXTENDED RELEASE ORAL at 17:09

## 2022-05-18 RX ADMIN — CITALOPRAM HYDROBROMIDE 10 MG: 10 TABLET ORAL at 09:28

## 2022-05-18 RX ADMIN — FERROUS SULFATE TAB 325 MG (65 MG ELEMENTAL FE) 325 MG: 325 (65 FE) TAB at 09:27

## 2022-05-18 RX ADMIN — AMLODIPINE BESYLATE 10 MG: 10 TABLET ORAL at 09:27

## 2022-05-18 RX ADMIN — HEPARIN SODIUM 7500 UNITS: 5000 INJECTION INTRAVENOUS; SUBCUTANEOUS at 06:42

## 2022-05-18 RX ADMIN — METOPROLOL SUCCINATE 50 MG: 50 TABLET, EXTENDED RELEASE ORAL at 06:42

## 2022-05-18 RX ADMIN — OXYBUTYNIN CHLORIDE 2.5 MG: 5 TABLET ORAL at 17:09

## 2022-05-18 RX ADMIN — TAMSULOSIN HYDROCHLORIDE 0.4 MG: 0.4 CAPSULE ORAL at 17:09

## 2022-05-18 RX ADMIN — ASPIRIN 81 MG: 81 TABLET, COATED ORAL at 09:27

## 2022-05-18 RX ADMIN — DOCUSATE SODIUM 100 MG: 100 CAPSULE, LIQUID FILLED ORAL at 09:28

## 2022-05-18 RX ADMIN — HEPARIN SODIUM 7500 UNITS: 5000 INJECTION INTRAVENOUS; SUBCUTANEOUS at 14:00

## 2022-05-18 NOTE — OCCUPATIONAL THERAPY NOTE
Occupational Therapy Evaluation     Patient Name: Daphney Herrera  EZZSF'X Date: 5/18/2022  Problem List  Principal Problem:    Shortness of breath  Active Problems:    Type 2 diabetes mellitus with complication ([facility] )    Essential hypertension    Obstructive sleep apnea syndrome    Morbid obesity with BMI of 40 0-44 9, adult (HCC)    Hyperlipidemia    Chest pain    Anemia    Stage 3 chronic kidney disease, unspecified whether stage 3a or 3b CKD ([facility] )    Close exposure to COVID-19 virus    Past Medical History  Past Medical History:   Diagnosis Date    Anxiety     Broken teeth     Cancer ([facility] )     basal cell-right cheek,left breast, right side of nose, left forehead    Chipped tooth     upper front-right    Chronic pain disorder     pain with standing-herniated disc lower back, upper back pain    Claustrophobia     Colon polyps     CPAP (continuous positive airway pressure) dependence     Depression     Diabetes mellitus ([facility] )     Dyslipidemia     Esophageal polyp     GERD (gastroesophageal reflux disease)     Glaucoma     Headache     Hyperlipidemia     Hypertension     Low back pain     Morbid obesity with BMI of 45 0-49 9, adult (HCC)     Neck pain     Numbness and tingling in left arm     resolved 2/20/17 / numbness and tingling left side last assessed 3/23/16    OAB (overactive bladder)     Paroxysmal supraventricular tachycardia (HCC)     Pericarditis 01/2021    Shortness of breath     with exertion since 2020    Sleep apnea     wears cpap     Squamous cell skin cancer 10/19/2020    Right Nasal Ala, Dr Senft    Tinnitus      Past Surgical History  Past Surgical History:   Procedure Laterality Date    ANOSCOPY      for polyp removal    BACK SURGERY  1983    disc surgery lower back    BASAL CELL CARCINOMA EXCISION      right cheek    CATARACT EXTRACTION Bilateral     COLONOSCOPY      CYSTOSCOPY      EGD      JOINT REPLACEMENT      bilateral knees    KNEE ARTHROSCOPY Bilateral     MOHS SURGERY  11/11/2020 [facility] Right Nasal Ala, Dr Destinee Liz      tooth extraction    [place] COLONOSCOPY FLX DX W/COLLJ [facility] INPATIENT REHABILITATION WHEN PFRMD N/A 2/13/2019    Procedure: COLONOSCOPY;  Surgeon: Alyx Bronson MD;  [facility]; Service: Gastroenterology    GA ESOPHAGOGASTRODUODENOSCOPY TRANSORAL DIAGNOSTIC N/A 2/13/2019    Procedure: ESOPHAGOGASTRODUODENOSCOPY (EGD); Surgeon: Alyx Bronson MD;  [facility]; Service: Gastroenterology    RECTAL BIOPSY      SPINE SURGERY  1983 05/18/22 0826   OT Last Visit   OT Visit Date 05/18/22   Note Type   Note type Evaluation   Restrictions/Precautions   Other Precautions Telemetry   Pain Assessment   Pain Assessment Tool 0-10   Pain Score No Pain   Home Living   Type of Home House   Home Layout Two level   Bathroom Shower/Tub Walk-in shower   Bathroom Toilet Standard   Bathroom Accessibility Accessible   Home Equipment Walker;Cane;Wheelchair-manual   Additional Comments Pt reports living in a 2 story home with 0 [place]  Prior Function   Level of [place] Independent with ADLs and functional mobility   Lives With Spouse   Receives Help From Family   ADL Assistance Independent   IADLs Independent   Falls in the last 6 months 0   Vocational Retired   Lifestyle   Autonomy Pt reports being I with ADLS, IADLS and mobility without device PTA  (+)    Reciprocal Relationships Pt lives with his wife who is able to assist as needed upon d/c   Service to Others Retired   [address] Enjoys watching sports   Subjective   Subjective Pt reports that he has no concerns about returning home upon d/c     ADL   Where Assessed Edge of bed   Eating Assistance 7  Independent   Grooming Assistance 7  Independent   UB Bathing Assistance 5  Supervision/Setup   LB Bathing Assistance 5  Supervision/Setup   UB Dressing Assistance 5  Supervision/Setup   LB Dressing Assistance 5  Supervision/Setup   Toileting Assistance  5  Supervision/Setup   Bed Mobility   Supine to Sit 6 Modified independent   Additional Comments After OT session pt left seated EOB to eat breakfast    Transfers   Sit to Stand 6  Modified independent   Additional items Increased time required   Stand to Sit 6  Modified independent   Additional items Increased time required   Functional Mobility   Functional Mobility 5  Supervision   Additional Comments Pt demonstrated household mobility with no device or rest breaks  Balance   Static Sitting Good   Dynamic Sitting Good   Static Standing Fair +   Dynamic Standing Fair   Ambulatory Fair   Activity Tolerance   Activity Tolerance Patient tolerated treatment well   Medical Staff Made Aware Seen with PT 2* medical complexity/ instability   Nurse Made Aware RN confirmed okay to see pt   Cognition   Overall Cognitive Status [facility]   Arousal/Participation Alert; Cooperative   Attention Within functional limits   Orientation Level Oriented X4   Memory Within functional limits   Following Commands Follows all commands and directions without difficulty   Comments Pt is very pleasant and cooperative to work with therapy  Assessment   Assessment Pt is a 65 y o  male admitted to [facility] on 5/17/2022 w/ SOB  Pt  has a past medical history of Anxiety, Broken teeth, Cancer ([address] ), Chipped tooth, Chronic pain disorder, Claustrophobia, Colon polyps, CPAP (continuous positive airway pressure) dependence, Depression, Diabetes mellitus ([address] ), Dyslipidemia, Esophageal polyp, GERD (gastroesophageal reflux disease), Glaucoma, Headache, Hyperlipidemia, Hypertension, Low back pain, Morbid obesity with BMI of 45 0-49 9, adult ([address] ), Neck pain, Numbness and tingling in left arm, OAB (overactive bladder), Paroxysmal supraventricular tachycardia ([address] ), Pericarditis (01/2021), Shortness of breath, Sleep apnea, Squamous cell skin cancer (10/19/2020), and Tinnitus  Pt with active OT orders and ambulate  orders  Pt resides in a 2 story home with 0 [place]   Pt lives with his wife who is able to assist as needed upon d/c  Pt was I w/  ADLS and IADLS, (+) drove, & required no use of DME PTA  Currently pt is mod I for functional transfers, supervision for functional transfers, and supervision for ADLS overall  Based on the aforementioned OT evaluation, functional performance deficits, and assessments, pt has been identified as a moderate complexity evaluation  From OT standpoint, anticipate d/c home with family support  Recommend continued participation in [facility] and functional mobility with staff  No further acute OT needs, d/c OT  Goals   Patient Goals To return home   Recommendation   OT Discharge Recommendation No rehabilitation needs  (Home with increased social suppport)   OT - OK to Discharge Yes  (When medically appropriate)   AM-PAC Daily Activity Inpatient   Lower Body Dressing 3   Bathing 3   Toileting 4   Upper Body Dressing 4   Grooming 4   Eating 4   Daily Activity Raw Score 22   Daily Activity Standardized Score (Calc for Raw Score >=11) 47  1   AM-PAC Applied Cognition Inpatient   Following a Speech/Presentation 4   Understanding Ordinary Conversation 4   Taking Medications 4   Remembering Where Things Are Placed or Put Away 4   Remembering List of 4-5 Errands 4   Taking Care of Complicated Tasks 4   Applied Cognition Raw Score 24   Applied Cognition Standardized Score 62 21   Modified [place] Scale   Modified [place] Scale 2       Amanda Ramesh, MOT, OTR/L

## 2022-05-18 NOTE — PHYSICAL THERAPY NOTE
Physical Therapy Evaluation    Patient's Name: Sepideh Ayala    Admitting Diagnosis  Chest pain [R07 9]  Elevated blood pressure reading [R03 0]  Dyspnea [R06 00]  Elevated serum creatinine [R79 89]  Acute chest pain [R07 9]  Acute pneumonia [J18 9]  Hyperglycemia due to diabetes mellitus ([facility] ) [E11 65]    Problem List  Patient Active Problem List   Diagnosis    Chronic right-sided low back pain without sciatica    Type 2 diabetes mellitus with complication ([facility])    Essential hypertension    Peripheral neuropathy    Isolated proteinuria with morphologic lesion    Obstructive sleep apnea syndrome    Morbid obesity with BMI of 40 0-44 9, adult ([facility])    Mood disturbance    Gastroesophageal reflux disease    Palpitations    Hypokalemia    Mood disorder ([facility])    Urgency incontinence    Onychomycosis    BPH with urinary obstruction    Fecal incontinence    Hyperlipidemia    Chest pain    Acute idiopathic pericarditis    Adenomatous polyp of transverse colon    Sacroiliitis ([facility])    Lumbar spondylosis    Facet arthropathy, lumbosacral    Ulnar nerve entrapment    Partial seizure ([facility])    Shortness of breath    Anemia    Bilateral carpal tunnel syndrome    Dementia ([facility])    Flank pain    Decreased diffusion capacity    Chronic back pain    Shoulder pain, bilateral    Bilateral myofascial pain    Nasal congestion    Bilateral carotid artery disease, unspecified type ([facility])    Stage 3 chronic kidney disease, unspecified whether stage 3a or 3b CKD ([facility] )    Interstitial lung disease ([facility] )    Heart failure with preserved left ventricular function (HFpEF) ([facility] )    Close exposure to COVID-19 virus       Past Medical History  Past Medical History:   Diagnosis Date    Anxiety     Broken teeth     Cancer ([facility] )     basal cell-right cheek,left breast, right side of nose, left forehead    Chipped tooth     upper front-right    Chronic pain disorder     pain with standing-herniated disc lower back, upper back pain    Claustrophobia Colon polyps     CPAP (continuous positive airway pressure) dependence     Depression     Diabetes mellitus (HCC)     Dyslipidemia     Esophageal polyp     GERD (gastroesophageal reflux disease)     Glaucoma     Headache     Hyperlipidemia     Hypertension     Low back pain     Morbid obesity with BMI of 45 0-49 9, adult (HCC)     Neck pain     Numbness and tingling in left arm     resolved 2/20/17 / numbness and tingling left side last assessed 3/23/16    OAB (overactive bladder)     Paroxysmal supraventricular tachycardia (HCC)     Pericarditis 01/2021    Shortness of breath     with exertion since 2020    Sleep apnea     wears cpap     Squamous cell skin cancer 10/19/2020    Right Nasal Ala, Dr Turcios    Tinnitus        Past Surgical History  Past Surgical History:   Procedure Laterality Date    ANOSCOPY      for polyp removal    BACK SURGERY  1983    disc surgery lower back    BASAL CELL CARCINOMA EXCISION      right cheek    CATARACT EXTRACTION Bilateral     COLONOSCOPY      CYSTOSCOPY      EGD      JOINT REPLACEMENT      bilateral knees    KNEE ARTHROSCOPY Bilateral     MOHS SURGERY  11/11/2020    [facility] Right Nasal Ala, Dr Maylin Nesbitt      tooth extraction    [place] COLONOSCOPY FLX DX W/COLLJ [facility] REHABILITATION WHEN PFRMD N/A 2/13/2019    Procedure: COLONOSCOPY;  Surgeon: Jenny Rivers MD;  [facility]; Service: Gastroenterology    [place] ESOPHAGOGASTRODUODENOSCOPY TRANSORAL DIAGNOSTIC N/A 2/13/2019    Procedure: ESOPHAGOGASTRODUODENOSCOPY (EGD); Surgeon: Jenny Rivers MD;  [facility];   Service: Gastroenterology    RECTAL BIOPSY      SPINE SURGERY  1983 05/18/22 0827   PT Last Visit   PT Visit Date 05/18/22   Note Type   Note type Evaluation   Pain Assessment   Pain Assessment Tool 0-10   Pain Score No Pain   Restrictions/Precautions   Other Precautions Telemetry   Home Living   Type of [address] [place] [address] Two level;1/2 bath on main level  (0 [place])   Home Equipment Walker;Cane;Wheelchair-manual   Additional Comments Pt lives in a [facility] with 0 [place]   Prior Function   Level of [place] Independent with ADLs and functional mobility   Lives With Spouse   Receives Help From Family   ADL Assistance Independent   IADLs Independent   Falls in the last 6 months 0   Vocational Retired   Comments Pt was ambulating without AD PTA  Reports that he climbs up stairs on his hands and knees when he's tired  Cognition   Overall Cognitive Status WFL   Orientation Level Oriented X4   Memory Within functional limits   Following Commands Follows all commands and directions without difficulty   RLE Assessment   RLE Assessment WFL   LLE Assessment   LLE Assessment WFL   Bed Mobility   Supine to Sit 6  Modified independent   Additional items HOB elevated; Bedrails; Increased time required   Transfers   Sit to Stand 6  Modified independent   Additional items Increased time required   Stand to Sit 6  Modified independent   Additional items Increased time required   Additional Comments transfers without AD   Ambulation/Elevation   Gait pattern WNL   Gait Assistance 5  Supervision   Assistive Device None   Distance 180ft   Stair Management Assistance 5  Supervision   Additional items Assist x 1;Verbal cues   Stair Management Technique One rail R;Alternating pattern; Foreward   Number of Stairs 10   Ambulation/Elevation Additional Comments Educated pt on step to pattern on stairs for energy conservation upon d/c  Balance   Static Sitting Good   Dynamic Sitting Good   Static Standing Fair +   Dynamic Standing Fair   Ambulatory Fair   Activity Tolerance   Activity Tolerance Patient tolerated treatment well   Medical Staff Made Aware Seen with OT 2* pt's medical complexity and multiple comorbidities   PT focus on functional trasnfers, gait, endurance, and stairs   Nurse Made Aware ok to see per RN   Assessment   Prognosis Good   Assessment Pt is a 65 y o  male seen for PT evaluation s/p admit to [facility] on 5/17/2022  Pt was admitted with a primary dx of: shortness of breath  PT now consulted for assessment of mobility and d/c needs  Pt with Up and OOB as tolerated , PT evaluation orders  Pts current comorbidities effecting treatment include:  anxiety, cancer, chronic pain, depression, DM, LBP, obesity, pericarditis, tinnitus  Pts current clinical presentation is Evolving (medium complexity) due to Ongoing medical management for primary dx, Decreased activity tolerance compared to baseline, Ongoing telemetry monitoring, Trending lab values  Prior to admission, pt was independent with ADLs and ambulating without any AD  Pt lives with his wife in a [facility] with 0 [place], full flight up to bed/bath  Upon evaluation, pt currently is at mod (I) level for bed mobility; mod (I) for transfers; supervision for ambulation 180 ft w/ no AD; and supervision for navigating 10 steps up/down with R HR  Pt presents at PT eval functioning at/near baseline mobility level  No acute PT needs identified, PT signing off  At conclusion of PT session pt seated EOB to eat his breakfast with phone and call bell within reach  Pt denies any further questions at this time  The patient's AM-PAC Basic Mobility Inpatient Short Form Raw Score is 18  A Raw score of greater than 16 suggests the patient may benefit from discharge to home  Please also refer to the recommendation of the Physical Therapist for safe discharge planning  Recommend home upon hospital D/C     Goals   Patient Goals to go home   Plan   PT Frequency Other (Comment)  (1x visit, D/C PT)   Recommendation   PT Discharge Recommendation No rehabilitation needs   AM-PAC Basic Mobility Inpatient   Turning in Bed Without Bedrails 3   Lying on Back to Sitting on Edge of Flat Bed 3   Moving Bed to Chair 3   Standing Up From Chair 3   Walk in Room 3   Climb 3-5 Stairs 3   Basic Mobility Inpatient Raw Score 18   Basic Mobility Standardized Score 41 05   Highest Level Of Mobility   [facility] Goal 6: Walk 10 steps or more   -HLM Achieved 7: Walk 25 feet or more   Modified [place] Scale   Modified [place] Scale 2   End of Consult   Patient Position at End of Consult Seated edge of bed; All needs within reach       Farrukh Campos PT, DPT

## 2022-05-18 NOTE — ASSESSMENT & PLAN NOTE
· Patient came to the hospital with substernal chest pain and shortness of breath  Patient also with cough with expectoration  Chest pain is associated with deep breath  · Chest x-ray negative  CT chest with no acute pulmonary disease   · D-dimer is negative  PCT negative, afebrile, no leukocytosis and without radiographic findings of pneumonia will hold any further antibiotic   · Possibly related to pericarditis, he does have hx of this  CRP mildly elevated    Echo pending   · Completely resolved as of this morning

## 2022-05-18 NOTE — ASSESSMENT & PLAN NOTE
· Patient reported that 5/16 patient had dinner with his friends and 1 of them tested positive for COVID  · Patient currently COVID negative  · Unlikely his symptoms are due to COVID given the short time frame between exposure and onset of symptoms

## 2022-05-18 NOTE — CONSULTS
Consultation - Cardiology Team One  Meryle Badder 65 y o  male MRN: [id number]  Unit/Bed#: CW2 213-01 Encounter: [id number]    Inpatient consult to Cardiology  Consult performed by: UDAY Carter  Consult ordered by: Maurisio Lowe PA-C      Physician Requesting Consult: Manny Donahue MD  Reason for Consult / Principal Problem: Chest pain     Assessment/ Plan    1  Chest pain and SOB   HS troponin 5/4/5  Reviewed ECG showing NSR with low voltage QRS, no significant change from prior   C-reactive protein elevated: 27 6  sed rate 77  Echocardiogram pending     2  Probable HFpEF with concern for possible cardiac amyloid due to:  Low voltage ECG (although can be due to obesity)   Grade I diastolic dysfunction/mild concentric hypertrophy    Undiagnosed bilateral carpel tunnel   Discussed with attending, can consider outpatient testing including: SPEP, UPEP and serum free light chains     3  Hypertension   Last BP was borderline: 82/63  Prior was 144/66  Patient takes amlodipine 10 mg PO daily, furosemide 40 mg PO daily and metoprolol XL 50 mg PO daily at home  Monitor BP  Medications have hold parameters if BP remains borderline, will hold  4  Hyperlipidemia   LDL 45  On atorvastatin 40 mg PO daily     5  Type II diabetes  Hemoglobin A1C 6 7  On SSI     6  Obesity  Counseled on lifestyle modifications       History of Present Illness   HPI: Meryle Badder is a 65y o  year old male who has a history of pericarditis, hypertension, hyperlipidemia, type II diabetes, obesity and prior tobacco abuse  Patient  follows with [facility]  He presents to [facility] ER 5/17/2022 with complaint of chest pain and SOB that started Tuesday upon awakening  Patient reports chest pain as pressure without radiation  He was also SOB with chest pain  No diaphoresis or nausea  EMS was called and on the way to the hospital he was given SL nitroglycerin x 2 with some relief of pain  He reports chest pain eventually resolved  Once he woke up this morning he reports no further complaint of chest pain although this afternoon he notes when he takes a deep breath he had L sided back pain  He denies recent illness  No fever or chills  He denies exertional chest pain at home and report SOB with exertion but chronic  Cardiac cath 1/11/2021 showed no evidence of occlusive coronary artery disease  Echocardiogram 8/19/22021 showed EF 60%, no wall motion abnormalities, mild concentric hypertrophy, dilated LA and RA, mild MR and mild AS  He lives at home with his wife  He quit smoking 40 years ago  He has 2 cocktails a night  EKG reviewed personally:   Reviewed ECG showing NSR with low voltage QRS  Ventricular rate 61 bpm  DC interval 200 ms  QRSD 84 ms  QT interval 406 ms  QTc interval 408 ms     Telemetry reviewed personally:   Normal sinus rhythm     Review of Systems   Constitutional: Negative for chills and fever  HENT: Negative for congestion  Cardiovascular: Positive for dyspnea on exertion  Negative for chest pain, leg swelling, orthopnea and palpitations  Respiratory: Negative for shortness of breath  Musculoskeletal: Positive for back pain  Negative for falls  Gastrointestinal: Negative for bloating, nausea and vomiting  Neurological: Negative for dizziness and light-headedness  Psychiatric/Behavioral: Negative for altered mental status  All other systems reviewed and are negative      Historical Information   Past Medical History:   Diagnosis Date    Anxiety     Broken teeth     [facility]     basal cell-right cheek,left breast, right side of nose, left forehead    Chipped tooth     upper front-right    Chronic pain disorder     pain with standing-herniated disc lower back, upper back pain    Claustrophobia     Colon polyps     CPAP (continuous positive airway pressure) dependence     Depression     Diabetes mellitus ([facility] )     Dyslipidemia     Esophageal polyp     GERD (gastroesophageal reflux disease)     Glaucoma     Headache     Hyperlipidemia     Hypertension     Low back pain     Morbid obesity with BMI of 45 0-49 9, adult (HCC)     Neck pain     Numbness and tingling in left arm     resolved 17 / numbness and tingling left side last assessed 3/23/16    OAB (overactive bladder)     Paroxysmal supraventricular tachycardia (HCC)     Pericarditis 2021    Shortness of breath     with exertion since     Sleep apnea     wears cpap     Squamous cell skin cancer 10/19/2020    Right Nasal Ala, Dr Turcios    Tinnitus      Past Surgical History:   Procedure Laterality Date    ANOSCOPY      for polyp removal    BACK SURGERY      disc surgery lower back    BASAL CELL CARCINOMA EXCISION      right cheek    CATARACT EXTRACTION Bilateral     COLONOSCOPY      CYSTOSCOPY      EGD      JOINT REPLACEMENT      bilateral knees    KNEE ARTHROSCOPY Bilateral     MOHS SURGERY  2020    [facility] Right Nasal Ala, Dr Huber Adame      tooth extraction    NM COLONOSCOPY FLX DX W/COLLJ SPEC WHEN PFRMD N/A 2019    Procedure: COLONOSCOPY;  Surgeon: Blanca Albert MD;  Location: [facility]; Service: Gastroenterology    NM ESOPHAGOGASTRODUODENOSCOPY TRANSORAL DIAGNOSTIC N/A 2019    Procedure: ESOPHAGOGASTRODUODENOSCOPY (EGD); Surgeon: Blanca Albert MD;  Location: [facility];   Service: Gastroenterology    RECTAL BIOPSY      SPINE SURGERY       Social History     Substance and Sexual Activity   Alcohol Use Yes    Alcohol/week: 14 0 standard drinks    Types: 14 Shots of liquor per week    Comment: "couple of drinks each night"     Social History     Substance and Sexual Activity   Drug Use No     Social History     Tobacco Use   Smoking Status Former Smoker    Quit date: 1983    Years since quittin 3   Smokeless Tobacco Never Used     Family History:   Family History   Problem Relation Age of Onset    Alzheimer's disease Mother     Diabetes Mother     Dementia Mother    [facility] Hypertension Father     Hypertension Father     Diabetes Paternal Grandmother     Esophageal cancer Paternal Grandfather        Meds/Allergies   all current active meds have been reviewed and current meds:   Current Facility-Administered Medications   Medication Dose Route Frequency    acetaminophen (TYLENOL) tablet 650 mg  650 mg Oral Q6H PRN    albuterol inhalation solution 2 5 mg  2 5 mg Nebulization Q6H PRN    aluminum-magnesium hydroxide-simethicone (MYLANTA) oral suspension 30 mL  30 mL Oral Q6H PRN    amLODIPine (NORVASC) tablet 10 mg  10 mg Oral Daily    aspirin (ECOTRIN LOW STRENGTH) EC tablet 81 mg  81 mg Oral Daily    atorvastatin (LIPITOR) tablet 40 mg  40 mg Oral Daily With Dinner    benzonatate (TESSALON PERLES) capsule 100 mg  100 mg Oral TID PRN    citalopram (CeleXA) tablet 10 mg  10 mg Oral Daily    docusate sodium (COLACE) capsule 100 mg  100 mg Oral BID    ferrous sulfate tablet 325 mg  325 mg Oral Daily With Breakfast    fluticasone (FLONASE) 50 mcg/act nasal spray 1 spray  1 spray Nasal Daily    furosemide (LASIX) tablet 40 mg  40 mg Oral Daily    guaiFENesin (MUCINEX) 12 hr tablet 1,200 mg  1,200 mg Oral Q12H GIL    heparin (porcine) subcutaneous injection 7,500 Units  7,500 Units Subcutaneous Q8H [address]    insulin lispro (HumaLOG) 100 units/mL subcutaneous injection 1-5 Units  1-5 Units Subcutaneous HS    insulin lispro (HumaLOG) 100 units/mL subcutaneous injection 1-6 Units  1-6 Units Subcutaneous TID AC    metoprolol succinate (TOPROL-XL) 24 hr tablet 50 mg  50 mg Oral Q12H    ondansetron (ZOFRAN) injection 4 mg  4 mg Intravenous Q6H PRN    oxybutynin (DITROPAN) tablet 2 5 mg  2 5 mg Oral BID    pantoprazole (PROTONIX) EC tablet 20 mg  20 mg Oral Early Morning    tamsulosin (FLOMAX) capsule 0 4 mg  0 4 mg Oral Daily With Dinner          Allergies   Allergen Reactions    Sulfa Antibiotics Other (See Comments)     Nausea Objective   Vitals: Blood pressure (!) 82/63, pulse 60, temperature 98 9 °F (37 2 °C), resp  rate 18, height 5' 7" (1 702 m), weight 134 kg (295 lb 12 8 oz), SpO2 95 %  ,     Body mass index is 46 33 kg/m²  ,     Systolic (32RGM), KYS:289 , Min:82 , IUO:093     Diastolic (34INJ), RED:93, Min:60, Max:67      No intake or output data in the 24 hours ending 05/18/22 1311  Weight (last 2 days)     Date/Time Weight    05/17/22 1300 134 (295 8)        Invasive Devices  Report    Peripheral Intravenous Line  Duration           Peripheral IV 05/17/22 Right Wrist <1 day              Physical Exam  Constitutional:       General: He is not in acute distress  Appearance: He is obese  HENT:      Head: Normocephalic  Mouth/Throat:      Mouth: Mucous membranes are moist    Cardiovascular:      Rate and Rhythm: Normal rate and regular rhythm  Pulses: Normal pulses  Heart sounds: No murmur heard  Pulmonary:      Effort: Pulmonary effort is normal  No respiratory distress  Breath sounds: Normal breath sounds  Abdominal:      General: Bowel sounds are normal       Palpations: Abdomen is soft  Musculoskeletal:         General: No swelling  Normal range of motion  Cervical back: Neck supple  Skin:     General: Skin is warm and dry  Capillary Refill: Capillary refill takes less than 2 seconds  Neurological:      General: No focal deficit present  Mental Status: He is alert and oriented to person, place, and time     Psychiatric:         Mood and Affect: Mood normal            LABORATORY RESULTS:      CBC with diff:   Results from last 7 days   Lab Units 05/18/22  1038 05/17/22  1739 05/17/22  0922   WBC Thousand/uL 6 28  --  9 28   HEMOGLOBIN g/dL 11 0*  --  11 1*   HEMATOCRIT % 33 8*  --  33 8*   MCV fL 102*  --  102*   PLATELETS Thousands/uL 232 278 249   MCH pg 33 3  --  33 4   MCHC g/dL 32 5  --  32 8   RDW % 14 2  --  14 2   MPV fL 9 7 10 0 9 4   NRBC AUTO /100 WBCs  --   --  0 CMP:  Results from last 7 days   Lab Units 22  1038 22  0922   POTASSIUM mmol/L 3 8 4 1   CHLORIDE mmol/L 104 109*   CO2 mmol/L 27 27   BUN mg/dL 17 21   CREATININE mg/dL 1 38* 1 40*   CALCIUM mg/dL 9 0 9 0   AST U/L 25 34   ALT U/L 36 40   ALK PHOS U/L 156* 151*   EGFR ml/min/1 73sq m 48 47       BMP:  Results from last 7 days   Lab Units 22  1038 22  0922   POTASSIUM mmol/L 3 8 4 1   CHLORIDE mmol/L 104 109*   CO2 mmol/L 27 27   BUN mg/dL 17 21   CREATININE mg/dL 1 38* 1 40*   CALCIUM mg/dL 9 0 9 0          Lab Results   Component Value Date    NTBNP 235 2022         Lipid Profile:   Lab Results   Component Value Date    CHOL 162 09/10/2015    CHOL 175 2015    CHOL 179 2015     Lab Results   Component Value Date    HDL 41 2022    HDL 40 2021    HDL 37 (L) 2020     Lab Results   Component Value Date    LDLCALC 45 2022    LDLCALC 77 2021    LDLCALC 91 2020     Lab Results   Component Value Date    TRIG 109 2022    TRIG 87 2021    TRIG 115 2020     Cardiac testing:   Results for orders placed during the hospital encounter of 21    Echo complete with contrast if indicated    Narrative  [place] [address], [address] [facility]  [phone]    Transthoracic Echocardiogram  2D, M-mode, Doppler, and Color Doppler    Study date:  19-Aug-2021    Patient: Natalia Ashford  MR number: [id number]  Account number: [de-identified]  : [id number]  Age: 67 years  Gender: Male  Status: Outpatient  [facility]  Height: 67 in  Weight: 303 lb  BP: 134/ 78 mmHg    Indications: Diastolic dysfunction    Sonographer:  CATRACHITA Melton  Primary Physician:  Ivana Carter MD  Referring Physician:  Ivana Carter MD  Group:  [facility]  Interpreting Physician:  Reanna Guajardo MD    SUMMARY    LEFT VENTRICLE:  Systolic function was normal  Ejection fraction was estimated to be 60 %  Global longitudinal strain was normal at -19 7%  There were no regional wall motion abnormalities  There was mild concentric hypertrophy  LEFT ATRIUM:  The atrium was dilated  RIGHT ATRIUM:  The atrium was dilated  MITRAL VALVE:  There was mild regurgitation  AORTIC VALVE:  There was very mild stenosis  Valve mean gradient was 10 mmHg  HISTORY: PRIOR HISTORY: GERD; DM2; ANALY; HTN; HLD; Obesity    PROCEDURE: The study was performed in the [facility]  This was a routine study  The transthoracic approach was used  The study included complete 2D imaging, M-mode, complete spectral Doppler, and color Doppler  The  heart rate was 66 bpm, at the start of the study  Images were obtained from the parasternal, apical, subcostal, and suprasternal notch acoustic windows  This was a technically difficult study due to body habitus  LEFT VENTRICLE: Size was normal  Systolic function was normal  Ejection fraction was estimated to be 60 %  Global longitudinal strain was normal at -19 7%  There were no regional wall motion abnormalities  Wall thickness was mildly  increased  There was mild concentric hypertrophy  DOPPLER: The transmitral flow pattern was normal  Left ventricular diastolic function parameters were normal for the patient's age  RIGHT VENTRICLE: The size was normal  Systolic function was normal  Wall thickness was normal     LEFT ATRIUM: The atrium was dilated  RIGHT ATRIUM: The atrium was dilated  MITRAL VALVE: Valve structure was normal  There was normal leaflet separation  DOPPLER: The transmitral velocity was within the normal range  There was no evidence for stenosis  There was mild regurgitation  AORTIC VALVE: The valve was trileaflet  Leaflets exhibited normal thickness, mild calcification, and lower normal cuspal separation  DOPPLER: Transaortic velocity was minimally increased  There was very mild stenosis   There was no  significant regurgitation  TRICUSPID VALVE: The valve structure was normal  There was normal leaflet separation  DOPPLER: The transtricuspid velocity was within the normal range  There was no evidence for stenosis  There was trace regurgitation  PULMONIC VALVE: Leaflets exhibited normal thickness, no calcification, and normal cuspal separation  DOPPLER: The transpulmonic velocity was within the normal range  There was trace regurgitation  PERICARDIUM: There was no pericardial effusion  The pericardium was normal in appearance  AORTA: The root exhibited normal size  SYSTEMIC VEINS: IVC: The inferior vena cava was normal in size   Respirophasic changes were normal     MEASUREMENT TABLES    DOPPLER MEASUREMENTS  Aortic valve   (Reference normals)  Peak isaiah   221 cm/s   (--)  Peak gradient   20 mmHg   (--)  Mean gradient   10 mmHg   (--)    SYSTEM MEASUREMENT TABLES    2D  %FS: 30 75 %  AA PSSL Full: -18 35 %  AAS PSSL Full: -34 97 %  AI PSSL Full: -32 62 %  AL PSSL Full: -24 94 %  AP PSSL Full: -30 22 %  AS PSSL Full: -38 06 %  AVC: 361 78 ms  Ao Diam: 3 37 cm  Ao asc: 3 57 cm  BA PSSL Full: -10 12 %  BAS PSSL Full: -18 48 %  BI PSSL Full: -14 91 %  BL PSSL Full: -29 %  BP PSSL Full: -5 35 %  BS PSSL Full: -15 3 %  EDV(Teich): 162 5 ml  EF(Teich): 57 6 %  ESV(Teich): 68 9 ml  G peak SL Full(A2C): -17 34 %  G peak SL Full(A4C): -24 08 %  G peak SL Full(APLAX): -17 76 %  G peak SL Full(Avg): -19 73 %  HR_2Ch_Q: 60 41 BPM  HR_4Ch_Q: 61 BPM  IVSd: 1 41 cm  LA Area: 31 53 cm2  LA Diam: 5 83 cm  LVCO_2Ch_Q: 6 68 L/min  LVCO_4Ch_Q: 7 31 L/min  LVCO_BiP_Q: 6 99 L/min  LVEDV MOD A4C: 164 16 ml  LVEF MOD A4C: 76 69 %  LVEF_2Ch_Q: 66 03 %  LVEF_4Ch_Q: 70 31 %  LVEF_BiP_Q: 68 09 %  LVESV MOD A4C: 38 26 ml  LVIDd: 5 74 cm  LVIDs: 3 97 cm  LVLd A4C: 8 92 cm  LVLd_2Ch_Q: 8 35 cm  LVLd_4Ch_Q: 8 24 cm  LVLs A4C: 7 44 cm  LVLs_2Ch_Q: 7 15 cm  LVLs_4Ch_Q: 6 56 cm  LVOT Diam: 2 04 cm  LVPWd: 1 3 cm  LVSV_2Ch_Q: 110 51 ml  LVSV_4Ch_Q: 119 81 ml  LVSV_BiP_Q: 114 95 ml  LVVED_2Ch_Q: 167 35 ml  LVVED_4Ch_Q: 170 4 ml  LVVED_BiP_Q: 168 81 ml  LVVES_2Ch_Q: 56 84 ml  LVVES_4Ch_Q: 50 59 ml  LVVES_BiP_Q: 53 86 ml  MA PSSL Full: -7 42 %  MAS PSSL Full: -9 85 %  MI PSSL Full: -22 04 %  ML PSSL Full: -14 64 %  MP PSSL Full: -3 57 %  MS PSSL Full: -22 38 %  Peak SL Dispersion Full: 75 37 ms  RA Area: 24 35 cm2  RVIDd: 3 76 cm  SV MOD A4C: 125 9 ml  SV(Teich): 93 6 ml    CW  AV Env  Ti: 310 45 ms  AV MaxP 29 mmHg  AV VTI: 43 19 cm  AV Vmax: 2 07 m/s  AV Vmean: 1 39 m/s  AV meanP 02 mmHg  MV PHT: 98 11 ms  MVA By PHT: 2 24 cm2    MM  TAPSE: 2 38 cm    PW  ELISEO (VTI): 2 2 cm2  ELISEO Vmax: 2 24 cm2  E' Sept: 0 06 m/s  E/E' Sept: 15 61  LVOT Env  Ti: 334 92 ms  LVOT VTI: 28 99 cm  LVOT Vmax: 1 42 m/s  LVOT Vmean: 0 87 m/s  LVOT maxP 04 mmHg  LVOT meanPG: 3 66 mmHg  LVSV Dopp: 95 09 ml  MV A Phillip: 0 79 m/s  MV Dec [place]: 4 35 m/s2  MV DecT: 224 37 ms  MV E Phillip: 0 98 m/s  MV E/A Ratio: 1 23    [facility] Accredited [facility]ch[facility]    Prepared and electronically signed by    Michelet Castaneda MD  Signed 19-Aug-2021 14:05:46    No results found for this or any previous visit  No valid procedures specified  No results found for this or any previous visit  Imaging:   XR chest 2 views    Result Date: 2022  Narrative: CHEST INDICATION:   CP  COMPARISON:  Chest radiograph 2021 EXAM PERFORMED/VIEWS:  XR CHEST PA & LATERAL FINDINGS: Heart shadow is mildly enlarged but unchanged from prior exam  The lungs are clear  No pneumothorax or pleural effusion  Osseous structures appear within normal limits for patient age  Impression: No acute cardiopulmonary disease   Workstation performed: [id number]     CT chest wo contrast    Result Date: 2022  Narrative: CT CHEST WITHOUT IV CONTRAST INDICATION:   "Shortness of breath and chest pain " Per my review of the medical record, the patient presents with substernal chest pain, worsening with deep inspiration, and shortness of breath, also with cough with expectoration, negative procalcitonin  No fever or leukocytosis  Negative d-dimer  COMPARISON:  Chest radiograph from today and chest CT from 1/19/2022 and 1/10/2021    TECHNIQUE: Chest CT without intravenous contrast   Axial, sagittal, coronal 2D reformats and coronal MIPS from source data  Radiation dose length product (DLP):  901 99 mGy-cm   Radiation dose exposure minimized using iterative reconstruction and automated exposure control  FINDINGS: LUNGS:  Redemonstration of mild basilar predominant peripheral reticulation  No acute pulmonary disease  Tiny benign calcified granuloma in the right middle lobe  [place]na-fissural nodules due to benign intrapulmonary lymph nodes  Mild paraseptal emphysema  AIRWAYS: No significant filling defects  PLEURA:  Unremarkable  HEART/GREAT VESSELS:  Normal heart size  Stable trace pericardial fluid  MEDIASTINUM AND HARPER:  Unremarkable  CHEST WALL AND LOWER NECK: Unremarkable  UPPER ABDOMEN:  Cholelithiasis  Left renal cyst  OSSEOUS STRUCTURES: Mild degenerative disease in the spine  Impression: No acute pulmonary disease  Mild basilar predominant juxtapleural reticulation, without definite change since January 2021, due to early pulmonary fibrosis  Workstation performed: [id number]     Thank you for allowing us to participate in this patient's care  Counseling / Coordination of Care  Total floor / unit time spent today 45 minutes  Greater than 50% of total time was spent with the patient and / or family counseling and / or coordination of care  A description of the counseling / coordination of care: Review of history, current assessment, development of a plan  Code Status: Level 1 - Full Code    ** Please Note: Dragon 360 Dictation voice to text software may have been used in the creation of this document   **

## 2022-05-18 NOTE — ASSESSMENT & PLAN NOTE
· Patient came to the hospital with substernal chest pain which worsens with deep inspiration  · Patient was admitted to the hospital last year with chest pain the and had cardiac catheterization which was nonobstructive coronary artery disease    [place] to be secondary to acute pericarditis  · Serial troponin negative   · Check echocardiogram  · Patient's symptoms have completely resolved this AM

## 2022-05-18 NOTE — ASSESSMENT & PLAN NOTE
Lab Results   Component Value Date    HGBA1C 6 7 (H) 04/20/2022       Recent Labs     05/17/22  1535 05/17/22  2111 05/18/22  0617   POCGLU 106 89 134       Blood Sugar Average: Last 72 hrs:  (P) 129 [id number]   Patient is on oral hypoglycemic agents as outpatient  Last hemoglobin A1c is 6 7 showing excellent control  Insulin sliding scale for overnight    Monitor blood sugar and adjust insulin dose per blood sugar

## 2022-05-18 NOTE — PROGRESS NOTES
[address] [facility]  Progress Note Selma Wakefield 1945, 65 y o  male MRN: [id number]  Unit/Bed#: 2 213-01 Encounter: [id number]  Primary Care Provider: My Katz MD   Date and time admitted to hospital: 5/17/2022  9:11 AM    * Shortness of breath  Assessment & Plan  · Patient came to the hospital with substernal chest pain and shortness of breath  Patient also with cough with expectoration  Chest pain is associated with deep breath  · Chest x-ray negative  CT chest with no acute pulmonary disease   · D-dimer is negative  PCT negative, afebrile, no leukocytosis and without radiographic findings of pneumonia will hold any further antibiotic   · Possibly related to pericarditis, he does have hx of this  CRP mildly elevated  Echo pending   · Completely resolved as of this morning     Close exposure to COVID-19 virus  Assessment & Plan  · Patient reported that 5/16 patient had dinner with his friends and 1 of them tested positive for COVID  · Patient currently COVID negative  · Unlikely his symptoms are due to COVID given the short time frame between exposure and onset of symptoms    Stage 3 chronic kidney disease, unspecified whether stage 3a or 3b CKD [facility])  Assessment & Plan  Lab Results   Component Value Date    EGFR 47 05/17/2022    EGFR 56 04/20/2022    EGFR 45 03/08/2022    CREATININE 1 40 (H) 05/17/2022    CREATININE 1 22 04/20/2022    CREATININE 1 46 (H) 03/08/2022   Creatinine 1 4  Close to baseline  Monitor for now    Anemia  Assessment & Plan  · Patient with chronic anemia  Continue with iron supplementation  · Hemoglobin at baseline    Chest pain  Assessment & Plan  · Patient came to the hospital with substernal chest pain which worsens with deep inspiration  · Patient was admitted to the hospital last year with chest pain the and had cardiac catheterization which was nonobstructive coronary artery disease    [place] to be secondary to acute pericarditis  · Serial troponin negative   · Check echocardiogram  · Patient's symptoms have completely resolved this AM     Hyperlipidemia  Assessment & Plan  · Continue statin    Morbid obesity with BMI of 40 0-44 9, adult (HCC)  Assessment & Plan  · TLC as outpatient    Obstructive sleep apnea syndrome  Assessment & Plan  · Patient uses CPAP at night    Essential hypertension  Assessment & Plan  · Monitor blood pressure  · Continue with outpatient medications    Type 2 diabetes mellitus with complication [facility])  Assessment & Plan  Lab Results   Component Value Date    HGBA1C 6 7 (H) 04/20/2022       Recent Labs     05/17/22  1535 05/17/22  2111 05/18/22  0617   POCGLU 106 89 134       Blood Sugar Average: Last 72 hrs:  (P) 526 [id number]   Patient is on oral hypoglycemic agents as outpatient  Last hemoglobin A1c is 6 7 showing excellent control  Insulin sliding scale for overnight  Monitor blood sugar and adjust insulin dose per blood sugar        VTE Pharmacologic Prophylaxis: VTE Score: 6 Moderate Risk (Score 3-4) - Pharmacological DVT Prophylaxis Ordered: heparin  Patient Centered Rounds: I performed bedside rounds with nursing staff today  Discussions with Specialists or Other Care Team Provider: RN    Education and Discussions with Family / Patient: patinoe t  Time Spent for Care: 30 minutes  More than 50% of total time spent on counseling and coordination of care as described above  Current Length of Stay: 1 day(s)  Current Patient Status: Inpatient   Certification Statement: The patient will continue to require additional inpatient hospital stay due to monitor for recurrence of sx, waiting on echo, if ok later today possible d/c later  Discharge Plan: Anticipate discharge later today or tomorrow to home  Code Status: Level 1 - Full Code    Subjective:   Pt has no complaints this AM   He states he feels much better this morning  SOB, CP resolved    Walked up and down halls and did stairs without difficulty  Objective:     Vitals:   Temp (24hrs), Av 9 °F (37 2 °C), Min:98 9 °F (37 2 °C), Max:98 9 °F (37 2 °C)    Temp:  [98 9 °F (37 2 °C)] 98 9 °F (37 2 °C)  HR:  [57-64] 57  Resp:  [16-20] 18  BP: (141-149)/(60-67) 144/66  SpO2:  [94 %-98 %] 94 %  Body mass index is 46 33 kg/m²  Input and Output Summary (last 24 hours):   No intake or output data in the 24 hours ending 22 1003    Physical Exam:   Physical Exam  Vitals reviewed  Constitutional:       General: He is not in acute distress  Appearance: Normal appearance  He is not toxic-appearing  HENT:      Head: Normocephalic and atraumatic  Eyes:      Extraocular Movements: Extraocular movements intact  Cardiovascular:      Rate and Rhythm: Normal rate and regular rhythm  Heart sounds: No murmur heard  Pulmonary:      Effort: Pulmonary effort is normal  No respiratory distress  Breath sounds: Normal breath sounds  Abdominal:      General: Bowel sounds are normal  There is no distension  Tenderness: There is no abdominal tenderness  Musculoskeletal:         General: Normal range of motion  Cervical back: Normal range of motion  Left lower leg: Edema present  Skin:     General: Skin is warm and dry  Neurological:      General: No focal deficit present  Mental Status: He is alert and oriented to person, place, and time  Psychiatric:         Mood and Affect: Mood normal          Behavior: Behavior normal          Thought Content:  Thought content normal          Judgment: Judgment normal           Additional Data:     Labs:  Results from last 7 days   Lab Units 22  1739 22  0922   WBC Thousand/uL  --  9 28   HEMOGLOBIN g/dL  --  11 1*   HEMATOCRIT %  --  33 8*   PLATELETS Thousands/uL 278 249   NEUTROS PCT %  --  74   LYMPHS PCT %  --  12*   MONOS PCT %  --  10   EOS PCT %  --  2     Results from last 7 days   Lab Units 22  0922   SODIUM mmol/L 137   POTASSIUM mmol/L 4 1   CHLORIDE mmol/L 109*   CO2 mmol/L 27   BUN mg/dL 21   CREATININE mg/dL 1 40*   ANION GAP mmol/L 1*   CALCIUM mg/dL 9 0   ALBUMIN g/dL 2 9*   TOTAL BILIRUBIN mg/dL 0 26   ALK PHOS U/L 151*   ALT U/L 40   AST U/L 34   GLUCOSE RANDOM mg/dL 178*         Results from last 7 days   Lab Units 05/18/22  0617 05/17/22  2111 05/17/22  1535   POC GLUCOSE mg/dl 134 89 106         Results from last 7 days   Lab Units 05/17/22  1520 05/17/22  1232   PROCALCITONIN ng/ml 0 10 0 12       Lines/Drains:  Invasive Devices  Report    Peripheral Intravenous Line  Duration           Peripheral IV 05/17/22 Right Wrist <1 day                  Telemetry:  Telemetry Orders (From admission, onward)             48 Hour Telemetry Monitoring  Continuous x 48 hours        References:    Telemetry Guidelines   Question:  Reason for 48 Hour Telemetry  Answer:  Acute MI, chest pain - R/O MI, or unstable angina                 Telemetry Reviewed: Normal Sinus Rhythm  Indication for Continued Telemetry Use: Acute MI/Unstable Angina/Rule out ACS if echo neg and remains asx will d/c later today              Imaging: Reviewed radiology reports from this admission including: chest xray and chest CT scan    Recent Cultures (last 7 days):   Results from last 7 days   Lab Units 05/17/22  1529 05/17/22  1240 05/17/22  1232   BLOOD CULTURE   --  Received in Microbiology Lab  Culture in Progress  Received in Microbiology Lab  Culture in Progress     LEGIONELLA URINARY ANTIGEN  Negative  --   --        Last 24 Hours Medication List:   Current Facility-Administered Medications   Medication Dose Route Frequency Provider Last Rate    acetaminophen  650 mg Oral Q6H PRN Ольга Prince MD      albuterol  2 5 mg Nebulization Q6H PRN Ольга Prince MD      aluminum-magnesium hydroxide-simethicone  30 mL Oral Q6H PRN Ольга Prince MD      amLODIPine  10 mg Oral Daily Ольга Prince MD      aspirin  81 mg Oral Daily Ольга Prince MD      atorvastatin 40 mg Oral Daily With Juana Bustillos MD      benzonatate  100 mg Oral TID PRN Sarah Erickson MD      citalopram  10 mg Oral Daily Sarah Erickson MD      docusate sodium  100 mg Oral BID Sarah Erickson MD      ferrous sulfate  325 mg Oral Daily With Breakfast Sarah Erickson MD      fluticasone  1 spray Nasal Daily Sarah Erickson MD      furosemide  40 mg Oral Daily Sarah Erickson MD      guaiFENesin  1,200 mg Oral Q12H [address] Sarah Erickson MD      heparin (porcine)  7,500 Units Subcutaneous [facility] Sarah Erickson MD      insulin lispro  1-5 Units Subcutaneous HS Sarah Erickson MD      insulin lispro  1-6 Units Subcutaneous TID [facility] Sarah Erickson MD      metoprolol succinate  50 mg Oral Q12H Sarah Erickson MD      ondansetron  4 mg Intravenous Q6H PRN Sarah Erickson MD      oxybutynin  2 5 mg Oral BID Sarah Erickson MD      pantoprazole  20 mg Oral Early Morning Sarah Erickson MD      tamsulosin  0 4 mg Oral Daily With Juana Bustillos MD          Today, Patient Was Seen By: Jaison Gonzalez PA-C    **Please Note: This note may have been constructed using a voice recognition system  **

## 2022-05-18 NOTE — CASE MANAGEMENT
Case Management Assessment & Discharge Planning Note    Patient name Jaya Lei  Location 2 213/2 977-65 MRN [id number]  : 1945 Date 2022       Current Admission Date: 2022  Current Admission Diagnosis:Shortness of breath   Patient Active Problem List    Diagnosis Date Noted    Close exposure to COVID-19 virus 2022    Heart failure with preserved left ventricular function (HFpEF) ([facility] ) 2022    Interstitial lung disease ([facility] ) 02/15/2022    Bilateral carotid artery disease, unspecified type ([facility] ) 2022    Stage 3 chronic kidney disease, unspecified whether stage 3a or 3b CKD ([facility] ) 2022    Nasal congestion 2022    Chronic back pain 2022    Shoulder pain, bilateral 2022    Bilateral myofascial pain 2022    Decreased diffusion capacity 2021    Dementia ([facility] ) 11/10/2021    Flank pain 11/10/2021    Bilateral carpal tunnel syndrome 2021    Anemia 2021    Shortness of breath 2021    Partial seizure ([facility] )     Ulnar nerve entrapment 2021    Facet arthropathy, lumbosacral 2021    Sacroiliitis ([facility] ) 03/15/2021    Lumbar spondylosis 03/15/2021    Adenomatous polyp of transverse colon 2021    Acute idiopathic pericarditis 2021    Hyperlipidemia 01/10/2021    Chest pain 01/10/2021    Fecal incontinence 2020    BPH with urinary obstruction 2020    Onychomycosis 2019    Urgency incontinence 2019    Mood disorder ([facility] ) 2019    Hypokalemia 2019    Palpitations 2018    Obstructive sleep apnea syndrome 2018    Morbid obesity with BMI of 40 0-44 9, adult ([facility] ) 2018    Mood disturbance 2018    Gastroesophageal reflux disease 2018    Isolated proteinuria with morphologic lesion 2018    Type 2 diabetes mellitus with complication ([facility] )     Essential hypertension 2018    Peripheral neuropathy 01/29/2018    Chronic right-sided low back pain without sciatica       LOS (days): 1  Geometric Mean LOS (GMLOS) (days): 1 70  Days to GMLOS:0 7     OBJECTIVE:    Risk of Unplanned Readmission Score: 12 32     Current admission status: Inpatient    Preferred Pharmacy:   [facility] [address]  [address]  [place]  Phone: [phone] Fax: [phone]    Primary Care Provider: Shay Collier MD    Primary Insurance: MEDICARE  Secondary Insurance: AARP    ASSESSMENT:  [place] [address] Proxies    There are no active Health Care Proxies on file  Advance Directives  Does patient have a Health Care POA?: Yes  Does patient have Advance Directives?: Yes  Advance Directives: Living will, Power of  for health care (son)  Primary Contact: wifeLarry [phone]    Readmission Root Cause  30 Day Readmission: No    Patient Information  Admitted from[de-identified] Home  Mental Status: Alert  During Assessment patient was accompanied by: Not accompanied during assessment  Assessment information provided by[de-identified] Patient  Primary Caregiver: Self  Support Systems: Self, Spouse/significant other, Son, [address] of Residence: [address] do you live in?: [address] [facility] entry access options  Select all that apply : Stairs  Number of steps to enter home  : 1  Do the steps have railings?: No  Type of Current Residence: [address] [place] [address] home  Upon entering residence, is there a bedroom on the main floor (no further steps)?: No  A bedroom is located on the following floor levels of residence (select all that apply):: 2nd Floor  Upon entering residence, is there a bathroom on the main floor (no further steps)?: Yes  Number of steps to 2nd floor from main floor: One Flight  In the last 12 months, was there a time when you were not able to pay the mortgage or rent on time?: No  In the last 12 months, how many places have you lived?: 1  In the last 12 months, was there a time when you did not have a steady place to sleep or slept in a shelter (including now)?: No  Homeless/housing insecurity resource given?: No  Living Arrangements: Lives w/ Spouse/significant other    Activities of Daily Living Prior to Admission  Functional Status: Independent  Completes ADLs independently?: Yes  Ambulates independently?: Yes  Does patient use assisted devices?: No  Does patient currently own DME?: Yes  What DME does the patient currently own?: CPAP  Does patient have a history of Outpatient Therapy (PT/OT)?: No  Does the patient have a history of Short-Term Rehab?: No  Does patient have a history of HHC?: No  Does patient currently have [facility]?: No    Patient Information Continued  Income Source: Pension/long term  Does patient have prescription coverage?: Yes  Within the past 12 months, you worried that your food would run out before you got the money to buy more : Never true  Within the past 12 months, the food you bought just didn't last and you didn't have money to get more : Never true  Food insecurity resource given?: No  Does patient receive dialysis treatments?: No  Does patient have a history of substance abuse?: No  Does patient have a history of Mental Health Diagnosis?: No    PHQ 2/9 Screening   Reviewed PHQ 2/9 Depression Screening Score?: No    Means of Transportation  Means of Transport to Appts[de-identified] Drives Self  In the past 12 months, has lack of transportation kept you from medical appointments or from getting medications?: No  In the past 12 months, has lack of transportation kept you from meetings, work, or from getting things needed for daily living?: No  Was application for public transport provided?: No    DISCHARGE DETAILS:    Discharge planning discussed with[de-identified] patient     CM contacted family/caregiver?: No- see comments (declined)     Contacts  Patient Contacts: wife, Roman Rankin  Relationship to Patient[de-identified] Family  Contact Method: Phone  Phone Number: [phone]  Reason/Outcome: Emergency [address]         Is the patient interested in [place] [address] at discharge?: No    DME Referral Provided  Referral made for DME?: No     Additional Comments: patient independent PTA   No d/c needs evaluated

## 2022-05-19 ENCOUNTER — TRANSITIONAL CARE MANAGEMENT (OUTPATIENT)
Dept: INTERNAL MEDICINE CLINIC | Facility: CLINIC | Age: 77
End: 2022-05-19

## 2022-05-19 VITALS
HEART RATE: 57 BPM | SYSTOLIC BLOOD PRESSURE: 138 MMHG | WEIGHT: 295 LBS | HEIGHT: 67 IN | RESPIRATION RATE: 18 BRPM | DIASTOLIC BLOOD PRESSURE: 90 MMHG | BODY MASS INDEX: 46.3 KG/M2 | TEMPERATURE: 99.1 F | OXYGEN SATURATION: 95 %

## 2022-05-19 PROBLEM — I30.9 ACUTE PERICARDITIS: Status: ACTIVE | Noted: 2021-01-10

## 2022-05-19 LAB
FLUAV RNA RESP QL NAA+PROBE: NEGATIVE
FLUBV RNA RESP QL NAA+PROBE: NEGATIVE
GLUCOSE SERPL-MCNC: 142 MG/DL (ref 65–140)
GLUCOSE SERPL-MCNC: 152 MG/DL (ref 65–140)
RSV RNA RESP QL NAA+PROBE: NEGATIVE
SARS-COV-2 RNA RESP QL NAA+PROBE: NEGATIVE

## 2022-05-19 PROCEDURE — 94760 N-INVAS EAR/PLS OXIMETRY 1: CPT

## 2022-05-19 PROCEDURE — 99239 HOSP IP/OBS DSCHRG MGMT >30: CPT | Performed by: PHYSICIAN ASSISTANT

## 2022-05-19 PROCEDURE — 0241U HB NFCT DS VIR RESP RNA 4 TRGT: CPT | Performed by: PHYSICIAN ASSISTANT

## 2022-05-19 PROCEDURE — 99232 SBSQ HOSP IP/OBS MODERATE 35: CPT | Performed by: INTERNAL MEDICINE

## 2022-05-19 PROCEDURE — 94660 CPAP INITIATION&MGMT: CPT

## 2022-05-19 PROCEDURE — 82948 REAGENT STRIP/BLOOD GLUCOSE: CPT

## 2022-05-19 RX ORDER — COLCHICINE 0.6 MG/1
0.6 TABLET ORAL 2 TIMES DAILY
Qty: 60 TABLET | Refills: 0 | Status: SHIPPED | OUTPATIENT
Start: 2022-05-19

## 2022-05-19 RX ORDER — COLCHICINE 0.6 MG/1
0.6 TABLET ORAL 2 TIMES DAILY
Status: DISCONTINUED | OUTPATIENT
Start: 2022-05-19 | End: 2022-05-19 | Stop reason: HOSPADM

## 2022-05-19 RX ADMIN — DOCUSATE SODIUM 100 MG: 100 CAPSULE, LIQUID FILLED ORAL at 08:31

## 2022-05-19 RX ADMIN — AMLODIPINE BESYLATE 10 MG: 10 TABLET ORAL at 08:31

## 2022-05-19 RX ADMIN — ASPIRIN 81 MG: 81 TABLET, COATED ORAL at 08:31

## 2022-05-19 RX ADMIN — FERROUS SULFATE TAB 325 MG (65 MG ELEMENTAL FE) 325 MG: 325 (65 FE) TAB at 06:57

## 2022-05-19 RX ADMIN — OXYBUTYNIN CHLORIDE 2.5 MG: 5 TABLET ORAL at 08:31

## 2022-05-19 RX ADMIN — METOPROLOL SUCCINATE 50 MG: 50 TABLET, EXTENDED RELEASE ORAL at 06:57

## 2022-05-19 RX ADMIN — CITALOPRAM HYDROBROMIDE 10 MG: 10 TABLET ORAL at 08:32

## 2022-05-19 RX ADMIN — PANTOPRAZOLE SODIUM 20 MG: 20 TABLET, DELAYED RELEASE ORAL at 06:58

## 2022-05-19 RX ADMIN — INSULIN LISPRO 1 UNITS: 100 INJECTION, SOLUTION INTRAVENOUS; SUBCUTANEOUS at 11:53

## 2022-05-19 RX ADMIN — FUROSEMIDE 40 MG: 40 TABLET ORAL at 08:32

## 2022-05-19 RX ADMIN — COLCHICINE 0.6 MG: 0.6 TABLET ORAL at 09:35

## 2022-05-19 RX ADMIN — GUAIFENESIN 1200 MG: 600 TABLET, EXTENDED RELEASE ORAL at 08:31

## 2022-05-19 NOTE — DISCHARGE INSTRUCTIONS
Please get blood work (BMP) prior to appointment next Thursday (5/26/2022)  You were diagnosed with pericarditis  Take Colchicine 0 6 mg twice daily  Follow up with cardiology in one week with repeat blood work

## 2022-05-19 NOTE — PROGRESS NOTES
General Cardiology   Progress Note -  [facility] [employer]   Az Collins 65 y o  male MRN: [id number]    Unit/Bed#: CW2 213-01 Encounter: [id number]    Assessment/ Plan     1  Chest pain   HS troponin 5/4/5  Reviewed ECG showing NSR with low voltage QRS, no significant change from prior   No evidence of ACS  C-reactive protein elevated: 27 6  sed rate 77  Echocardiogram reviewed showing EF 68%, grade I diastolic dysfunction, RV wall thickness increased, LA mildly dilated, RV systolic pressure is moderately to severely elevated, estimated 64 mmHg and pericardium is thickened with evidence of pericardial constriction  Concern for reoccurrence of acute pericarditis   Will start colchicine 0 6 mg PO BID  Hold on NSAIDs due to elevated creatinine: 1 38  Check BMP in a week prior to follow up appointment  If creatinine stable then recommend ibuprofen 600 mg PO TID for week and taper outpatient       2  Hypertension   Average BP: 131/67  On amlodipine 10 mg PO daily, furosemide 40 mg PO daily and metoprolol XL 50 mg PO daily at home      3  Hyperlipidemia   LDL 45  On atorvastatin 40 mg PO daily      4  Type II diabetes  Hemoglobin A1C 6 7  On SSI      5  Obesity  Counseled on lifestyle modifications     Patient will follow up with Alexa Mohr as scheduled 5/26/2022  Subjective  Patient reporting no further complaint of chest pain or SOB  He is resting in bed eating breakfast  He is eager to go home  Review of Systems   Constitutional: Negative for chills and fever  HENT: Negative for congestion  Cardiovascular: Positive for dyspnea on exertion  Negative for chest pain, leg swelling, orthopnea and palpitations  Respiratory: Negative for shortness of breath  Musculoskeletal: Negative for falls  Gastrointestinal: Negative for bloating, nausea and vomiting  Neurological: Negative for dizziness and light-headedness  Psychiatric/Behavioral: Negative for altered mental status     All other systems reviewed and are negative  Objective:   Vitals: Blood pressure 152/74, pulse 58, temperature (!) 100 8 °F (38 2 °C), resp  rate 18, height 5' 7" (1 702 m), weight 134 kg (295 lb), SpO2 94 %  ,       Body mass index is 46 2 kg/m²  ,     Systolic (85LWN), AEL:677 , Min:82 , ADM:910     Diastolic (64MOY), OHA:30, Min:63, Max:74          Intake/Output Summary (Last 24 hours) at 5/19/2022 0836  Last data filed at 5/18/2022 1100  Gross per 24 hour   Intake 220 ml   Output 300 ml   Net -80 ml     Weight (last 2 days)     Date/Time Weight    05/18/22 1410 134 (295)    05/17/22 1300 134 (295 8)        Telemetry Review: Sinus rhythm HR 50-60s    Physical Exam  Constitutional:       General: He is not in acute distress  Appearance: He is obese  HENT:      Head: Normocephalic  Mouth/Throat:      Mouth: Mucous membranes are moist    Cardiovascular:      Rate and Rhythm: Normal rate and regular rhythm  Pulmonary:      Effort: Pulmonary effort is normal  No respiratory distress  Breath sounds: Normal breath sounds  Abdominal:      General: Bowel sounds are normal       Palpations: Abdomen is soft  Musculoskeletal:         General: No swelling  Normal range of motion  Cervical back: Neck supple  Skin:     General: Skin is warm and dry  Neurological:      General: No focal deficit present  Mental Status: He is alert and oriented to person, place, and time     Psychiatric:         Mood and Affect: Mood normal          LABORATORY RESULTS      CBC with diff:   Results from last 7 days   Lab Units 05/18/22  1038 05/17/22  1739 05/17/22  0922   WBC Thousand/uL 6 28  --  9 28   HEMOGLOBIN g/dL 11 0*  --  11 1*   HEMATOCRIT % 33 8*  --  33 8*   MCV fL 102*  --  102*   PLATELETS Thousands/uL 232 278 249   MCH pg 33 3  --  33 4   MCHC g/dL 32 5  --  32 8   RDW % 14 2  --  14 2   MPV fL 9 7 10 0 9 4   NRBC AUTO /100 WBCs  --   --  0       CMP:  Results from last 7 days   Lab Units 05/18/22  1038 22  0922   POTASSIUM mmol/L 3 8 4 1   CHLORIDE mmol/L 104 109*   CO2 mmol/L 27 27   BUN mg/dL 17 21   CREATININE mg/dL 1 38* 1 40*   CALCIUM mg/dL 9 0 9 0   AST U/L 25 34   ALT U/L 36 40   ALK PHOS U/L 156* 151*   EGFR ml/min/1 73sq m 48 47       BMP:  Results from last 7 days   Lab Units 22  1038 22  0922   POTASSIUM mmol/L 3 8 4 1   CHLORIDE mmol/L 104 109*   CO2 mmol/L 27 27   BUN mg/dL 17 21   CREATININE mg/dL 1 38* 1 40*   CALCIUM mg/dL 9 0 9 0       Lab Results   Component Value Date    NTBNP 235 2022       Lipid Profile:   Lab Results   Component Value Date    CHOL 162 09/10/2015    CHOL 175 2015    CHOL 179 2015     Lab Results   Component Value Date    HDL 41 2022    HDL 40 2021    HDL 37 (L) 2020     Lab Results   Component Value Date    LDLCALC 45 2022    LDLCALC 77 2021    LDLCALC 91 2020     Lab Results   Component Value Date    TRIG 109 2022    TRIG 87 2021    TRIG 115 2020       Cardiac testing:   Results for orders placed during the hospital encounter of 21    Echo complete with contrast if indicated    Narrative  [place] [address], [address] [facility]  [phone]    Transthoracic Echocardiogram  2D, M-mode, Doppler, and Color Doppler    Study date:  19-Aug-2021    Patient: Christin Zhao  MR number: [id number]  Account number: [de-identified]  : [id number]  Age: 67 years  Gender: Male  Status: Outpatient  Location: [address] [place] [address] and Vascular Center  Height: 67 in  Weight: 303 lb  BP: 134/ 78 mmHg    Indications: Diastolic dysfunction    Sonographer:  CATRACHITA Ch  Primary Physician:  David Molina MD  Referring Physician:  David Molina MD  Group:  [facility]  Interpreting Physician:  Theresa Mills MD    SUMMARY    LEFT VENTRICLE:  Systolic function was normal  Ejection fraction was estimated to be 60 %   Global longitudinal strain was normal at -19 7%  There were no regional wall motion abnormalities  There was mild concentric hypertrophy  LEFT ATRIUM:  The atrium was dilated  RIGHT ATRIUM:  The atrium was dilated  MITRAL VALVE:  There was mild regurgitation  AORTIC VALVE:  There was very mild stenosis  Valve mean gradient was 10 mmHg  HISTORY: PRIOR HISTORY: GERD; DM2; ANALY; HTN; HLD; Obesity    PROCEDURE: The study was performed in the [facility] and [facility]  This was a routine study  The transthoracic approach was used  The study included complete 2D imaging, M-mode, complete spectral Doppler, and color Doppler  The  heart rate was 66 bpm, at the start of the study  Images were obtained from the parasternal, apical, subcostal, and suprasternal notch acoustic windows  This was a technically difficult study due to body habitus  LEFT VENTRICLE: Size was normal  Systolic function was normal  Ejection fraction was estimated to be 60 %  Global longitudinal strain was normal at -19 7%  There were no regional wall motion abnormalities  Wall thickness was mildly  increased  There was mild concentric hypertrophy  DOPPLER: The transmitral flow pattern was normal  Left ventricular diastolic function parameters were normal for the patient's age  RIGHT VENTRICLE: The size was normal  Systolic function was normal  Wall thickness was normal     LEFT ATRIUM: The atrium was dilated  RIGHT ATRIUM: The atrium was dilated  MITRAL VALVE: Valve structure was normal  There was normal leaflet separation  DOPPLER: The transmitral velocity was within the normal range  There was no evidence for stenosis  There was mild regurgitation  AORTIC VALVE: The valve was trileaflet  Leaflets exhibited normal thickness, mild calcification, and lower normal cuspal separation  DOPPLER: Transaortic velocity was minimally increased  There was very mild stenosis  There was no  significant regurgitation      TRICUSPID VALVE: The valve structure was normal  There was normal leaflet separation  DOPPLER: The transtricuspid velocity was within the normal range  There was no evidence for stenosis  There was trace regurgitation  PULMONIC VALVE: Leaflets exhibited normal thickness, no calcification, and normal cuspal separation  DOPPLER: The transpulmonic velocity was within the normal range  There was trace regurgitation  PERICARDIUM: There was no pericardial effusion  The pericardium was normal in appearance  AORTA: The root exhibited normal size  SYSTEMIC VEINS: IVC: The inferior vena cava was normal in size   Respirophasic changes were normal     MEASUREMENT TABLES    DOPPLER MEASUREMENTS  Aortic valve   (Reference normals)  Peak isaiah   221 cm/s   (--)  Peak gradient   20 mmHg   (--)  Mean gradient   10 mmHg   (--)    SYSTEM MEASUREMENT TABLES    2D  %FS: 30 75 %  AA PSSL Full: -18 35 %  AAS PSSL Full: -34 97 %  AI PSSL Full: -32 62 %  AL PSSL Full: -24 94 %  AP PSSL Full: -30 22 %  AS PSSL Full: -38 06 %  AVC: 361 78 ms  Ao Diam: 3 37 cm  Ao asc: 3 57 cm  BA PSSL Full: -10 12 %  BAS PSSL Full: -18 48 %  BI PSSL Full: -14 91 %  BL PSSL Full: -29 %  BP PSSL Full: -5 35 %  BS PSSL Full: -15 3 %  EDV(Teich): 162 5 ml  EF(Teich): 57 6 %  ESV(Teich): 68 9 ml  G peak SL Full(A2C): -17 34 %  G peak SL Full(A4C): -24 08 %  G peak SL Full(APLAX): -17 76 %  G peak SL Full(Avg): -19 73 %  HR_2Ch_Q: 60 41 BPM  HR_4Ch_Q: 61 BPM  IVSd: 1 41 cm  LA Area: 31 53 cm2  LA Diam: 5 83 cm  LVCO_2Ch_Q: 6 68 L/min  LVCO_4Ch_Q: 7 31 L/min  LVCO_BiP_Q: 6 99 L/min  LVEDV MOD A4C: 164 16 ml  LVEF MOD A4C: 76 69 %  LVEF_2Ch_Q: 66 03 %  LVEF_4Ch_Q: 70 31 %  LVEF_BiP_Q: 68 09 %  LVESV MOD A4C: 38 26 ml  LVIDd: 5 74 cm  LVIDs: 3 97 cm  LVLd A4C: 8 92 cm  LVLd_2Ch_Q: 8 35 cm  LVLd_4Ch_Q: 8 24 cm  LVLs A4C: 7 44 cm  LVLs_2Ch_Q: 7 15 cm  LVLs_4Ch_Q: 6 56 cm  LVOT Diam: 2 04 cm  LVPWd: 1 3 cm  LVSV_2Ch_Q: 110 51 ml  LVSV_4Ch_Q: 119 81 ml  LVSV_BiP_Q: 114 95 ml  LVVED_2Ch_Q: 167 35 ml  LVVED_4Ch_Q: 170 4 ml  LVVED_BiP_Q: 168 81 ml  LVVES_2Ch_Q: 56 84 ml  LVVES_4Ch_Q: 50 59 ml  LVVES_BiP_Q: 53 86 ml  MA PSSL Full: -7 42 %  MAS PSSL Full: -9 85 %  MI PSSL Full: -22 04 %  ML PSSL Full: -14 64 %  MP PSSL Full: -3 57 %  MS PSSL Full: -22 38 %  Peak SL Dispersion Full: 75 37 ms  RA Area: 24 35 cm2  RVIDd: 3 76 cm  SV MOD A4C: 125 9 ml  SV(Teich): 93 6 ml    CW  AV Env  Ti: 310 45 ms  AV MaxP 29 mmHg  AV VTI: 43 19 cm  AV Vmax: 2 07 m/s  AV Vmean: 1 39 m/s  AV meanP 02 mmHg  MV PHT: 98 11 ms  MVA By PHT: 2 24 cm2    MM  TAPSE: 2 38 cm    PW  ELISEO (VTI): 2 2 cm2  ELISEO Vmax: 2 24 cm2  E' Sept: 0 06 m/s  E/E' Sept: 15 61  LVOT Env  Ti: 334 92 ms  LVOT VTI: 28 99 cm  LVOT Vmax: 1 42 m/s  LVOT Vmean: 0 87 m/s  LVOT maxP 04 mmHg  LVOT meanPG: 3 66 mmHg  LVSV Dopp: 95 09 ml  MV A Phillip: 0 79 m/s  MV Dec Greene: 4 35 m/s2  MV DecT: 224 37 ms  MV E Phillip: 0 98 m/s  MV E/A Ratio: 1 23    [facility] Accredited Echocardiography Laboratory    Prepared and electronically signed by    Lizzette Ryan MD  Signed 19-Aug-2021 14:05:46    No results found for this or any previous visit  No results found for this or any previous visit  No valid procedures specified  No results found for this or any previous visit      Meds/Allergies   all current active meds have been reviewed and current meds:   Current Facility-Administered Medications   Medication Dose Route Frequency    acetaminophen (TYLENOL) tablet 650 mg  650 mg Oral Q6H PRN    albuterol inhalation solution 2 5 mg  2 5 mg Nebulization Q6H PRN    aluminum-magnesium hydroxide-simethicone (MYLANTA) oral suspension 30 mL  30 mL Oral Q6H PRN    amLODIPine (NORVASC) tablet 10 mg  10 mg Oral Daily    aspirin (ECOTRIN LOW STRENGTH) EC tablet 81 mg  81 mg Oral Daily    atorvastatin (LIPITOR) tablet 40 mg  40 mg Oral Daily With Dinner    benzonatate (TESSALON PERLES) capsule 100 mg  100 mg Oral TID PRN    citalopram (CeleXA) tablet 10 mg 10 mg Oral Daily    docusate sodium (COLACE) capsule 100 mg  100 mg Oral BID    ferrous sulfate tablet 325 mg  325 mg Oral Daily With Breakfast    fluticasone (FLONASE) 50 mcg/act nasal spray 1 spray  1 spray Nasal Daily    furosemide (LASIX) tablet 40 mg  40 mg Oral Daily    guaiFENesin (MUCINEX) 12 hr tablet 1,200 mg  1,200 mg Oral Q12H GIL    heparin (porcine) subcutaneous injection 7,500 Units  7,500 Units Subcutaneous Q8H [facility]    insulin lispro (HumaLOG) 100 units/mL subcutaneous injection 1-5 Units  1-5 Units Subcutaneous HS    insulin lispro (HumaLOG) 100 units/mL subcutaneous injection 1-6 Units  1-6 Units Subcutaneous TID AC    metoprolol succinate (TOPROL-XL) 24 hr tablet 50 mg  50 mg Oral Q12H    ondansetron (ZOFRAN) injection 4 mg  4 mg Intravenous Q6H PRN    oxybutynin (DITROPAN) tablet 2 5 mg  2 5 mg Oral BID    pantoprazole (PROTONIX) EC tablet 20 mg  20 mg Oral Early Morning    tamsulosin (FLOMAX) capsule 0 4 mg  0 4 mg Oral Daily With Dinner     Medications Prior to Admission   Medication    amLODIPine (NORVASC) 10 mg tablet    aspirin (ECOTRIN LOW STRENGTH) 81 mg EC tablet    carteolol (OCUPRESS) 1 % ophthalmic solution    citalopram (CeleXA) 10 mg tablet    diclofenac (VOLTAREN) 75 mg EC tablet    ferrous sulfate 325 (65 Fe) mg tablet    fluticasone (FLONASE) 50 mcg/act nasal spray    furosemide (LASIX) 40 mg tablet    glimepiride (AMARYL) 1 mg tablet    guaiFENesin (MUCINEX) 600 mg 12 hr tablet    losartan (COZAAR) 100 MG tablet    metoprolol succinate (Toprol XL) 50 mg 24 hr tablet    omeprazole (PriLOSEC) 20 mg delayed release capsule    oxyCODONE-acetaminophen (PERCOCET) 5-325 mg per tablet    oxymetazoline (AFRIN) 0 05 % nasal spray    rosuvastatin (CRESTOR) 20 MG tablet    tamsulosin (FLOMAX) 0 4 mg    trospium chloride (SANCTURA) 20 mg tablet         Counseling / Coordination of Care  Total floor / unit time spent today 20 minutes    Greater than 50% of total time was spent with the patient and / or family counseling and / or coordination of care  ** Please Note: Dragon 360 Dictation voice to text software may have been used in the creation of this document   **

## 2022-05-19 NOTE — ASSESSMENT & PLAN NOTE
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

## 2022-05-19 NOTE — DISCHARGE SUMMARY
[address] [facility]  Discharge- Julian Sanchez 1945, 65 y o  male MRN: [id number]  Unit/Bed#: 2 213-01 Encounter: [id number]  Primary Care Provider: Haley Rebolledo MD   Date and time admitted to hospital: 5/17/2022  9:11 AM    * Shortness of breath  Assessment & Plan  · Patient came to the hospital with substernal chest pain and shortness of breath  Patient also with cough with expectoration  Chest pain is associated with deep breath  · Chest x-ray negative  CT chest with no acute pulmonary disease   · D-dimer is negative  PCT negative, afebrile, no leukocytosis and without radiographic findings of pneumonia will hold any further antibiotic   · Possibly related to pericarditis, he does have hx of this  CRP and ESR are elevated  Echo with thickened pericardium/evidence of pericardial constriction   · 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, cardiology following, recommending Colchicine 0 6 mg BID  Outpt f/u with cardiology in one week  IF renal function stable at that time recommending to start NSAIDs     Close exposure to COVID-19 virus  Assessment & Plan  · Patient reported that 5/16 patient had dinner with his friends and 1 of them tested positive for COVID  · Patient currently COVID negative, though with low grade fever and recurrent pericarditis will re-swab prior to d/c     Stage 3 chronic kidney disease, unspecified whether stage 3a or 3b CKD [facility])  Assessment & Plan  Lab Results   Component Value Date    EGFR 48 05/18/2022    EGFR 47 05/17/2022    EGFR 56 04/20/2022    CREATININE 1 38 (H) 05/18/2022    CREATININE 1 40 (H) 05/17/2022    CREATININE 1 22 04/20/2022   Creatinine 1 4  Close to baseline  Monitor for now    Anemia  Assessment & Plan  · Patient with chronic anemia    Continue with iron supplementation  · Hemoglobin at baseline    Acute pericarditis  Assessment & Plan  · Patient came to the hospital with substernal chest pain which worsens with deep inspiration  · Patient was admitted to the hospital last year with chest pain the and had cardiac catheterization which was nonobstructive coronary artery disease  Felt to be secondary to acute pericarditis  · Serial troponin negative   · Echo: EF 60%, G1DD, thickened pericardium with evidence for pericardial constriction though is not a complete study   · Cardiology consult appreciated  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 as recurrent pericarditis with colchicine 0 6 mg BID  Outpt f/u with cardiology in one week with BMP at that time  If renal function is stable, he can be started on ibuprofen  Will need outpt echo   · Stable for discharge with outpt f/u     Hyperlipidemia  Assessment & Plan  · Continue statin    Morbid obesity with BMI of 40 0-44 9, adult ([address] )  Assessment & Plan  · TLC as outpatient    Obstructive sleep apnea syndrome  Assessment & Plan  · Patient uses CPAP at night    Essential hypertension  Assessment & Plan  · Monitor blood pressure  · Continue with outpatient medications    Type 2 diabetes mellitus with complication [facility])  Assessment & Plan  Lab Results   Component Value Date    HGBA1C 6 7 (H) 04/20/2022       Recent Labs     05/18/22  1116 05/18/22  1620 05/18/22  2133 05/19/22  0656   POCGLU 162* 103 144* 142*       Blood Sugar Average: Last 72 hrs:  (P) 125 [id number]   Patient is on oral hypoglycemic agents as outpatient  Last hemoglobin A1c is 6 7 showing excellent control  Insulin sliding scale for overnight    Monitor blood sugar and adjust insulin dose per blood sugar        Medical Problems             Resolved Problems  Date Reviewed: 5/19/2022   None               Discharging Physician / Practitioner: Marcela Leung PA-C  PCP: Dedra Rivers MD  Admission Date:   Admission Orders (From admission, onward)     Ordered        05/17/22 1246  Inpatient Admission  Once                      Discharge Date: 05/19/22    Consultations During Hospital Stay:  · Cardiology     Procedures Performed: · None    Significant Findings / Test Results:   · Echo with thickened pericardium  · CXR, CT chest, D-dimer, troponin negative     Incidental Findings:   · None     Test Results Pending at Discharge (will require follow up): · None     Outpatient Tests Requested:  · Recommend repeat echo, timing at cardiology discretion     Complications:  None    Reason for Admission: sob/cp     Hospital Course:   Landy Fernandez is a 65 y o  male patient who originally presented to the hospital on 5/17/2022 due to chest pain and shortness of breath that began when he woke up  He described CP radiating to back worse with inspiration  Troponin and EKG negative  CXR, CT chest, d-dimer, PCT negative  Initially yesterday AM he felt better but then had recurrent sx  He does have hx of pericarditis and though atypical his sx did seem possibly related to recurrent pericarditis thus cardiology was consulted  Echo demonstrated thickened pericardium/possible pericardial constriction  Cardiology recommending to treat for recurrent pericarditis with colchicine 0 6 mg BID  He will need to f/u with cardiology in one week with repeat BMP  If renal function stable he will need to be started on NSAIDs  He is stable for discharge with outpt f/u     Please see above list of diagnoses and related plan for additional information  Condition at Discharge: good    Discharge Day Visit / Exam:   Subjective:  Pt has no acute complaints, doing ok today, glad to know he has a diagnosis  Vitals: Blood Pressure: 152/74 (05/19/22 0655)  Pulse: 58 (05/19/22 0655)  Temperature: (!) 100 8 °F (38 2 °C) (05/18/22 2258)  Temp Source: Oral (05/17/22 0932)  Respirations: 18 (05/18/22 1911)  Height: 5' 7" (170 2 cm) (05/18/22 1410)  Weight - Scale: 134 kg (295 lb) (05/18/22 1410)  SpO2: 94 % (05/19/22 0655)  Exam:   Physical Exam  Vitals reviewed  Constitutional:       General: He is not in acute distress       Appearance: He is not ill-appearing or toxic-appearing  HENT:      Head: Normocephalic and atraumatic  Eyes:      Extraocular Movements: Extraocular movements intact  Cardiovascular:      Rate and Rhythm: Normal rate and regular rhythm  Pulmonary:      Effort: Pulmonary effort is normal  No respiratory distress  Breath sounds: Normal breath sounds  Abdominal:      General: Bowel sounds are normal  There is no distension  Palpations: Abdomen is soft  Tenderness: There is no abdominal tenderness  Musculoskeletal:         General: Normal range of motion  Cervical back: Normal range of motion  Neurological:      General: No focal deficit present  Mental Status: He is alert and oriented to person, place, and time  Psychiatric:         Mood and Affect: Mood normal          Behavior: Behavior normal          Thought Content: Thought content normal            Discussion with Family: Patient declined call to   Discharge instructions/Information to patient and family:   See after visit summary for information provided to patient and family  Provisions for Follow-Up Care:  See after visit summary for information related to follow-up care and any pertinent home health orders  Disposition:   Home    Planned Readmission: no     Discharge Statement:  I spent 38 minutes discharging the patient  This time was spent on the day of discharge  I had direct contact with the patient on the day of discharge  Greater than 50% of the total time was spent examining patient, answering all patient questions, arranging and discussing plan of care with patient as well as directly providing post-discharge instructions  Additional time then spent on discharge activities  Discharge Medications:  See after visit summary for reconciled discharge medications provided to patient and/or family        **Please Note: This note may have been constructed using a voice recognition system**

## 2022-05-19 NOTE — ASSESSMENT & PLAN NOTE
· Patient reported that 5/16 patient had dinner with his friends and 1 of them tested positive for COVID  · Patient currently COVID negative, though with low grade fever and recurrent pericarditis will re-swab prior to d/c

## 2022-05-19 NOTE — ASSESSMENT & PLAN NOTE
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

## 2022-05-19 NOTE — ASSESSMENT & PLAN NOTE
Lab Results   Component Value Date    EGFR 48 05/18/2022    EGFR 47 05/17/2022    EGFR 56 04/20/2022    CREATININE 1 38 (H) 05/18/2022    CREATININE 1 40 (H) 05/17/2022    CREATININE 1 22 04/20/2022   Creatinine 1 4  Close to baseline    Monitor for now

## 2022-05-19 NOTE — ASSESSMENT & PLAN NOTE
Lab Results   Component Value Date    HGBA1C 6 7 (H) 04/20/2022       Recent Labs     05/18/22  1116 05/18/22  1620 05/18/22  2133 05/19/22  0656   POCGLU 162* 103 144* 142*       Blood Sugar Average: Last 72 hrs:  (P) 249 [id number]   Patient is on oral hypoglycemic agents as outpatient  Last hemoglobin A1c is 6 7 showing excellent control  Insulin sliding scale for overnight    Monitor blood sugar and adjust insulin dose per blood sugar

## 2022-05-22 LAB
BACTERIA BLD CULT: NORMAL
BACTERIA BLD CULT: NORMAL

## 2022-05-23 ENCOUNTER — TELEPHONE (OUTPATIENT)
Dept: INTERNAL MEDICINE CLINIC | Facility: CLINIC | Age: 77
End: 2022-05-23

## 2022-05-23 ENCOUNTER — APPOINTMENT (OUTPATIENT)
Dept: LAB | Age: 77
End: 2022-05-23
Payer: MEDICARE

## 2022-05-23 ENCOUNTER — OFFICE VISIT (OUTPATIENT)
Dept: INTERNAL MEDICINE CLINIC | Facility: CLINIC | Age: 77
End: 2022-05-23
Payer: MEDICARE

## 2022-05-23 VITALS
SYSTOLIC BLOOD PRESSURE: 130 MMHG | HEIGHT: 67 IN | BODY MASS INDEX: 45.8 KG/M2 | OXYGEN SATURATION: 96 % | DIASTOLIC BLOOD PRESSURE: 64 MMHG | HEART RATE: 71 BPM | WEIGHT: 291.8 LBS | TEMPERATURE: 99 F

## 2022-05-23 DIAGNOSIS — I31.9 PERICARDITIS: ICD-10-CM

## 2022-05-23 DIAGNOSIS — I30.9 ACUTE PERICARDITIS, UNSPECIFIED TYPE: Primary | ICD-10-CM

## 2022-05-23 DIAGNOSIS — N18.30 STAGE 3 CHRONIC KIDNEY DISEASE, UNSPECIFIED WHETHER STAGE 3A OR 3B CKD (HCC): ICD-10-CM

## 2022-05-23 DIAGNOSIS — I10 ESSENTIAL HYPERTENSION: ICD-10-CM

## 2022-05-23 DIAGNOSIS — E11.8 TYPE 2 DIABETES MELLITUS WITH COMPLICATION (HCC): ICD-10-CM

## 2022-05-23 DIAGNOSIS — F03.90 DEMENTIA WITHOUT BEHAVIORAL DISTURBANCE, UNSPECIFIED DEMENTIA TYPE (HCC): ICD-10-CM

## 2022-05-23 DIAGNOSIS — E66.01 MORBID OBESITY WITH BMI OF 40.0-44.9, ADULT (HCC): ICD-10-CM

## 2022-05-23 DIAGNOSIS — R74.8 ELEVATED ALKALINE PHOSPHATASE MEASUREMENT: ICD-10-CM

## 2022-05-23 PROBLEM — I30.0 ACUTE IDIOPATHIC PERICARDITIS: Status: RESOLVED | Noted: 2021-01-22 | Resolved: 2022-05-23

## 2022-05-23 LAB
ANION GAP SERPL CALCULATED.3IONS-SCNC: 8 MMOL/L (ref 4–13)
BUN SERPL-MCNC: 15 MG/DL (ref 5–25)
CALCIUM SERPL-MCNC: 9 MG/DL (ref 8.3–10.1)
CHLORIDE SERPL-SCNC: 107 MMOL/L (ref 100–108)
CO2 SERPL-SCNC: 24 MMOL/L (ref 21–32)
CREAT SERPL-MCNC: 1.27 MG/DL (ref 0.6–1.3)
GFR SERPL CREATININE-BSD FRML MDRD: 53 ML/MIN/1.73SQ M
GLUCOSE P FAST SERPL-MCNC: 145 MG/DL (ref 65–99)
POTASSIUM SERPL-SCNC: 4.1 MMOL/L (ref 3.5–5.3)
SODIUM SERPL-SCNC: 139 MMOL/L (ref 136–145)

## 2022-05-23 PROCEDURE — 99496 TRANSJ CARE MGMT HIGH F2F 7D: CPT | Performed by: INTERNAL MEDICINE

## 2022-05-23 PROCEDURE — 80048 BASIC METABOLIC PNL TOTAL CA: CPT

## 2022-05-23 PROCEDURE — 36415 COLL VENOUS BLD VENIPUNCTURE: CPT

## 2022-05-23 RX ORDER — LIDOCAINE HYDROCHLORIDE 20 MG/ML
SOLUTION OROPHARYNGEAL
COMMUNITY
Start: 2022-05-09 | End: 2022-06-29

## 2022-05-23 NOTE — ASSESSMENT & PLAN NOTE
Lab Results   Component Value Date    EGFR 53 05/23/2022    EGFR 48 05/18/2022    EGFR 47 05/17/2022    CREATININE 1 27 05/23/2022    CREATININE 1 38 (H) 05/18/2022    CREATININE 1 40 (H) 05/17/2022   Chronic stage III kidney disease    GFR 53 cc/minute would prefer not to place the patient on nonsteroidal anti-inflammatories due to his underlying kidney disease and diabetes avoid dehydration and continue to monitor kidney function

## 2022-05-23 NOTE — PROGRESS NOTES
Assessment/Plan:    Type 2 diabetes mellitus with complication (HCC)  Recommend continuation of current diabetic management Amaryl 1 mg 3 tablets daily diet should be low in concentrated sugars and carbohydrates  Weight loss would be very beneficial  Lab Results   Component Value Date    HGBA1C 6 7 (H) 04/20/2022       Essential hypertension  Hypertension remains adequately controlled continue current medication Cozaar 100 mg daily and metoprolol succinate 50 mg every 12 hours  Interstitial lung disease ([facility] [address] )  Interstitial lung disease present on previous CT scan unchanged from 2021 consistent with fibrosis of the lung tissue appreciate previous pulmonary consultation  Acute pericarditis  Recently diagnosed during hospitalization acute pericarditis with some indication of possible restrictive pericardium on echocardiogram   This is the patient's 2nd episode of pericarditis he is currently feeling better with less pain on colchicine dosing twice a day  Would prefer that we not use a nonsteroidal anti inflammatory to manage his pericarditis due to his underlying stage 3 chronic kidney disease  Recommend follow-up with Cardiology  He does not seem to have a regular cardiologist I have referred him on to 1 of the [facility] cardiologist to continue his management of pericarditis follow-up echocardiogram indicated to be appropriate on his notes from hospitalization to evaluate inferior vena cava and further evaluate possible restrictive pericarditis  Dementia [facility])  Patient and family aware of the patient's gradual cognitive decline  I recommend trial of Alzheimer medication although I do believe that a good deal of his dementia is vascular  Patient declined trial of Alzheimer medication after our discussion today  Discussed with family will try to talk to him regarding a trial of medication      Stage 3 chronic kidney disease, unspecified whether stage 3a or 3b CKD ([facility] [address] )  Lab Results   Component Value Date    EGFR 53 05/23/2022    EGFR 48 05/18/2022    EGFR 47 05/17/2022    CREATININE 1 27 05/23/2022    CREATININE 1 38 (H) 05/18/2022    CREATININE 1 40 (H) 05/17/2022   Chronic stage III kidney disease  GFR 53 cc/minute would prefer not to place the patient on nonsteroidal anti-inflammatories due to his underlying kidney disease and diabetes avoid dehydration and continue to monitor kidney function    Morbid obesity with BMI of 40 0-44 9, adult ([facility] [address] )  Morbid obesity is a major factor in the patient's current physical state  Of he has limited exercise capability shortness of breath and fatigue on exertion  Has been unable to lose any significant weight over the past several years  Anemia  Chronic low-grade anemia reviewed today suspect that this may be secondary to his chronic kidney disease  Previous evaluation of stool indicated no active GI blood loss Q68 iron and folic acid are all normal    TCM Call (since 4/22/2022)     Date and time call was made  5/19/2022  4:39 PM    Hospital care reviewed  Records reviewed    Patient was hospitialized at  [facility]    Date of Admission  05/17/22    Date of discharge  05/19/22    Diagnosis  Shortness of breath    Disposition  Home    Were the patients medications reviewed and updated  Yes    Current Symptoms  None      TCM Call (since 4/22/2022)     Post hospital issues  None    Should patient be enrolled in anticoag monitoring? No    Scheduled for follow up?   Yes    Did you obtain your prescribed medications  Yes    Do you need help managing your prescriptions or medications  No    Is transportation to your appointment needed  No    I have advised the patient to call PCP with any new or worsening symptoms  [id number] members    Support System  None    Are you recieving any outpatient services  No    Are you recieving home care services  No    Are you using any community resources  No    Current waiver services No    Have you fallen in the last 12 months  No    Interperter language line needed  No    Counseling  Patient           Diagnoses and all orders for this visit:    Acute infective pericarditis, unspecified infectious etiology  -     Ambulatory Referral to Cardiology; Future  -     Lyme Antibody Profile with reflex to WB; Future    Elevated alkaline phosphatase measurement  -     Alkaline phosphatase, isoenzymes; Future    Other orders  -     Lidocaine Viscous HCl (XYLOCAINE) 2 % mucosal solution; TAKE 15 ML BY MOUTH SWISH AND SPIT OUT EVERY 6 HOURS AS NEEDED        Subjective:      Patient ID: Miguel Kirby is a 65 y o  male  This 16-year-old gentleman returns today for a transition of care visit  He was recently hospitalized at [facility] from 5/17-5/19/2022  His diagnosis at the time of discharge was chest pain precipitated by acute pericarditis  This patient also was treated for pericarditis in January of 2021  He presented initially with chest discomfort and shortness of breath  Evaluation revealed no evidence of pneumonia nor any evidence of pulmonary embolism  CRP and sed rate were both noted to be elevated echocardiogram confirmed the presence of pericarditis along with a thickened pericardium  He was started on colchicine by Cardiology during hospitalization  Discussion regarding possible conversion to nonsteroidal anti inflammatory during the course of hospitalization  I am concerned about the potential for long-term impact on his kidney function with nonsteroidal anti-inflammatories when prefer that we avoid their use given his chronic stage 3 kidney disease  The nature of his chronic kidney disease is related to multi factors including advancing age chronic hypertension and type 2 diabetes  His GFR runs between 45 and 60 over the past year  Patient continues to have a low-grade anemia hemoglobin running in the 11 range  No evidence of active bleeding on stool testing previously  E54 and folic acid values are not deficient  Suspect that his low-grade anemia could be related to chronic low-grade renal insufficiency  White count and platelet counts remain stable and normal     Patient and family have expressed concern about increased forgetfulness of suggestive of dementia diagnosis  Previous MRI scan of the brain did confirm the presence of vascular disease of the brain  Discussion with the patient's sister today who is a physician indicates that the family would like to see how he does on medication for Alzheimer's disease  Discussion with the patient today about a trial on these medications  He is reluctant to add additional medications to his current medication list   Will try to discuss this again when he returns for his next visit  Chronic elevation of alkaline phosphatase reviewed today believed to be bone in nature liver enzymes have remained normal isoenzyme assay has been requested  The following portions of the patient's history were reviewed and updated as appropriate:   He  has a past medical history of Anxiety, Broken teeth, Cancer ([facility] ), Chipped tooth, Chronic pain disorder, Claustrophobia, Colon polyps, CPAP (continuous positive airway pressure) dependence, Depression, Diabetes mellitus ([facility] [address] ), Dyslipidemia, Esophageal polyp, GERD (gastroesophageal reflux disease), Glaucoma, Headache, Hyperlipidemia, Hypertension, Low back pain, Morbid obesity with BMI of 45 0-49 9, adult ([facility] [address] ), Neck pain, Numbness and tingling in left arm, OAB (overactive bladder), Paroxysmal supraventricular tachycardia ([address] ), Pericarditis (01/2021), Shortness of breath, Sleep apnea, Squamous cell skin cancer (10/19/2020), and Tinnitus    He   Patient Active Problem List    Diagnosis Date Noted    Close exposure to COVID-19 virus 05/17/2022    Heart failure with preserved left ventricular function (HFpEF) ([facility] [address] ) 03/03/2022    Interstitial lung disease ([facility] [address] ) 02/15/2022    Bilateral carotid artery disease, unspecified type ([place] [address] ) 02/03/2022    Stage 3 chronic kidney disease, unspecified whether stage 3a or 3b CKD ([place] [address] ) 02/03/2022    Nasal congestion 02/01/2022    Chronic back pain 01/24/2022    Shoulder pain, bilateral 01/24/2022    Bilateral myofascial pain 01/24/2022    Decreased diffusion capacity 12/01/2021    Dementia ([place] [address] ) 11/10/2021    Flank pain 11/10/2021    Bilateral carpal tunnel syndrome 09/24/2021    Anemia 08/23/2021    Shortness of breath 08/16/2021    Partial seizure ([place] [address] )     Ulnar nerve entrapment 06/21/2021    Facet arthropathy, lumbosacral 05/05/2021    Sacroiliitis ([place] [address] ) 03/15/2021    Lumbar spondylosis 03/15/2021    Adenomatous polyp of transverse colon 02/23/2021    Hyperlipidemia 01/10/2021    Acute pericarditis 01/10/2021    Fecal incontinence 03/25/2020    BPH with urinary obstruction 01/23/2020    Onychomycosis 11/25/2019    Urgency incontinence 11/20/2019    Mood disorder ([place] [address] ) 08/14/2019    Hypokalemia 07/24/2019    Palpitations 09/17/2018    Obstructive sleep apnea syndrome 06/04/2018    Morbid obesity with BMI of 40 0-44 9, adult ([place] [address] ) 06/04/2018    Mood disturbance 06/04/2018    Gastroesophageal reflux disease 06/04/2018    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    Type 2 diabetes mellitus with complication ([place] [address] ) 69/13/8835    Essential hypertension 01/29/2018    Peripheral neuropathy 01/29/2018    Chronic right-sided low back pain without sciatica      He  has a past surgical history that includes Cataract extraction (Bilateral); Knee arthroscopy (Bilateral); Excision basal cell carcinoma; Anoscopy; Rectal biopsy; Mouth surgery; pr esophagogastroduodenoscopy transoral diagnostic (N/A, 2/13/2019); pr colonoscopy flx dx w/collj spec when pfrmd (N/A, 2/13/2019); Mohs surgery (11/11/2020); Joint replacement; Back surgery (1983); Colonoscopy; EGD; Cystoscopy; and Spine surgery (1983)    His family history includes Alzheimer's disease in his mother; Dementia in his mother; Diabetes in his mother and paternal grandmother; Esophageal cancer in his paternal grandfather; Hypertension in his father; Nichol Adan Hypertension in his father  He  reports that he quit smoking about 39 years ago  He has never used smokeless tobacco  He reports current alcohol use of about 14 0 standard drinks of alcohol per week  He reports that he does not use drugs  Current Outpatient Medications   Medication Sig Dispense Refill    amLODIPine (NORVASC) 10 mg tablet TAKE 1 TABLET BY MOUTH EVERY DAY (Patient taking differently: 10 mg every morning) 90 tablet 3    aspirin (ECOTRIN LOW STRENGTH) 81 mg EC tablet Take 1 tablet (81 mg total) by mouth daily 30 tablet 0    carteolol (OCUPRESS) 1 % ophthalmic solution INSTILL 1 DROP IN EACH EYE TWO TIMES DAILY      citalopram (CeleXA) 10 mg tablet TAKE 1 TABLET BY MOUTH EVERY DAY 90 tablet 3    colchicine (COLCRYS) 0 6 mg tablet Take 1 tablet (0 6 mg total) by mouth in the morning and 1 tablet (0 6 mg total) in the evening   60 tablet 0    ferrous sulfate 325 (65 Fe) mg tablet Take 1 tablet (325 mg total) by mouth daily with breakfast 90 tablet 3    fluticasone (FLONASE) 50 mcg/act nasal spray 1 spray into each nostril daily 16 g 4    furosemide (LASIX) 40 mg tablet Take 1 tablet (40 mg total) by mouth daily 90 tablet 3    glimepiride (AMARYL) 1 mg tablet TAKE 3 TABLETS BY MOUTH EVERY DAY WITH BREAKFAST 270 tablet 3    guaiFENesin (MUCINEX) 600 mg 12 hr tablet Take 1,200 mg by mouth every 12 (twelve) hours      Lidocaine Viscous HCl (XYLOCAINE) 2 % mucosal solution TAKE 15 ML BY MOUTH SWISH AND SPIT OUT EVERY 6 HOURS AS NEEDED      losartan (COZAAR) 100 MG tablet TAKE 1 TABLET BY MOUTH EVERY DAY 90 tablet 3    metoprolol succinate (Toprol XL) 50 mg 24 hr tablet Take 1 tablet (50 mg total) by mouth every 12 (twelve) hours 60 tablet 5    omeprazole (PriLOSEC) 20 mg delayed release capsule TAKE 1 CAPSULE BY MOUTH EVERY DAY 90 capsule 3    oxymetazoline (AFRIN) 0 05 % nasal spray 2 sprays by Each Nare route every 12 (twelve) hours as needed for congestion        rosuvastatin (CRESTOR) 20 MG tablet TAKE 1 TABLET BY MOUTH EVERY DAY 90 tablet 3    tamsulosin (FLOMAX) 0 4 mg Take 1 capsule (0 4 mg total) by mouth daily with dinner 90 capsule 3    trospium chloride (SANCTURA) 20 mg tablet Take 1 tablet (20 mg total) by mouth 2 (two) times a day 180 tablet 3     No current facility-administered medications for this visit       Review of Systems   Constitutional: Positive for fatigue  Respiratory: Positive for shortness of breath  Neurological:        Signs of cognitive deficit with impaired short-term memory difficulty concentrating   All other systems reviewed and are negative  Objective:      /64   Pulse 71   Temp 99 °F (37 2 °C)   Ht 5' 7" (1 702 m)   Wt 132 kg (291 lb 12 8 oz)   SpO2 96%   BMI 45 70 kg/m²          Physical Exam  Constitutional:       General: He is not in acute distress  Appearance: He is well-developed  He is not ill-appearing  HENT:      Head: Normocephalic  Right Ear: Hearing and external ear normal       Left Ear: Hearing and external ear normal       Nose: Nose normal    Eyes:      Conjunctiva/sclera: Conjunctivae normal       Pupils: Pupils are equal, round, and reactive to light  Neck:      Thyroid: No thyromegaly  Cardiovascular:      Rate and Rhythm: Normal rate and regular rhythm  Heart sounds: Normal heart sounds, S1 normal and S2 normal  No murmur heard  Pulmonary:      Effort: Pulmonary effort is normal       Breath sounds: Normal breath sounds  No wheezing, rhonchi or rales  Abdominal:      General: Bowel sounds are normal       Palpations: Abdomen is soft  Musculoskeletal:         General: Normal range of motion  Cervical back: No rigidity or tenderness  Lymphadenopathy:      Cervical: No cervical adenopathy     Skin:     General: Skin is warm and dry  Neurological:      Mental Status: He is alert and oriented to person, place, and time  Deep Tendon Reflexes: Reflexes are normal and symmetric  Psychiatric:         Behavior: Behavior normal          Thought Content:  Thought content normal          Judgment: Judgment normal

## 2022-05-23 NOTE — ASSESSMENT & PLAN NOTE
Interstitial lung disease present on previous CT scan unchanged from 2021 consistent with fibrosis of the lung tissue appreciate previous pulmonary consultation

## 2022-05-23 NOTE — ASSESSMENT & PLAN NOTE
Recommend continuation of current diabetic management Amaryl 1 mg 3 tablets daily diet should be low in concentrated sugars and carbohydrates    Weight loss would be very beneficial  Lab Results   Component Value Date    HGBA1C 6 7 (H) 04/20/2022

## 2022-05-23 NOTE — TELEPHONE ENCOUNTER
Dr Chato Strange called to speak to you about her brother Chas Pfeiffer to help his family  Her number is [phone]

## 2022-05-23 NOTE — TELEPHONE ENCOUNTER
Call was returned to the patient's sister who is a physician we reviewed his recent hospitalization and current medical conditions

## 2022-05-23 NOTE — ASSESSMENT & PLAN NOTE
Chronic low-grade anemia reviewed today suspect that this may be secondary to his chronic kidney disease    Previous evaluation of stool indicated no active GI blood loss P97 iron and folic acid are all normal

## 2022-05-23 NOTE — ASSESSMENT & PLAN NOTE
Morbid obesity is a major factor in the patient's current physical state  Of he has limited exercise capability shortness of breath and fatigue on exertion  Has been unable to lose any significant weight over the past several years

## 2022-05-23 NOTE — ASSESSMENT & PLAN NOTE
Recently diagnosed during hospitalization acute pericarditis with some indication of possible restrictive pericardium on echocardiogram   This is the patient's 2nd episode of pericarditis he is currently feeling better with less pain on colchicine dosing twice a day  Would prefer that we not use a nonsteroidal anti inflammatory to manage his pericarditis due to his underlying stage 3 chronic kidney disease  Recommend follow-up with Cardiology  He does not seem to have a regular cardiologist I have referred him on to 1 of the [facility] cardiologist to continue his management of pericarditis follow-up echocardiogram indicated to be appropriate on his notes from hospitalization to evaluate inferior vena cava and further evaluate possible restrictive pericarditis

## 2022-05-23 NOTE — ASSESSMENT & PLAN NOTE
Patient and family aware of the patient's gradual cognitive decline  I recommend trial of Alzheimer medication although I do believe that a good deal of his dementia is vascular  Patient declined trial of Alzheimer medication after our discussion today  Discussed with family will try to talk to him regarding a trial of medication

## 2022-05-23 NOTE — ASSESSMENT & PLAN NOTE
Hypertension remains adequately controlled continue current medication Cozaar 100 mg daily and metoprolol succinate 50 mg every 12 hours

## 2022-05-25 ENCOUNTER — TELEPHONE (OUTPATIENT)
Dept: INTERNAL MEDICINE CLINIC | Facility: CLINIC | Age: 77
End: 2022-05-25

## 2022-05-25 NOTE — TELEPHONE ENCOUNTER
Pt called  He was previously scheduled to see a NP at [facility] on 5/26  He just scheduled an appointment with Dr Hollie Peraza (cardiologist) for 6/21  Does he still need to see the NP on 5/26?

## 2022-05-25 NOTE — PROGRESS NOTES
Cardiology Follow Up    Sophie Mackey  1945  [id number]  [address]  [facility]  [address]  [phone]    1  Other acute pericarditis  Ambulatory Referral to Cardiology   2  Primary hypertension     3  Chronic diastolic heart failure ([facility] [address] )     4  Mixed hyperlipidemia     5  Stage 3b chronic kidney disease ([facility] [address] )     6  Type 2 diabetes mellitus with complication (HCC)     7  Class 3 severe obesity without serious comorbidity with body mass index (BMI) of 40 0 to 44 9 in adult, unspecified obesity type ([facility] [address] )     8  Anemia, unspecified type     9  ANALY on CPAP         Interval History:   Mr Brenner Case was admitted to [facility] on 5/17 - 5/19/22 with shortness of breath and CP  EKG negative  Chest x-ray CT of the chest and D-dimers  Negative  CRP 27 6 , ESR 77, Cardiology was consulted  5/18/22 TTE  Left ventricle cavity size normal wall thickness mildly increased mild concentric hypertrophy LVEF 75% diastolic function mildly abnormal consistent with grade 1 abnormal relaxation  Left atrial filling pressure is elevated  Right ventricular cavity size systolic function normal   Left atrium mildly dilated right atrium normal in size  Aortic valve trileaflet no evidence of regurgitation there is aortic sclerosis  No evidence of mitral valve regurgitation or stenosis  No evidence of tricuspid valve stenosis or regurgitation  Systolic pressure moderately elevated estimated right ventricular systolic pressure 64 mmHg  No evidence of pulmonic valve regurgitation or stenosis  Pericardium is thickened evidence of pericardial constriction the evidence for constriction include septal tissue velocity with evidence of annulus re versus and septal balance  Cardiology recommended treatment with colchicine 0 6 mg b i d  for 6 months due to recurrent pericarditis  Ibuprofen 600 mg p o  t i d  taper over 4 weeks  NSAID were no started due to CKD  It was suggested he undergo a Follow-up limited echo with inclusion of septal lateral tissue Doppler M-mode of 5 S mitral tricuspid inflow and hepatic vein Doppler with respirometer, IVC assessment  With Cardiology and repeat BMP in a week due to NSAIDS        5/23/22  Sodium 139 potassium 4 1 BUN 15 creatinine 1 27 GFR 53     Mr Dhruv King presents to our office for a follow up visit  He is accompanied by his wife  Malvin deeies CP  He admits to shortness of breath and fatigue no worse from over one year ago when he started with pericarditis  Medical History   Myopericarditis 1/2021   Treated with colchicine and ibuprofen with slow taper over 3 months    TTE August 21 similar findings evidence of aortic sclerosis and increased velocities to  Hypertension  Hyperlipidemia  HFpEF LVEF 60%, grade 1 abnormal relaxation   DM2 HgbA1C 6 7 on 4/20/22  CKD III baseline creat   Chronic anemia   Obesity BMI 45 70  ANALY on CPAP   Previous tobacco abuse   PFT with mild restriction and moderately reduced DLCO       LHC minor luminal irregularities of LAD and RCA, otherwise normal     Patient Active Problem List   Diagnosis    Chronic right-sided low back pain without sciatica    Type 2 diabetes mellitus with complication ([facility] [address] )    Essential hypertension    Peripheral neuropathy    Isolated proteinuria with morphologic lesion    Obstructive sleep apnea syndrome    Morbid obesity with BMI of 40 0-44 9, adult (HCC)    Mood disturbance    Gastroesophageal reflux disease    Palpitations    Hypokalemia    Mood disorder ([facility])    Urgency incontinence    Onychomycosis    BPH with urinary obstruction    Fecal incontinence    Hyperlipidemia    Acute pericarditis    Adenomatous polyp of transverse colon    Sacroiliitis (HCC)    Lumbar spondylosis    Facet arthropathy, lumbosacral    Ulnar nerve entrapment    Partial seizure (HCC)    Shortness of breath    Anemia    Bilateral carpal tunnel syndrome    Dementia (HCC)    Flank pain    Decreased diffusion capacity    Chronic back pain    Shoulder pain, bilateral    Bilateral myofascial pain    Nasal congestion    Bilateral carotid artery disease, unspecified type (HCC)    Stage 3 chronic kidney disease, unspecified whether stage 3a or 3b CKD ([facility]    Interstitial lung disease ([facility] )    Heart failure with preserved left ventricular function (HFpEF) ([facility] )    Close exposure to COVID-19 virus     Past Medical History:   Diagnosis Date    Anxiety     Broken teeth     Cancer ([facility] )     basal cell-right cheek,left breast, right side of nose, left forehead    Chipped tooth     upper front-right    Chronic pain disorder     pain with standing-herniated disc lower back, upper back pain    Claustrophobia     Colon polyps     CPAP (continuous positive airway pressure) dependence     Depression     Diabetes mellitus (HCC)     Dyslipidemia     Esophageal polyp     GERD (gastroesophageal reflux disease)     Glaucoma     Headache     Hyperlipidemia     Hypertension     Low back pain     Morbid obesity with BMI of 45 0-49 9, adult (HCC)     Neck pain     Numbness and tingling in left arm     resolved 2/20/17 / numbness and tingling left side last assessed 3/23/16    OAB (overactive bladder)     Paroxysmal supraventricular tachycardia (HCC)     Pericarditis 01/2021    Shortness of breath     with exertion since 2020    Sleep apnea     wears cpap     Squamous cell skin cancer 10/19/2020    Right Nasal Ala, Dr Turcios    Tinnitus      Social History     Socioeconomic History    Marital status: /Civil Union     Spouse name: Not on file    Number of children: Not on file    Years of education: Not on file    Highest education level: Not on file   Occupational History    Occupation: retired   Tobacco Use    Smoking status: Former Smoker     Quit date: 1/24/1983     Years since quittin 3    Smokeless tobacco: Never Used   Vaping Use    Vaping Use: Never used   Substance and Sexual Activity    Alcohol use: Yes     Alcohol/week: 14 0 standard drinks     Types: 14 Shots of liquor per week     Comment: "couple of drinks each night"    Drug use: No    Sexual activity: Not Currently     Partners: Female   Other Topics Concern    Not on file   Social History Narrative    Not on file     Social Determinants of Health     Financial Resource Strain: Not on file   Food Insecurity: No Food Insecurity    Worried About Running Out of Food in the Last Year: Never true    Vinnie of Food in the Last Year: Never true   Transportation Needs: No Transportation Needs    Lack of Transportation (Medical): No    Lack of Transportation (Non-Medical): No   Physical Activity: Not on file   Stress: Not on file   Social Connections: Not on file   Intimate Partner Violence: Not on file   Housing Stability: Low Risk     Unable to Pay for Housing in the Last Year: No    Number of Places Lived in the Last Year: 1    Unstable Housing in the Last Year: No      Family History   Problem Relation Age of Onset    Alzheimer's disease Mother     Diabetes Mother     Dementia Mother    Isreal Villarreal Hypertension Father     Hypertension Father     Diabetes Paternal Grandmother     Esophageal cancer Paternal Grandfather      Past Surgical History:   Procedure Laterality Date    ANOSCOPY      for polyp removal    BACK SURGERY  1983    disc surgery lower back    BASAL CELL CARCINOMA EXCISION      right cheek    CATARACT EXTRACTION Bilateral     COLONOSCOPY      CYSTOSCOPY      EGD      JOINT REPLACEMENT      bilateral knees    KNEE ARTHROSCOPY Bilateral     MOHS SURGERY  2020    [facility] Right Nasal Ala, Dr Gera Moreira      tooth extraction    SC COLONOSCOPY FLX DX W/COLLJ SPEC WHEN PFRMD N/A 2019    Procedure: COLONOSCOPY;  Surgeon: Nicolas Mendoza MD;  Location: [facility];   Service: Gastroenterology    WV ESOPHAGOGASTRODUODENOSCOPY TRANSORAL DIAGNOSTIC N/A 2/13/2019    Procedure: ESOPHAGOGASTRODUODENOSCOPY (EGD); Surgeon: Thom Euceda MD;  Location: [facility];   Service: Gastroenterology    RECTAL BIOPSY      SPINE SURGERY  1983       Current Outpatient Medications:     amLODIPine (NORVASC) 10 mg tablet, TAKE 1 TABLET BY MOUTH EVERY DAY (Patient taking differently: 10 mg every morning), Disp: 90 tablet, Rfl: 3    aspirin (ECOTRIN LOW STRENGTH) 81 mg EC tablet, Take 1 tablet (81 mg total) by mouth daily, Disp: 30 tablet, Rfl: 0    carteolol (OCUPRESS) 1 % ophthalmic solution, INSTILL 1 DROP IN [facility] EYE TWO TIMES DAILY, Disp: , Rfl:     citalopram (CeleXA) 10 mg tablet, TAKE 1 TABLET BY MOUTH EVERY DAY, Disp: 90 tablet, Rfl: 3    colchicine (COLCRYS) 0 6 mg tablet, Take 1 tablet (0 6 mg total) by mouth in the morning and 1 tablet (0 6 mg total) in the evening , Disp: 60 tablet, Rfl: 0    ferrous sulfate 325 (65 Fe) mg tablet, Take 1 tablet (325 mg total) by mouth daily with breakfast, Disp: 90 tablet, Rfl: 3    fluticasone (FLONASE) 50 mcg/act nasal spray, 1 spray into each nostril daily, Disp: 16 g, Rfl: 4    furosemide (LASIX) 40 mg tablet, Take 1 tablet (40 mg total) by mouth daily, Disp: 90 tablet, Rfl: 3    glimepiride (AMARYL) 1 mg tablet, TAKE 3 TABLETS BY MOUTH EVERY DAY WITH BREAKFAST, Disp: 270 tablet, Rfl: 3    guaiFENesin (MUCINEX) 600 mg 12 hr tablet, Take 1,200 mg by mouth every 12 (twelve) hours, Disp: , Rfl:     Lidocaine Viscous HCl (XYLOCAINE) 2 % mucosal solution, TAKE 15 ML BY MOUTH SWISH AND SPIT OUT EVERY 6 HOURS AS NEEDED, Disp: , Rfl:     losartan (COZAAR) 100 MG tablet, TAKE 1 TABLET BY MOUTH EVERY DAY, Disp: 90 tablet, Rfl: 3    metoprolol succinate (Toprol XL) 50 mg 24 hr tablet, Take 1 tablet (50 mg total) by mouth every 12 (twelve) hours, Disp: 60 tablet, Rfl: 5    omeprazole (PriLOSEC) 20 mg delayed release capsule, TAKE 1 CAPSULE BY MOUTH EVERY DAY, Disp: 90 capsule, Rfl: 3    oxymetazoline (AFRIN) 0 05 % nasal spray, 2 sprays by Each Nare route every 12 (twelve) hours as needed for congestion  , Disp: , Rfl:     rosuvastatin (CRESTOR) 20 MG tablet, TAKE 1 TABLET BY MOUTH EVERY DAY, Disp: 90 tablet, Rfl: 3    tamsulosin (FLOMAX) 0 4 mg, Take 1 capsule (0 4 mg total) by mouth daily with dinner, Disp: 90 capsule, Rfl: 3    trospium chloride (SANCTURA) 20 mg tablet, Take 1 tablet (20 mg total) by mouth 2 (two) times a day, Disp: 180 tablet, Rfl: 3  Allergies   Allergen Reactions    Sulfa Antibiotics Other (See Comments)     Nausea         Labs:  Appointment on 05/23/2022   Component Date Value    Sodium 05/23/2022 139     Potassium 05/23/2022 4 1     Chloride 05/23/2022 107     CO2 05/23/2022 24     ANION GAP 05/23/2022 8     BUN 05/23/2022 15     Creatinine 05/23/2022 1 27     Glucose, Fasting 05/23/2022 145 (A)    Calcium 05/23/2022 9 0     eGFR 05/23/2022 53    Admission on 05/17/2022, Discharged on 05/19/2022   Component Date Value    WBC 05/17/2022 9 28     RBC 05/17/2022 3 32 (A)    Hemoglobin 05/17/2022 11 1 (A)    Hematocrit 05/17/2022 33 8 (A)    MCV 05/17/2022 102 (A)    MCH 05/17/2022 33 4     MCHC 05/17/2022 32 8     RDW 05/17/2022 14 2     MPV 05/17/2022 9 4     Platelets 96/23/8190 249     nRBC 05/17/2022 0     Neutrophils Relative 05/17/2022 74     Immat GRANS % 05/17/2022 1     Lymphocytes Relative 05/17/2022 12 (A)    Monocytes Relative 05/17/2022 10     Eosinophils Relative 05/17/2022 2     Basophils Relative 05/17/2022 1     Neutrophils Absolute 05/17/2022 6 98     Immature Grans Absolute 05/17/2022 0 07     Lymphocytes Absolute 05/17/2022 1 13     Monocytes Absolute 05/17/2022 0 89     Eosinophils Absolute 05/17/2022 0 15     Basophils Absolute 05/17/2022 0 06     Sodium 05/17/2022 137     Potassium 05/17/2022 4 1     Chloride 05/17/2022 109 (A)    CO2 05/17/2022 27     ANION GAP 05/17/2022 1 (A)  BUN 05/17/2022 21     Creatinine 05/17/2022 1 40 (A)    Glucose 05/17/2022 178 (A)    Calcium 05/17/2022 9 0     Corrected Calcium 05/17/2022 9 9     AST 05/17/2022 34     ALT 05/17/2022 40     Alkaline Phosphatase 05/17/2022 151 (A)    Total Protein 05/17/2022 7 4     Albumin 05/17/2022 2 9 (A)    Total Bilirubin 05/17/2022 0 26     eGFR 05/17/2022 47     hs TnI 0hr 05/17/2022 5     hs TnI 2hr 05/17/2022 4     Delta 2hr hsTnI 05/17/2022 -1     D-Dimer, Quant 05/17/2022 0 29     hs TnI 4hr 05/17/2022 5     Delta 4hr hsTnI 05/17/2022 0     Blood Culture 05/17/2022 No Growth After 5 Days   Blood Culture 05/17/2022 No Growth After 5 Days       Procalcitonin 05/17/2022 0 12     SARS-CoV-2 05/17/2022 Negative     INFLUENZA A PCR 05/17/2022 Negative     INFLUENZA B PCR 05/17/2022 Negative     RSV PCR 05/17/2022 Negative     Ventricular Rate 05/17/2022 61     Atrial Rate 05/17/2022 75     MD Interval 05/17/2022 200     QRSD Interval 05/17/2022 84     QT Interval 05/17/2022 406     QTC Interval 05/17/2022 408     P Axis 05/17/2022 27     QRS Axis 05/17/2022 17     T Wave Axis 05/17/2022 28     Procalcitonin 05/17/2022 0 10     Strep pneumoniae antigen* 05/17/2022 Negative     Legionella Urinary Antig* 05/17/2022 Negative     Platelets 87/66/7690 278     MPV 05/17/2022 10 0     POC Glucose 05/17/2022 106     AV area peak phillip 05/18/2022 2 0     LA size 05/18/2022 4 1     Aortic valve mean veloci* 05/18/2022 12 70     Triscuspid Valve Regurgi* 05/18/2022 56 0     Tricuspid valve peak reg* 05/18/2022 3 75     LVPWd 05/18/2022 1 30     Left Atrium Area-systoli* 05/18/2022 34 4     MV E' Tissue Velocity Se* 05/18/2022 10     MV E' Tissue Velocity La* 05/18/2022 7     TR Peak Phillip 05/18/2022 3 8     IVSd 05/18/2022 7 16     LV DIASTOLIC VOLUME (MOD* 38/61/9200 218     LEFT VENTRICLE SYSTOLIC * 25/09/1962 248     Left ventricular stroke * 05/18/2022 114 00     A4C EF 05/18/2022 71     LVIDd 05/18/2022 6 50     IVS 05/18/2022 1 3     LVIDS 05/18/2022 4 70     FS 05/18/2022 28     Ao root 05/18/2022 2 80     RVID d 05/18/2022 4 1     LVOT mn grad 05/18/2022 3 0     AV area by cont VTI 05/18/2022 2 2     AV mean gradient 05/18/2022 7     AV LVOT peak gradient 05/18/2022 5     MV valve area p 1/2 meth* 05/18/2022 3 86     E wave deceleration time 05/18/2022 196     LVOT diameter 05/18/2022 2 0     LVOT peak phillip 05/18/2022 1 11     LVOT peak VTI 05/18/2022 24 67     Ao peak phillip retrograde 05/18/2022 1 78     Ao VTI 05/18/2022 35 96     LVOT stroke volume 05/18/2022 77 46     AV peak gradient 05/18/2022 13     MV Peak E Phillip 05/18/2022 125     MV Peak A Phillip 05/18/2022 0 78     MANFRED A4C 05/18/2022 28 3     RAA A4C 05/18/2022 27 2     MV stenosis pressure 1/2* 05/18/2022 57     LVOT SI 05/18/2022 31 80     LVSV, 2D 05/18/2022 114     LVOT area 05/18/2022 3 14     AV Velocity Ratio 05/18/2022 0 62     AV valve area 05/18/2022 2 15     LV EF 05/18/2022 60     Est  RA pres 05/18/2022 8 0     Right Ventricular Peak S* 05/18/2022 64 00     CRP 05/17/2022 27 6 (A)    POC Glucose 05/17/2022 89     Procalcitonin 05/18/2022 0 11     WBC 05/18/2022 6 28     RBC 05/18/2022 3 30 (A)    Hemoglobin 05/18/2022 11 0 (A)    Hematocrit 05/18/2022 33 8 (A)    MCV 05/18/2022 102 (A)    MCH 05/18/2022 33 3     MCHC 05/18/2022 32 5     RDW 05/18/2022 14 2     Platelets 50/65/1012 232     MPV 05/18/2022 9 7     Sodium 05/18/2022 137     Potassium 05/18/2022 3 8     Chloride 05/18/2022 104     CO2 05/18/2022 27     ANION GAP 05/18/2022 6     BUN 05/18/2022 17     Creatinine 05/18/2022 1 38 (A)    Glucose 05/18/2022 188 (A)    Calcium 05/18/2022 9 0     Corrected Calcium 05/18/2022 9 7     AST 05/18/2022 25     ALT 05/18/2022 36     Alkaline Phosphatase 05/18/2022 156 (A)    Total Protein 05/18/2022 7 4     Albumin 05/18/2022 3 1 (A)    Total Bilirubin 05/18/2022 0 56     eGFR 05/18/2022 48     POC Glucose 05/18/2022 134     HS TnI random 05/18/2022 6 (A)    POC Glucose 05/18/2022 162 (A)    Sed Rate 05/18/2022 77 (A)    POC Glucose 05/18/2022 103     Ventricular Rate 05/18/2022 59     Atrial Rate 05/18/2022 59     TX Interval 05/18/2022 172     QRSD Interval 05/18/2022 86     QT Interval 05/18/2022 432     QTC Interval 05/18/2022 427     P Axis 05/18/2022 -26     QRS Axis 05/18/2022 -2     T Wave Axis 05/18/2022 39     POC Glucose 05/18/2022 144 (A)    POC Glucose 05/19/2022 142 (A)    SARS-CoV-2 05/19/2022 Negative     INFLUENZA A PCR 05/19/2022 Negative     INFLUENZA B PCR 05/19/2022 Negative     RSV PCR 05/19/2022 Negative     POC Glucose 05/19/2022 152 (A)   Appointment on 04/20/2022   Component Date Value    WBC 04/20/2022 6 27     RBC 04/20/2022 3 35 (A)    Hemoglobin 04/20/2022 11 0 (A)    Hematocrit 04/20/2022 33 2 (A)    MCV 04/20/2022 99 (A)    MCH 04/20/2022 32 8     MCHC 04/20/2022 33 1     RDW 04/20/2022 15 6 (A)    MPV 04/20/2022 9 6     Platelets 32/87/3819 252     nRBC 04/20/2022 0     Neutrophils Relative 04/20/2022 69     Immat GRANS % 04/20/2022 1     Lymphocytes Relative 04/20/2022 16     Monocytes Relative 04/20/2022 11     Eosinophils Relative 04/20/2022 2     Basophils Relative 04/20/2022 1     Neutrophils Absolute 04/20/2022 4 36     Immature Grans Absolute 04/20/2022 0 06     Lymphocytes Absolute 04/20/2022 0 98     Monocytes Absolute 04/20/2022 0 69     Eosinophils Absolute 04/20/2022 0 12     Basophils Absolute 04/20/2022 0 06     Sodium 04/20/2022 139     Potassium 04/20/2022 4 2     Chloride 04/20/2022 109 (A)    CO2 04/20/2022 29     ANION GAP 04/20/2022 1 (A)    BUN 04/20/2022 15     Creatinine 04/20/2022 1 22     Glucose, Fasting 04/20/2022 150 (A)    Calcium 04/20/2022 9 1     Corrected Calcium 04/20/2022 9 8     AST 04/20/2022 21     ALT 04/20/2022 27     Alkaline Phosphatase 04/20/2022 153 (A)    Total Protein 04/20/2022 7 2     Albumin 04/20/2022 3 1 (A)    Total Bilirubin 04/20/2022 0 60     eGFR 04/20/2022 56     Cholesterol 04/20/2022 108     Triglycerides 04/20/2022 109     HDL, Direct 04/20/2022 41     LDL Calculated 04/20/2022 45     Non-HDL-Chol (CHOL-HDL) 04/20/2022 67     PSA 04/20/2022 0 3     TSH 3RD GENERATON 04/20/2022 2 030     Hemoglobin A1C 04/20/2022 6 7 (A)    EAG 04/20/2022 146      Imaging: XR chest 2 views    Result Date: 5/17/2022  Narrative: CHEST INDICATION:   CP  COMPARISON:  Chest radiograph August 19, 2021 EXAM PERFORMED/VIEWS:  XR CHEST PA & LATERAL FINDINGS: Heart shadow is mildly enlarged but unchanged from prior exam  The lungs are clear  No pneumothorax or pleural effusion  Osseous structures appear within normal limits for patient age  Impression: No acute cardiopulmonary disease  Workstation performed: [id number]     CT chest wo contrast    Result Date: 5/17/2022  Narrative: CT CHEST WITHOUT IV CONTRAST INDICATION:   "Shortness of breath and chest pain " Per my review of the medical record, the patient presents with substernal chest pain, worsening with deep inspiration, and shortness of breath, also with cough with expectoration, negative procalcitonin  No fever or leukocytosis  Negative d-dimer  COMPARISON:  Chest radiograph from today and chest CT from 1/19/2022 and 1/10/2021    TECHNIQUE: Chest CT without intravenous contrast   Axial, sagittal, coronal 2D reformats and coronal MIPS from source data  Radiation dose length product (DLP):  901 99 mGy-cm   Radiation dose exposure minimized using iterative reconstruction and automated exposure control  FINDINGS: LUNGS:  Redemonstration of mild basilar predominant peripheral reticulation  No acute pulmonary disease  Tiny benign calcified granuloma in the right middle lobe  Lorena-fissural nodules due to benign intrapulmonary lymph nodes  Mild paraseptal emphysema   AIRWAYS: No significant filling defects  PLEURA:  Unremarkable  HEART/GREAT VESSELS:  Normal heart size  Stable trace pericardial fluid  MEDIASTINUM AND HARPER:  Unremarkable  CHEST WALL AND LOWER NECK: Unremarkable  UPPER ABDOMEN:  Cholelithiasis  Left renal cyst  OSSEOUS STRUCTURES: Mild degenerative disease in the spine  Impression: No acute pulmonary disease  Mild basilar predominant juxtapleural reticulation, without definite change since January 2021, due to early pulmonary fibrosis  Workstation performed: [id number]     Echo complete w/ contrast if indicated    Result Date: 5/18/2022  Narrative: Chas Lyons  Left Ventricle: Left ventricular cavity size is normal  Wall thickness is mildly increased  There is mild concentric hypertrophy  The left ventricular ejection fraction is 60%  Systolic function is normal  Wall motion is normal  Diastolic function is mildly abnormal, consistent with grade I (abnormal) relaxation  Left atrial filling pressure is elevated    Right Ventricle: Wall thickness is increased    Left Atrium: The atrium is mildly dilated    Tricuspid Valve: The right ventricular systolic pressure is moderately to severely elevated  The estimated right ventricular systolic pressure is 56 61 mmHg    Aorta: The aortic root is normal in size  The aortic root exhibited mild fibrocalcific change    Pericardium: The pericardium is thickened  There is evidence for pericardial constriction  The evidence for constriction includes: septal tissue velocity with evidence of annulus reversus and septal bounce  There is concern for constrictive physiology, however study is incomplete to make definitive conclusion  Recommend repeating limited echo with inflow mitral and tricuspid velocities in conjunction with respirometer as well as hepatic vein Doppler with respirometer  Review of Systems:  Review of Systems   Constitutional: Positive for fatigue  Respiratory: Positive for shortness of breath      Musculoskeletal: Positive for arthralgias and myalgias  All other systems reviewed and are negative  Physical Exam:  Physical Exam  Vitals reviewed  Constitutional:       Appearance: He is obese  HENT:      Head: Normocephalic  Cardiovascular:      Rate and Rhythm: Normal rate and regular rhythm  Pulses: Normal pulses  Heart sounds: Normal heart sounds  Pulmonary:      Effort: Pulmonary effort is normal       Breath sounds: Normal breath sounds  Abdominal:      General: Bowel sounds are normal       Palpations: Abdomen is soft  Musculoskeletal:      Cervical back: Normal range of motion  Right lower leg: No edema  Left lower leg: No edema  Skin:     General: Skin is warm and dry  Capillary Refill: Capillary refill takes less than 2 seconds  Neurological:      General: No focal deficit present  Mental Status: He is alert and oriented to person, place, and time  Psychiatric:         Mood and Affect: Mood normal          Behavior: Behavior normal          Discussion/Summary:  1  Recurrent Pericarditis continue on Colchicine 0 6mg BID for 6 months  ? Etiology of re current pericarditis with elevated ESR and CRP  TTE shows no pericardial effusion, pericardium thickened with evidence of pericardial constriction  Consider referral to rheumatology  Denies CP, Herve Villegas does not want to take Motrin due to CKD  Will need follow up TTE in future  Herve Villegas is scheduled to be seen By Dr Dayna Flores on 6/21/22  2  Hypertension controlled on  Losartan 100 mg daily amlodipine 10 mg daily, metoprolol succinate 50 mg q 12 hours  3  Chronic HFpEF LVEF 60%  NYHA class III stage C on physical exam euvolemic compensated weight in the office to 287 pounds  Dyspnea with exertion baseline continue on metoprolol succinate 50 mg q 12 hours, losartan 100 mg daily, amlodipine 10 mg daily, Lasix 40 mg daily, 2 g sodium diet 50 100 cc fluid restriction and daily weights  4   Hyperlipidemia 4/20/22 , TG 109, HDL 41, LDL 45   Controlled on  Crestor 20 mg daily  5  CKD IIIb baseline creat 1 27 GFR 53   6  DM2 HgbA1C 6 7  Continue Amaryl 3 mg daily diabetic diet  7  Obesity poor appetite and weight loss BMI 44 98   8  Anemia 5/18/11 Hgb11 Hct 33 MCV 33 8   9   ANALY compliant with CPAP

## 2022-05-26 ENCOUNTER — OFFICE VISIT (OUTPATIENT)
Dept: CARDIOLOGY CLINIC | Facility: CLINIC | Age: 77
End: 2022-05-26
Payer: MEDICARE

## 2022-05-26 VITALS
WEIGHT: 287.2 LBS | HEIGHT: 67 IN | BODY MASS INDEX: 45.08 KG/M2 | DIASTOLIC BLOOD PRESSURE: 78 MMHG | HEART RATE: 62 BPM | SYSTOLIC BLOOD PRESSURE: 134 MMHG | OXYGEN SATURATION: 97 %

## 2022-05-26 DIAGNOSIS — N18.32 STAGE 3B CHRONIC KIDNEY DISEASE (HCC): ICD-10-CM

## 2022-05-26 DIAGNOSIS — G47.33 OSA ON CPAP: ICD-10-CM

## 2022-05-26 DIAGNOSIS — Z99.89 OSA ON CPAP: ICD-10-CM

## 2022-05-26 DIAGNOSIS — I10 PRIMARY HYPERTENSION: ICD-10-CM

## 2022-05-26 DIAGNOSIS — I30.8 OTHER ACUTE PERICARDITIS: Primary | ICD-10-CM

## 2022-05-26 DIAGNOSIS — E78.2 MIXED HYPERLIPIDEMIA: ICD-10-CM

## 2022-05-26 DIAGNOSIS — E66.01 CLASS 3 SEVERE OBESITY WITHOUT SERIOUS COMORBIDITY WITH BODY MASS INDEX (BMI) OF 40.0 TO 44.9 IN ADULT, UNSPECIFIED OBESITY TYPE (HCC): ICD-10-CM

## 2022-05-26 DIAGNOSIS — I50.32 CHRONIC DIASTOLIC HEART FAILURE (HCC): ICD-10-CM

## 2022-05-26 DIAGNOSIS — E11.8 TYPE 2 DIABETES MELLITUS WITH COMPLICATION (HCC): ICD-10-CM

## 2022-05-26 DIAGNOSIS — D64.9 ANEMIA, UNSPECIFIED TYPE: ICD-10-CM

## 2022-05-26 PROCEDURE — 99215 OFFICE O/P EST HI 40 MIN: CPT | Performed by: NURSE PRACTITIONER

## 2022-05-27 ENCOUNTER — RA CDI HCC (OUTPATIENT)
Dept: OTHER | Facility: HOSPITAL | Age: 77
End: 2022-05-27

## 2022-05-27 NOTE — PROGRESS NOTES
I13 0  E11 22  [facility] 75  coding opportunities          Chart Reviewed number of suggestions sent to Provider: 2     Patients Insurance     Medicare Insurance: [employer]

## 2022-06-16 ENCOUNTER — TELEPHONE (OUTPATIENT)
Dept: INTERNAL MEDICINE CLINIC | Facility: CLINIC | Age: 77
End: 2022-06-16

## 2022-06-16 NOTE — TELEPHONE ENCOUNTER
Dr Kaleb Espinoza called about patients upcoming appointment and would like a call back at [phone]  She said she has a few things to discuss    Thank you

## 2022-06-16 NOTE — TELEPHONE ENCOUNTER
I returned Dr Gordan Schwab call  She just wanted to talk to you about her concerns and try to do an intervention but has found out that the  pt is hospitalized in [address] [facility] [address]  He will most likely cancel his appoint on Wednesday  He is a very complicated case and she had some concerns

## 2022-06-20 ENCOUNTER — TELEPHONE (OUTPATIENT)
Dept: INTERNAL MEDICINE CLINIC | Facility: CLINIC | Age: 77
End: 2022-06-20

## 2022-06-20 NOTE — TELEPHONE ENCOUNTER
Patients wife called and her  was in the hospital at [facility] for 4 days  She is calling them to get his medical records and this is FYI    Thank you

## 2022-06-21 ENCOUNTER — OFFICE VISIT (OUTPATIENT)
Dept: CARDIAC SURGERY | Facility: CLINIC | Age: 77
End: 2022-06-21
Payer: MEDICARE

## 2022-06-21 VITALS
HEART RATE: 73 BPM | HEIGHT: 67 IN | OXYGEN SATURATION: 94 % | SYSTOLIC BLOOD PRESSURE: 124 MMHG | BODY MASS INDEX: 42.85 KG/M2 | WEIGHT: 273 LBS | DIASTOLIC BLOOD PRESSURE: 60 MMHG

## 2022-06-21 DIAGNOSIS — E66.01 MORBID OBESITY WITH BMI OF 40.0-44.9, ADULT (HCC): ICD-10-CM

## 2022-06-21 DIAGNOSIS — I10 ESSENTIAL HYPERTENSION: Primary | ICD-10-CM

## 2022-06-21 DIAGNOSIS — R94.2 DECREASED DIFFUSION CAPACITY: ICD-10-CM

## 2022-06-21 DIAGNOSIS — Z98.890 HISTORY OF CARDIAC CATH: ICD-10-CM

## 2022-06-21 PROBLEM — Z86.79 HISTORY OF PERICARDITIS: Status: ACTIVE | Noted: 2022-06-21

## 2022-06-21 PROCEDURE — 99214 OFFICE O/P EST MOD 30 MIN: CPT | Performed by: INTERNAL MEDICINE

## 2022-06-21 RX ORDER — AMOXICILLIN 500 MG/1
500 TABLET, FILM COATED ORAL EVERY 6 HOURS
COMMUNITY
Start: 2022-06-14 | End: 2022-06-28

## 2022-06-21 RX ORDER — FAMOTIDINE 20 MG/1
20 TABLET, FILM COATED ORAL
COMMUNITY
Start: 2022-06-19 | End: 2022-06-29

## 2022-06-21 NOTE — PROGRESS NOTES
Cardiology Consultation      Ej Art  1945  [id number]  [id number] [facility]  [address]  [address] [facility] [address] [phone]    1  Essential hypertension     2  Morbid obesity with BMI of 40 0-44 9, adult ([facility] [address] )     3  Decreased diffusion capacity     4  History of cardiac cath, 1/2011, no obstructive cad                1  Hx of pericarditis, none clinically and clinically no evidence of constrictive pericarditis  2  Obesity  3  CT evidence pulm fibrosis and decreased DLCO  4  CPAP with ANALY  5  Multifactorial echo evidence pulm htn   6  Cardiac cath 1/2011, mild luminal irregs  7  Palpitations, few seconds at night, difficulty keeping monitors on, zio patches in past as well    Plan:  Patient overall doing well  Reviewed echo, I do not see any evidence of constriction  Nor does he clinically have any evidence of pericarditis  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  Multi factorial with obesity hypoventilation/CPAP with sleep apnea, decreased DLCO and CT evidence of some pulmonary fibrosis  That being said, encouraged continued weight loss, made no changes medical regimen  Told if palpitations persistent which they aren't can take an extra dose of toprol xl 50mg    Patient moved to [facility] and may obtain cardiologist down there which is more convenient for patient  History:     78-year-old male catheterization 2021 minimal troponin elevation  Had luminal irregularities    Been bothered by fatigue  Was diagnosed with pericarditis and prescribed ibuprofen and colchicine in the past     CT scanning with comment of some findings consistent with early pulmonary fibrosis, had known PFTs in the past with decreased DLCO  He states he is compliant with CPAP for sleep apnea    Baseline renal insufficiency creatinine 1 3    Has had multifactorial dyspnea on exertion    Preserved ejection fraction  Sed rate 77 in May 2022      Patient Active Problem List   Diagnosis    Chronic right-sided low back pain without sciatica    Type 2 diabetes mellitus with complication ([facility])    Essential hypertension    Peripheral neuropathy    Isolated proteinuria with morphologic lesion    Obstructive sleep apnea syndrome    Morbid obesity with BMI of 40 0-44 9, adult ([facility])    Mood disturbance    Gastroesophageal reflux disease    Palpitations    Hypokalemia    Mood disorder ([facility])    Urgency incontinence    Onychomycosis    BPH with urinary obstruction    Fecal incontinence    Hyperlipidemia    Acute pericarditis    Adenomatous polyp of transverse colon    Sacroiliitis ([facility])    Lumbar spondylosis    Facet arthropathy, lumbosacral    Ulnar nerve entrapment    Partial seizure ([facility])    Shortness of breath    Anemia    Bilateral carpal tunnel syndrome    Dementia ([facility])    Flank pain    Decreased diffusion capacity    Chronic back pain    Shoulder pain, bilateral    Bilateral myofascial pain    Nasal congestion    Bilateral carotid artery disease, unspecified type ([facility])    Stage 3 chronic kidney disease, unspecified whether stage 3a or 3b CKD ([facility])    Interstitial lung disease ([facility] [address] )    Heart failure with preserved left ventricular function (HFpEF) ([facility] [address] )    Close exposure to COVID-19 virus    History of pericarditis    History of cardiac cath, 1/2011, no obstructive cad     Past Medical History:   Diagnosis Date    Anxiety     Broken teeth     Cancer ([facility] [address] )     basal cell-right cheek,left breast, right side of nose, left forehead    Chipped tooth     upper front-right    Chronic pain disorder     pain with standing-herniated disc lower back, upper back pain    Claustrophobia     Colon polyps     CPAP (continuous positive airway pressure) dependence     Depression     Diabetes mellitus ([facility] )     Dyslipidemia     Esophageal polyp     GERD (gastroesophageal reflux disease)  Glaucoma     Headache     Hyperlipidemia     Hypertension     Low back pain     Morbid obesity with BMI of 45 0-49 9, adult (HCC)     Neck pain     Numbness and tingling in left arm     resolved 17 / numbness and tingling left side last assessed 3/23/16    OAB (overactive bladder)     Paroxysmal supraventricular tachycardia (HCC)     Pericarditis 2021    Shortness of breath     with exertion since     Sleep apnea     wears cpap     Squamous cell skin cancer 10/19/2020    Right Nasal Ala, Dr Turcios    Tinnitus      Social History     Socioeconomic History    Marital status: /Civil Union     Spouse name: Not on file    Number of children: Not on file    Years of education: Not on file    Highest education level: Not on file   Occupational History    Occupation: retired   Tobacco Use    Smoking status: Former Smoker     Quit date: 1983     Years since quittin 4    Smokeless tobacco: Never Used   Vaping Use    Vaping Use: Never used   Substance and Sexual Activity    Alcohol use: Yes     Alcohol/week: 14 0 standard drinks     Types: 14 Shots of liquor per week     Comment: "couple of drinks each night"    Drug use: No    Sexual activity: Not Currently     Partners: Female   Other Topics Concern    Not on file   Social History Narrative    Not on file     Social Determinants of Health     Financial Resource Strain: Not on file   Food Insecurity: No Food Insecurity    Worried About Running Out of Food in the Last Year: Never true    Vinnie of Food in the Last Year: Never true   Transportation Needs: No Transportation Needs    Lack of Transportation (Medical): No    Lack of Transportation (Non-Medical):  No   Physical Activity: Not on file   Stress: Not on file   Social Connections: Not on file   Intimate Partner Violence: Not on file   Housing Stability: Low Risk     Unable to Pay for Housing in the Last Year: No    Number of Places Lived in the Last Year: 1  Unstable Housing in the Last Year: No      Family History   Problem Relation Age of Onset    Alzheimer's disease Mother     Diabetes Mother     Dementia Mother    [place] Hypertension Father     Hypertension Father     Diabetes Paternal Grandmother     Esophageal cancer Paternal Grandfather      Past Surgical History:   Procedure Laterality Date    ANOSCOPY      for polyp removal    BACK SURGERY  1983    disc surgery lower back    BASAL CELL CARCINOMA EXCISION      right cheek    CATARACT EXTRACTION Bilateral     COLONOSCOPY      CYSTOSCOPY      EGD      JOINT REPLACEMENT      bilateral knees    KNEE ARTHROSCOPY Bilateral     MOHS SURGERY  11/11/2020    [facility] Right Nasal Ala, Dr Yu Francisco      tooth extraction    LA COLONOSCOPY FLX DX W/COLLJ SPEC WHEN PFRMD N/A 2/13/2019    Procedure: COLONOSCOPY;  Surgeon: Thomas Portillo MD;  Location: [facility]; Service: Gastroenterology    LA ESOPHAGOGASTRODUODENOSCOPY TRANSORAL DIAGNOSTIC N/A 2/13/2019    Procedure: ESOPHAGOGASTRODUODENOSCOPY (EGD); Surgeon: Thomas Portillo MD;  Location: [facility];   Service: Gastroenterology    RECTAL BIOPSY      SPINE SURGERY  1983       Current Outpatient Medications:     amLODIPine (NORVASC) 10 mg tablet, TAKE 1 TABLET BY MOUTH EVERY DAY (Patient taking differently: 10 mg every morning), Disp: 90 tablet, Rfl: 3    amoxicillin (AMOXIL) 500 MG tablet, Take 500 mg by mouth every 6 (six) hours, Disp: , Rfl:     aspirin (ECOTRIN LOW STRENGTH) 81 mg EC tablet, Take 1 tablet (81 mg total) by mouth daily, Disp: 30 tablet, Rfl: 0    carteolol (OCUPRESS) 1 % ophthalmic solution, INSTILL 1 DROP IN [facility] EYE TWO TIMES DAILY, Disp: , Rfl:     citalopram (CeleXA) 10 mg tablet, TAKE 1 TABLET BY MOUTH EVERY DAY, Disp: 90 tablet, Rfl: 3    colchicine (COLCRYS) 0 6 mg tablet, Take 1 tablet (0 6 mg total) by mouth in the morning and 1 tablet (0 6 mg total) in the evening , Disp: 60 tablet, Rfl: 0    famotidine (PEPCID) 20 mg tablet, Take 20 mg by mouth daily at bedtime, Disp: , Rfl:     ferrous sulfate 325 (65 Fe) mg tablet, Take 1 tablet (325 mg total) by mouth daily with breakfast, Disp: 90 tablet, Rfl: 3    fluticasone (FLONASE) 50 mcg/act nasal spray, 1 spray into each nostril daily, Disp: 16 g, Rfl: 4    glimepiride (AMARYL) 1 mg tablet, TAKE 3 TABLETS BY MOUTH EVERY DAY WITH BREAKFAST, Disp: 270 tablet, Rfl: 3    guaiFENesin (MUCINEX) 600 mg 12 hr tablet, Take 1,200 mg by mouth every 12 (twelve) hours, Disp: , Rfl:     Lidocaine Viscous HCl (XYLOCAINE) 2 % mucosal solution, TAKE 15 ML BY MOUTH SWISH AND SPIT OUT EVERY 6 HOURS AS NEEDED, Disp: , Rfl:     metoprolol succinate (Toprol XL) 50 mg 24 hr tablet, Take 1 tablet (50 mg total) by mouth every 12 (twelve) hours, Disp: 60 tablet, Rfl: 5    oxymetazoline (AFRIN) 0 05 % nasal spray, 2 sprays by Each Nare route every 12 (twelve) hours as needed for congestion  , Disp: , Rfl:     rosuvastatin (CRESTOR) 20 MG tablet, TAKE 1 TABLET BY MOUTH EVERY DAY, Disp: 90 tablet, Rfl: 3    tamsulosin (FLOMAX) 0 4 mg, Take 1 capsule (0 4 mg total) by mouth daily with dinner, Disp: 90 capsule, Rfl: 3    trospium chloride (SANCTURA) 20 mg tablet, Take 1 tablet (20 mg total) by mouth 2 (two) times a day, Disp: 180 tablet, Rfl: 3    furosemide (LASIX) 40 mg tablet, Take 1 tablet (40 mg total) by mouth daily (Patient not taking: Reported on 6/21/2022), Disp: 90 tablet, Rfl: 3    losartan (COZAAR) 100 MG tablet, TAKE 1 TABLET BY MOUTH EVERY DAY (Patient not taking: Reported on 6/21/2022), Disp: 90 tablet, Rfl: 3    omeprazole (PriLOSEC) 20 mg delayed release capsule, TAKE 1 CAPSULE BY MOUTH EVERY DAY (Patient not taking: Reported on 6/21/2022), Disp: 90 capsule, Rfl: 3  Allergies   Allergen Reactions    Sulfa Antibiotics Other (See Comments)     Nausea         Social, Family and medication history as listed, reviewed and updated as necessary    Labs:   Lab Results Component Value Date     01/19/2015    K 4 1 05/23/2022     05/23/2022    CO2 24 05/23/2022    BUN 15 05/23/2022    CREATININE 1 27 05/23/2022    CREATININE 1 38 (H) 05/18/2022    GLUCOSE 125 01/19/2015    CALCIUM 9 0 05/23/2022       Lab Results   Component Value Date    WBC 6 28 05/18/2022    HGB 11 0 (L) 05/18/2022    HGB 11 1 (L) 05/17/2022    HCT 33 8 (L) 05/18/2022    HCT 33 8 (L) 05/17/2022     05/18/2022     05/17/2022       Lab Results   Component Value Date    CHOL 162 09/10/2015    CHOL 175 05/06/2015     Lab Results   Component Value Date    HDL 41 04/20/2022    HDL 40 04/12/2021     Lab Results   Component Value Date    LDLCALC 45 04/20/2022    LDLCALC 77 04/12/2021     Lab Results   Component Value Date    TRIG 109 04/20/2022    TRIG 87 04/12/2021     No results found for: LDLDIRECT    Lab Results   Component Value Date    ALT 36 05/18/2022    ALT 40 05/17/2022    AST 25 05/18/2022    AST 34 05/17/2022             Lab Results   Component Value Date    NTBNP 235 03/08/2022       Lab Results   Component Value Date    HGBA1C 6 7 (H) 04/20/2022    HGBA1C 6 2 01/10/2022    HGBA1C 6 6 (H) 08/13/2021       Imaging: Reviewed in epic      Review of Systems:  14 systems reviewed and negative with exception of the above       PHYSICAL EXAM:        Vitals:    06/21/22 1400   BP: 124/60   Pulse: 73   SpO2: 94%     Body mass index is 42 76 kg/m²    Weight (last 2 days)     Date/Time Weight    06/21/22 1400 124 (273)             Gen: No acute distress  HEENT: anicteric, mucous membranes moist  Neck: supple, no jugular venous distention, or carotid bruit  Heart: regular, normal s1 and s2, no murmur/rub or gallop  Lungs :clear to auscultation bilaterally, no rales/rhonchi or wheeze  Abdomen: soft nontender, normoactive bowel sounds, no organomegaly  Ext: warm and perfused, normal femoral pulses, no edema, or clubbing  Skin: warm, no rashes  Neuro: AAO x 3, no focal findings  Psychiatric: normal affect  Musculoskeletal: no obvious joint deformities  This note was completed in part utilizing [employer] fluency direct voice recognition software  Grammatical errors, random word insertion, spelling mistakes, and incomplete sentences may be an occasional consequence of the system secondary to software limitations, ambient noise and hardware issues  At the time of dictation, efforts were made to edit, clarify and /or correct errors  Please read the chart carefully and recognize, using context, where substitutions have occurred  If you have any questions or concerns about the context, text or information contained within the body of this dictation, please contact myself, the provider, for further clarification

## 2022-06-28 ENCOUNTER — OFFICE VISIT (OUTPATIENT)
Dept: INTERNAL MEDICINE CLINIC | Facility: CLINIC | Age: 77
End: 2022-06-28
Payer: MEDICARE

## 2022-06-28 VITALS
BODY MASS INDEX: 42.5 KG/M2 | OXYGEN SATURATION: 96 % | TEMPERATURE: 98.7 F | HEIGHT: 67 IN | WEIGHT: 270.8 LBS | HEART RATE: 62 BPM

## 2022-06-28 DIAGNOSIS — I30.8 OTHER ACUTE PERICARDITIS: ICD-10-CM

## 2022-06-28 DIAGNOSIS — I10 ESSENTIAL HYPERTENSION: ICD-10-CM

## 2022-06-28 DIAGNOSIS — M54.16 LUMBAR RADICULOPATHY: ICD-10-CM

## 2022-06-28 DIAGNOSIS — F03.90 DEMENTIA WITHOUT BEHAVIORAL DISTURBANCE, UNSPECIFIED DEMENTIA TYPE (HCC): ICD-10-CM

## 2022-06-28 DIAGNOSIS — E11.8 TYPE 2 DIABETES MELLITUS WITH COMPLICATION (HCC): Primary | ICD-10-CM

## 2022-06-28 DIAGNOSIS — N18.30 STAGE 3 CHRONIC KIDNEY DISEASE, UNSPECIFIED WHETHER STAGE 3A OR 3B CKD (HCC): ICD-10-CM

## 2022-06-28 DIAGNOSIS — E11.8 TYPE 2 DIABETES MELLITUS WITH COMPLICATION, WITHOUT LONG-TERM CURRENT USE OF INSULIN (HCC): ICD-10-CM

## 2022-06-28 PROCEDURE — 99215 OFFICE O/P EST HI 40 MIN: CPT | Performed by: INTERNAL MEDICINE

## 2022-06-28 RX ORDER — GLIMEPIRIDE 1 MG/1
1 TABLET ORAL
Qty: 270 TABLET | Refills: 3
Start: 2022-06-28

## 2022-06-28 NOTE — PROGRESS NOTES
Assessment/Plan:    Type 2 diabetes mellitus with complication ([facility] )  Patient is urged to continue on his Amaryl 1 mg daily as well as a balance diabetic diet  He has been given a prescription for a home glucometer to monitor daily fasting blood sugars  I have encouraged him to establish care with an endocrinologist in his new community to take over care and management of his type 2 diabetes  Lab Results   Component Value Date    HGBA1C 6 7 (H) 04/20/2022       Obstructive sleep apnea syndrome  Patient is encouraged to continued use of his CPAP machine for management of his chronic obstructive sleep syndrome    Acute pericarditis  Pericarditis diagnosed at the time of the patient's recent hospitalization at [facility] he continues on colchicine for management of this pericarditis  Given kidney insufficiency nonsteroidal anti inflammatory medication should be avoided he will need to establish care with cardiologist in his new community to supervise tapering of the colchicine medication    Essential hypertension  Blood pressure assessment indicates good control recommend continuation of his amlodipine at 10 mg daily    Dementia ([facility] )  Early symptoms of dementia actually seem somewhat better on today's visit of recommend continued follow-up with Neurology/internal Medicine in his new community  A MR I scan of the brain has been scheduled for July    Stage 3 chronic kidney disease, unspecified whether stage 3a or 3b CKD ([facility] )  Lab Results   Component Value Date    EGFR 53 05/23/2022    EGFR 48 05/18/2022    EGFR 47 05/17/2022    CREATININE 1 27 05/23/2022    CREATININE 1 38 (H) 05/18/2022    CREATININE 1 40 (H) 05/17/2022   Recommend continued surveillance of kidney disease  Most likely cause is his diabetes and history of hypertension as well as advancing age  Should avoid dehydration recommend he try to increase hydration by 16 oz of fluid daily    Also avoid nonsteroidal anti inflammatory medications  Lumbar radiculopathy  Burning sensation in the left thigh consistent with known L3 disc herniation to the left side with nerve compression recommend establishing consultation with Pain Management in his new community for consideration of epidural injection  Diagnoses and all orders for this visit:    Type 2 diabetes mellitus with complication, without long-term current use of insulin (HCC)  -     Glucometer  -     glimepiride (AMARYL) 1 mg tablet; Take 1 tablet (1 mg total) by mouth daily with breakfast        Subjective:      Patient ID: Shubham Elias is a 65 y o  male  This 76-year-old gentleman returns today in the company of his wife  Since his last visit with us he is moved from the [facility] to the [facility]  He was hospitalized several days at the [facility]   Unfortunately do not have a discharge summary that goes with that hospitalization at this time  From the patient and his wife's description at a sounds like he was admitted with dehydration and low blood pressure and possibly some electrolyte imbalance based upon their description  The patient had been experiencing decreased appetite along with approximately 4 weeks of loose stools all of which may explain the electrolyte imbalance and dehydration diagnosis  He received IV hydration and improved  He has been working hard on trying to reduce his weight and he has lost approximately 17 lb over the past month  During his hospitalization his diabetic medication Amaryl was decreased from 3 mg a day down to 1 mg daily  He was provided today with a request for a home glucometer to continue monitoring his fasting blood sugars  He describes a neuropathy type of burning sensation in the left thigh this correlates with previous MRI scanning of his lumbar spine which shows a disc displacement toward the left side at the L3 level    We discussed this during today's visit he has previously received epidural injections and I have recommended that he establish a visit with 1 of the pain management doctors in the area that he now lives to consider additional treatments into this area  He has had of some dental work on a recent broken tooth and was off his aspirin for several days  He may resume this treatment at the present time  He continues on colchicine for management of his recurrent pericarditis  He will need to establish care with a cardiologist in his new down to supervise the further weaning of his colchicine  He has a history of some cognitive decline but actually seems to be sharper today than his last several visits with us  An MRI scan of the brain has been scheduled for July for further evaluation of brain structure  Suspect the mild decline in cognitive function and memory function is related to his type 2 diabetes in advancing age  The following portions of the patient's history were reviewed and updated as appropriate:   He  has a past medical history of Anxiety, Broken teeth, Cancer ([address] ), Chipped tooth, Chronic pain disorder, Claustrophobia, Colon polyps, CPAP (continuous positive airway pressure) dependence, Depression, Diabetes mellitus ([address] ), Dyslipidemia, Esophageal polyp, GERD (gastroesophageal reflux disease), Glaucoma, Headache, Hyperlipidemia, Hypertension, Low back pain, Morbid obesity with BMI of 45 0-49 9, adult ([address] ), Neck pain, Numbness and tingling in left arm, OAB (overactive bladder), Paroxysmal supraventricular tachycardia ([address] ), Pericarditis (01/2021), Shortness of breath, Sleep apnea, Squamous cell skin cancer (10/19/2020), and Tinnitus    He   Patient Active Problem List    Diagnosis Date Noted    Lumbar radiculopathy 06/28/2022    History of pericarditis 06/21/2022    History of cardiac cath, 1/2011, no obstructive cad 06/21/2022    Close exposure to COVID-19 virus 05/17/2022    Heart failure with preserved left ventricular function (HFpEF) ([address] ) 03/03/2022    Interstitial lung disease ([place] [address] ) 02/15/2022    Bilateral carotid artery disease, unspecified type ([place] [address] ) 02/03/2022    Stage 3 chronic kidney disease, unspecified whether stage 3a or 3b CKD ([place] [address] ) 02/03/2022    Nasal congestion 02/01/2022    Chronic back pain 01/24/2022    Shoulder pain, bilateral 01/24/2022    Bilateral myofascial pain 01/24/2022    Decreased diffusion capacity 12/01/2021    Dementia ([place] [address] ) 11/10/2021    Flank pain 11/10/2021    Bilateral carpal tunnel syndrome 09/24/2021    Anemia 08/23/2021    Shortness of breath 08/16/2021    Partial seizure ([place] [address] )     Ulnar nerve entrapment 06/21/2021    Facet arthropathy, lumbosacral 05/05/2021    Sacroiliitis ([place] [address] ) 03/15/2021    Lumbar spondylosis 03/15/2021    Adenomatous polyp of transverse colon 02/23/2021    Hyperlipidemia 01/10/2021    Acute pericarditis 01/10/2021    Fecal incontinence 03/25/2020    BPH with urinary obstruction 01/23/2020    Onychomycosis 11/25/2019    Urgency incontinence 11/20/2019    Mood disorder ([place] [address] ) 08/14/2019    Hypokalemia 07/24/2019    Palpitations 09/17/2018    Obstructive sleep apnea syndrome 06/04/2018    Morbid obesity with BMI of 40 0-44 9, adult ([place] [address] ) 06/04/2018    Mood disturbance 06/04/2018    Gastroesophageal reflux disease 06/04/2018    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    Type 2 diabetes mellitus with complication ([place] [address] ) 51/59/7233    Essential hypertension 01/29/2018    Peripheral neuropathy 01/29/2018    Chronic right-sided low back pain without sciatica      He  has a past surgical history that includes Cataract extraction (Bilateral); Knee arthroscopy (Bilateral); Excision basal cell carcinoma; Anoscopy; Rectal biopsy; Mouth surgery; pr esophagogastroduodenoscopy transoral diagnostic (N/A, 2/13/2019); pr colonoscopy flx dx w/collj spec when pfrmd (N/A, 2/13/2019); Mohs surgery (11/11/2020); Joint replacement; Back surgery (1983);  Colonoscopy; EGD; Cystoscopy; and Spine surgery (1983)  His family history includes Alzheimer's disease in his mother; Dementia in his mother; Diabetes in his mother and paternal grandmother; Esophageal cancer in his paternal grandfather; Hypertension in his father; Soraida Charles Hypertension in his father  He  reports that he quit smoking about 39 years ago  He has never used smokeless tobacco  He reports current alcohol use of about 14 0 standard drinks of alcohol per week  He reports that he does not use drugs  Current Outpatient Medications   Medication Sig Dispense Refill    amLODIPine (NORVASC) 10 mg tablet TAKE 1 TABLET BY MOUTH EVERY DAY (Patient taking differently: 10 mg every morning) 90 tablet 3    aspirin (ECOTRIN LOW STRENGTH) 81 mg EC tablet Take 1 tablet (81 mg total) by mouth daily 30 tablet 0    carteolol (OCUPRESS) 1 % ophthalmic solution INSTILL 1 DROP IN EACH EYE TWO TIMES DAILY      citalopram (CeleXA) 10 mg tablet TAKE 1 TABLET BY MOUTH EVERY DAY 90 tablet 3    colchicine (COLCRYS) 0 6 mg tablet Take 1 tablet (0 6 mg total) by mouth in the morning and 1 tablet (0 6 mg total) in the evening   60 tablet 0    famotidine (PEPCID) 20 mg tablet Take 20 mg by mouth daily at bedtime      ferrous sulfate 325 (65 Fe) mg tablet Take 1 tablet (325 mg total) by mouth daily with breakfast 90 tablet 3    fluticasone (FLONASE) 50 mcg/act nasal spray 1 spray into each nostril daily 16 g 4    glimepiride (AMARYL) 1 mg tablet Take 1 tablet (1 mg total) by mouth daily with breakfast 270 tablet 3    Lidocaine Viscous HCl (XYLOCAINE) 2 % mucosal solution TAKE 15 ML BY MOUTH SWISH AND SPIT OUT EVERY 6 HOURS AS NEEDED      metoprolol succinate (Toprol XL) 50 mg 24 hr tablet Take 1 tablet (50 mg total) by mouth every 12 (twelve) hours 60 tablet 5    oxymetazoline (AFRIN) 0 05 % nasal spray 2 sprays by Each Nare route every 12 (twelve) hours as needed for congestion        rosuvastatin (CRESTOR) 20 MG tablet TAKE 1 TABLET BY MOUTH EVERY DAY 90 tablet 3    tamsulosin (FLOMAX) 0 4 mg Take 1 capsule (0 4 mg total) by mouth daily with dinner 90 capsule 3    trospium chloride (SANCTURA) 20 mg tablet Take 1 tablet (20 mg total) by mouth 2 (two) times a day 180 tablet 3     No current facility-administered medications for this visit       Review of Systems   Constitutional: Positive for fatigue  Gastrointestinal: Positive for diarrhea  Musculoskeletal: Positive for gait problem  Neurological:        Burning sensation left thigh   All other systems reviewed and are negative  Objective:      Pulse 62   Temp 98 7 °F (37 1 °C) (Tympanic)   Ht 5' 7" (1 702 m)   Wt 123 kg (270 lb 12 8 oz)   SpO2 96%   BMI 42 41 kg/m²          Physical Exam  Constitutional:       General: He is not in acute distress  Appearance: He is well-developed  He is not ill-appearing  HENT:      Head: Normocephalic  Right Ear: Hearing and external ear normal       Left Ear: Hearing and external ear normal       Nose: Nose normal    Eyes:      Conjunctiva/sclera: Conjunctivae normal       Pupils: Pupils are equal, round, and reactive to light  Neck:      Thyroid: No thyromegaly  Cardiovascular:      Rate and Rhythm: Normal rate and regular rhythm  Heart sounds: S1 normal and S2 normal      No friction rub  Pulmonary:      Effort: Pulmonary effort is normal       Breath sounds: Normal breath sounds  No wheezing, rhonchi or rales  Abdominal:      General: Bowel sounds are normal       Palpations: Abdomen is soft  Musculoskeletal:         General: Normal range of motion  Lymphadenopathy:      Cervical: No cervical adenopathy  Skin:     General: Skin is warm and dry  Neurological:      Mental Status: He is alert and oriented to person, place, and time  Sensory: Sensory deficit present  Deep Tendon Reflexes: Reflexes are normal and symmetric     Psychiatric:         Mood and Affect: Mood normal          Behavior: Behavior normal  Thought Content:  Thought content normal          Judgment: Judgment normal

## 2022-06-28 NOTE — ASSESSMENT & PLAN NOTE
Burning sensation in the left thigh consistent with known L3 disc herniation to the left side with nerve compression recommend establishing consultation with Pain Management in his new community for consideration of epidural injection

## 2022-06-28 NOTE — ASSESSMENT & PLAN NOTE
Blood pressure assessment indicates good control recommend continuation of his amlodipine at 10 mg daily

## 2022-06-28 NOTE — ASSESSMENT & PLAN NOTE
Pericarditis diagnosed at the time of the patient's recent hospitalization at [facility] he continues on colchicine for management of this pericarditis    Given kidney insufficiency nonsteroidal anti inflammatory medication should be avoided he will need to establish care with cardiologist in his new community to supervise tapering of the colchicine medication

## 2022-06-28 NOTE — ASSESSMENT & PLAN NOTE
Early symptoms of dementia actually seem somewhat better on today's visit of recommend continued follow-up with Neurology/internal Medicine in his new community    A MR I scan of the brain has been scheduled for July

## 2022-06-28 NOTE — ASSESSMENT & PLAN NOTE
Patient is encouraged to continued use of his CPAP machine for management of his chronic obstructive sleep syndrome

## 2022-06-28 NOTE — ASSESSMENT & PLAN NOTE
Patient is urged to continue on his Amaryl 1 mg daily as well as a balance diabetic diet  He has been given a prescription for a home glucometer to monitor daily fasting blood sugars  I have encouraged him to establish care with an endocrinologist in his new community to take over care and management of his type 2 diabetes    Lab Results   Component Value Date    HGBA1C 6 7 (H) 04/20/2022

## 2022-06-28 NOTE — ASSESSMENT & PLAN NOTE
Lab Results   Component Value Date    EGFR 53 05/23/2022    EGFR 48 05/18/2022    EGFR 47 05/17/2022    CREATININE 1 27 05/23/2022    CREATININE 1 38 (H) 05/18/2022    CREATININE 1 40 (H) 05/17/2022   Recommend continued surveillance of kidney disease  Most likely cause is his diabetes and history of hypertension as well as advancing age  Should avoid dehydration recommend he try to increase hydration by 16 oz of fluid daily  Also avoid nonsteroidal anti inflammatory medications

## 2022-06-29 ENCOUNTER — ANESTHESIA EVENT (OUTPATIENT)
Dept: RADIOLOGY | Facility: HOSPITAL | Age: 77
End: 2022-06-29

## 2022-06-29 RX ORDER — OMEPRAZOLE 20 MG/1
20 CAPSULE, DELAYED RELEASE ORAL
COMMUNITY

## 2022-06-29 RX ORDER — FUROSEMIDE 40 MG/1
40 TABLET ORAL
COMMUNITY

## 2022-06-29 RX ORDER — LOSARTAN POTASSIUM 100 MG/1
100 TABLET ORAL DAILY
COMMUNITY

## 2022-06-29 NOTE — PRE-PROCEDURE INSTRUCTIONS
Pre-Surgery Instructions:   Medication Instructions    amLODIPine (NORVASC) 10 mg tablet Take day of surgery   aspirin (ECOTRIN LOW STRENGTH) 81 mg EC tablet Hold day of surgery   carteolol (OCUPRESS) 1 % ophthalmic solution Take day of surgery   citalopram (CeleXA) 10 mg tablet Take day of surgery   colchicine (COLCRYS) 0 6 mg tablet Take day of surgery   ferrous sulfate 325 (65 Fe) mg tablet Hold day of surgery   fluticasone (FLONASE) 50 mcg/act nasal spray Take day of surgery   furosemide (LASIX) 40 mg tablet Hold day of surgery   glimepiride (AMARYL) 1 mg tablet Hold day of surgery   losartan (COZAAR) 100 MG tablet Hold day of surgery   metoprolol succinate (Toprol XL) 50 mg 24 hr tablet Take day of surgery   omeprazole (PriLOSEC) 20 mg delayed release capsule Take day of surgery   rosuvastatin (CRESTOR) 20 MG tablet Take day of surgery   tamsulosin (FLOMAX) 0 4 mg Take night before surgery    trospium chloride (SANCTURA) 20 mg tablet Hold day of surgery  Reviewed with patient, in detail, medication list and instructions  Advised patient that Sam Shay will call with MRI arrival time and hospital directions the business day prior to surgery  Advised patient nothing eat or drink after midnight  Patient verbalized understanding and knows to call provider's office with any additional questions prior to MRI  Patient verbalized understanding of current visitor restrictions  Patient advised he/she must have responsible adult to accompany him/her home

## 2022-07-07 ENCOUNTER — TELEPHONE (OUTPATIENT)
Dept: SLEEP CENTER | Facility: CLINIC | Age: 77
End: 2022-07-07

## 2022-07-07 NOTE — TELEPHONE ENCOUNTER
Patient called stating he needs new CPAP supplies and he has a new address  Advised patient order for supplies was sent to [address] [place] [address] on 4/28/22  [facility] and make them aware of his new address  Provided [facility] phone number [phone]

## 2022-07-13 ENCOUNTER — HOSPITAL ENCOUNTER (OUTPATIENT)
Dept: RADIOLOGY | Facility: HOSPITAL | Age: 77
Discharge: HOME | End: 2022-07-13
Payer: MEDICARE

## 2022-07-13 ENCOUNTER — ANESTHESIA (OUTPATIENT)
Dept: RADIOLOGY | Facility: HOSPITAL | Age: 77
End: 2022-07-13

## 2022-07-13 VITALS
RESPIRATION RATE: 16 BRPM | BODY MASS INDEX: 40.81 KG/M2 | SYSTOLIC BLOOD PRESSURE: 156 MMHG | HEIGHT: 67 IN | OXYGEN SATURATION: 94 % | WEIGHT: 260 LBS | TEMPERATURE: 98.6 F | DIASTOLIC BLOOD PRESSURE: 72 MMHG | HEART RATE: 68 BPM

## 2022-07-13 DIAGNOSIS — F03.90 DEMENTIA WITHOUT BEHAVIORAL DISTURBANCE, UNSPECIFIED DEMENTIA TYPE: ICD-10-CM

## 2022-07-13 LAB — GLUCOSE SERPL-MCNC: 92 MG/DL (ref 65–140)

## 2022-07-13 PROCEDURE — G1004 CDSM NDSC: HCPCS

## 2022-07-13 PROCEDURE — 82948 REAGENT STRIP/BLOOD GLUCOSE: CPT

## 2022-07-13 PROCEDURE — 70551 MRI BRAIN STEM W/O DYE: CPT

## 2022-07-13 RX ORDER — SODIUM CHLORIDE, SODIUM LACTATE, POTASSIUM CHLORIDE, CALCIUM CHLORIDE 600; 310; 30; 20 MG/100ML; MG/100ML; MG/100ML; MG/100ML
125 INJECTION, SOLUTION INTRAVENOUS CONTINUOUS
Status: DISCONTINUED | OUTPATIENT
Start: 2022-07-13 | End: 2022-07-14 | Stop reason: HOSPADM

## 2022-07-13 RX ORDER — SODIUM CHLORIDE, SODIUM LACTATE, POTASSIUM CHLORIDE, CALCIUM CHLORIDE 600; 310; 30; 20 MG/100ML; MG/100ML; MG/100ML; MG/100ML
100 INJECTION, SOLUTION INTRAVENOUS CONTINUOUS
Status: DISCONTINUED | OUTPATIENT
Start: 2022-07-13 | End: 2022-07-14 | Stop reason: HOSPADM

## 2022-07-13 RX ORDER — PROPOFOL 10 MG/ML
INJECTION, EMULSION INTRAVENOUS AS NEEDED
Status: DISCONTINUED | OUTPATIENT
Start: 2022-07-13 | End: 2022-07-13

## 2022-07-13 RX ORDER — SODIUM CHLORIDE, SODIUM LACTATE, POTASSIUM CHLORIDE, CALCIUM CHLORIDE 600; 310; 30; 20 MG/100ML; MG/100ML; MG/100ML; MG/100ML
INJECTION, SOLUTION INTRAVENOUS CONTINUOUS PRN
Status: DISCONTINUED | OUTPATIENT
Start: 2022-07-13 | End: 2022-07-13

## 2022-07-13 RX ORDER — VASOPRESSIN 20 U/ML
INJECTION PARENTERAL AS NEEDED
Status: DISCONTINUED | OUTPATIENT
Start: 2022-07-13 | End: 2022-07-13

## 2022-07-13 RX ORDER — EPHEDRINE SULFATE 50 MG/ML
INJECTION INTRAVENOUS AS NEEDED
Status: DISCONTINUED | OUTPATIENT
Start: 2022-07-13 | End: 2022-07-13

## 2022-07-13 RX ORDER — ONDANSETRON 2 MG/ML
4 INJECTION INTRAMUSCULAR; INTRAVENOUS ONCE AS NEEDED
Status: CANCELLED | OUTPATIENT
Start: 2022-07-13

## 2022-07-13 RX ORDER — GLYCOPYRROLATE 0.2 MG/ML
INJECTION INTRAMUSCULAR; INTRAVENOUS AS NEEDED
Status: DISCONTINUED | OUTPATIENT
Start: 2022-07-13 | End: 2022-07-13

## 2022-07-13 RX ORDER — LIDOCAINE HYDROCHLORIDE 20 MG/ML
INJECTION, SOLUTION EPIDURAL; INFILTRATION; INTRACAUDAL; PERINEURAL AS NEEDED
Status: DISCONTINUED | OUTPATIENT
Start: 2022-07-13 | End: 2022-07-13

## 2022-07-13 RX ADMIN — VASOPRESSIN 1 UNITS: 20 INJECTION INTRAVENOUS at 14:18

## 2022-07-13 RX ADMIN — VASOPRESSIN 2 UNITS: 20 INJECTION INTRAVENOUS at 14:26

## 2022-07-13 RX ADMIN — EPHEDRINE SULFATE 10 MG: 50 INJECTION INTRAVENOUS at 14:01

## 2022-07-13 RX ADMIN — EPHEDRINE SULFATE 10 MG: 50 INJECTION INTRAVENOUS at 13:58

## 2022-07-13 RX ADMIN — EPHEDRINE SULFATE 10 MG: 50 INJECTION INTRAVENOUS at 14:11

## 2022-07-13 RX ADMIN — VASOPRESSIN 1 UNITS: 20 INJECTION INTRAVENOUS at 14:21

## 2022-07-13 RX ADMIN — SODIUM CHLORIDE, SODIUM LACTATE, POTASSIUM CHLORIDE, AND CALCIUM CHLORIDE: .6; .31; .03; .02 INJECTION, SOLUTION INTRAVENOUS at 13:10

## 2022-07-13 RX ADMIN — PROPOFOL 300 MG: 10 INJECTION, EMULSION INTRAVENOUS at 13:53

## 2022-07-13 RX ADMIN — EPHEDRINE SULFATE 10 MG: 50 INJECTION INTRAVENOUS at 14:06

## 2022-07-13 RX ADMIN — GLYCOPYRROLATE 0.1 MCG: 0.2 INJECTION, SOLUTION INTRAMUSCULAR; INTRAVENOUS at 14:34

## 2022-07-13 RX ADMIN — SODIUM CHLORIDE, SODIUM LACTATE, POTASSIUM CHLORIDE, AND CALCIUM CHLORIDE 125 ML/HR: .6; .31; .03; .02 INJECTION, SOLUTION INTRAVENOUS at 12:30

## 2022-07-13 RX ADMIN — VASOPRESSIN 1 UNITS: 20 INJECTION INTRAVENOUS at 14:13

## 2022-07-13 RX ADMIN — LIDOCAINE HYDROCHLORIDE 100 MG: 20 INJECTION, SOLUTION EPIDURAL; INFILTRATION; INTRACAUDAL; PERINEURAL at 13:53

## 2022-07-13 RX ADMIN — EPHEDRINE SULFATE 10 MG: 50 INJECTION INTRAVENOUS at 14:03

## 2022-07-13 NOTE — ANESTHESIA PREPROCEDURE EVALUATION
Procedure:  MRI BRAIN NEUROQUANT WO CONTRAST    Relevant Problems   CARDIO   (+) Essential hypertension   (+) Hyperlipidemia      ENDO   (+) Type 2 diabetes mellitus with complication (HCC)      GI/HEPATIC   (+) Gastroesophageal reflux disease      /RENAL   (+) BPH with urinary obstruction   (+) Stage 3 chronic kidney disease, unspecified whether stage 3a or 3b CKD (HCC)      HEMATOLOGY   (+) Anemia      MUSCULOSKELETAL   (+) Chronic back pain   (+) Chronic right-sided low back pain without sciatica   (+) Lumbar spondylosis   (+) Sacroiliitis (HCC)      NEURO/PSYCH   (+) Chronic back pain   (+) Chronic right-sided low back pain without sciatica   (+) Dementia (HCC)   (+) History of pericarditis   (+) Partial seizure (HCC)      PULMONARY   (+) Obstructive sleep apnea syndrome   (+) Shortness of breath    Morbid obesity BMI 41    Physical Exam    Airway    Mallampati score: II  TM Distance: >3 FB  Neck ROM: full     Dental   No notable dental hx     Cardiovascular      Pulmonary      Other Findings  Large neck circumfirence      Anesthesia Plan  ASA Score- 3     Anesthesia Type- general with ASA Monitors  Additional Monitors:   Airway Plan: LMA  Plan Factors-Exercise tolerance (METS): >4 METS  Chart reviewed  Imaging results reviewed  Patient summary reviewed  Patient is not a current smoker  Obstructive sleep apnea risk education given perioperatively  Induction- intravenous  Postoperative Plan-     Informed Consent- Anesthetic plan and risks discussed with patient  I personally reviewed this patient with the CRNA  Discussed and agreed on the Anesthesia Plan with the ASHLEY Smyth

## 2022-07-13 NOTE — NURSING NOTE
MRI completed, patient tolerated procedure  Post vital signs taken and recorded  Report given to  Pastor Colby  Patient placed in transport to be taken to [place]  Patient offers no complaints or verbalizing any issues upon leaving MRI

## 2022-07-13 NOTE — ANESTHESIA POSTPROCEDURE EVALUATION
Post-Op Assessment Note    CV Status:  Stable    Pain management: adequate     Mental Status:  Awake and arousable   Hydration Status:  Euvolemic   PONV Controlled:  Controlled   Airway Patency:  Patent      Post Op Vitals Reviewed: Yes      Staff: CRNA, Anesthesiologist         No complications documented      BP   135/70   Temp  100 3   Pulse  64   Resp   16   SpO2   94
Sudden numbness or weakness of the face, arm, or leg, especially on one side of the body. Confusion, trouble speaking or understanding. Trouble seeing in one or both eyes. Trouble walking, dizziness, loss of balance or coordination. Severe headache.

## 2022-07-15 ENCOUNTER — TELEPHONE (OUTPATIENT)
Dept: CARDIOLOGY CLINIC | Facility: CLINIC | Age: 77
End: 2022-07-15

## 2022-08-15 ENCOUNTER — TELEPHONE (OUTPATIENT)
Dept: CARDIOLOGY CLINIC | Facility: CLINIC | Age: 77
End: 2022-08-15

## 2022-08-15 DIAGNOSIS — F32.A DEPRESSION, UNSPECIFIED DEPRESSION TYPE: ICD-10-CM

## 2022-08-15 RX ORDER — CITALOPRAM 10 MG/1
10 TABLET ORAL DAILY
Qty: 90 TABLET | Refills: 3 | Status: SHIPPED | OUTPATIENT
Start: 2022-08-15

## 2022-08-15 NOTE — TELEPHONE ENCOUNTER
Routed Cardiac cath results and CT chest high resolution results to Dr Back Anchors office at wife's request   # [phone]

## 2022-11-02 ENCOUNTER — TELEPHONE (OUTPATIENT)
Dept: PULMONOLOGY | Facility: CLINIC | Age: 77
End: 2022-11-02

## 2022-11-02 NOTE — TELEPHONE ENCOUNTER
Spoke with patient to set up a follow up Appt in [facility] with Varsha Christina  Patient stated he moved and wouldn't  be coming  to our doctors anymore

## 2022-11-30 DIAGNOSIS — N13.8 BPH WITH URINARY OBSTRUCTION: ICD-10-CM

## 2022-11-30 DIAGNOSIS — N40.1 BPH WITH URINARY OBSTRUCTION: ICD-10-CM

## 2022-11-30 RX ORDER — TAMSULOSIN HYDROCHLORIDE 0.4 MG/1
CAPSULE ORAL
Qty: 90 CAPSULE | Refills: 0 | Status: SHIPPED | OUTPATIENT
Start: 2022-11-30

## 2023-01-13 ENCOUNTER — TELEPHONE (OUTPATIENT)
Dept: PULMONOLOGY | Facility: CLINIC | Age: 78
End: 2023-01-13

## 2023-01-13 NOTE — TELEPHONE ENCOUNTER
Patient and his wife had called, they moved areas and his pulmonary issues have resided at this time  He is not going to continue his care with our practice moving forward  He will look into other locations near him if he needs continued care  Thank you

## 2023-01-27 DIAGNOSIS — E78.00 PURE HYPERCHOLESTEROLEMIA: ICD-10-CM

## 2023-01-27 RX ORDER — ROSUVASTATIN CALCIUM 20 MG/1
TABLET, COATED ORAL
Qty: 90 TABLET | Refills: 2 | Status: SHIPPED | OUTPATIENT
Start: 2023-01-27

## 2023-02-22 DIAGNOSIS — N40.1 BPH WITH URINARY OBSTRUCTION: ICD-10-CM

## 2023-02-22 DIAGNOSIS — N13.8 BPH WITH URINARY OBSTRUCTION: ICD-10-CM

## 2023-02-24 RX ORDER — TAMSULOSIN HYDROCHLORIDE 0.4 MG/1
CAPSULE ORAL
Qty: 90 CAPSULE | Refills: 0 | Status: SHIPPED | OUTPATIENT
Start: 2023-02-24

## 2023-04-06 DIAGNOSIS — R39.15 URGENCY OF URINATION: ICD-10-CM

## 2023-04-07 RX ORDER — TROSPIUM CHLORIDE 20 MG/1
TABLET, FILM COATED ORAL
Qty: 180 TABLET | Refills: 1 | Status: SHIPPED | OUTPATIENT
Start: 2023-04-07

## 2023-04-24 ENCOUNTER — TELEPHONE (OUTPATIENT)
Dept: UROLOGY | Facility: AMBULATORY SURGERY CENTER | Age: 78
End: 2023-04-24

## 2023-04-24 ENCOUNTER — TELEPHONE (OUTPATIENT)
Dept: CARDIAC SURGERY | Facility: CLINIC | Age: 78
End: 2023-04-24

## 2023-04-24 NOTE — TELEPHONE ENCOUNTER
Patient has moved and would like a records released form emailed to him at   [email or web address]    He has a new patient appointment next month and they'll need the records to see him  He does not have a fax  His new address is;   Franc[place] [address] [place] [address]    Patient can be reached at [phone]

## 2023-04-24 NOTE — TELEPHONE ENCOUNTER
Called patient to schedule a yearly follow up with Dr Johnston however, patient states he has moved away to [facility] and is seeking care closer to where he moved to

## 2023-06-09 DIAGNOSIS — N13.8 BPH WITH URINARY OBSTRUCTION: ICD-10-CM

## 2023-06-09 DIAGNOSIS — N40.1 BPH WITH URINARY OBSTRUCTION: ICD-10-CM

## 2023-06-16 RX ORDER — TAMSULOSIN HYDROCHLORIDE 0.4 MG/1
CAPSULE ORAL
Qty: 90 CAPSULE | Refills: 0 | Status: SHIPPED | OUTPATIENT
Start: 2023-06-16

## 2023-07-14 NOTE — TELEPHONE ENCOUNTER
Received a call from patient and his wife stating you saw him while he was in the hospital recently and they had some questions about medication that was prescribed  He advised that his sister is a doctor that is semi retired in [place] and would like to know why some medications were given and some medications were not given  They did not want to give me any information and just stated his sister has all the questions that need to be answered and they want Dr Jennifer Montana to reach out to her to discuss this  I did advise that he should follow up with his primary care doctor as he is not a patient at this office  He was not happy with that answer and said its not up to his primary doctor since Dr Jennifer Montana is the one who prescribed these medications  I advised them that I would send the message and Dr Jennifer Motnana will address this as he feels appropriate       Sister is Yahaira Linda from [place] - [phone]  Patients wife is Betsy Reza - [phone] Alert and interactive, no focal deficits

## 2023-09-06 DIAGNOSIS — F32.A DEPRESSION, UNSPECIFIED DEPRESSION TYPE: ICD-10-CM

## 2023-09-06 RX ORDER — CITALOPRAM HYDROBROMIDE 10 MG/1
10 TABLET ORAL DAILY
Qty: 90 TABLET | Refills: 3 | Status: SHIPPED | OUTPATIENT
Start: 2023-09-06

## 2023-10-27 NOTE — ASSESSMENT & PLAN NOTE
Lab Results   Component Value Date    HGBA1C 6 6 (H) 07/17/2019    Most recent evaluation of diabetes indicates excellent control of his diabetes he has a blood sugar at the lab of 125  His hemoglobin A1c is 6 6%  He denies any symptoms of hypoglycemia is only concern is that of daily diarrhea that after he takes his morning dose of metformin  He is transitions her without any issues from Januvia to Amaryl to save money  He is presently taking Amaryl 1 mg daily in the morning we decided to decrease his daily metformin dose from 500 mg twice a day to 500 mg once a day in the evening  He does not seem to have any diarrhea after the evening dose of metformin  I also indicated option would be to switch the metformin to extended release form which seems to have less incidence of diarrhea  Will see him in 2-3 weeks to re-evaluate his blood sugars as well as see how his bowels are doing  No results for input(s): POCGLU in the last 72 hours      Blood Sugar Average: Last 72 hrs: none

## 2023-11-09 ENCOUNTER — OFFICE VISIT (OUTPATIENT)
Dept: NEUROLOGY | Facility: CLINIC | Age: 78
End: 2023-11-09
Payer: MEDICARE

## 2023-11-09 VITALS
HEART RATE: 56 BPM | RESPIRATION RATE: 16 BRPM | OXYGEN SATURATION: 96 % | BODY MASS INDEX: 45.36 KG/M2 | DIASTOLIC BLOOD PRESSURE: 62 MMHG | TEMPERATURE: 97.3 F | WEIGHT: 289 LBS | SYSTOLIC BLOOD PRESSURE: 131 MMHG | HEIGHT: 67 IN

## 2023-11-09 DIAGNOSIS — G62.9 PERIPHERAL NEUROPATHY: ICD-10-CM

## 2023-11-09 DIAGNOSIS — G31.84 MILD COGNITIVE IMPAIRMENT: Primary | ICD-10-CM

## 2023-11-09 DIAGNOSIS — E53.8 B12 DEFICIENCY: ICD-10-CM

## 2023-11-09 PROCEDURE — 99213 OFFICE O/P EST LOW 20 MIN: CPT | Performed by: NURSE PRACTITIONER

## 2023-11-09 NOTE — PROGRESS NOTES
Patient ID: Tere Wood is a 77 y.o. male. Assessment/Plan:  Patient Instructions:  Start 1000mcg b12 supplement daily  Stay as physically active as possible, if continued shortness of breath make sure to follow up with primary care/cardiology  Speech therapy for cognitive therapy  Let the office know if there is any seizure like activity  Repeat labs in 2 months time  Follow up in 4 months or sooner if needed (we may consider repeat MRI at that time like we discussed). MOCA/exam unchanged today from previous; discussed trial of speech/cognitive therapy, wearing hearing aide,  and b12 supplementation and if continued decline in the future will repeat MRI brain neuroquant for comparison/to decide next steps for treatment. Diagnoses and all orders for this visit:    Mild cognitive impairment  -     Ambulatory Referral to Speech Therapy; Future  -     CBC and differential; Future  -     Comprehensive metabolic panel; Future  -     Vitamin B12; Future  -     Folate; Future  -     TSH, 3rd generation with Free T4 reflex; Future    B12 deficiency  -     CBC and differential; Future  -     Comprehensive metabolic panel; Future  -     Vitamin B12; Future  -     Folate; Future  -     TSH, 3rd generation with Free T4 reflex; Future    Peripheral neuropathy  -     CBC and differential; Future  -     Comprehensive metabolic panel; Future  -     Vitamin B12; Future  -     Folate; Future  -     TSH, 3rd generation with Free T4 reflex; Future  -     Protein electrophoresis, serum;  Future       Subjective:    HPI  Hardiksol Infante is a 77year old male with past medical history of GERD, DM with neuropathy, HTN, episodes of pericarditis (followed by cardiology-states recently without symptoms), bph, ckd, obesity, hyperlipidemia, HTN, depression, hearing loss-doesn't wear hearing aides, alcohol use, family history of dementia who presents for neurology follow up regarding previous seizure like activity (loss of motor control right hand) and continued memory loss. Last office visit was 3/31/2022; MRI brain was reordered at that time with neuroquant analysis which was reviewed with him today. Previous MOCA was 23/30; today repeat was also 23/30. He is with his wife today. He denies any seizure like activity recently-he actually forgets that he was seen here in the past for this. He does require anesthesia to get a good quality MRI brain because of his claustrophobia. 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 For the last year he has not been as active and often gets short of breath easily. He often sits in front of a computer. He is responsible for the home bill paying and has not made mistakes. He drives without difficulty. He notices he is very forgetful in conversation with names, places etc. Since last office visit they have moved and now live in the [address] [place] [address] area and have started to follow up with primary care and cardiology there. He denies appetite complaint; he states his neuropathy symptoms are well controlled-no pain at night/he has found good shoes helpful. He does usually drink 1 alcoholic beverage/night; he does stay socially active with friends in his area. He is compliant with his cpap machine nightly and feels this works well for sleep.      Lab Results   Component Value Date    SODIUM 139 05/23/2022    K 4.1 05/23/2022     05/23/2022    CO2 24 05/23/2022    BUN 15 05/23/2022    CREATININE 1.27 05/23/2022    GLUC 188 (H) 05/18/2022    CALCIUM 9.0 05/23/2022     Lab Results   Component Value Date    WBC 6.28 05/18/2022    HGB 11.0 (L) 05/18/2022    HCT 33.8 (L) 05/18/2022     (H) 05/18/2022     05/18/2022     Lab Results Component Value Date    HZXLICNW23 233 09/27/2021     Lab Results   Component Value Date    ZTD9KUTZZSVY 2.030 04/20/2022     Lab Results   Component Value Date    FOLATE 19.2 (H) 09/27/2021     Lab Results   Component Value Date    LDLCALC 45 04/20/2022     MRI brain neuroquant 7/13/2022:  IMPRESSION:  No acute intracranial abnormality. Mild chronic microangiopathy. NeuroQuant analysis was performed: Low hippocampal volume and enlargement of the adjacent inferior lateral ventricles suggestive of local ex-vacuo dilatation. Findings support medial temporal lobe focused neurodegenerative etiology. MRI brain 7/19/2021:  IMPRESSION:  Motion degraded examination. No acute intracranial abnormality. Mildly progressive white matter disease thought to represent a sequela of chronic microangiopathy. No imaging findings of the mesial temporal lobes to account for the patient's seizure history. Echocardiogram 5/18/2022:  Left Ventricle: Left ventricular cavity size is normal. Wall thickness is mildly increased. There is mild concentric hypertrophy. The left ventricular ejection fraction is 60%. Systolic function is normal. Wall motion is normal. Diastolic function is mildly abnormal, consistent with grade I (abnormal) relaxation. Left atrial filling pressure is elevated. Right Ventricle: Wall thickness is increased. Left Atrium: The atrium is mildly dilated. Tricuspid Valve: The right ventricular systolic pressure is moderately to severely elevated. The estimated right ventricular systolic pressure is 77.04 mmHg. Aorta: The aortic root is normal in size. The aortic root exhibited mild fibrocalcific change. Pericardium: The pericardium is thickened. There is evidence for pericardial constriction. The evidence for constriction includes: septal tissue velocity with evidence of annulus reversus and septal bounce. EEG 8/4/2021:  Clinical Interpretation:    This was a normal awake, drowsy and sleep routine EEG study. No electrographic seizures, interictal epileptiform discharges (seizure tendency) or focal slowing were seen    The following portions of the patient's history were reviewed and updated as appropriate: allergies, current medications, past family history, past medical history, past social history, past surgical history, and problem list.         Objective:    Blood pressure 131/62, pulse 56, temperature (!) 97.3 °F (36.3 °C), temperature source Temporal, resp. rate 16, height 5' 7" (1.702 m), weight 131 kg (289 lb), SpO2 96 %. Physical Exam  Vitals reviewed. Constitutional:       General: He is not in acute distress. Appearance: He is obese. He is not ill-appearing, toxic-appearing or diaphoretic. HENT:      Head: Normocephalic and atraumatic. Right Ear: External ear normal.      Left Ear: External ear normal.      Nose: Nose normal.      Mouth/Throat:      Mouth: Mucous membranes are moist.      Pharynx: Oropharynx is clear. Eyes:      General: Lids are normal.         Right eye: No discharge. Left eye: No discharge. Extraocular Movements: Extraocular movements intact. Pupils: Pupils are equal, round, and reactive to light. Cardiovascular:      Rate and Rhythm: Normal rate and regular rhythm. Pulses: Normal pulses. Heart sounds: Normal heart sounds. Pulmonary:      Effort: Pulmonary effort is normal. No respiratory distress. Abdominal:      General: Bowel sounds are normal. There is no distension. Musculoskeletal:      Cervical back: Normal range of motion. No tenderness. Right lower leg: Edema (1+) present. Left lower leg: Edema (1+) present. Skin:     Capillary Refill: Capillary refill takes less than 2 seconds. Findings: No rash. Neurological:      Mental Status: He is alert. Mental status is at baseline. Cranial Nerves: No cranial nerve deficit. Sensory: Sensory deficit present.       Motor: Motor strength is normal.     Coordination: Romberg sign positive. Deep Tendon Reflexes:      Reflex Scores:       Brachioradialis reflexes are 1+ on the right side and 1+ on the left side. Patellar reflexes are 1+ on the right side and 1+ on the left side. Psychiatric:         Mood and Affect: Mood normal.         Speech: Speech normal.         Behavior: Behavior normal.         Neurological Exam  Mental Status  Alert. Oriented to person, place and time. Speech is normal. Language is fluent with no aphasia. See moca 23/30-most difficulty with delayed recall. Cranial Nerves  CN II: Visual acuity is normal. Visual fields full to confrontation. CN III, IV, VI: Extraocular movements intact bilaterally. Normal lids and orbits bilaterally. Pupils equal round and reactive to light bilaterally. CN V: Facial sensation is normal.  CN VII: Full and symmetric facial movement. CN VIII: Hearing is normal.  CN IX, X: Palate elevates symmetrically. Normal gag reflex. CN XI: Shoulder shrug strength is normal.  CN XII: Tongue midline without atrophy or fasciculations. Motor   Strength is 5/5 throughout all four extremities. Sensory  Light touch abnormality: Temperature abnormality: Vibration abnormality:   5 second vibration at toes  Decreased temperature below mid shin level. Reflexes                                            Right                      Left  Brachioradialis                    1+                         1+  Patellar                                1+                         1+  Achilles                                Tr                         Tr    Coordination  Right: Finger-to-nose normal.Left: Finger-to-nose normal.    Gait  Casual gait: Wide stance. Romberg is present. Unable to rise from chair without using arms. ROS:    Review of Systems   Constitutional:  Negative for appetite change, fatigue and fever. HENT: Negative.   Negative for hearing loss, tinnitus, trouble swallowing and voice change. Eyes: Negative. Negative for photophobia, pain and visual disturbance. Respiratory: Negative. Negative for shortness of breath. Cardiovascular: Negative. Negative for palpitations. Gastrointestinal: Negative. Negative for nausea and vomiting. Endocrine: Negative. Negative for cold intolerance. Genitourinary: Negative. Negative for dysuria, frequency and urgency. Musculoskeletal:  Negative for back pain, gait problem, myalgias and neck pain. Skin: Negative. Negative for rash. Allergic/Immunologic: Negative. Neurological:  Positive for numbness (Feet). Negative for dizziness, tremors, seizures, syncope, facial asymmetry, speech difficulty, weakness, light-headedness and headaches. Off balanced   Hematological: Negative. Does not bruise/bleed easily. Psychiatric/Behavioral:  Positive for confusion (Memory issues). Negative for hallucinations and sleep disturbance.     ROS was reviewed and updated as appropriate

## 2023-11-09 NOTE — PATIENT INSTRUCTIONS
Start 1000mcg b12 supplement daily  Stay as physically active as possible, if continued shortness of breath make sure to follow up with primary care/cardiology  Speech therapy for cognitive therapy  Let the office know if there is any seizure like activity  Repeat labs in 2 months time  Follow up in 4 months or sooner if needed (we may consider repeat MRI at that time like we discussed).

## 2023-12-07 ENCOUNTER — TELEPHONE (OUTPATIENT)
Dept: NEUROLOGY | Facility: CLINIC | Age: 78
End: 2023-12-07

## 2023-12-07 DIAGNOSIS — D47.2 MONOCLONAL GAMMOPATHY: Primary | ICD-10-CM

## 2023-12-07 NOTE — TELEPHONE ENCOUNTER
----- Message from Norma Dugan, [facility] sent at 12/7/2023  1:15 PM EST -----  I attempted to call the patient to explain lab results but it went to voicemail; can we please try calling again- overall labs are ok; the hemoglobin was slightly low and b12 was low normal so he should continued the b12 supplement as instructed before. The SPEP (protein electrophoresis) test was abnormal- showing a monoclonal gammopathy at this point of undetermined significance but I do think he should see a hematologist ( I will put in a referral) regarding this test to determine if further testing is necessary especially if there is any changes to symptoms.

## 2023-12-08 ENCOUNTER — TELEPHONE (OUTPATIENT)
Dept: HEMATOLOGY ONCOLOGY | Facility: CLINIC | Age: 78
End: 2023-12-08

## 2023-12-08 NOTE — TELEPHONE ENCOUNTER
I called Jeovany Heller in response to a referral that was received for patient to establish care with Hematology. Outreach was made to schedule a consultation. Patient's voicemail is full and unable to accept messages at this time. Another attempt will be made to contact patient.

## 2023-12-12 ENCOUNTER — TELEPHONE (OUTPATIENT)
Dept: HEMATOLOGY ONCOLOGY | Facility: CLINIC | Age: 78
End: 2023-12-12

## 2023-12-12 NOTE — TELEPHONE ENCOUNTER
I called Justina Valenzuela in response to a referral that was received for patient to establish care with Hematology. Outreach was made to schedule a consultation. The patient will give us a call back . Another attempt will be made to contact patient.

## 2023-12-14 NOTE — TELEPHONE ENCOUNTER
12/13 at 3:47 pm:    This is [facility]. I'm calling for my , Madyson Martin. Birth date [birth year]. He had seen Dr. Heath Barros and he was trying to get in touch with him. If you would please give me a call at [phone]. Thank you.

## 2023-12-15 NOTE — TELEPHONE ENCOUNTER
Called pt's wife, Devin Santiago, and made her aware of the results and provider's recommendations/hematology referral. Devin Santiago verbalized understanding of all.

## 2023-12-22 NOTE — TELEPHONE ENCOUNTER
[facility] called asking for a copy of the labs and urin records to be sent as he is establishing with them.   Fax is [phone] Pat Mckeon  Thank you.

## 2024-01-05 DIAGNOSIS — E78.00 PURE HYPERCHOLESTEROLEMIA: ICD-10-CM

## 2024-01-05 RX ORDER — ROSUVASTATIN CALCIUM 20 MG/1
TABLET, COATED ORAL
Qty: 90 TABLET | Refills: 2 | Status: SHIPPED | OUTPATIENT
Start: 2024-01-05

## 2024-01-10 DIAGNOSIS — T78.40XA ALLERGY, INITIAL ENCOUNTER: ICD-10-CM

## 2024-01-10 RX ORDER — FLUTICASONE PROPIONATE 50 MCG
1 SPRAY, SUSPENSION (ML) NASAL DAILY
Qty: 16 ML | Refills: 3 | Status: SHIPPED | OUTPATIENT
Start: 2024-01-10

## 2024-01-10 NOTE — TELEPHONE ENCOUNTER
[facility] Hospitalist Progress Note    SUBJECTIVE:    Pt seen and examined at bedside. Imodium is helping. No chest pain or abdominal pain. No melena.   OBJECTIVE:    Current Facility-Administered Medications   Medication   • warfarin (COUMADIN) tablet 8 mg   • epoetin toby-epbx (RETACRIT) 91429 UNIT/ML injection 10,000 Units   • loperamide (IMODIUM) capsule 2 mg   • metoPROLOL tartrate (LOPRESSOR) tablet 50 mg   • pantoprazole (PROTONIX) EC tablet 40 mg   • heparin (porcine) 25,000 units/250 mL in dextrose 5 % infusion   • heparin (porcine) injection 7,400 Units   • heparin (porcine) injection 3,700 Units   • WARFARIN - PHARMACIST MONITORED Misc   • sodium chloride (NORMAL SALINE) 0.9 % bolus 100-200 mL   • sodium chloride 0.9% infusion   • hydrALAZINE (APRESOLINE) injection 15 mg   • prochlorperazine (COMPAZINE) injection 5 mg   • escitalopram (LEXAPRO) tablet 5 mg   • sodium chloride 0.9% infusion   • sodium chloride 0.9% infusion   • ALPRAZolam (XANAX) tablet 0.25 mg   • sodium chloride (NORMAL SALINE) 0.9 % bolus 100-200 mL   • cloNIDine (CATAPRES) tablet 0.1 mg   • acetaminophen (TYLENOL) tablet 650 mg   • ondansetron (ZOFRAN) injection 4 mg   • amLODIPine (NORVASC) tablet 10 mg   • aspirin (ECOTRIN) enteric coated tablet 81 mg   • cinacalcet (SENSIPAR) tablet 90 mg   • levothyroxine (SYNTHROID, LEVOTHROID) tablet 200 mcg   • levothyroxine (SYNTHROID, LEVOTHROID) tablet 50 mcg   • hydrALAZINE (APRESOLINE) tablet 100 mg   • losartan (COZAAR) tablet 50 mg   • nystatin (MYCOSTATIN) powder   • dextrose 50 % injection 25 g   • dextrose 50 % injection 12.5 g   • glucagon (GLUCAGEN) injection 1 mg   • dextrose (GLUTOSE) 40 % gel 15 g   • dextrose (GLUTOSE) 40 % gel 30 g   • insulin lispro (ADMELOG,HumaLOG) - Correction Dose       I/O's    Intake/Output Summary (Last 24 hours) at 9/9/2022 1510  Last data filed at 9/9/2022 1426  Gross per 24 hour   Intake 1081.35 ml   Output 2001 ml   Net -919.65 ml       Last Recorded  Requested medication(s) are due for refill today: Yes  Patient has already received a courtesy refill: No  Other reason request has been forwarded to provider:    Vitals  Vital Last Value 24 Hour Range   Temperature 98.2 °F (36.8 °C) (22 1243) Temp  Min: 98.1 °F (36.7 °C)  Max: 98.2 °F (36.8 °C)   Pulse 68 (22 1352) Pulse  Min: 50  Max: 68   Respiratory 16 (22 1243) Resp  Min: 14  Max: 18   Non-Invasive  Blood Pressure (!) 146/65 (22 1352) BP  Min: 101/42  Max: 167/47   Pulse Oximetry 95 % (22 1243) SpO2  Min: 94 %  Max: 99 %     Vital Today Admitted   Weight 85.5 kg (188 lb 7.9 oz) (22 0550) Weight: 92.2 kg (203 lb 4.2 oz) (22 06)   Height N/A Height: 5' (152.4 cm) (22)   BMI N/A BMI (Calculated): 39.7 (22)       Physical Exam  Constitutional:       General: She is not in acute distress.     Appearance: Normal appearance. She is not ill-appearing.      Comments: Better color today   HENT:      Head: Normocephalic and atraumatic.      Neck: Neck supple.   Neck:      Vascular: No carotid bruit.      Comments: L neck- central dialysis catheter- site C/D/I w/ catheter covered with gauze  Cardiovascular:      Rate and Rhythm: Normal rate and regular rhythm.      Heart sounds: Murmur heard.   Pulmonary:      Breath sounds: Examination of the right-lower field reveals decreased breath sounds. Examination of the left-lower field reveals decreased breath sounds. Decreased breath sounds present. No wheezing, rhonchi or rales.   Chest:      Comments: L chest-   Abdominal:      General: Bowel sounds are normal. There is no distension.      Palpations: Abdomen is soft.   Musculoskeletal:      Right lower le+ Pitting Edema present.      Left lower le+ Pitting Edema present.   Lymphadenopathy:      Cervical: No cervical adenopathy.   Skin:     General: Skin is warm and dry.      Coloration: Skin is not jaundiced.      Findings: Bruising:      Neurological:      General: No focal deficit present.      Mental Status: She is alert and oriented to person, place, and time.       Labs     Recent Labs   Lab 22  3042  09/09/22  0528 09/08/22  0422 09/08/22  0421 09/07/22  2050 09/07/22  1440 09/07/22  0442   WBC 4.7  --  4.6  --   --   --  4.7   HCT 30.7*  --  30.7*  --   --   --  28.5*   HGB 10.0*  --  9.9*  --   --   --  9.2*     --  179  --   --   --  174   INR  --  1.5  --  1.3  --   --  1.3   PTT  --  60*  --  51* 57*   < > 74*   SODIUM 132*  --   --  131*  --   --  133*   POTASSIUM 5.0  --   --  4.6  --   --  4.4   CHLORIDE 102  --   --  102  --   --  100   CO2 24  --   --  24  --   --  25   CALCIUM 9.7  --   --  9.4  --   --  9.3   GLUCOSE 103*  --   --  104*  --   --  97   BUN 18  --   --  10  --   --  22*   CREATININE 5.90*  --   --  3.76*  --   --  6.06*   AST 16  --   --  21  --   --  11   GPT 20  --   --  21  --   --  17   ALKPT 71  --   --  74  --   --  74   BILIRUBIN 0.4  --   --  0.4  --   --  0.4   ALBUMIN 3.3*  --   --  3.2*  --   --  3.2*   PHOS 5.1*  --   --  4.0  --   --  4.9*    < > = values in this interval not displayed.       Imaging  No results found.      ASSESSMENT/PLAN:    Acute hypoxic respiratory failure, present on admission, multifactorial secondary to COVID-19, recent pulmonary emboli, small pleural effusion, and renally-mediated volume overload  - Initially with hypoxia to 83% on room air, now oxygenating adequately - on RA   - SARS-COV-2 PCR positive (on 9/1)   - AC with heparin gtt, warfarin (INR  1.5 today)   - Fluid mgmt via HD   - Maintaining contact and droplet isolation   - No specific treatment recommended at this time for COVID-19 per Pulmonology   - Pulmonology on consult     Suspected upper GI bleed, possibly due to healed duodenal ulcer, occurring in setting of peptic ulcer disease, exacerbated by recent anticoagulant therapy  S/p EGD on 9/2/22  - Continue w/ PO Protonix 40 mg daily   - Continue w/ aspirin. AC noted below   - Per GI, higher risk of bleeding on OAC, but given relatively benign EGD findings benefits outweigh risk     Anemia, acute on chronic, multifactorial due  to CKD, iron deficiency, B12 deficiency, with reported melena   - Hgb 8.1 --> 10 today with no further bleeding noted  - Continue w/ IM B12 1,000 mcg daily for 5 days, then once weekly x 4 weeks, then monthly per Hematology   - s/p venofar and adwoa w/ HD  - Monitoring H&H, transfuse if hgb <7. S/p 2 U pRBC transfusions  - Hematology on consult     ESRD on HD (M/W/F)  - HD mgmt per Nephrology @ [facility] (last session yesterday and tolerated 3L)  - Continue w/ Sensipar   - Add Retacrit for HD  - Nephrology on consult     Hypertensive urgency in the setting of primary ypertension   - BP is in 140-150's today  - Continue w/ amlodipine, PO hydralazine, losartan, and metoprolol    Loose stools/diarrhea, rule out c diff  -c diff neg x2 this admission  - may use imodium prn (d/w RN staff)    Acute LUE DVT/pulmonary embolism, in setting of COVID-19, August 2022 - Mgmt as above  CVA with residual left hemiparesis - Continue w/ aspirin and AC noted below  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 Cover w/ SSI  Hypothyroidism - Continue w/ levothyroxine  Peptic ulcer disease - Continue w/ Protonix  Spondylosis of the cervical and lumbar spine - Continue w/ PRN analgesics  History of SBO due to incarcerated hernia s/p ex lap, SOPHIE, March 2018 - No acute concerns.   JUN - Nocturnal CPAP use encouraged  Obesity, BMI 36.81 - Dietary/lifestyle modifications advised     DVT PPx: SCDs, heparin gtt, warfarin (INR 1.5 today)    DISPO: Pending clinical improvement and further specialist input. Anticipate eventual return home with [facility]. Will need to remain admitted until INR therapuetic,, minimum 5 day bridge per hematology.    Expected discharge date: 3-4 days    PCP:  MD Dr. Alice Carreno DO

## 2024-02-27 ENCOUNTER — TELEPHONE (OUTPATIENT)
Dept: NEUROLOGY | Facility: CLINIC | Age: 79
End: 2024-02-27

## 2024-02-27 NOTE — TELEPHONE ENCOUNTER
Patient is scheduled for an office visit on 3/11 with [place]. Called patient to confirm appt and appt details. Left vm.

## 2024-02-27 NOTE — TELEPHONE ENCOUNTER
PT called back regarding message. Told pt appt time 1:45 arrive 15 minutes early on 03/11/2024 w/Gucci Hahn.

## 2024-03-11 ENCOUNTER — TELEPHONE (OUTPATIENT)
Dept: NEUROLOGY | Facility: CLINIC | Age: 79
End: 2024-03-11

## 2024-03-11 NOTE — TELEPHONE ENCOUNTER
Patient's wife called and stated her  hasn't had all his testing done yet and they don't want to drive here twice.  His last test is being done on 3/18/24.    Patient is rescheduled for 3/26/24 @ 1:45 with Gucci Hahn in the [place] Office.

## 2024-03-12 ENCOUNTER — HOSPITAL ENCOUNTER (OUTPATIENT)
Dept: HOSPITAL 99 - REG | Age: 79
End: 2024-03-12
Payer: MEDICARE

## 2024-03-12 DIAGNOSIS — R31.29: ICD-10-CM

## 2024-03-12 DIAGNOSIS — E11.69: ICD-10-CM

## 2024-03-12 DIAGNOSIS — I10: ICD-10-CM

## 2024-03-12 DIAGNOSIS — E78.2: Primary | ICD-10-CM

## 2024-03-12 LAB
ALBUMIN SERPL-MCNC: 3.9 G/DL (ref 3.5–5)
ALP SERPL-CCNC: 123 U/L (ref 38–126)
ALT SERPL-CCNC: 35 U/L (ref 0–50)
AST SERPL-CCNC: 31 U/L (ref 17–59)
BUN SERPL-MCNC: 18 MG/DL (ref 9–20)
CALCIUM SERPL-MCNC: 9 MG/DL (ref 8.4–10.2)
CHLORIDE SERPL-SCNC: 102 MMOL/L (ref 98–107)
CO2 SERPL-SCNC: 26 MMOL/L (ref 22–30)
CREAT UR-MCNC: 133.3 MG/DL
EGFR: > 60
ERYTHROCYTE [DISTWIDTH] IN BLOOD BY AUTOMATED COUNT: 13.1 % (ref 11.5–14.5)
GLUCOSE SERPL-MCNC: 135 MG/DL (ref 70–99)
HBA1C MFR BLD: 6.5 % (ref 4–5.6)
HCT VFR BLD AUTO: 36 % (ref 39–52)
HDLC SERPL-MCNC: 44 MG/DL
HGB BLD-MCNC: 12.1 G/DL (ref 13–18)
LDLC SERPL CALC-MCNC: 64 MG/DL
MCHC RBC AUTO-ENTMCNC: 33.6 G/DL (ref 33–37)
MCV RBC AUTO: 100.8 FL (ref 80–94)
MICROALBUMIN UR-MCNC: 17.9 MG/DL (ref 0.6–1.7)
NRBC BLD AUTO-RTO: 0 %
PLATELET # BLD AUTO: 240 10^3/UL (ref 130–400)
POTASSIUM SERPL-SCNC: 4.3 MMOL/L (ref 3.5–5.1)
PROT SERPL-MCNC: 7.4 G/DL (ref 6.3–8.2)
SODIUM SERPL-SCNC: 138 MMOL/L (ref 135–145)
SQUAMOUS URNS QL MICRO: (no result) /LPF
URINE RED BLOOD CELL: (no result) /HPF (ref 0–2)
URINE WHITE CELL: (no result) /HPF (ref 0–5)
VLDLC SERPL CALC-MCNC: 23 MG/DL (ref 0–30)

## 2024-03-18 ENCOUNTER — HOSPITAL ENCOUNTER (OUTPATIENT)
Dept: HOSPITAL 99 - RADI | Age: 79
End: 2024-03-18
Payer: MEDICARE

## 2024-03-18 VITALS — SYSTOLIC BLOOD PRESSURE: 61 MMHG | DIASTOLIC BLOOD PRESSURE: 73 MMHG | RESPIRATION RATE: 18 BRPM

## 2024-03-18 VITALS — DIASTOLIC BLOOD PRESSURE: 69 MMHG

## 2024-03-18 DIAGNOSIS — C83.00: Primary | ICD-10-CM

## 2024-03-18 DIAGNOSIS — D47.2: ICD-10-CM

## 2024-03-18 DIAGNOSIS — D68.8: ICD-10-CM

## 2024-03-18 LAB
ALBUMIN SERPL-MCNC: 3.9 G/DL (ref 3.5–5)
ALP SERPL-CCNC: 117 U/L (ref 38–126)
ALT SERPL-CCNC: 30 U/L (ref 0–50)
AST SERPL-CCNC: 25 U/L (ref 17–59)
BUN SERPL-MCNC: 17 MG/DL (ref 9–20)
CALCIUM SERPL-MCNC: 8.7 MG/DL (ref 8.4–10.2)
CHLORIDE SERPL-SCNC: 108 MMOL/L (ref 98–107)
CO2 SERPL-SCNC: 24 MMOL/L (ref 22–30)
EGFR: > 60
ERYTHROCYTE [DISTWIDTH] IN BLOOD BY AUTOMATED COUNT: 13.2 % (ref 11.5–14.5)
GLUCOSE SERPL-MCNC: 143 MG/DL (ref 70–99)
HCT VFR BLD AUTO: 34.8 % (ref 39–52)
HGB BLD-MCNC: 12.1 G/DL (ref 13–18)
INR PPP: 1.06
MCHC RBC AUTO-ENTMCNC: 34.8 G/DL (ref 33–37)
MCV RBC AUTO: 98.9 FL (ref 80–94)
NRBC BLD AUTO-RTO: 0 %
PLATELET # BLD AUTO: 213 10^3/UL (ref 130–400)
POTASSIUM SERPL-SCNC: 4.5 MMOL/L (ref 3.5–5.1)
PROT SERPL-MCNC: 7.2 G/DL (ref 6.3–8.2)
PROTHROMBIN TIME: 13.6 SEC (ref 11.4–14.6)
SODIUM SERPL-SCNC: 138 MMOL/L (ref 135–145)

## 2024-03-18 PROCEDURE — 88311 DECALCIFY TISSUE: CPT

## 2024-03-18 PROCEDURE — 88312 SPECIAL STAINS GROUP 1: CPT

## 2024-03-18 PROCEDURE — 88305 TISSUE EXAM BY PATHOLOGIST: CPT

## 2024-03-18 RX ADMIN — LORAZEPAM 0.5 MG: 2 INJECTION INTRAMUSCULAR; INTRAVENOUS at 10:58

## 2024-03-18 RX ADMIN — SODIUM CHLORIDE 0.25 ML: 9 INJECTION, SOLUTION INTRAMUSCULAR; INTRAVENOUS; SUBCUTANEOUS at 10:58

## 2024-03-19 ENCOUNTER — HOSPITAL ENCOUNTER (OUTPATIENT)
Dept: HOSPITAL 99 - RAD | Age: 79
End: 2024-03-19
Payer: MEDICARE

## 2024-03-19 DIAGNOSIS — D47.2: Primary | ICD-10-CM

## 2024-03-19 DIAGNOSIS — C88.0: ICD-10-CM

## 2024-03-20 LAB
ALBUMIN SERPL ELPH-MCNC: 3.4 G/DL (ref 3.75–5.01)
INTERPRETATION SERPL IFE-IMP: (no result)
KAPPA LC FREE SER-MCNC: 33.32 MG/L (ref 3.3–19.4)
KAPPA LC FREE/LAMBDA FREE SER NEPH: 0.3 {RATIO} (ref 0.26–1.65)
LAMBDA LC FREE SERPL-MCNC: 110.44 MG/L (ref 5.71–26.3)
Lab: 9356 TU (ref 0–999)
PROT SERPL-MCNC: 7.2 G/DL (ref 6.3–8.2)

## 2024-03-26 ENCOUNTER — TELEPHONE (OUTPATIENT)
Dept: NEUROLOGY | Facility: CLINIC | Age: 79
End: 2024-03-26

## 2024-03-26 ENCOUNTER — OFFICE VISIT (OUTPATIENT)
Dept: NEUROLOGY | Facility: CLINIC | Age: 79
End: 2024-03-26
Payer: MEDICARE

## 2024-03-26 VITALS
HEIGHT: 67 IN | DIASTOLIC BLOOD PRESSURE: 72 MMHG | HEART RATE: 72 BPM | RESPIRATION RATE: 16 BRPM | OXYGEN SATURATION: 98 % | SYSTOLIC BLOOD PRESSURE: 118 MMHG | TEMPERATURE: 97.9 F | WEIGHT: 293.2 LBS | BODY MASS INDEX: 46.02 KG/M2

## 2024-03-26 DIAGNOSIS — G62.9 PERIPHERAL NEUROPATHY: Primary | ICD-10-CM

## 2024-03-26 DIAGNOSIS — G31.84 MILD COGNITIVE IMPAIRMENT: ICD-10-CM

## 2024-03-26 PROCEDURE — 99213 OFFICE O/P EST LOW 20 MIN: CPT | Performed by: NURSE PRACTITIONER

## 2024-03-26 RX ORDER — BENZALKONIUM CHLORIDE 1.3 MG/ML
CLOTH TOPICAL
COMMUNITY
Start: 2024-02-26

## 2024-03-26 RX ORDER — AMLODIPINE BESYLATE 5 MG/1
5 TABLET ORAL DAILY
COMMUNITY
Start: 2024-01-27

## 2024-03-26 NOTE — TELEPHONE ENCOUNTER
----- Message from UDAY Harrell sent at 3/26/2024  3:13 PM EDT -----  Can you please reach out to Kali Toribio DO hematology office in [facility] and get recent records (office notes/repeat testing)?  Thanks,  Gucci

## 2024-03-26 NOTE — PATIENT INSTRUCTIONS
Continue current treatment; I will reach out for further records from the hematologist  Suggest EMG for further evaluation  Suggest speech therapy for cognitive therapy  Let us know if any more sudden changes; follow up in 6 months    Mild Cognitive Impairment: New Diagnosis   AMBULATORY CARE:   Mild cognitive impairment (MCI)  is a condition that causes problems with your memory, attention, and ability to think clearly. You may have problems in only one of these areas, or in all of them. These problems are common with aging, but MCI causes problems that are more than should be expected in a person your age. These problems are frustrating, but they are not severe enough to stop your activities of daily living. Rarely, MCI may go away, and your thinking may return to normal. MCI may instead get worse and develop into dementia. Dementia is a condition that causes severe loss of memory, thought control, and judgment. Alzheimer disease is the most common cause of dementia.  Contact your healthcare provider if:   You have new or worsening symptoms.    You have questions or concerns about your condition or care.    Help create a care plan early:  A care plan is a way for you to make decisions about future care you may need. Your care plan can be set up so others know how to change it to meet your needs over time. This can be helpful if you are not able to make certain decisions later. Your providers will talk to you and anyone who lives or helps you about your personal and financial decisions. Your plan may include any of the following, depending on the type of MCI you have:  Driving limits  may include how far or long you can drive, or not driving alone.    Gait training  includes teaching you and other people how to help you walk or exercise safely.    Home safety  includes instructions not to cook alone, or to set reminders to turn off appliances. Poison and firearm storage safety, and removal of tripping hazards can  also be part of the plan.    Advanced directives  are legal documents you can sign ahead of time to help others know your wishes. Examples include a living will, durable power of , and financial and long-term care plans.    Manage MCI:   Do activities that engage you.  Examples include computer games, solving jigsaw puzzles, gardening, and creating artwork. Activities that involve language skills include reading, writing, and solving crossword puzzles. You may want to join a group so you can interact with others who share your interests. Other people can help challenge and motivate you.    Manage health conditions that can worsen MCI.  Hypertension, diabetes, and other conditions can cause MCI to progress to dementia. Talk to your healthcare provider if you need help managing a health condition you have.    Eat a variety of healthy foods.  Healthy foods include fruits, vegetables, low-fat meats, fish, beans, whole-grain breads, and low-fat dairy products. Your provider may recommend a heart healthy diet to prevent atherosclerosis, or hardening of the arteries. This is a condition that increases your risk for dementia. A heart healthy diet is high in omega-3 fatty acids, fruits, and vegetables. It is low in saturated fats, salt, and sugar.    Exercise every day, or as directed.  Physical activity may help improve memory and the ability to think clearly. Try to get 30 minutes of physical activity on most days of the week. Walking is a good activity for most people. Talk to your healthcare provider about the best activity for you.         Do not smoke.  Nicotine in cigarettes and cigars contain harmful chemicals that can damage blood vessels to your brain. Smoking increases your risk for MCI and may cause it to progress more quickly. Ask your healthcare provider for information if you currently smoke and need help to quit. Smokeless tobacco products still contain nicotine. Talk to your healthcare provider  before you use these products.    Follow up with your healthcare provider as directed:  You may need to come in 1 to 2 times each year for tests. The tests will show if your symptoms are progressing toward dementia. Write down your questions so you remember to ask them during your visits.  © Copyright [employer] 2023 Information is for End User's use only and may not be sold, redistributed or otherwise used for commercial purposes.  The above information is an  only. It is not intended as medical advice for individual conditions or treatments. Talk to your doctor, nurse or pharmacist before following any medical regimen to see if it is safe and effective for you.

## 2024-03-26 NOTE — PROGRESS NOTES
Patient ID: Hector Fishman is a 78 y.o. male.    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 Kali Toribio DO office for test results). On exam sensation changes are not much different from previous exam; no motor weakness on exam but recently has described every am cramping of hands/feet. Could consider further lab workup including cryoglobulin/anti mag antibody testing in the future. He mentions family members have MGUS and Parkinson disease. He denies there being genetic testing done in the past and he is not interested in pursuing this today.    Mild cognitive impairment  -repeat MOCA 24/30 today-no change from previous; no other change in exam/lifestyle; discussed further testing options: could repeat MRI brain or complete other testing such as LP/PET imaging to further evaluate. There was a mutual decision to hold off on further testing at this time; he will try to be more active, complete speech therapy, and continue with b12 supplementation. We discussed potential treatment options depending on clinical progression; he would be hesitant to do new therapies and I do not feel it necessary to add a medication like donepezil at this time.    Other orders  -     Respiratory Therapy Supplies (CareTouch 2 CPAP Hose ) MISC; As directed  -     amLODIPine (NORVASC) 5 mg tablet; Take 5 mg by mouth daily       Subjective:    MONICA Fishman is a 78 year old male with past  medical history of GERD, DM with neuropathy, HTN, episodes of pericarditis (followed by cardiology-states recently without symptoms), bph, ckd, obesity, hyperlipidemia, ANALY on cpap, HTN, skin cancer without reported metastasis, depression, hearing loss-doesn't wear hearing aides, alcohol use (1-2/night), family history of dementia who presents for follow up, last office visit 11/9/2023. At that time he was ordered labs and speech therapy for his concerns of memory loss/neuropathy. He did have lab testing which overall was normal except for finding of monoclonal protein. He did see a hematologist after and did have bone marrow biopsy and other repeat testing which are not available today for review. He does state he had right hip pain after the biopsy but today that pain went away. He did not complete speech therapy. His wife with him today states he does have continued repetitive speech and forgetfulness that is not much changed from 4 months ago. He denies any change to his neuropathy symptoms but he does state that he gets an uncomfortable cramping of his hands and feet every morning when he wakes up which is a newer symptom. He denies any chest pain/cardiac complaints. He continues to not be very active at home. He has regular follow up in [facility] with his PCP, cardiologist, hematologist, dermatologist and endocrinologist.     Labs 11/30/2023:  Hgb 12, hct 35, mcv 100, platelet 221  Glucose 141, na 140, k 4.2, ca 8.8, creat 0.9  Tsh 3.87,folate 6, b 12 453 (on supplement)  Spep gamma 1.63 with ZACK igm type lambda monoclonal protein.    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 For the last year he has not been as active and  "often gets short of breath easily. He often sits in front of a computer. He is responsible for the home bill paying and has not made mistakes. He drives without difficulty. He notices he is very forgetful in conversation with names, places etc.   previous seizure like activity (loss of motor control right hand) -no repeat recently.  He denies appetite complaint; he states his neuropathy symptoms are well controlled-no pain at night/he has found good shoes helpful. He does usually drink 1 alcoholic beverage/night; he does stay socially active with friends in his area. He is compliant with his cpap machine nightly and feels this works well for sleep.     MRI brain neuroquant 7/13/2022:  IMPRESSION:  No acute intracranial abnormality.  Mild chronic microangiopathy.  NeuroQuant analysis was performed: Low hippocampal volume and enlargement of the adjacent inferior lateral ventricles suggestive of local ex-vacuo dilatation.  Findings support medial temporal lobe focused neurodegenerative etiology.     MRI brain 7/19/2021:  IMPRESSION:  Motion degraded examination.  No acute intracranial abnormality.  Mildly progressive white matter disease thought to represent a sequela of chronic microangiopathy.   No imaging findings of the mesial temporal lobes to account for the patient's seizure history.  The following portions of the patient's history were reviewed and updated as appropriate: allergies, current medications, past family history, past medical history, past social history, past surgical history, and problem list.         Objective:    Blood pressure 118/72, pulse 72, temperature 97.9 °F (36.6 °C), temperature source Temporal, resp. rate 16, height 5' 7\" (1.702 m), weight 133 kg (293 lb 3.2 oz), SpO2 98%.    Physical Exam  Vitals reviewed.   Constitutional:       General: He is not in acute distress.     Appearance: He is obese.   HENT:      Head: Normocephalic and atraumatic.      Right Ear: External ear normal.      " Left Ear: External ear normal.      Nose: Nose normal.      Mouth/Throat:      Pharynx: Oropharynx is clear.   Eyes:      General: Lids are normal.         Right eye: No discharge.         Left eye: No discharge.      Extraocular Movements: Extraocular movements intact.      Pupils: Pupils are equal, round, and reactive to light.   Cardiovascular:      Rate and Rhythm: Normal rate and regular rhythm.      Pulses: Normal pulses.      Heart sounds: Normal heart sounds.   Pulmonary:      Effort: Pulmonary effort is normal.      Breath sounds: Normal breath sounds.   Abdominal:      General: Bowel sounds are normal. There is no distension.   Musculoskeletal:      Cervical back: Normal range of motion. No tenderness.      Right lower leg: Edema (mild) present.      Left lower leg: Edema present.   Skin:     General: Skin is warm.      Findings: No rash.   Neurological:      Mental Status: He is alert. Mental status is at baseline.      Motor: Motor strength is normal.     Deep Tendon Reflexes:      Reflex Scores:       Brachioradialis reflexes are 1+ on the right side and 1+ on the left side.       Patellar reflexes are 1+ on the right side and 1+ on the left side.  Psychiatric:         Mood and Affect: Mood normal.         Speech: Speech normal.         Neurological Exam  Mental Status  Alert. Oriented to person, place and time. Speech is normal. Language is fluent with no aphasia.  [place] 24/30.    Cranial Nerves  CN II: Visual acuity is normal. Visual fields full to confrontation.  CN III, IV, VI: Extraocular movements intact bilaterally. Normal lids and orbits bilaterally. Pupils equal round and reactive to light bilaterally.  CN V: Facial sensation is normal.  CN VII: Full and symmetric facial movement.  CN VIII: Hearing is normal.  CN IX, X: Palate elevates symmetrically. Normal gag reflex.  CN XI: Shoulder shrug strength is normal.  CN XII: Tongue midline without atrophy or fasciculations.    Motor  Normal muscle  bulk throughout. Strength is 5/5 throughout all four extremities.    Sensory  Light touch abnormality: Temperature abnormality: Vibration abnormality:   Decreased below ankle bilateral.    Reflexes                                            Right                      Left  Brachioradialis                    1+                         1+  Patellar                                1+                         1+  Achilles                                Tr                         Tr    Coordination  Right: Rapid alternating movement normal.Left: Rapid alternating movement normal.    Gait  Casual gait: Wide stance. Unable to rise from chair without using arms.        ROS:    Review of Systems   Constitutional:  Negative for appetite change, fatigue and fever.   HENT:  Positive for hearing loss and tinnitus. Negative for trouble swallowing and voice change.    Eyes:  Positive for visual disturbance. Negative for photophobia and pain.   Respiratory: Negative.  Negative for shortness of breath.    Cardiovascular: Negative.  Negative for palpitations.   Gastrointestinal: Negative.  Negative for nausea and vomiting.   Endocrine: Negative for cold intolerance and heat intolerance.   Genitourinary:  Positive for frequency. Negative for dysuria and urgency.   Musculoskeletal:  Positive for back pain. Negative for gait problem, myalgias, neck pain and neck stiffness.   Skin: Negative.  Negative for rash.   Allergic/Immunologic: Negative.    Neurological:  Positive for numbness. Negative for dizziness, tremors, seizures, syncope, facial asymmetry, speech difficulty, weakness, light-headedness and headaches.   Hematological: Negative.  Does not bruise/bleed easily.   Psychiatric/Behavioral:  Positive for confusion. Negative for hallucinations and sleep disturbance.    All other systems reviewed and are negative.  ROS was reviewed and updated as appropriate

## 2024-04-20 DIAGNOSIS — R39.15 URGENCY OF URINATION: ICD-10-CM

## 2024-04-22 RX ORDER — TROSPIUM CHLORIDE 20 MG/1
TABLET, FILM COATED ORAL
Qty: 180 TABLET | Refills: 1 | Status: SHIPPED | OUTPATIENT
Start: 2024-04-22

## 2024-07-31 ENCOUNTER — HOSPITAL ENCOUNTER (OUTPATIENT)
Dept: HOSPITAL 99 - RPT | Age: 79
Discharge: HOME | End: 2024-07-31
Payer: MEDICARE

## 2024-07-31 ENCOUNTER — HOSPITAL ENCOUNTER (INPATIENT)
Dept: HOSPITAL 99 - IVU | Age: 79
LOS: 2 days | Discharge: HOME | DRG: 309 | End: 2024-08-02
Payer: MEDICARE

## 2024-07-31 VITALS — RESPIRATION RATE: 23 BRPM

## 2024-07-31 VITALS — RESPIRATION RATE: 19 BRPM | DIASTOLIC BLOOD PRESSURE: 93 MMHG | SYSTOLIC BLOOD PRESSURE: 120 MMHG

## 2024-07-31 VITALS — SYSTOLIC BLOOD PRESSURE: 93 MMHG | DIASTOLIC BLOOD PRESSURE: 74 MMHG

## 2024-07-31 VITALS — RESPIRATION RATE: 25 BRPM

## 2024-07-31 VITALS — RESPIRATION RATE: 19 BRPM

## 2024-07-31 VITALS — DIASTOLIC BLOOD PRESSURE: 75 MMHG | RESPIRATION RATE: 17 BRPM | SYSTOLIC BLOOD PRESSURE: 103 MMHG

## 2024-07-31 VITALS — DIASTOLIC BLOOD PRESSURE: 130 MMHG | SYSTOLIC BLOOD PRESSURE: 146 MMHG

## 2024-07-31 VITALS — RESPIRATION RATE: 36 BRPM

## 2024-07-31 VITALS — BODY MASS INDEX: 43.5 KG/M2 | BODY MASS INDEX: 43.2 KG/M2

## 2024-07-31 VITALS — RESPIRATION RATE: 20 BRPM

## 2024-07-31 VITALS — RESPIRATION RATE: 16 BRPM

## 2024-07-31 VITALS — RESPIRATION RATE: 18 BRPM

## 2024-07-31 VITALS — SYSTOLIC BLOOD PRESSURE: 130 MMHG | DIASTOLIC BLOOD PRESSURE: 80 MMHG | RESPIRATION RATE: 28 BRPM

## 2024-07-31 VITALS — DIASTOLIC BLOOD PRESSURE: 74 MMHG | SYSTOLIC BLOOD PRESSURE: 98 MMHG

## 2024-07-31 VITALS — SYSTOLIC BLOOD PRESSURE: 115 MMHG | RESPIRATION RATE: 20 BRPM | DIASTOLIC BLOOD PRESSURE: 93 MMHG

## 2024-07-31 VITALS — DIASTOLIC BLOOD PRESSURE: 61 MMHG | SYSTOLIC BLOOD PRESSURE: 104 MMHG

## 2024-07-31 VITALS — SYSTOLIC BLOOD PRESSURE: 79 MMHG | DIASTOLIC BLOOD PRESSURE: 50 MMHG

## 2024-07-31 VITALS — RESPIRATION RATE: 22 BRPM

## 2024-07-31 VITALS — RESPIRATION RATE: 27 BRPM

## 2024-07-31 VITALS — RESPIRATION RATE: 15 BRPM

## 2024-07-31 DIAGNOSIS — N18.9: ICD-10-CM

## 2024-07-31 DIAGNOSIS — E78.00: ICD-10-CM

## 2024-07-31 DIAGNOSIS — Z96.653: ICD-10-CM

## 2024-07-31 DIAGNOSIS — F02.A3: ICD-10-CM

## 2024-07-31 DIAGNOSIS — E11.22: ICD-10-CM

## 2024-07-31 DIAGNOSIS — G30.0: ICD-10-CM

## 2024-07-31 DIAGNOSIS — F02.A4: ICD-10-CM

## 2024-07-31 DIAGNOSIS — Z87.891: ICD-10-CM

## 2024-07-31 DIAGNOSIS — E03.8: ICD-10-CM

## 2024-07-31 DIAGNOSIS — E66.01: ICD-10-CM

## 2024-07-31 DIAGNOSIS — Z79.84: ICD-10-CM

## 2024-07-31 DIAGNOSIS — R26.9: Primary | ICD-10-CM

## 2024-07-31 DIAGNOSIS — I95.9: ICD-10-CM

## 2024-07-31 DIAGNOSIS — R53.1: ICD-10-CM

## 2024-07-31 DIAGNOSIS — Z79.82: ICD-10-CM

## 2024-07-31 DIAGNOSIS — M47.816: ICD-10-CM

## 2024-07-31 DIAGNOSIS — R93.0: ICD-10-CM

## 2024-07-31 DIAGNOSIS — I70.0: ICD-10-CM

## 2024-07-31 DIAGNOSIS — I12.9: ICD-10-CM

## 2024-07-31 DIAGNOSIS — N40.0: ICD-10-CM

## 2024-07-31 DIAGNOSIS — K21.9: ICD-10-CM

## 2024-07-31 DIAGNOSIS — Z79.899: ICD-10-CM

## 2024-07-31 DIAGNOSIS — I48.91: Primary | ICD-10-CM

## 2024-07-31 DIAGNOSIS — G47.33: ICD-10-CM

## 2024-07-31 DIAGNOSIS — Z88.2: ICD-10-CM

## 2024-07-31 DIAGNOSIS — J84.10: ICD-10-CM

## 2024-07-31 LAB
ALBUMIN SERPL-MCNC: 4 G/DL (ref 3.5–5)
ALP SERPL-CCNC: 107 U/L (ref 38–126)
ALT SERPL-CCNC: 21 U/L (ref 0–50)
APTT PPP: 25 SEC (ref 23.4–35)
APTT PPP: 25.3 SEC (ref 23.4–35)
AST SERPL-CCNC: 23 U/L (ref 17–59)
BUN SERPL-MCNC: 17 MG/DL (ref 9–20)
CALCIUM SERPL-MCNC: 9 MG/DL (ref 8.4–10.2)
CHLORIDE SERPL-SCNC: 104 MMOL/L (ref 98–107)
CO2 SERPL-SCNC: 24 MMOL/L (ref 22–30)
EGFR: > 60
ERYTHROCYTE [DISTWIDTH] IN BLOOD BY AUTOMATED COUNT: 13.1 % (ref 11.5–14.5)
GLUCOSE SERPL-MCNC: 104 MG/DL (ref 70–99)
HCT VFR BLD AUTO: 36 % (ref 39–52)
HGB BLD-MCNC: 13 G/DL (ref 13–18)
INR PPP: 1.06
MCHC RBC AUTO-ENTMCNC: 36.1 G/DL (ref 33–37)
MCV RBC AUTO: 96.8 FL (ref 80–94)
NRBC BLD AUTO-RTO: 0 %
PLATELET # BLD AUTO: 256 10^3/UL (ref 130–400)
POTASSIUM SERPL-SCNC: 4.1 MMOL/L (ref 3.5–5.1)
PROT SERPL-MCNC: 7.1 G/DL (ref 6.3–8.2)
PROTHROMBIN TIME: 13.9 SEC (ref 11.4–14.6)
SODIUM SERPL-SCNC: 136 MMOL/L (ref 135–145)
TROPONIN I SERPL-MCNC: < 0.012 NG/ML

## 2024-07-31 RX ADMIN — DILTIAZEM HYDROCHLORIDE 5 MG: 5 INJECTION, SOLUTION INTRAVENOUS at 20:06

## 2024-07-31 RX ADMIN — HEPARIN SODIUM 4000 UNITS: 5000 INJECTION, SOLUTION INTRAVENOUS; SUBCUTANEOUS at 23:01

## 2024-07-31 RX ADMIN — HEPARIN SODIUM 250: 10000 INJECTION, SOLUTION INTRAVENOUS at 23:05

## 2024-07-31 RX ADMIN — DILTIAZEM HYDROCHLORIDE 125: 5 INJECTION INTRAVENOUS at 20:09

## 2024-08-01 VITALS — SYSTOLIC BLOOD PRESSURE: 98 MMHG | DIASTOLIC BLOOD PRESSURE: 74 MMHG | HEART RATE: 81 BPM

## 2024-08-01 VITALS — SYSTOLIC BLOOD PRESSURE: 131 MMHG | DIASTOLIC BLOOD PRESSURE: 92 MMHG

## 2024-08-01 VITALS — DIASTOLIC BLOOD PRESSURE: 82 MMHG | SYSTOLIC BLOOD PRESSURE: 127 MMHG

## 2024-08-01 VITALS — SYSTOLIC BLOOD PRESSURE: 116 MMHG | DIASTOLIC BLOOD PRESSURE: 55 MMHG

## 2024-08-01 VITALS — DIASTOLIC BLOOD PRESSURE: 92 MMHG | HEART RATE: 110 BPM | SYSTOLIC BLOOD PRESSURE: 131 MMHG | OXYGEN SATURATION: 95 %

## 2024-08-01 VITALS — SYSTOLIC BLOOD PRESSURE: 123 MMHG | DIASTOLIC BLOOD PRESSURE: 69 MMHG

## 2024-08-01 VITALS — DIASTOLIC BLOOD PRESSURE: 61 MMHG | SYSTOLIC BLOOD PRESSURE: 106 MMHG

## 2024-08-01 VITALS — SYSTOLIC BLOOD PRESSURE: 135 MMHG | DIASTOLIC BLOOD PRESSURE: 76 MMHG

## 2024-08-01 VITALS — SYSTOLIC BLOOD PRESSURE: 121 MMHG | DIASTOLIC BLOOD PRESSURE: 82 MMHG

## 2024-08-01 VITALS — SYSTOLIC BLOOD PRESSURE: 108 MMHG | DIASTOLIC BLOOD PRESSURE: 60 MMHG

## 2024-08-01 VITALS — DIASTOLIC BLOOD PRESSURE: 74 MMHG | SYSTOLIC BLOOD PRESSURE: 118 MMHG

## 2024-08-01 VITALS — RESPIRATION RATE: 20 BRPM

## 2024-08-01 VITALS — SYSTOLIC BLOOD PRESSURE: 138 MMHG | DIASTOLIC BLOOD PRESSURE: 72 MMHG

## 2024-08-01 VITALS — DIASTOLIC BLOOD PRESSURE: 129 MMHG | SYSTOLIC BLOOD PRESSURE: 149 MMHG

## 2024-08-01 VITALS — DIASTOLIC BLOOD PRESSURE: 82 MMHG | SYSTOLIC BLOOD PRESSURE: 124 MMHG

## 2024-08-01 VITALS — RESPIRATION RATE: 18 BRPM

## 2024-08-01 VITALS — DIASTOLIC BLOOD PRESSURE: 118 MMHG | SYSTOLIC BLOOD PRESSURE: 147 MMHG

## 2024-08-01 LAB
APTT PPP: 30.2 SEC (ref 23.4–35)
APTT PPP: 35.2 SEC (ref 23.4–35)
BUN SERPL-MCNC: 14 MG/DL (ref 9–20)
CALCIUM SERPL-MCNC: 8.8 MG/DL (ref 8.4–10.2)
CHLORIDE SERPL-SCNC: 105 MMOL/L (ref 98–107)
CO2 SERPL-SCNC: 21 MMOL/L (ref 22–30)
EGFR: > 60
ERYTHROCYTE [DISTWIDTH] IN BLOOD BY AUTOMATED COUNT: 13.2 % (ref 11.5–14.5)
ESTIMATED CREATININE CLEARANCE: 83 ML/MIN
GLUCOSE - POINT OF CARE: 117 MG/DL (ref 70–99)
GLUCOSE - POINT OF CARE: 128 MG/DL (ref 70–99)
GLUCOSE - POINT OF CARE: 146 MG/DL (ref 70–99)
GLUCOSE - POINT OF CARE: 150 MG/DL (ref 70–99)
GLUCOSE - POINT OF CARE: 158 MG/DL (ref 70–99)
GLUCOSE SERPL-MCNC: 137 MG/DL (ref 70–99)
HBA1C MFR BLD: 6.9 % (ref 4–5.6)
HCT VFR BLD AUTO: 34.1 % (ref 39–52)
HDLC SERPL-MCNC: 33 MG/DL
HGB BLD-MCNC: 12.2 G/DL (ref 13–18)
LDLC SERPL CALC-MCNC: 46 MG/DL
MAGNESIUM SERPL-MCNC: 2 MG/DL (ref 1.6–2.3)
MCHC RBC AUTO-ENTMCNC: 35.8 G/DL (ref 33–37)
MCV RBC AUTO: 97.4 FL (ref 80–94)
PLATELET # BLD AUTO: 239 10^3/UL (ref 130–400)
POTASSIUM SERPL-SCNC: 4.4 MMOL/L (ref 3.5–5.1)
SODIUM SERPL-SCNC: 135 MMOL/L (ref 135–145)
TSH SERPL DL<=0.05 MIU/L-ACNC: 5.97 UIU/ML (ref 0.47–4.68)
VLDLC SERPL CALC-MCNC: 49 MG/DL (ref 0–30)

## 2024-08-01 RX ADMIN — TOLTERODINE TARTRATE 1 MG: 1 TABLET ORAL at 22:17

## 2024-08-01 RX ADMIN — ASPIRIN 81 MG: 81 TABLET, COATED ORAL at 09:42

## 2024-08-01 RX ADMIN — FUROSEMIDE 40 MG: 40 TABLET ORAL at 09:42

## 2024-08-01 RX ADMIN — DILTIAZEM HYDROCHLORIDE 125: 5 INJECTION INTRAVENOUS at 07:40

## 2024-08-01 RX ADMIN — ROSUVASTATIN CALCIUM 20 MG: 20 TABLET, FILM COATED ORAL at 09:42

## 2024-08-01 RX ADMIN — APIXABAN 5 MG: 5 TABLET, FILM COATED ORAL at 20:02

## 2024-08-01 RX ADMIN — CITALOPRAM HYDROBROMIDE 10 MG: 10 TABLET ORAL at 09:43

## 2024-08-01 RX ADMIN — TAMSULOSIN HYDROCHLORIDE 0.4 MG: 0.4 CAPSULE ORAL at 22:17

## 2024-08-01 RX ADMIN — GLIMEPIRIDE 1 MG: 1 TABLET ORAL at 09:43

## 2024-08-01 RX ADMIN — INSULIN ASPART: 100 INJECTION, SOLUTION INTRAVENOUS; SUBCUTANEOUS at 07:45

## 2024-08-01 RX ADMIN — INSULIN ASPART 1 UNITS: 100 INJECTION, SOLUTION INTRAVENOUS; SUBCUTANEOUS at 12:38

## 2024-08-01 RX ADMIN — INSULIN ASPART 1 UNITS: 100 INJECTION, SOLUTION INTRAVENOUS; SUBCUTANEOUS at 18:02

## 2024-08-02 VITALS — SYSTOLIC BLOOD PRESSURE: 138 MMHG | DIASTOLIC BLOOD PRESSURE: 59 MMHG

## 2024-08-02 VITALS — DIASTOLIC BLOOD PRESSURE: 71 MMHG | SYSTOLIC BLOOD PRESSURE: 109 MMHG

## 2024-08-02 VITALS — DIASTOLIC BLOOD PRESSURE: 59 MMHG | SYSTOLIC BLOOD PRESSURE: 92 MMHG

## 2024-08-02 VITALS — SYSTOLIC BLOOD PRESSURE: 133 MMHG | HEART RATE: 84 BPM | DIASTOLIC BLOOD PRESSURE: 73 MMHG

## 2024-08-02 VITALS — SYSTOLIC BLOOD PRESSURE: 133 MMHG | DIASTOLIC BLOOD PRESSURE: 73 MMHG

## 2024-08-02 VITALS — RESPIRATION RATE: 20 BRPM

## 2024-08-02 VITALS — DIASTOLIC BLOOD PRESSURE: 75 MMHG | SYSTOLIC BLOOD PRESSURE: 113 MMHG

## 2024-08-02 VITALS — RESPIRATION RATE: 18 BRPM

## 2024-08-02 LAB
GLUCOSE - POINT OF CARE: 149 MG/DL (ref 70–99)
GLUCOSE - POINT OF CARE: 156 MG/DL (ref 70–99)

## 2024-08-02 PROCEDURE — 5A2204Z RESTORATION OF CARDIAC RHYTHM, SINGLE: ICD-10-PCS | Performed by: INTERNAL MEDICINE

## 2024-08-02 PROCEDURE — B24BZZ4 ULTRASONOGRAPHY OF HEART WITH AORTA, TRANSESOPHAGEAL: ICD-10-PCS | Performed by: INTERNAL MEDICINE

## 2024-08-02 RX ADMIN — DILTIAZEM HYDROCHLORIDE 125: 5 INJECTION INTRAVENOUS at 05:10

## 2024-08-02 RX ADMIN — GLIMEPIRIDE 1 MG: 1 TABLET ORAL at 12:24

## 2024-08-02 RX ADMIN — Medication 1 FLUSH: at 12:24

## 2024-08-02 RX ADMIN — FUROSEMIDE 40 MG: 40 TABLET ORAL at 12:23

## 2024-08-02 RX ADMIN — INSULIN ASPART: 100 INJECTION, SOLUTION INTRAVENOUS; SUBCUTANEOUS at 12:22

## 2024-08-02 RX ADMIN — APIXABAN 5 MG: 5 TABLET, FILM COATED ORAL at 08:27

## 2024-08-02 RX ADMIN — INSULIN ASPART 1 UNITS: 100 INJECTION, SOLUTION INTRAVENOUS; SUBCUTANEOUS at 08:28

## 2024-08-02 RX ADMIN — CITALOPRAM HYDROBROMIDE 10 MG: 10 TABLET ORAL at 08:27

## 2024-08-02 RX ADMIN — ROSUVASTATIN CALCIUM 20 MG: 20 TABLET, FILM COATED ORAL at 08:27

## 2024-08-02 RX ADMIN — DILTIAZEM HYDROCHLORIDE 180 MG: 180 CAPSULE, COATED, EXTENDED RELEASE ORAL at 12:23

## 2024-08-26 ENCOUNTER — HOSPITAL ENCOUNTER (OUTPATIENT)
Dept: HOSPITAL 99 - REG | Age: 79
End: 2024-08-26
Payer: MEDICARE

## 2024-08-26 DIAGNOSIS — C88.0: ICD-10-CM

## 2024-08-26 DIAGNOSIS — D47.2: Primary | ICD-10-CM

## 2024-08-26 DIAGNOSIS — G30.0: ICD-10-CM

## 2024-08-26 LAB
ERYTHROCYTE [DISTWIDTH] IN BLOOD BY AUTOMATED COUNT: 12.9 % (ref 11.5–14.5)
FOLATE SERPL-MCNC: 10.1 NG/ML (ref 2.76–20)
HCT VFR BLD AUTO: 34.6 % (ref 39–52)
HGB BLD-MCNC: 12.1 G/DL (ref 13–18)
MCHC RBC AUTO-ENTMCNC: 35 G/DL (ref 33–37)
MCV RBC AUTO: 101.5 FL (ref 80–94)
NRBC BLD AUTO-RTO: 0 %
PLATELET # BLD AUTO: 245 10^3/UL (ref 130–400)
VIT B12 SERPL-MCNC: 648 PG/ML (ref 239–931)

## 2024-08-27 DIAGNOSIS — F32.A DEPRESSION, UNSPECIFIED DEPRESSION TYPE: ICD-10-CM

## 2024-08-28 LAB — Lab: (no result) TU (ref 0–999)

## 2024-08-28 RX ORDER — CITALOPRAM HYDROBROMIDE 10 MG/1
10 TABLET ORAL DAILY
Qty: 90 TABLET | Refills: 0 | Status: SHIPPED | OUTPATIENT
Start: 2024-08-28

## 2024-08-29 LAB
ALBUMIN SERPL ELPH-MCNC: 3.4 G/DL (ref 3.75–5.01)
INTERPRETATION SERPL IFE-IMP: (no result)
KAPPA LC FREE SER-MCNC: 28.82 MG/L (ref 3.3–19.4)
KAPPA LC FREE/LAMBDA FREE SER NEPH: 0.34 {RATIO} (ref 0.26–1.65)
LAMBDA LC FREE SERPL-MCNC: 84.77 MG/L (ref 5.71–26.3)
PROT SERPL-MCNC: 7.1 G/DL (ref 6.3–8.2)

## 2024-09-04 ENCOUNTER — HOSPITAL ENCOUNTER (OUTPATIENT)
Dept: NEUROLOGY | Facility: CLINIC | Age: 79
Discharge: HOME | End: 2024-09-04
Payer: MEDICARE

## 2024-09-04 DIAGNOSIS — G62.9 PERIPHERAL NEUROPATHY: ICD-10-CM

## 2024-09-04 PROCEDURE — 95912 NRV CNDJ TEST 11-12 STUDIES: CPT | Performed by: PSYCHIATRY & NEUROLOGY

## 2024-09-04 PROCEDURE — 95886 MUSC TEST DONE W/N TEST COMP: CPT | Performed by: PSYCHIATRY & NEUROLOGY

## 2024-09-23 ENCOUNTER — TELEPHONE (OUTPATIENT)
Dept: NEUROLOGY | Facility: CLINIC | Age: 79
End: 2024-09-23

## 2024-09-23 NOTE — TELEPHONE ENCOUNTER
Called patient to confirm upcoming appointment on 10/1/24 with Gucci Hahn in the [place] office.  Pt confirmed appt

## 2024-09-27 DIAGNOSIS — E11.8 TYPE 2 DIABETES MELLITUS WITH COMPLICATION, WITHOUT LONG-TERM CURRENT USE OF INSULIN (HCC): ICD-10-CM

## 2024-09-27 RX ORDER — GLIMEPIRIDE 1 MG/1
TABLET ORAL
Qty: 270 TABLET | Refills: 2 | Status: SHIPPED | OUTPATIENT
Start: 2024-09-27

## 2024-10-01 ENCOUNTER — OFFICE VISIT (OUTPATIENT)
Dept: NEUROLOGY | Facility: CLINIC | Age: 79
End: 2024-10-01
Payer: MEDICARE

## 2024-10-01 VITALS
DIASTOLIC BLOOD PRESSURE: 60 MMHG | OXYGEN SATURATION: 95 % | HEIGHT: 67 IN | HEART RATE: 63 BPM | WEIGHT: 283 LBS | BODY MASS INDEX: 44.42 KG/M2 | TEMPERATURE: 97.5 F | SYSTOLIC BLOOD PRESSURE: 124 MMHG

## 2024-10-01 DIAGNOSIS — G62.9 PERIPHERAL NEUROPATHY: Primary | ICD-10-CM

## 2024-10-01 DIAGNOSIS — I48.91 ATRIAL FIBRILLATION (HCC): ICD-10-CM

## 2024-10-01 DIAGNOSIS — R41.3 MEMORY LOSS: ICD-10-CM

## 2024-10-01 DIAGNOSIS — G62.89 NEUROPATHY ASSOCIATED WITH ANTI-MAG ANTIBODY: ICD-10-CM

## 2024-10-01 PROCEDURE — 99214 OFFICE O/P EST MOD 30 MIN: CPT | Performed by: NURSE PRACTITIONER

## 2024-10-01 RX ORDER — APIXABAN 5 MG/1
5 TABLET, FILM COATED ORAL 2 TIMES DAILY
COMMUNITY

## 2024-10-01 NOTE — PROGRESS NOTES
Ambulatory Visit  Name: Hector Fishman      : 1945      MRN: [id number]  Encounter Provider: UDAY Parsons  Encounter Date: 10/1/2024   Encounter department: [facility] [place]    Assessment & Plan  Peripheral neuropathy         Memory loss    Orders:    MRI brain NeuroQuant wo contrast; Future    Neuropathy associated with anti-MAG antibody         Atrial fibrillation (HCC)           Patient Instructions   I will request more recent records  Continue care with hematology  Suggest repeat MRI brain neuroquant and we could consider medication as we discussed depending on other tests done more recently in the hospital.  Let us know if any other changes, follow up in 3-4 months   History of Present Illness      79 year old male with past medical history of GERD, DM with neuropathy, HTN, episodes of pericarditis (followed by cardiology-states recently without symptoms), bph, ckd, obesity, hyperlipidemia, ANALY on cpap, HTN, skin cancer without reported metastasis, depression, hearing loss-doesn't wear hearing aides, alcohol use (1-2/night), family history of dementia     Hector presents with his wife for 6 month follow up. Most of his care is from [place].   Records are available from initial consult with hematologist (Dr. Toribio- [facility]) and MAG IGM 9356; he states he has had more recent follow up and a recent hospital visit where afib (asymptomatic) was found and he started on new medications.  He denies progression in neuropathy symptoms. He had been doing PT for balance.  There is complaints of continued memory loss.       EMG 2024:  Impression: Abnormal study. These electrodiagnostic findings are most consistent with a generalized, mild to moderate, predominantly axonal sensorimotor neuropathy, likely secondary to this patient's underlying history of diabetes. In addition, there is evidence to support a chronic ulnar neuropathy in the left upper  extremity, localized best across the elbow, consistent with the clinical diagnosis of cubital tunnel syndrome.    Previous History:  history of DM; also with recent testing found to have IGM lambda monoclonal protein and he has had hematology follow up and recent bone marrow biopsy-awaiting results (will reach out Dr. Kali Toribio DO office for test results). On exam sensation changes are not much different from previous exam; no motor weakness on exam but recently has described every am cramping of hands/feet. Could consider further lab workup including cryoglobulin/anti mag antibody testing in the future. He mentions family members have MGUS and Parkinson disease. He denies there being genetic testing done in the past and he is not interested in pursuing this today.   repeat MOCA 24/30 today-no change from previous; no other change in exam/lifestyle; discussed further testing options: could repeat MRI brain or complete other testing such as LP/PET imaging to further evaluate. There was a mutual decision to hold off on further testing at this time; he will try to be more active, complete speech therapy, and continue with b12 supplementation. We discussed potential treatment options depending on clinical progression; he would be hesitant to do new therapies and I do not feel it necessary to add a medication like donepezil at this time.   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 For the last year he has not been as active and often gets short of breath easily. He often sits in front of a computer. He is responsible for the home bill paying and has not made mistakes. He drives without difficulty. He notices he is very forgetful in conversation with  names, places etc.   previous seizure like activity (loss of motor control right hand) -no repeat recently.    MRI brain neuroquant 7/13/2022:  IMPRESSION:  No acute intracranial abnormality.  Mild chronic microangiopathy.  NeuroQuant analysis was performed: Low hippocampal volume and enlargement of the adjacent inferior lateral ventricles suggestive of local ex-vacuo dilatation.  Findings support medial temporal lobe focused neurodegenerative etiology.     MRI brain 7/19/2021:  IMPRESSION:  Motion degraded examination.  No acute intracranial abnormality.  Mildly progressive white matter disease thought to represent a sequela of chronic microangiopathy.   No imaging findings of the mesial temporal lobes to account for the patient's seizure history.  The following portions of the patient's history were reviewed and updated as appropriate: allergies, current medications, past family history, past medical history, past social history, past surgical history, and problem list.    HPI  Review of Systems   Constitutional: Negative.    HENT: Negative.     Eyes: Negative.    Respiratory: Negative.     Cardiovascular: Negative.    Gastrointestinal: Negative.    Endocrine: Negative.    Genitourinary: Negative.    Musculoskeletal: Negative.    Skin: Negative.    Allergic/Immunologic: Negative.    Neurological: Negative.    Hematological: Negative.    Psychiatric/Behavioral: Negative.     All other systems reviewed and are negative.    I have personally reviewed the MA's review of systems and made changes as necessary.    Current Outpatient Medications on File Prior to Visit   Medication Sig Dispense Refill    carteolol (OCUPRESS) 1 % ophthalmic solution Administer 0.5 mL to both eyes 2 (two) times a day      citalopram (CeleXA) 10 mg tablet TAKE 1 TABLET BY MOUTH EVERY DAY 90 tablet 0    Eliquis 5 MG Take 5 mg by mouth 2 (two) times a day      fluticasone (FLONASE) 50 mcg/act nasal spray USE 1 SPRAY IN EACH NOSTRIL ONE TIME A  "DAY 16 mL 3    furosemide (LASIX) 40 mg tablet Take 40 mg by mouth daily in the early morning      glimepiride (AMARYL) 1 mg tablet TAKE 3 TABLETS BY MOUTH EVERY DAY WITH BREAKFAST 270 tablet 2    losartan (COZAAR) 100 MG tablet Take 100 mg by mouth daily      Respiratory Therapy Supplies (CareTouch 2 CPAP Hose ) MISC As directed      tamsulosin (FLOMAX) 0.4 mg TAKE 1 CAPSULE BY MOUTH EVERY DAY WITH DINNER 90 capsule 0    trospium chloride (SANCTURA) 20 mg tablet TAKE 1 TABLET BY MOUTH TWO TIMES DAILY 180 tablet 1    amLODIPine (NORVASC) 10 mg tablet TAKE 1 TABLET BY MOUTH EVERY DAY (Patient not taking: Reported on 3/26/2024) 90 tablet 3    amLODIPine (NORVASC) 5 mg tablet Take 5 mg by mouth daily      aspirin (ECOTRIN LOW STRENGTH) 81 mg EC tablet Take 1 tablet (81 mg total) by mouth daily 30 tablet 0    ferrous sulfate 325 (65 Fe) mg tablet Take 1 tablet (325 mg total) by mouth daily with breakfast (Patient not taking: Reported on 2023) 90 tablet 3    metoprolol succinate (TOPROL-XL) 50 mg 24 hr tablet TAKE 1 TABLET BY MOUTH EVERY 12 HOURS 60 tablet 4     No current facility-administered medications on file prior to visit.      Social History     Tobacco Use    Smoking status: Former     Current packs/day: 0.00     Average packs/day: 0.5 packs/day for 20.0 years (10.0 ttl pk-yrs)     Types: Cigarettes     Quit date: 1983     Years since quittin.7    Smokeless tobacco: Never   Vaping Use    Vaping status: Never Used   Substance and Sexual Activity    Alcohol use: Yes     Alcohol/week: 14.0 standard drinks of alcohol     Types: 14 Shots of liquor per week     Comment: \"couple of drinks each night\"    Drug use: No    Sexual activity: Not Currently     Partners: Female     Objective     /60   Pulse 63   Temp 97.5 °F (36.4 °C) (Temporal)   Ht 5' 7\" (1.702 m)   Wt 128 kg (283 lb)   SpO2 95%   BMI 44.32 kg/m²     Physical Exam  Vitals reviewed.   Constitutional:       General: He is not " in acute distress.     Appearance: He is obese.   HENT:      Head: Normocephalic and atraumatic.      Right Ear: External ear normal.      Left Ear: External ear normal.      Nose: Nose normal.      Mouth/Throat:      Pharynx: Oropharynx is clear.   Eyes:      General:         Right eye: No discharge.         Left eye: No discharge.      Extraocular Movements: Extraocular movements intact.      Pupils: Pupils are equal, round, and reactive to light.   Cardiovascular:      Rate and Rhythm: Normal rate and regular rhythm.      Pulses: Normal pulses.      Heart sounds: Normal heart sounds.   Pulmonary:      Effort: Pulmonary effort is normal.      Breath sounds: Normal breath sounds.   Abdominal:      General: There is no distension.   Musculoskeletal:      Cervical back: Normal range of motion.   Skin:     General: Skin is warm.      Capillary Refill: Capillary refill takes less than 2 seconds.      Findings: No rash.   Neurological:      Mental Status: He is alert. Mental status is at baseline.      Sensory: Sensory deficit present.      Motor: Motor strength is normal.     Deep Tendon Reflexes:      Reflex Scores:       Brachioradialis reflexes are 1+ on the right side and 1+ on the left side.       Patellar reflexes are 1+ on the right side and 1+ on the left side.       Achilles reflexes are 0 on the right side and 0 on the left side.  Psychiatric:         Mood and Affect: Mood normal.       Neurologic Exam     Mental Status   Attention: normal.   Level of consciousness: alert    Cranial Nerves     CN II   Right visual field deficit: none  Left visual field deficit: none     CN III, IV, VI   Pupils are equal, round, and reactive to light.    CN V   Facial sensation intact.     CN VII   Facial expression full, symmetric.     CN VIII   CN VIII normal.     CN IX, X   CN IX normal.     CN XI   CN XI normal.     CN XII   CN XII normal.     Motor Exam   Muscle bulk: normal  Overall muscle tone: normal    Strength    Strength 5/5 throughout.     Sensory Exam   Right leg light touch: decreased from ankle  Left leg light touch: decreased from ankle  Right leg vibration: decreased from ankle  Left leg vibration: decreased from ankle    Gait, Coordination, and Reflexes     Gait  Gait: wide-based    Tremor   Resting tremor: absent  Action tremor: absent    Reflexes   Right brachioradialis: 1+  Left brachioradialis: 1+  Right patellar: 1+  Left patellar: 1+  Right achilles: 0  Left achilles: 0

## 2024-10-01 NOTE — PATIENT INSTRUCTIONS
I will request more recent records  Continue care with hematology  Suggest repeat MRI brain neuroquant and we could consider medication as we discussed depending on other tests done more recently in the hospital.  Let us know if any other changes, follow up in 3-4 months

## 2024-10-02 ENCOUNTER — TELEPHONE (OUTPATIENT)
Age: 79
End: 2024-10-02

## 2024-10-02 NOTE — TELEPHONE ENCOUNTER
Spoke with patient's spouse Debora (Listed on Communication Consent.)    Debora wanted to give Gucci some information she did not have at patient's office visit on 10-1-24.    Patient cannot get an MRI until 1-25 due to patient's size.     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 Thank you!

## 2024-10-04 ENCOUNTER — TELEPHONE (OUTPATIENT)
Dept: NEUROLOGY | Facility: CLINIC | Age: 79
End: 2024-10-04

## 2024-10-04 NOTE — TELEPHONE ENCOUNTER
----- Message from Ellen Zhao MD sent at 10/3/2024  5:15 PM EDT -----  Valarie,    I have an opening at 1 pm this Monday. Can you ask the patient if he would like to come in?    Thanks!  PRESTON  ----- Message -----  From: UDAY Harrell  Sent: 10/2/2024   4:40 PM EDT  To: MD Dr. Pavel Mccormick do you have openings to see this patient?  Thanks  Gucci  ----- Message -----  From: Rehana Singletary DO  Sent: 10/1/2024   5:05 PM EDT  To: UDAY Harrell    The treatment of anti-MAG peripheral neuropathy is different than treatment for diabetic neuropathy.    So I do think he does need to see Dr. Khalil  or Dr. Zhao in a 1 hour new patient follow-up visit.  ----- Message -----  From: UDAY Harrell  Sent: 10/1/2024   3:51 PM EDT  To: Rehana Singletary DO    Good afternoon,  I see this patient for neuropathy/mild dementia follow up  He has been seeing hematology for MGUS as well and had bone marrow biopsy/ct chest that I need to get results on.  He had labs drawn recently and mag antibody was very high 9356 TU  EMG showed mild-mod sensorimotor axonal neuropathy and he does have DM  He will have repeat labs drawn; I wonder are we doing something more aggressive for anti mag peripheral neuropathy lately-should I set him up with Dr. Zhao/Dr. Khalil?  Radha Duckworth

## 2024-10-07 ENCOUNTER — OFFICE VISIT (OUTPATIENT)
Dept: NEUROLOGY | Facility: CLINIC | Age: 79
End: 2024-10-07
Payer: MEDICARE

## 2024-10-07 VITALS
OXYGEN SATURATION: 95 % | BODY MASS INDEX: 43.79 KG/M2 | SYSTOLIC BLOOD PRESSURE: 124 MMHG | DIASTOLIC BLOOD PRESSURE: 76 MMHG | WEIGHT: 279.6 LBS | HEART RATE: 68 BPM | TEMPERATURE: 97.2 F

## 2024-10-07 DIAGNOSIS — G62.89 NEUROPATHY ASSOCIATED WITH ANTI-MAG ANTIBODY: Primary | ICD-10-CM

## 2024-10-07 DIAGNOSIS — R73.9 BLOOD SUGAR INCREASED: ICD-10-CM

## 2024-10-07 DIAGNOSIS — G31.84 MILD COGNITIVE IMPAIRMENT: ICD-10-CM

## 2024-10-07 PROCEDURE — 99215 OFFICE O/P EST HI 40 MIN: CPT | Performed by: PSYCHIATRY & NEUROLOGY

## 2024-10-07 RX ORDER — DILTIAZEM HYDROCHLORIDE 180 MG/1
180 CAPSULE, COATED, EXTENDED RELEASE ORAL DAILY
COMMUNITY

## 2024-10-07 NOTE — PROGRESS NOTES
Patient ID: Hector Fishman is a 79 y.o. male.    Assessment/Plan:    No problem-specific Assessment & Plan notes found for this encounter.       {Assess/PlanSmartLinks:91032}       Subjective:    HPI    {[facility] Neurology HPI texts:13023}    {Common ambulatory SmartLinks:39804}         Objective:    There were no vitals taken for this visit.    Physical Exam    Neurological Exam      ROS:    Review of Systems   Constitutional:  Negative for appetite change, fatigue and fever.   HENT: Negative.  Negative for hearing loss, tinnitus, trouble swallowing and voice change.    Eyes: Negative.  Negative for photophobia, pain and visual disturbance.   Respiratory: Negative.  Negative for shortness of breath.    Cardiovascular: Negative.  Negative for palpitations.   Gastrointestinal: Negative.  Negative for nausea and vomiting.   Endocrine: Negative.  Negative for cold intolerance.   Genitourinary: Negative.  Negative for dysuria, frequency and urgency.   Musculoskeletal:  Positive for gait problem (gets off balance). Negative for back pain, myalgias, neck pain and neck stiffness.   Skin: Negative.  Negative for rash.   Allergic/Immunologic: Negative.    Neurological:  Positive for dizziness (on/off), weakness (hands) and numbness (hands-b/l yevgeniy in am, can't make fist, and ntingley, b/l numb x about 1 yr). Negative for tremors, seizures, syncope, facial asymmetry, speech difficulty, light-headedness and headaches.   Hematological: Negative.  Does not bruise/bleed easily.   Psychiatric/Behavioral: Negative.  Negative for confusion, hallucinations and sleep disturbance.                     Normal

## 2024-10-07 NOTE — PROGRESS NOTES
Ambulatory Visit  Name: Hector Fishman      : 1945      MRN: [id number]  Encounter Provider: Ellen Zhao MD  Encounter Date: 10/7/2024   Encounter department: [facility] [place]    Assessment & Plan  Neuropathy associated with anti-MAG antibody  Mr Garcia has had paresthesias in the toes for about a year.  Workup revealed abnormal SPEP with a monoclonal protein IgM lambda.  Subsequent anti-MAG testing also abnormal.  He had a bone marrow biopsy in 2024.  Unfortunately, I do not have the results.    Typically we would expect a demyelinating neuropathy with the anti-MAG antibody.  However, if bone marrow biopsy is consistent, I would agree to move forward with treatment.  Per Gucci FRYE, the patient's hematologist is considering Rituxan, which would also be used in the neurological setting.    We will try to obtain bone marrow biopsy results and last hematology office note.        Blood sugar increased  His wife was concerned that he has been on glimepiride for several years but was never diagnosed with diabetes.  Given the underlying neuropathy, elevated blood sugars, I did order HbA1c.  Orders:    Hemoglobin A1C; Future    Mild cognitive impairment  He continues to have memory deficits.  His wife notes that he has been getting worse and has not been able to drive in the last 8 months.  MoCA in 2023 was 23/30, 2024 was 24/30.  Today at 22/30.  Testing wise, he has not shown a significant decline but per family, there has been a difference.  He would qualify for lecanemab even if the other workup was consistent with early Alzheimer's due to anticoagulant use and elevated BMI.  He is still scoring reasonably well and may not qualify for memantine.  He and his wife will consider starting Aricept 5 mg nightly.  Repeat MRI with neuro quant is pending for 2025.     He will follow-up with Gucci in 2025.  In the interim, I will obtain  records from his hematologist to include last office note and bone marrow biopsy report.          History of Present Illness   Mr Fishman is an 79 year old gentleman who presents for neuromuscular evaluation for + anti-MAG in the setting of MGUS and neuropathy. He last saw Gucci FRYE last week. He is here with his wife.    He first noticed soreness of the toes about a year ago.  They are a little bit numb.  He denies symptoms in the hands other than arthritis in the thumbs.  He had an EMG last month which showed mild axonal neuropathy and chronic left ulnar neuropathy at the elbow.  He denies weakness in the hands.  He does note right elbow pain (opposite the side of EMG changes) which is nonradiating.    He was having a lot of trouble with his balance.  He was in PT and PT noticed cardiac arrhythmia which turned out to be a fib.  He underwent cardioversion recently and started diltiazem and Eliquis.  He has not resumed PT since then.    He has been seeing hematology due to abnormal labs for about a year.  He had a bone marrow biopsy.  Unfortunately, we do not have the results.    He originally saw neurology for memory loss x1 year.  He has difficulty with short-term memory.  He asks repetitive questions.  He is no longer driving for the last 6-8 months. He can no longer do the bills. He remembers to brush his teeth and change clothes. He remembers to lock the door. He may look under the microwave instead of the TV if his wife tells him to look for something.  His mother had dementia.  His brother recently passed away from Parkinson's in May 2024.    He has sleep apnea and uses CPAP.    He follows with cardiology and hematology in [place].         PMH: GERD, DM with neuropathy, HTN, episodes of pericarditis, bph, ckd, obesity, hyperlipidemia, ANALY on cpap, HTN, skin cancer without reported metastasis, depression, hearing loss-)doesn't wear hearing aides)    PSH: cardioversion, TKR    Social: Retired.  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 Quit smoking >40 years. Alcohol use (0-2/night). No drugs.    Family history: Mother - dementia, Brother - parkinsons        Review of Systems  I have personally reviewed the MA's review of systems and made changes as necessary.    Constitutional:  Negative for appetite change, fatigue and fever.   HENT: Negative.  Negative for hearing loss, tinnitus, trouble swallowing and voice change.    Eyes: Negative.  Negative for photophobia, pain and visual disturbance.   Respiratory: Negative.  Negative for shortness of breath.    Cardiovascular: Negative.  Negative for palpitations.   Gastrointestinal: Negative.  Negative for nausea and vomiting.   Endocrine: Negative.  Negative for cold intolerance.   Genitourinary: Negative.  Negative for dysuria, frequency and urgency.   Musculoskeletal:  Positive for gait problem (gets off balance). Negative for back pain, myalgias, neck pain and neck stiffness.   Skin: Negative.  Negative for rash.   Allergic/Immunologic: Negative.    Neurological:  Positive for dizziness (on/off), weakness (hands) and numbness (hands-b/l yevgeniy in am, can't make fist, and ntingley, b/l numb x about 1 yr). Negative for tremors, seizures, syncope, facial asymmetry, speech difficulty, light-headedness and headaches.   Hematological: Negative.  Does not bruise/bleed easily.   Psychiatric/Behavioral: Negative.  Negative for confusion, hallucinations and sleep disturbance.         Objective     /76 (BP Location: Left arm, Patient Position: Sitting, Cuff Size: Adult)   Pulse 68   Temp (!) 97.2 °F (36.2 °C) (Temporal)   Wt 127 kg (279 lb 9.6 oz)   SpO2 95%   BMI 43.79 kg/m²     Physical Exam  Exam conducted with a chaperone present.   Constitutional:       Comments: Elevated BMI   HENT:      Mouth/Throat:      Mouth: Mucous membranes are moist.      Pharynx: Oropharynx is clear.   Eyes:      Conjunctiva/sclera: Conjunctivae normal.   Neck:  "     Vascular: No carotid bruit.   Cardiovascular:      Rate and Rhythm: Normal rate and regular rhythm.      Heart sounds: Normal heart sounds.   Pulmonary:      Effort: Pulmonary effort is normal.      Breath sounds: Normal breath sounds.   Skin:     General: Skin is warm and dry.   Psychiatric:         Mood and Affect: Mood normal.         Behavior: Behavior normal.       Neurologic Exam  Mental status: Awake, alert, oriented to person and place.  He did not know the name for \"karishma.\"  He called it an Octopus.  Delayed recall 0/5.  He did not know the name at our facility. MOCA 22/30 (24/30 in March 2024).    Negative glabellar, snout, palmomental and grasp reflexes.    Cranial nerves: Extraocular movements intact.  Pupils equally round and reactive to light.  Visual fields full to confrontation.  Facial sensation intact.  Face symmetric.  Speech clear. Hearing impaired.  Tongue, uvula, palate midline and intact.  Sternocleidomastoid intact.    Motor:   Deltoid: Right 5/5, left 5/5  Biceps: Right 5/5, left 5/5  Triceps: Right 5/5, left 5/5  : Right 5/5, left 5/5  Abductor digiti minimi: Right 5/5, left 5-/5  Abductor pollicis brevis: Right 5/5, left 5/5    Iliopsoas: Right 5/5, left 5/5  Quadriceps: Right 5/5, left 5/5  Tibialis anterior: Right 5/5, left 5/5  Medial gastrocnemius: Right 5/5, left 5/5  Extensor hallucis longus: Right 4/5, left 4/5  Abductor hallucis: Right 4+/5, left 4+/5    Normal arches.  No hammertoes.    Cerebellar: No dysmetria.  Heel-to-shin intact.  Wide-based gait.    Reflexes:   Biceps: Right 0, left 1+  Triceps: Right 0, left 0  Brachioradialis: Right 0, left 0  Patellar: Right 0, left 0  Achilles: Right 0, left 0  Plantar responses flexor bilaterally.    Sensory: Light touch intact.  Slightly reduced temperature in the lower extremities.  Vibration 3 seconds at the great toes.        DATA:  March 2024:  WBC 6.5  Hemoglobin 12.1  Platelets 213  BUN 17  Creatinine 0.9  AST " 25  ALT 30  Beta-2 microglobulin 3.3  SPEP with ZACK: Monoclonal spike in the beta/gamma region  Free kappa light chain 33.52  Free lambda light chain 110.44  Kappa to lambda ratio 0.30  Anti-MAG antibody 9356 (done at [facility])    11/30/23:  ZACK: monoclonal protein IgM lamda    EMG LUE/LLE 9/4/24:  Abnormal study.   --These electrodiagnostic findings are most consistent with a generalized, mild to moderate, predominantly axonal sensorimotor neuropathy, likely secondary to this patient's underlying history of diabetes.   --In addition, there is evidence to support a chronic ulnar neuropathy in the left upper extremity, localized best across the elbow, consistent with the clinical diagnosis of cubital tunnel syndrome.    CT chest abdomen and pelvis 3/19/2024:  -No acute pathology of chest, abdomen or pelvis.    -Simple renal cysts.  Stable.    -Gallstones.  Stable.        Administrative Statements   I have spent a total time of 50 minutes in caring for this patient on the day of the visit/encounter including Diagnostic results, Prognosis, Risks and benefits of tx options, Instructions for management, Patient and family education, Impressions, Counseling / Coordination of care, Documenting in the medical record, Reviewing / ordering tests, medicine, procedures  , and Obtaining or reviewing history  .

## 2024-10-08 ENCOUNTER — HOSPITAL ENCOUNTER (OUTPATIENT)
Dept: HOSPITAL 99 - REG | Age: 79
End: 2024-10-08
Payer: MEDICARE

## 2024-10-08 DIAGNOSIS — R53.1: ICD-10-CM

## 2024-10-08 DIAGNOSIS — E11.69: Primary | ICD-10-CM

## 2024-10-08 LAB
ALBUMIN SERPL-MCNC: 4.2 G/DL (ref 3.5–5)
ALP SERPL-CCNC: 113 U/L (ref 38–126)
ALT SERPL-CCNC: 27 U/L (ref 0–50)
AST SERPL-CCNC: 28 U/L (ref 17–59)
BUN SERPL-MCNC: 17 MG/DL (ref 9–20)
CALCIUM SERPL-MCNC: 9.1 MG/DL (ref 8.4–10.2)
CHLORIDE SERPL-SCNC: 104 MMOL/L (ref 98–107)
CO2 SERPL-SCNC: 25 MMOL/L (ref 22–30)
EGFR: > 60
ERYTHROCYTE [DISTWIDTH] IN BLOOD BY AUTOMATED COUNT: 12.9 % (ref 11.5–14.5)
GLUCOSE SERPL-MCNC: 113 MG/DL (ref 70–99)
HCT VFR BLD AUTO: 38 % (ref 39–52)
HGB BLD-MCNC: 12.9 G/DL (ref 13–18)
MCHC RBC AUTO-ENTMCNC: 33.9 G/DL (ref 33–37)
MCV RBC AUTO: 103 FL (ref 80–94)
NRBC BLD AUTO-RTO: 0 %
PLATELET # BLD AUTO: 232 10^3/UL (ref 130–400)
POTASSIUM SERPL-SCNC: 4.7 MMOL/L (ref 3.5–5.1)
PROT SERPL-MCNC: 7.5 G/DL (ref 6.3–8.2)
SODIUM SERPL-SCNC: 140 MMOL/L (ref 135–145)

## 2024-10-09 LAB — HBA1C MFR BLD: 6.4 % (ref 4–5.6)

## 2024-10-17 DIAGNOSIS — E78.00 PURE HYPERCHOLESTEROLEMIA: ICD-10-CM

## 2024-10-18 RX ORDER — ROSUVASTATIN CALCIUM 20 MG/1
TABLET, COATED ORAL
Qty: 90 TABLET | Refills: 2 | Status: SHIPPED | OUTPATIENT
Start: 2024-10-18

## 2024-10-25 ENCOUNTER — TELEPHONE (OUTPATIENT)
Dept: NEUROLOGY | Facility: CLINIC | Age: 79
End: 2024-10-25

## 2024-10-25 NOTE — TELEPHONE ENCOUNTER
----- Message from Ellen Zhao MD sent at 10/11/2024  9:57 AM EDT -----  Regarding: please obtain records  HI Valarie,    Please try to get the bone marrow biopsy report and last hematology office note for this patient.     He sees Dr Kali Toribio at [facility].     Thanks!  RC

## 2024-10-25 NOTE — TELEPHONE ENCOUNTER
Message sent to pt re who his hematologist is-not sure if he was ever seen by Hem/onc-per chat msg in dec, pt was contacted by them re scheduling appt

## 2024-11-15 ENCOUNTER — HOSPITAL ENCOUNTER (OUTPATIENT)
Dept: HOSPITAL 99 - REG | Age: 79
End: 2024-11-15
Payer: MEDICARE

## 2024-11-15 DIAGNOSIS — D47.2: Primary | ICD-10-CM

## 2024-11-15 DIAGNOSIS — C88.00: ICD-10-CM

## 2024-11-15 LAB
ALBUMIN SERPL-MCNC: 4 G/DL (ref 3.5–5)
ALP SERPL-CCNC: 109 U/L (ref 38–126)
ALT SERPL-CCNC: 26 U/L (ref 0–50)
AST SERPL-CCNC: 20 U/L (ref 17–59)
BUN SERPL-MCNC: 13 MG/DL (ref 9–20)
CALCIUM SERPL-MCNC: 9.2 MG/DL (ref 8.4–10.2)
CHLORIDE SERPL-SCNC: 103 MMOL/L (ref 98–107)
CO2 SERPL-SCNC: 26 MMOL/L (ref 22–30)
EGFR: > 60
ERYTHROCYTE [DISTWIDTH] IN BLOOD BY AUTOMATED COUNT: 13.2 % (ref 11.5–14.5)
GLUCOSE SERPL-MCNC: 154 MG/DL (ref 70–99)
HCT VFR BLD AUTO: 35.9 % (ref 39–52)
HGB BLD-MCNC: 12.1 G/DL (ref 13–18)
MCHC RBC AUTO-ENTMCNC: 33.7 G/DL (ref 33–37)
MCV RBC AUTO: 101.7 FL (ref 80–94)
NRBC BLD AUTO-RTO: 0 %
PLATELET # BLD AUTO: 240 10^3/UL (ref 130–400)
POTASSIUM SERPL-SCNC: 4.4 MMOL/L (ref 3.5–5.1)
PROT SERPL-MCNC: 7.4 G/DL (ref 6.3–8.2)
SODIUM SERPL-SCNC: 142 MMOL/L (ref 135–145)

## 2024-11-27 DIAGNOSIS — F32.A DEPRESSION, UNSPECIFIED DEPRESSION TYPE: ICD-10-CM

## 2024-12-02 RX ORDER — CITALOPRAM HYDROBROMIDE 10 MG/1
10 TABLET ORAL DAILY
Qty: 90 TABLET | Refills: 0 | Status: SHIPPED | OUTPATIENT
Start: 2024-12-02

## 2025-01-08 ENCOUNTER — TELEPHONE (OUTPATIENT)
Age: 80
End: 2025-01-08

## 2025-01-08 RX ORDER — IBUPROFEN 200 MG
800 TABLET ORAL
COMMUNITY
Start: 2024-07-31

## 2025-01-08 NOTE — TELEPHONE ENCOUNTER
01/08/25    Patient Wife called office today returning phone call made to her from our office.    Miss. Cazares accidentally disconnected the call.    Per Patient Wife she stated that the conversation was in regards of patient being cleared for his MRI.      DID NOT FIND ANY DOCUMENTATION IN-REGARDS OF THE MATTER.      Miss. Cazares stated that patient needs a clearance to be under anesthesia for his MRI, that he was advised to each out to his PCP for the clearance.    But Unfortunately, Per Miss. Cazares, Patient PCP did not know anything about the Matter and advice to reach out to his Neurologist for his Clearance.    Per Miss. Cazares the MRI Department is requesting the Clearance.    Can anyone please Review and Contact Patient Wife with any status.   Thank You.       MRI APPT SCHEDULED 01/23/25

## 2025-01-08 NOTE — PRE-PROCEDURE INSTRUCTIONS
Pre-Surgery Instructions:   Medication Instructions    carteolol (OCUPRESS) 1 % ophthalmic solution Take day of surgery.    citalopram (CeleXA) 10 mg tablet Take day of surgery.    diltiazem (CARDIZEM CD) 180 mg 24 hr capsule Take day of surgery.    Eliquis 5 MG Take day of surgery.    fluticasone (FLONASE) 50 mcg/act nasal spray Take day of surgery.    furosemide (LASIX) 40 mg tablet Hold day of surgery.    glimepiride (AMARYL) 1 mg tablet Hold day of surgery.    ibuprofen (MOTRIN) 200 mg tablet Hold day of surgery.    losartan (COZAAR) 100 MG tablet Hold day of surgery.    metoprolol succinate (TOPROL-XL) 50 mg 24 hr tablet Take day of surgery.    rosuvastatin (CRESTOR) 20 MG tablet Take night before surgery    tamsulosin (FLOMAX) 0.4 mg Take night before surgery    trospium chloride (SANCTURA) 20 mg tablet Take day of surgery.    Medication instructions for day of procedure reviewed. Please use only a sip of water to take your instructed morning medications (if any).      You will receive a call one business day prior to procedure with an arrival time and hospital directions. If procedure is scheduled on a Monday, the hospital will be calling you on the Friday prior to your procedure. If you have not heard from anyone by 8pm, please call the hospital supervisor through the hospital  at [phone]. (Eddie [phone]).     Please do not eat or drink after 11 pm the night before the MRI. Please take your medications with a sip of water at least 2 hours prior to their arrival time.     Please shower either the night prior to the procedure or the morning of the procedure. Dress in clean, comfortable clothes. All patients will be required to change into a hospital gown. Street clothes are not permitted in the MRI. Magnetic nail polish must be removed prior to the arrival.      Keep any valuables, jewelry, piercings at home.     Please bring your insurance cards, a form of photo ID, physician order, and a  list of medications with you. Arrive 15 minutes prior to your given arrival time in order to register. Please bring any prior CT or MRI studies of the same area that were not performed at a [facility] along.      Arrange for a responsible person to drive patient to and from the hospital on the day of the procedure. Visitor Guidelines discussed.     Call the prescribing physician's office with any new illnesses, exposures, or additional questions prior to procedure.

## 2025-01-09 ENCOUNTER — TELEPHONE (OUTPATIENT)
Age: 80
End: 2025-01-09

## 2025-01-09 NOTE — TELEPHONE ENCOUNTER
Outbound call made to PCP office and CTS spoke with KINGS Schroeder. It was requested Primary Care provider provide MRI anesthesia clearance for MRI scheduled 01/23/2025 at 2 pm. Our office number was left if there are additional questions [phone].     Outbound call made to Patient and Patient wife, they clarified they no longer see Dr. Michel and have been seeing Dr. Rod Cook,  office phone number [phone] since they moved in June 2022. Primary care provider updated in chart.     Outbound call made to Dr. Cook's office. They wanted to know what was exactly needed, if there was a form they needed to fill out or a letter.     Outbound call made to Dr. Michel's office to retract request for clearance and to make them aware Patient no longer considers Dr. Michel his PCP, he has been seeing Dr. Renteria since 2022.     Outbound call made to [facility] MRI department to clarify what is required for MRI anesthesia clearance. A voicemail was left requesting a call back to us or Dr. Renteria's office to clarify what information is needed for the anesthesia clearance.     Outbound call made to Patient and his wife, they were made aware the PCP Dr. Renteria's office was contacted and we are in communication. Patient wife asked if Gucci FRYE needed bloodwork before the MRI because she thinks [facility] lab called her saying they missed an appointment. Patient wife referred to check with their PCP as they may have ordered bloodwork. After call it was discovered per our chart Dr. Zhao ordered a Ha1c on 10/07/2024.    Outbound call made to Patient wife and she was made aware Dr. Zhao ordered an Ha1c in October. Patiet wife said they spoke to the Endocrinologist at [facility] and they said he is pre diabetic and are adjusting his medications.     Dr. Zhao sent to you for awareness they followed up regarding diabetes with [facility]. Thank you!    Gucci FRYE/Sammy  Jenna STACY: Please be aware Patient's PCP is Dr. Renteria (since 2022) and they are trying to clarify what is needed for Anesthesia clearance. Thank you!

## 2025-01-09 NOTE — TELEPHONE ENCOUNTER
Erika from [facility] called because she said she called earlier in regards to getting patient cleared for an MRI. She is calling back to say that patient told her Dr Michel is no longer his PCP and hasn't been since 2022. She wanted to just let the office know since Dr Michel has been sending refills for the patient for certain medications. Erika said that Dr Rod Cook is patient's current PCP.

## 2025-01-09 NOTE — TELEPHONE ENCOUNTER
Dr. Michel was removed as pcp and new pcp was put.     New pcp will have to take over the medications that Dr. Michel was refilling

## 2025-01-10 NOTE — TELEPHONE ENCOUNTER
Outbound call made to [place] and she states anesthesia is still reviewing the office note and it is not sure if there is additional info like an EKG needed. They will reach out to Dr. Renteria's office directly if it satisfies the clearance.

## 2025-01-10 NOTE — TELEPHONE ENCOUNTER
Outbound call made to [place] in preadmission testing and she confirmed Gucci FRYE's advisement. America was given Dr. Renteria's office phone number to follow up with them regarding requirements.

## 2025-01-10 NOTE — TELEPHONE ENCOUNTER
America from pre admission testing returning call from [employer]. America can be reached directly at [phone].

## 2025-01-10 NOTE — TELEPHONE ENCOUNTER
"UDAY Harrell to Me   1/9/25  2:02 PM  Usually clearance from the primary care office involves a history and physical (office visit) and possibly EKG (if indicated) within a month of the time he is to go into the MRI (not sure his last office visit with Dr. Cook).  -Gucci  --------------------------------------------------    Outbound call made to MRI to further clarify and gather information regarding where the information should be sent from the PCP office. MRI thinks the information may need to go to \"preadmission testing\" [phone] but they did not have the fax number.     Outbound call made to \"preadmission testing\" unsuccessful and a voicemail was left for return call to clarify where the office should send the information for clearance.     Outbound call made to Dr. Renteria's office. Last time he was in was in October. His next follow up appointment is in February. Dr. Renteria's office was given preadmission testing's phone number so they may also follow up. They are going to see if they can get him in before January 23rd so the MRI can be done but they did say the provider is very busy. They will call back with any update. Please call their office if we find out any additional info from preadmission testing.     Gucci FRYE: sent for your awareness of status. MRI may have to be rescheduled if they cannot get clearance done in time.Thank you!      "

## 2025-01-10 NOTE — TELEPHONE ENCOUNTER
America called in to advise she found an h&p from endocrinologist pt recently saw that can be used for the MRI. She confirmed there is nothing else needed from our office.

## 2025-01-23 ENCOUNTER — HOSPITAL ENCOUNTER (OUTPATIENT)
Dept: RADIOLOGY | Facility: HOSPITAL | Age: 80
Discharge: HOME | End: 2025-01-23
Payer: MEDICARE

## 2025-01-23 ENCOUNTER — ANESTHESIA (OUTPATIENT)
Dept: RADIOLOGY | Facility: HOSPITAL | Age: 80
End: 2025-01-23
Payer: MEDICARE

## 2025-01-23 ENCOUNTER — ANESTHESIA EVENT (OUTPATIENT)
Dept: RADIOLOGY | Facility: HOSPITAL | Age: 80
End: 2025-01-23
Payer: MEDICARE

## 2025-01-23 VITALS
BODY MASS INDEX: 43.79 KG/M2 | HEART RATE: 58 BPM | SYSTOLIC BLOOD PRESSURE: 174 MMHG | HEIGHT: 67 IN | TEMPERATURE: 96.3 F | OXYGEN SATURATION: 97 % | RESPIRATION RATE: 20 BRPM | WEIGHT: 279 LBS | DIASTOLIC BLOOD PRESSURE: 65 MMHG

## 2025-01-23 DIAGNOSIS — R41.3 MEMORY LOSS: ICD-10-CM

## 2025-01-23 LAB — GLUCOSE SERPL-MCNC: 129 MG/DL (ref 65–140)

## 2025-01-23 PROCEDURE — 70551 MRI BRAIN STEM W/O DYE: CPT

## 2025-01-23 PROCEDURE — 82948 REAGENT STRIP/BLOOD GLUCOSE: CPT

## 2025-01-23 RX ORDER — SODIUM CHLORIDE, SODIUM LACTATE, POTASSIUM CHLORIDE, CALCIUM CHLORIDE 600; 310; 30; 20 MG/100ML; MG/100ML; MG/100ML; MG/100ML
INJECTION, SOLUTION INTRAVENOUS CONTINUOUS PRN
Status: DISCONTINUED | OUTPATIENT
Start: 2025-01-23 | End: 2025-01-23

## 2025-01-23 RX ORDER — ONDANSETRON 2 MG/ML
INJECTION INTRAMUSCULAR; INTRAVENOUS AS NEEDED
Status: DISCONTINUED | OUTPATIENT
Start: 2025-01-23 | End: 2025-01-23

## 2025-01-23 RX ORDER — PROPOFOL 10 MG/ML
INJECTION, EMULSION INTRAVENOUS AS NEEDED
Status: DISCONTINUED | OUTPATIENT
Start: 2025-01-23 | End: 2025-01-23

## 2025-01-23 RX ORDER — EPHEDRINE SULFATE 50 MG/ML
INJECTION INTRAVENOUS AS NEEDED
Status: DISCONTINUED | OUTPATIENT
Start: 2025-01-23 | End: 2025-01-23

## 2025-01-23 RX ADMIN — ONDANSETRON 4 MG: 2 INJECTION INTRAMUSCULAR; INTRAVENOUS at 15:24

## 2025-01-23 RX ADMIN — SODIUM CHLORIDE, SODIUM LACTATE, POTASSIUM CHLORIDE, AND CALCIUM CHLORIDE: .6; .31; .03; .02 INJECTION, SOLUTION INTRAVENOUS at 14:27

## 2025-01-23 RX ADMIN — SODIUM CHLORIDE, SODIUM LACTATE, POTASSIUM CHLORIDE, AND CALCIUM CHLORIDE: .6; .31; .03; .02 INJECTION, SOLUTION INTRAVENOUS at 14:40

## 2025-01-23 RX ADMIN — PROPOFOL 60 MG: 10 INJECTION, EMULSION INTRAVENOUS at 14:40

## 2025-01-23 RX ADMIN — EPHEDRINE SULFATE 5 MG: 50 INJECTION, SOLUTION INTRAVENOUS at 14:57

## 2025-01-23 NOTE — NURSING NOTE
Brain MRI completed, patient tolerated procedure. Post vital signs taken and recorded. Report given to  APU nurse Lisa. Patient placed in transport to be taken to APU. Patient offers no complaints or verbalizing any issues upon leaving MRI.

## 2025-01-23 NOTE — ANESTHESIA PREPROCEDURE EVALUATION
"Procedure:  MRI BRAIN NEUROQUANT WO CONTRAST    Relevant Problems   CARDIO   (+) Bilateral carotid artery disease, unspecified type (HCC)   (+) Essential hypertension   (+) Hyperlipidemia      ENDO   (+) Type 2 diabetes mellitus with complication (HCC)      GI/HEPATIC   (+) Gastroesophageal reflux disease      /RENAL   (+) BPH with urinary obstruction   (+) Stage 3 chronic kidney disease, unspecified whether stage 3a or 3b CKD (HCC)      HEMATOLOGY   (+) Anemia      MUSCULOSKELETAL   (+) Chronic back pain   (+) Chronic right-sided low back pain without sciatica   (+) Lumbar spondylosis      NEURO/PSYCH   (+) Chronic back pain   (+) Chronic right-sided low back pain without sciatica   (+) Dementia (HCC)   (+) Partial seizure (HCC)      PULMONARY   (+) Obstructive sleep apnea syndrome   (+) Shortness of breath          Lab Results   Component Value Date    SODIUM 139 05/23/2022    K 4.1 05/23/2022    BUN 15 05/23/2022    CREATININE 1.27 05/23/2022    EGFR 53 05/23/2022    GLUCOSE 125 01/19/2015     Lab Results   Component Value Date    HGBA1C 6.7 (H) 04/20/2022       Lab Results   Component Value Date    HGB 11.0 (L) 05/18/2022    HGB 11.1 (L) 05/17/2022    HGB 11.0 (L) 04/20/2022     05/18/2022     05/17/2022     05/17/2022     Lab Results   Component Value Date    WBC 6.28 05/18/2022       Lab Results   Component Value Date    CREATININE 1.27 05/23/2022    CREATININE 1.38 (H) 05/18/2022    CREATININE 1.40 (H) 05/17/2022       Lab Results   Component Value Date    INR 0.98 01/10/2021     Lab Results   Component Value Date    PTT 35 01/11/2021    PTT 28 01/10/2021    PTT 18 (L) 01/10/2021       No results found for: \"LACTATE\"                      Physical Exam    Airway    Mallampati score: III  TM Distance: >3 FB       Dental   No notable dental hx     Cardiovascular      Pulmonary      Other Findings  Prior 2-H MV, LMA 5 seated without issue in 2022  No edema in LEs      Anesthesia Plan  ASA " Score- 4     Anesthesia Type- general with ASA Monitors.         Additional Monitors:     Airway Plan: LMA.    Comment:  I, Aguilar Newell M.D., have personally seen and evaluated the patient prior to anesthetic care.  I have reviewed the pre-anesthetic record, and other medical records if appropriate to the anesthetic care.  If a CRNA is involved in the case, I have reviewed the CRNA assessment, if present, and agree.     I spoke with R/B of proceeding today with Debora (spouse).  Patient had an episode of pericarditis in 2022 which required hospitalization at that time.  Last summer, he was hospitalized with a.fib/CHF.  In speaking with Debora, she reports that although he is less active with ADLs, he does not complain of SOB.  I obtained a fax of his most recent OV with EP doc dated 8/14/24.  Notes indicate LVEF 60-65% with AoV sclerosis and mild MR;  the report is not available for review and there is not commentary on right heart function in the notes.    I believe it is reasonable to proceed without further evaluation as the patient is without active cardiac symptoms, and further testing/intervention is unlikely to modify risk.  I reviewed this with Debora, who verbalized an understanding of the decision-making and agreed with proceeding.    Risks/benefits and alternatives discussed with patient including possible PONV, sore throat, and possibility of rare anesthetic and surgical emergencies.  .       Plan Factors-Exercise tolerance (METS): <4 METS.    Chart reviewed.   Existing labs reviewed. Patient summary reviewed.    Patient is not a current smoker.  Patient instructed to abstain from smoking on day of procedure. Patient did not smoke on day of surgery.    There is medical exclusion for perioperative obstructive sleep apnea risk education.        Induction- intravenous.    Postoperative Plan-         Informed Consent- Anesthetic plan and risks discussed with patient.  I personally reviewed this patient  with the CRNA. Discussed and agreed on the Anesthesia Plan with the CRNA..      NPO Status:  Vitals Value Taken Time   Date of last liquid 01/23/25 01/23/25 1327   Time of last liquid 1300 01/23/25 1327   Date of last solid 01/22/25 01/23/25 1327   Time of last solid 1900 01/23/25 1327

## 2025-01-23 NOTE — ANESTHESIA POSTPROCEDURE EVALUATION
Post-Op Assessment Note    CV Status:  Stable    Pain management: adequate       Mental Status:  Alert and awake   Hydration Status:  Euvolemic   PONV Controlled:  Controlled   Airway Patency:  Patent     Post Op Vitals Reviewed: Yes    No anethesia notable event occurred.    Staff: Anesthesiologist, CRNA           Last Filed PACU Vitals:  Vitals Value Taken Time   Temp     Pulse 65    /82    Resp 16    SpO2 96

## 2025-01-24 ENCOUNTER — RESULTS FOLLOW-UP (OUTPATIENT)
Dept: NEUROLOGY | Facility: CLINIC | Age: 80
End: 2025-01-24

## 2025-01-24 NOTE — ANESTHESIA POSTPROCEDURE EVALUATION
Post-Op Assessment Note    CV Status:  Stable    Pain management: adequate    Multimodal analgesia used between 6 hours prior to anesthesia start to PACU discharge    Mental Status:  Alert and awake   Hydration Status:  Stable   PONV Controlled:  Controlled   Airway Patency:  Patent  Two or more mitigation strategies used for obstructive sleep apnea   Post Op Vitals Reviewed: Yes    No anethesia notable event occurred.    Staff: Anesthesiologist           Last Filed PACU Vitals:  Vitals Value Taken Time   Temp     Pulse     BP     Resp     SpO2

## 2025-02-17 DIAGNOSIS — R39.15 URGENCY OF URINATION: ICD-10-CM

## 2025-02-17 RX ORDER — TROSPIUM CHLORIDE 20 MG/1
20 TABLET, FILM COATED ORAL 2 TIMES DAILY
Qty: 180 TABLET | Refills: 1 | Status: SHIPPED | OUTPATIENT
Start: 2025-02-17

## 2025-03-26 DIAGNOSIS — F32.A DEPRESSION, UNSPECIFIED DEPRESSION TYPE: ICD-10-CM

## 2025-03-27 NOTE — TELEPHONE ENCOUNTER
No longer a pt in our office since 2022.     Encounter date 01/09/2025   Pt new PCP is Dr. Rod Cook. All medication refills will have to go through him.

## 2025-03-28 ENCOUNTER — HOSPITAL ENCOUNTER (OUTPATIENT)
Dept: HOSPITAL 99 - REG | Age: 80
End: 2025-03-28
Payer: MEDICARE

## 2025-03-28 DIAGNOSIS — D47.2: Primary | ICD-10-CM

## 2025-03-28 DIAGNOSIS — C88.00: ICD-10-CM

## 2025-03-28 LAB
ALBUMIN SERPL-MCNC: 4.3 G/DL (ref 3.5–5)
ALP SERPL-CCNC: 110 U/L (ref 38–126)
ALT SERPL-CCNC: 20 U/L (ref 0–50)
AST SERPL-CCNC: 19 U/L (ref 17–59)
BUN SERPL-MCNC: 15 MG/DL (ref 9–20)
CALCIUM SERPL-MCNC: 9.5 MG/DL (ref 8.4–10.2)
CHLORIDE SERPL-SCNC: 101 MMOL/L (ref 98–107)
CO2 SERPL-SCNC: 26 MMOL/L (ref 22–30)
EGFR: > 60
ERYTHROCYTE [DISTWIDTH] IN BLOOD BY AUTOMATED COUNT: 13.1 % (ref 11.5–14.5)
GLUCOSE SERPL-MCNC: 207 MG/DL (ref 70–99)
HCT VFR BLD AUTO: 36.6 % (ref 39–52)
HGB BLD-MCNC: 12.5 G/DL (ref 13–18)
MCHC RBC AUTO-ENTMCNC: 34.2 G/DL (ref 33–37)
MCV RBC AUTO: 101.1 FL (ref 80–94)
NRBC BLD AUTO-RTO: 0 %
PLATELET # BLD AUTO: 225 10^3/UL (ref 130–400)
POTASSIUM SERPL-SCNC: 5 MMOL/L (ref 3.5–5.1)
PROT SERPL-MCNC: 7.8 G/DL (ref 6.3–8.2)
SODIUM SERPL-SCNC: 139 MMOL/L (ref 135–145)

## 2025-03-28 RX ORDER — CITALOPRAM HYDROBROMIDE 10 MG/1
10 TABLET ORAL DAILY
Qty: 90 TABLET | Refills: 0 | OUTPATIENT
Start: 2025-03-28

## 2025-04-15 ENCOUNTER — TELEPHONE (OUTPATIENT)
Dept: NEUROLOGY | Facility: CLINIC | Age: 80
End: 2025-04-15

## 2025-04-15 NOTE — TELEPHONE ENCOUNTER
Called pt to schedule a follow up appt with Gucci. Pt is overdue. LMOM     Pt does not need to see Dr. Zhao.